# Patient Record
Sex: MALE | Race: BLACK OR AFRICAN AMERICAN | NOT HISPANIC OR LATINO | Employment: OTHER | URBAN - METROPOLITAN AREA
[De-identification: names, ages, dates, MRNs, and addresses within clinical notes are randomized per-mention and may not be internally consistent; named-entity substitution may affect disease eponyms.]

---

## 2023-05-22 LAB
ALBUMIN (G/DL) IN SER/PLAS: 4 G/DL (ref 3.4–5)
ALBUMIN (MG/L) IN URINE: 60 MG/L
ALBUMIN/CREATININE (UG/MG) IN URINE: 66.5 UG/MG CRT (ref 0–30)
ANION GAP IN SER/PLAS: 13 MMOL/L (ref 10–20)
APPEARANCE, URINE: CLEAR
BILIRUBIN, URINE: NEGATIVE
BLOOD, URINE: NEGATIVE
CALCIDIOL (25 OH VITAMIN D3) (NG/ML) IN SER/PLAS: 35 NG/ML
CALCIUM (MG/DL) IN SER/PLAS: 9.2 MG/DL (ref 8.6–10.6)
CARBON DIOXIDE, TOTAL (MMOL/L) IN SER/PLAS: 25 MMOL/L (ref 21–32)
CHLORIDE (MMOL/L) IN SER/PLAS: 106 MMOL/L (ref 98–107)
COLOR, URINE: YELLOW
CREATININE (MG/DL) IN SER/PLAS: 1.54 MG/DL (ref 0.5–1.3)
CREATININE (MG/DL) IN URINE: 90.2 MG/DL (ref 20–370)
ERYTHROCYTE DISTRIBUTION WIDTH (RATIO) BY AUTOMATED COUNT: 14.8 % (ref 11.5–14.5)
ERYTHROCYTE MEAN CORPUSCULAR HEMOGLOBIN CONCENTRATION (G/DL) BY AUTOMATED: 33.1 G/DL (ref 32–36)
ERYTHROCYTE MEAN CORPUSCULAR VOLUME (FL) BY AUTOMATED COUNT: 89 FL (ref 80–100)
ERYTHROCYTES (10*6/UL) IN BLOOD BY AUTOMATED COUNT: 3.58 X10E12/L (ref 4.5–5.9)
GFR MALE: 47 ML/MIN/1.73M2
GLUCOSE (MG/DL) IN SER/PLAS: 138 MG/DL (ref 74–99)
GLUCOSE, URINE: NEGATIVE MG/DL
HEMATOCRIT (%) IN BLOOD BY AUTOMATED COUNT: 31.7 % (ref 41–52)
HEMOGLOBIN (G/DL) IN BLOOD: 10.5 G/DL (ref 13.5–17.5)
KETONES, URINE: NEGATIVE MG/DL
LEUKOCYTE ESTERASE, URINE: NEGATIVE
LEUKOCYTES (10*3/UL) IN BLOOD BY AUTOMATED COUNT: 3.3 X10E9/L (ref 4.4–11.3)
NITRITE, URINE: NEGATIVE
NRBC (PER 100 WBCS) BY AUTOMATED COUNT: 0 /100 WBC (ref 0–0)
PH, URINE: 6 (ref 5–8)
PHOSPHATE (MG/DL) IN SER/PLAS: 3.1 MG/DL (ref 2.5–4.9)
PLATELETS (10*3/UL) IN BLOOD AUTOMATED COUNT: 157 X10E9/L (ref 150–450)
POTASSIUM (MMOL/L) IN SER/PLAS: 3.7 MMOL/L (ref 3.5–5.3)
PROTEIN, URINE: NEGATIVE MG/DL
SODIUM (MMOL/L) IN SER/PLAS: 140 MMOL/L (ref 136–145)
SPECIFIC GRAVITY, URINE: 1.01 (ref 1–1.03)
URATE (MG/DL) IN SER/PLAS: 6 MG/DL (ref 4–7.5)
UREA NITROGEN (MG/DL) IN SER/PLAS: 12 MG/DL (ref 6–23)
UROBILINOGEN, URINE: <2 MG/DL (ref 0–1.9)

## 2023-07-25 LAB — SARS-COV-2 RESULT: NOT DETECTED

## 2023-09-06 PROBLEM — M1A.00X0 CHRONIC GOUTY ARTHROPATHY: Status: ACTIVE | Noted: 2023-09-06

## 2023-09-06 PROBLEM — E87.1 LOW SODIUM LEVELS: Status: ACTIVE | Noted: 2023-09-06

## 2023-09-06 PROBLEM — H90.3 BILATERAL SENSORINEURAL HEARING LOSS: Status: ACTIVE | Noted: 2023-09-06

## 2023-09-06 PROBLEM — D47.2 SMOLDERING MULTIPLE MYELOMA (SMM): Status: ACTIVE | Noted: 2023-09-06

## 2023-09-06 PROBLEM — T50.905A ADVERSE EFFECT OF DRUG: Status: ACTIVE | Noted: 2023-09-06

## 2023-09-06 PROBLEM — I49.3 PVC (PREMATURE VENTRICULAR CONTRACTION): Status: ACTIVE | Noted: 2023-09-06

## 2023-09-06 PROBLEM — M17.11 RIGHT KNEE DJD: Status: ACTIVE | Noted: 2023-09-06

## 2023-09-06 PROBLEM — E78.00 HYPERCHOLESTEROLEMIA: Status: ACTIVE | Noted: 2023-09-06

## 2023-09-06 PROBLEM — H61.23 HEARING LOSS SECONDARY TO CERUMEN IMPACTION, BILATERAL: Status: ACTIVE | Noted: 2023-09-06

## 2023-09-06 PROBLEM — I26.99 PULMONARY EMBOLUS AND INFARCTION (MULTI): Status: ACTIVE | Noted: 2023-09-06

## 2023-09-06 PROBLEM — R07.9 CHEST PAIN: Status: ACTIVE | Noted: 2023-09-06

## 2023-09-06 PROBLEM — R68.82 LOW LIBIDO: Status: ACTIVE | Noted: 2023-09-06

## 2023-09-06 PROBLEM — M25.559 PAINFUL HIP: Status: ACTIVE | Noted: 2023-09-06

## 2023-09-06 PROBLEM — C90.00: Status: ACTIVE | Noted: 2023-09-06

## 2023-09-06 PROBLEM — M62.81 MUSCLE WEAKNESS: Status: ACTIVE | Noted: 2023-09-06

## 2023-09-06 PROBLEM — R55 SYNCOPE AND COLLAPSE: Status: ACTIVE | Noted: 2023-09-06

## 2023-09-06 PROBLEM — H93.13 SUBJECTIVE TINNITUS OF BOTH EARS: Status: ACTIVE | Noted: 2023-09-06

## 2023-09-06 PROBLEM — N18.9 CHRONIC RENAL INSUFFICIENCY: Status: ACTIVE | Noted: 2023-09-06

## 2023-09-06 PROBLEM — M10.9 GOUT OF RIGHT KNEE: Status: ACTIVE | Noted: 2023-09-06

## 2023-09-06 PROBLEM — D53.1 MEGALOBLASTIC ANEMIA: Status: ACTIVE | Noted: 2023-09-06

## 2023-09-06 PROBLEM — E86.0 DEHYDRATION: Status: ACTIVE | Noted: 2023-09-06

## 2023-09-06 PROBLEM — B35.9 DERMATOPHYTOSIS: Status: ACTIVE | Noted: 2023-09-06
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
## 2023-09-06 PROBLEM — L02.92 FURUNCULOSIS: Status: ACTIVE | Noted: 2023-09-06

## 2023-09-06 PROBLEM — M16.10 ARTHROPATHY OF HIP: Status: ACTIVE | Noted: 2023-09-06

## 2023-09-06 PROBLEM — I49.9 ARRHYTHMIA: Status: ACTIVE | Noted: 2023-09-06

## 2023-09-06 PROBLEM — M1A.9XX0 CHRONIC GOUTY ARTHROPATHY: Status: ACTIVE | Noted: 2023-09-06

## 2023-09-06 PROBLEM — D63.8 ANEMIA IN OTHER CHRONIC DISEASES CLASSIFIED ELSEWHERE: Status: ACTIVE | Noted: 2023-09-06

## 2023-09-06 PROBLEM — M16.10 ARTHRITIS OF HIP: Status: ACTIVE | Noted: 2023-09-06

## 2023-09-06 PROBLEM — M54.50 LOWER BACK PAIN: Status: ACTIVE | Noted: 2023-09-06

## 2023-09-06 PROBLEM — I48.91 NEW ONSET ATRIAL FIBRILLATION (MULTI): Status: ACTIVE | Noted: 2023-09-06

## 2023-09-06 PROBLEM — H93.13 BILATERAL TINNITUS: Status: ACTIVE | Noted: 2023-09-06

## 2023-09-06 PROBLEM — R91.8 LUNG MASS: Status: ACTIVE | Noted: 2023-09-06

## 2023-09-06 PROBLEM — R05.8 RECURRENT DRY COUGH: Status: ACTIVE | Noted: 2023-09-06

## 2023-09-06 PROBLEM — K61.0 PERIANAL ABSCESS: Status: ACTIVE | Noted: 2023-09-06

## 2023-09-06 PROBLEM — M27.2 OSTEOMYELITIS OF MANDIBLE: Status: ACTIVE | Noted: 2023-09-06

## 2023-09-06 PROBLEM — R19.7 INTERMITTENT DIARRHEA: Status: ACTIVE | Noted: 2023-09-06

## 2023-09-06 PROBLEM — M51.36 LUMBAR DEGENERATIVE DISC DISEASE: Status: ACTIVE | Noted: 2023-09-06

## 2023-09-06 PROBLEM — R35.0 FREQUENT URINATION: Status: ACTIVE | Noted: 2023-09-06

## 2023-09-06 PROBLEM — E87.1 HYPONATREMIA: Status: ACTIVE | Noted: 2023-09-06

## 2023-09-06 PROBLEM — B35.4 RINGWORM OF BODY: Status: ACTIVE | Noted: 2023-09-06

## 2023-09-06 PROBLEM — L08.9 STAPH SKIN INFECTION: Status: ACTIVE | Noted: 2023-09-06

## 2023-09-06 PROBLEM — E29.1 HYPOGONADISM MALE: Status: ACTIVE | Noted: 2023-09-06

## 2023-09-06 PROBLEM — R29.898 WEAKNESS OF LEFT FOOT: Status: ACTIVE | Noted: 2023-09-06

## 2023-09-06 PROBLEM — I48.0 PAROXYSMAL ATRIAL FIBRILLATION (MULTI): Status: ACTIVE | Noted: 2023-09-06

## 2023-09-06 PROBLEM — N40.0 ENLARGED PROSTATE WITHOUT LOWER URINARY TRACT SYMPTOMS (LUTS): Status: ACTIVE | Noted: 2023-09-06

## 2023-09-06 PROBLEM — R53.83 CHEMOTHERAPY-INDUCED FATIGUE: Status: ACTIVE | Noted: 2023-09-06

## 2023-09-06 PROBLEM — R21 RASH: Status: ACTIVE | Noted: 2023-09-06

## 2023-09-06 PROBLEM — N13.8 BPH WITH OBSTRUCTION/LOWER URINARY TRACT SYMPTOMS: Status: ACTIVE | Noted: 2023-09-06

## 2023-09-06 PROBLEM — R53.81 MALAISE AND FATIGUE: Status: ACTIVE | Noted: 2023-09-06

## 2023-09-06 PROBLEM — H93.8X3 FULLNESS IN EAR, BILATERAL: Status: ACTIVE | Noted: 2023-09-06

## 2023-09-06 PROBLEM — R53.83 MALAISE AND FATIGUE: Status: ACTIVE | Noted: 2023-09-06

## 2023-09-06 PROBLEM — K21.9 GERD WITHOUT ESOPHAGITIS: Status: ACTIVE | Noted: 2023-09-06

## 2023-09-06 PROBLEM — R06.02 SOB (SHORTNESS OF BREATH): Status: ACTIVE | Noted: 2023-09-06

## 2023-09-06 PROBLEM — A09 GASTROENTERITIS, INFECTIOUS: Status: ACTIVE | Noted: 2023-09-06

## 2023-09-06 PROBLEM — N40.1 BPH WITH OBSTRUCTION/LOWER URINARY TRACT SYMPTOMS: Status: ACTIVE | Noted: 2023-09-06

## 2023-09-06 PROBLEM — T45.1X5A CHEMOTHERAPY-INDUCED FATIGUE: Status: ACTIVE | Noted: 2023-09-06

## 2023-09-06 PROBLEM — I47.19 ATRIAL TACHYCARDIA (CMS-HCC): Status: ACTIVE | Noted: 2023-09-06

## 2023-09-06 PROBLEM — I49.9 ARRHYTHMIA, VENTRICULAR: Status: ACTIVE | Noted: 2018-12-14

## 2023-09-06 PROBLEM — N31.9 NEUROGENIC BLADDER: Status: ACTIVE | Noted: 2023-09-06

## 2023-09-06 PROBLEM — R42 DIZZINESS: Status: ACTIVE | Noted: 2022-04-11

## 2023-09-06 PROBLEM — R97.20 ELEVATED PSA: Status: ACTIVE | Noted: 2023-09-06

## 2023-09-06 PROBLEM — J45.991 COUGH VARIANT ASTHMA (HHS-HCC): Status: ACTIVE | Noted: 2023-09-06

## 2023-09-06 PROBLEM — E11.9 DIABETES MELLITUS (MULTI): Status: ACTIVE | Noted: 2023-09-06

## 2023-09-06 PROBLEM — D61.818 PANCYTOPENIA (MULTI): Status: ACTIVE | Noted: 2023-09-06

## 2023-09-06 PROBLEM — I26.99 OTHER PULMONARY EMBOLISM WITHOUT ACUTE COR PULMONALE (MULTI): Status: ACTIVE | Noted: 2023-09-06

## 2023-09-06 PROBLEM — M1A.9XX1: Status: ACTIVE | Noted: 2023-09-06

## 2023-09-06 PROBLEM — T50.905A ADVERSE DRUG REACTION: Status: ACTIVE | Noted: 2023-09-06

## 2023-09-06 PROBLEM — M51.369 LUMBAR DEGENERATIVE DISC DISEASE: Status: ACTIVE | Noted: 2023-09-06

## 2023-09-06 PROBLEM — I48.91 ATRIAL FIBRILLATION WITH RVR (MULTI): Status: ACTIVE | Noted: 2023-09-06

## 2023-09-06 PROBLEM — E87.6 HYPOKALEMIA: Status: ACTIVE | Noted: 2023-09-06

## 2023-09-06 PROBLEM — R53.83 FATIGUE: Status: ACTIVE | Noted: 2023-09-06

## 2023-09-06 PROBLEM — I10 BENIGN HYPERTENSION: Status: ACTIVE | Noted: 2023-09-06

## 2023-09-06 PROBLEM — A09 DIARRHEA OF INFECTIOUS ORIGIN: Status: ACTIVE | Noted: 2023-09-06

## 2023-09-06 PROBLEM — M87.188: Status: ACTIVE | Noted: 2023-09-06

## 2023-09-06 PROBLEM — R29.898 TRANSIENT WEAKNESS OF LEFT LOWER EXTREMITY: Status: ACTIVE | Noted: 2023-09-06

## 2023-09-06 PROBLEM — R11.2 NAUSEA WITH VOMITING: Status: ACTIVE | Noted: 2023-09-06

## 2023-09-06 PROBLEM — I95.89 HYPOTENSION DUE TO HYPOVOLEMIA: Status: ACTIVE | Noted: 2023-09-06

## 2023-09-06 PROBLEM — M21.372 LEFT FOOT DROP: Status: ACTIVE | Noted: 2023-09-06

## 2023-09-06 PROBLEM — C90.00 IGG MULTIPLE MYELOMA (MULTI): Status: ACTIVE | Noted: 2023-09-06

## 2023-09-06 PROBLEM — B95.8 STAPH SKIN INFECTION: Status: ACTIVE | Noted: 2023-09-06

## 2023-09-06 PROBLEM — I95.1 ORTHOSTATIC HYPOTENSION: Status: ACTIVE | Noted: 2023-09-06

## 2023-09-06 PROBLEM — E86.1 HYPOTENSION DUE TO HYPOVOLEMIA: Status: ACTIVE | Noted: 2023-09-06

## 2023-09-06 RX ORDER — FINASTERIDE 5 MG/1
5 TABLET, FILM COATED ORAL DAILY PRN
COMMUNITY
Start: 2015-10-28

## 2023-09-06 RX ORDER — METOPROLOL TARTRATE 37.5 MG/1
TABLET, FILM COATED ORAL
COMMUNITY
Start: 2022-05-31 | End: 2023-10-11 | Stop reason: SDUPTHER

## 2023-09-06 RX ORDER — ATORVASTATIN CALCIUM 10 MG/1
1 TABLET, FILM COATED ORAL DAILY
COMMUNITY
Start: 2014-01-31 | End: 2023-10-12

## 2023-09-06 RX ORDER — ACETAMINOPHEN 500 MG
TABLET ORAL
COMMUNITY

## 2023-09-06 RX ORDER — FLUTICASONE PROPIONATE 50 MCG
1 SPRAY, SUSPENSION (ML) NASAL DAILY PRN
COMMUNITY
Start: 2021-09-03

## 2023-09-06 RX ORDER — ALLOPURINOL 300 MG/1
300 TABLET ORAL AS NEEDED
COMMUNITY
Start: 2013-11-20

## 2023-09-06 RX ORDER — SILDENAFIL 100 MG/1
100 TABLET, FILM COATED ORAL AS NEEDED
COMMUNITY
Start: 2022-02-28

## 2023-09-06 RX ORDER — FLUDROCORTISONE ACETATE 0.1 MG/1
1 TABLET ORAL DAILY
COMMUNITY
Start: 2022-05-31

## 2023-09-06 RX ORDER — PANTOPRAZOLE SODIUM 40 MG/1
1 TABLET, DELAYED RELEASE ORAL DAILY
COMMUNITY
Start: 2021-05-24

## 2023-09-06 RX ORDER — POTASSIUM CHLORIDE 750 MG/1
TABLET, FILM COATED, EXTENDED RELEASE ORAL
COMMUNITY
Start: 2022-06-15 | End: 2024-04-10 | Stop reason: ALTCHOICE

## 2023-09-06 RX ORDER — TRAMADOL HYDROCHLORIDE 50 MG/1
50 TABLET ORAL 3 TIMES DAILY PRN
COMMUNITY
Start: 2014-01-17 | End: 2023-10-13 | Stop reason: SDUPTHER

## 2023-09-06 RX ORDER — LENALIDOMIDE 5 MG/1
CAPSULE ORAL
COMMUNITY

## 2023-09-06 RX ORDER — METHYLPREDNISOLONE 4 MG/1
TABLET ORAL
COMMUNITY
Start: 2022-11-15 | End: 2023-11-14 | Stop reason: ALTCHOICE

## 2023-09-06 RX ORDER — METOPROLOL TARTRATE 25 MG/1
25 TABLET, FILM COATED ORAL 2 TIMES DAILY
COMMUNITY
Start: 2022-05-04 | End: 2023-10-11 | Stop reason: SDUPTHER

## 2023-09-06 RX ORDER — TAMSULOSIN HYDROCHLORIDE 0.4 MG/1
1 CAPSULE ORAL DAILY
COMMUNITY
Start: 2015-10-28

## 2023-09-06 RX ORDER — COLCHICINE 0.6 MG/1
0.6 TABLET ORAL DAILY PRN
COMMUNITY
Start: 2013-11-20

## 2023-09-06 RX ORDER — METOPROLOL TARTRATE 50 MG/1
1 TABLET ORAL EVERY 12 HOURS
COMMUNITY
Start: 2022-04-30 | End: 2023-12-08 | Stop reason: SDUPTHER

## 2023-09-06 RX ORDER — METFORMIN HYDROCHLORIDE 500 MG/1
500 TABLET ORAL AS NEEDED
COMMUNITY
Start: 2017-10-27

## 2023-09-06 RX ORDER — AMLODIPINE BESYLATE 5 MG/1
5 TABLET ORAL DAILY
COMMUNITY
End: 2024-04-17

## 2023-09-06 RX ORDER — ECONAZOLE NITRATE 10 MG/G
1 CREAM TOPICAL 2 TIMES DAILY
COMMUNITY
Start: 2021-05-24 | End: 2023-10-11 | Stop reason: ALTCHOICE

## 2023-09-18 DIAGNOSIS — C90.00 IGG MULTIPLE MYELOMA (MULTI): Primary | ICD-10-CM

## 2023-09-18 RX ORDER — HEPARIN 100 UNIT/ML
500 SYRINGE INTRAVENOUS AS NEEDED
Status: CANCELLED | OUTPATIENT
Start: 2023-10-02

## 2023-09-18 RX ORDER — HEPARIN SODIUM,PORCINE/PF 10 UNIT/ML
50 SYRINGE (ML) INTRAVENOUS AS NEEDED
Status: CANCELLED | OUTPATIENT
Start: 2023-10-02

## 2023-09-27 LAB
ALBUMIN (G/DL) IN SER/PLAS: 4.1 G/DL (ref 3.4–5)
ALBUMIN (MG/L) IN URINE: 19 MG/L
ALBUMIN/CREATININE (UG/MG) IN URINE: 17 UG/MG CRT (ref 0–30)
ANION GAP IN SER/PLAS: 14 MMOL/L (ref 10–20)
APPEARANCE, URINE: CLEAR
BILIRUBIN, URINE: NEGATIVE
BLOOD, URINE: NEGATIVE
CALCIUM (MG/DL) IN SER/PLAS: 9.1 MG/DL (ref 8.6–10.6)
CARBON DIOXIDE, TOTAL (MMOL/L) IN SER/PLAS: 24 MMOL/L (ref 21–32)
CHLORIDE (MMOL/L) IN SER/PLAS: 103 MMOL/L (ref 98–107)
CHOLESTEROL (MG/DL) IN SER/PLAS: 153 MG/DL (ref 0–199)
CHOLESTEROL IN HDL (MG/DL) IN SER/PLAS: 70.6 MG/DL
CHOLESTEROL/HDL RATIO: 2.2
COLOR, URINE: YELLOW
CREATININE (MG/DL) IN SER/PLAS: 1.34 MG/DL (ref 0.5–1.3)
CREATININE (MG/DL) IN URINE: 112 MG/DL (ref 20–370)
ERYTHROCYTE DISTRIBUTION WIDTH (RATIO) BY AUTOMATED COUNT: 16.2 % (ref 11.5–14.5)
ERYTHROCYTE MEAN CORPUSCULAR HEMOGLOBIN CONCENTRATION (G/DL) BY AUTOMATED: 33.1 G/DL (ref 32–36)
ERYTHROCYTE MEAN CORPUSCULAR VOLUME (FL) BY AUTOMATED COUNT: 93 FL (ref 80–100)
ERYTHROCYTES (10*6/UL) IN BLOOD BY AUTOMATED COUNT: 3.6 X10E12/L (ref 4.5–5.9)
FERRITIN (UG/LL) IN SER/PLAS: 421 UG/L (ref 20–300)
GFR MALE: 55 ML/MIN/1.73M2
GLUCOSE (MG/DL) IN SER/PLAS: 167 MG/DL (ref 74–99)
GLUCOSE, URINE: NEGATIVE MG/DL
HEMATOCRIT (%) IN BLOOD BY AUTOMATED COUNT: 33.5 % (ref 41–52)
HEMOGLOBIN (G/DL) IN BLOOD: 11.1 G/DL (ref 13.5–17.5)
IRON (UG/DL) IN SER/PLAS: 115 UG/DL (ref 35–150)
IRON BINDING CAPACITY (UG/DL) IN SER/PLAS: 319 UG/DL (ref 240–445)
IRON SATURATION (%) IN SER/PLAS: 36 % (ref 25–45)
KETONES, URINE: NEGATIVE MG/DL
LDL: 61 MG/DL (ref 0–99)
LEUKOCYTE ESTERASE, URINE: NEGATIVE
LEUKOCYTES (10*3/UL) IN BLOOD BY AUTOMATED COUNT: 3.4 X10E9/L (ref 4.4–11.3)
NITRITE, URINE: NEGATIVE
NRBC (PER 100 WBCS) BY AUTOMATED COUNT: 0 /100 WBC (ref 0–0)
PH, URINE: 6 (ref 5–8)
PHOSPHATE (MG/DL) IN SER/PLAS: 2.8 MG/DL (ref 2.5–4.9)
PLATELETS (10*3/UL) IN BLOOD AUTOMATED COUNT: 153 X10E9/L (ref 150–450)
POTASSIUM (MMOL/L) IN SER/PLAS: 3.9 MMOL/L (ref 3.5–5.3)
PROTEIN, URINE: NEGATIVE MG/DL
SODIUM (MMOL/L) IN SER/PLAS: 137 MMOL/L (ref 136–145)
SPECIFIC GRAVITY, URINE: 1.01 (ref 1–1.03)
TRIGLYCERIDE (MG/DL) IN SER/PLAS: 109 MG/DL (ref 0–149)
UREA NITROGEN (MG/DL) IN SER/PLAS: 15 MG/DL (ref 6–23)
UROBILINOGEN, URINE: <2 MG/DL (ref 0–1.9)
VLDL: 22 MG/DL (ref 0–40)

## 2023-09-28 LAB — PARATHYRIN INTACT (PG/ML) IN SER/PLAS: 85.6 PG/ML (ref 18.5–88)

## 2023-10-11 ENCOUNTER — OFFICE VISIT (OUTPATIENT)
Dept: CARDIOLOGY | Facility: HOSPITAL | Age: 73
End: 2023-10-11
Payer: MEDICARE

## 2023-10-11 VITALS
OXYGEN SATURATION: 98 % | HEIGHT: 71 IN | SYSTOLIC BLOOD PRESSURE: 130 MMHG | DIASTOLIC BLOOD PRESSURE: 82 MMHG | WEIGHT: 186.5 LBS | BODY MASS INDEX: 26.11 KG/M2 | HEART RATE: 70 BPM

## 2023-10-11 DIAGNOSIS — I48.91 ATRIAL FIBRILLATION WITH RVR (MULTI): Primary | ICD-10-CM

## 2023-10-11 DIAGNOSIS — I10 BENIGN HYPERTENSION: ICD-10-CM

## 2023-10-11 PROCEDURE — 99214 OFFICE O/P EST MOD 30 MIN: CPT | Performed by: NURSE PRACTITIONER

## 2023-10-11 PROCEDURE — 3075F SYST BP GE 130 - 139MM HG: CPT | Performed by: NURSE PRACTITIONER

## 2023-10-11 PROCEDURE — 3079F DIAST BP 80-89 MM HG: CPT | Performed by: NURSE PRACTITIONER

## 2023-10-11 PROCEDURE — 1036F TOBACCO NON-USER: CPT | Performed by: NURSE PRACTITIONER

## 2023-10-11 PROCEDURE — 1159F MED LIST DOCD IN RCRD: CPT | Performed by: NURSE PRACTITIONER

## 2023-10-11 ASSESSMENT — ENCOUNTER SYMPTOMS
DEPRESSION: 0
OCCASIONAL FEELINGS OF UNSTEADINESS: 0
LOSS OF SENSATION IN FEET: 1

## 2023-10-11 ASSESSMENT — PATIENT HEALTH QUESTIONNAIRE - PHQ9
2. FEELING DOWN, DEPRESSED OR HOPELESS: NOT AT ALL
SUM OF ALL RESPONSES TO PHQ9 QUESTIONS 1 & 2: 0
1. LITTLE INTEREST OR PLEASURE IN DOING THINGS: NOT AT ALL

## 2023-10-11 NOTE — PATIENT INSTRUCTIONS
Continue current cardiovascular medications  Verify your dose of Metoprolol you are taking. We have recorded Metoprolol tartrate 50 mg twice a day. Call if medication dose is different  Follow up as scheduled with Dr. Moon

## 2023-10-11 NOTE — PROGRESS NOTES
Primary Care Physician: Antoine Cano MD  Date of Visit: 10/11/2023  9:00 AM EDT  Location of visit: [facility]     Chief Complaint:   Chief Complaint   Patient presents with    Atrial Fibrillation    Hyperlipidemia    Hypertension    Follow-up     6 month         HPI / Summary:   Toby Ugalde is a 73 y.o. male presents for followup. Seen in collaboration with Dr. Moon. He continues to have chemotherapy for treatment of multiple myeloma. He has chronic dyspnea on exertion when ambulating up 1 flight of stairs which has remained unchanged. This is not always consistent. No Chest pain.  He ambulates with assistance of a cane at times. The patient denies chest pain,palpitations, lightheadedness, syncope, orthopnea, paroxysmal nocturnal dyspnea,lower extremity edema, or bleeding problems.              Past Medical History:  Past Medical History:   Diagnosis Date    Essential (primary) hypertension 11/20/2013    Benign essential hypertension    Other intervertebral disc degeneration, lumbar region     Lumbar degenerative disc disease    Personal history of malignant neoplasm of prostate     History of malignant neoplasm of prostate    Personal history of other diseases of the circulatory system     History of hypertension    Personal history of other diseases of the circulatory system 06/29/2022    History of atrial tachycardia    Personal history of other diseases of the circulatory system     History of essential hypertension    Personal history of other diseases of the musculoskeletal system and connective tissue     History of arthritis    Personal history of other diseases of the musculoskeletal system and connective tissue     History of acute gouty arthritis    Personal history of other diseases of the musculoskeletal system and connective tissue 01/30/2019    History of left foot drop    Personal history of other diseases of the respiratory system     History of bronchitis    Personal history of  other endocrine, nutritional and metabolic disease     History of hypercholesterolemia    Personal history of other endocrine, nutritional and metabolic disease     History of diabetes mellitus    Personal history of other endocrine, nutritional and metabolic disease 01/17/2014    History of diabetes mellitus    Pure hypercholesterolemia, unspecified 11/20/2013    Low-density-lipoid-type (LDL) hyperlipoproteinemia    Snoring     Snoring        Past Surgical History:  Past Surgical History:   Procedure Laterality Date    BACK SURGERY  11/20/2013    Back Surgery    TONSILLECTOMY  11/20/2013    Tonsillectomy    TOTAL KNEE ARTHROPLASTY  11/20/2013    Knee Replacement          Social History:   reports that he has never smoked. He has never used smokeless tobacco. He reports current alcohol use. He reports that he does not use drugs.     Family History:  family history includes Diabetes in his brother; Heart attack in his mother; Stroke in his maternal grandfather.      Allergies:  No Known Allergies    Outpatient Medications:  Current Outpatient Medications   Medication Instructions    acetaminophen (Tylenol) 500 mg tablet oral    allopurinol (ZYLOPRIM) 300 mg, oral, As needed    amLODIPine (NORVASC) 5 mg, oral, Daily    apixaban (Eliquis) 5 mg tablet TAKE ONE (1) TABLET BY MOUTH 2 TIMES A DAY    atorvastatin (Lipitor) 10 mg tablet 1 tablet, oral, Daily    colchicine (gout) 0.6 mg, oral, Daily PRN    dextromethorphan-guaifenesin (Mucinex DM)  mg 12 hr tablet 1 tablet, oral, Every 12 hours    finasteride (PROSCAR) 5 mg, oral, Daily PRN    fludrocortisone (Florinef) 0.1 mg tablet 1 tablet, oral, Daily    fluticasone (Flonase) 50 mcg/actuation nasal spray 1 spray, nasal, Daily PRN    lenalidomide (Revlimid) 5 mg capsule oral    metFORMIN (GLUCOPHAGE) 500 mg, oral, As needed    methylPREDNISolone (Medrol, Phillip,) 4 mg tablets oral,  1 kit orally take as directed on package    metoprolol tartrate (Lopressor) 50 mg tablet  "1 tablet, oral, Every 12 hours    pantoprazole (ProtoNix) 40 mg EC tablet 1 tablet, oral, Daily    potassium chloride CR 10 mEq ER tablet oral    sildenafil (VIAGRA) 100 mg, oral, As needed,  1 HOUR BEFORE NEEDED.    tamsulosin (Flomax) 0.4 mg 24 hr capsule 1 capsule, oral, Daily    traMADol (ULTRAM) 50 mg, oral, 3 times daily PRN       Physical Exam:  Vitals:    10/11/23 0922   BP: 130/82   BP Location: Left arm   Patient Position: Sitting   Pulse: 70   SpO2: 98%   Weight: 84.6 kg (186 lb 8 oz)   Height: 1.803 m (5' 11\")     Wt Readings from Last 5 Encounters:   10/11/23 84.6 kg (186 lb 8 oz)   06/27/23 86.9 kg (191 lb 9.3 oz)   05/24/23 86.2 kg (190 lb)   04/21/23 86.2 kg (190 lb)   04/12/23 84.8 kg (187 lb 0.4 oz)     Body mass index is 26.01 kg/m².     GENERAL: alert, cooperative, pleasant, in no acute distress  SKIN: warm and dry  NECK: Normal JVD, negative HJR  CARDIAC: Regular rate and rhythm with no rubs, murmurs, or gallops  CHEST: Normal respiratory efforts, lungs clear to auscultation bilaterally.  ABDOMEN: soft, nontender, nondistended  EXTREMITIES: no edema, +2 palpable RP and PT pulses bilaterally       Last Labs:  Recent Labs     09/11/23  0851 09/19/23  1003 09/27/23  1153   WBC 2.6* 2.4* 3.4*   HGB 9.5* 9.6* 11.1*   HCT 29.3* 29.4* 33.5*   PLT 95* 111* 153   MCV 93 92 93     Recent Labs     08/24/23  0906 09/11/23  0850 09/27/23  1153    142 137   K 3.5 3.8 3.9    107 103   BUN 14 12 15   CREATININE 1.27 1.18 1.34*     CMP -  Lab Results   Component Value Date    CALCIUM 9.1 09/27/2023    PHOS 2.8 09/27/2023    PROT 5.6 (L) 09/11/2023    PROT 5.6 (L) 09/11/2023    ALBUMIN 4.1 09/27/2023    AST 10 09/11/2023    ALT 9 (L) 09/11/2023    ALKPHOS 55 09/11/2023    BILITOT 0.7 09/11/2023       LIPID PANEL -   Lab Results   Component Value Date    CHOL 153 09/27/2023    HDL 70.6 09/27/2023    LDLF 61 09/27/2023    TRIG 109 09/27/2023       Lab Results   Component Value Date    BNP 20 04/09/2022 "    HGBA1C 7.7 (A) 02/28/2022    HGBA1C 7.0 (H) 08/03/2020       Last Cardiology Tests:  ECG:  Obtained and reviewed EKG- normal sinus rhythm HR 70    Echo:  CONCLUSIONS:   1. The left ventricular systolic function is normal with a 60% estimated ejection fraction.   2. Compared with study from 9/6/2018, Right ventricle appears mildly enlarged.       Stress Test:  9/13/2018   IMPRESSION:  1. Normal stress myocardial perfusion imaging. No evidence of  stress-induced ischemia or scar.  2. Well-maintained left ventricular function.  I personally reviewed the images/study and I agree with the findings  as stated. This study was interpreted at [facility], [place], [place].              Assessment/Plan   Diagnoses and all orders for this visit:  Atrial fibrillation with RVR (CMS/[facility])  -     ECG 12 lead (Clinic Performed)  Benign hypertension  In Summary Mr. Toby Ugalde is a pleasant 73 year old  male with significant past medical history of Paroxysmal atrial fibrillation, Hypertension, Pulmonary embolism 4/9/22, GERD, Anemia, Multiple myeloma. Patient is asymptomatic from a cardiac perspective. His blood pressure is acceptable. His lipid panel is acceptable. His EKG today showed normal sinus rhythm. He is tolerating Eliquis. Blood work is up to date. He will continue current cardiovascular medications. He will follow up as scheduled with Dr. Moon.       Orders:  Orders Placed This Encounter   Procedures    ECG 12 lead (Clinic Performed)      Followup Appts:  Future Appointments   Date Time Provider Department Center   10/13/2023 10:00 AM Antoine Cano MD [id number] [facility]   10/17/2023  9:30 AM INF 04 MINOFF [id number] [facility]   11/7/2023  8:30 AM [facility] [place]  [id number] [facility]   11/14/2023  8:40 AM Yumiko Waller MD [id number] [facility]   11/14/2023  9:30 AM INF 04 MINOFF [id number] [facility]   4/10/2024  9:00 AM Rishi Moon MD [id number] [facility]   8/7/2024  8:40 AM Geoffrey  FOSTER Garcia MD [id number] [place]           ____________________________________________________________  EWELINA Almanza-CNP  [facility]

## 2023-10-12 DIAGNOSIS — E78.00 HYPERCHOLESTEROLEMIA: Primary | ICD-10-CM

## 2023-10-12 RX ORDER — ATORVASTATIN CALCIUM 10 MG/1
10 TABLET, FILM COATED ORAL DAILY
Qty: 90 TABLET | Refills: 3 | Status: SHIPPED | OUTPATIENT
Start: 2023-10-12 | End: 2023-10-27 | Stop reason: ALTCHOICE

## 2023-10-13 ENCOUNTER — OFFICE VISIT (OUTPATIENT)
Dept: PRIMARY CARE | Facility: CLINIC | Age: 73
End: 2023-10-13
Payer: MEDICARE

## 2023-10-13 VITALS
SYSTOLIC BLOOD PRESSURE: 130 MMHG | HEART RATE: 76 BPM | BODY MASS INDEX: 25.8 KG/M2 | TEMPERATURE: 97.6 F | RESPIRATION RATE: 18 BRPM | OXYGEN SATURATION: 98 % | WEIGHT: 185 LBS | DIASTOLIC BLOOD PRESSURE: 70 MMHG

## 2023-10-13 DIAGNOSIS — R97.20 ELEVATED PSA: ICD-10-CM

## 2023-10-13 DIAGNOSIS — M51.36 LUMBAR DEGENERATIVE DISC DISEASE: Primary | ICD-10-CM

## 2023-10-13 DIAGNOSIS — E11.9 TYPE 2 DIABETES MELLITUS WITHOUT COMPLICATION, WITHOUT LONG-TERM CURRENT USE OF INSULIN (MULTI): ICD-10-CM

## 2023-10-13 DIAGNOSIS — I10 BENIGN HYPERTENSION: ICD-10-CM

## 2023-10-13 DIAGNOSIS — I48.91 ATRIAL FIBRILLATION WITH RVR (MULTI): ICD-10-CM

## 2023-10-13 DIAGNOSIS — K21.9 GERD WITHOUT ESOPHAGITIS: ICD-10-CM

## 2023-10-13 DIAGNOSIS — C90.00 IGG MULTIPLE MYELOMA (MULTI): ICD-10-CM

## 2023-10-13 DIAGNOSIS — E78.00 HYPERCHOLESTEROLEMIA: ICD-10-CM

## 2023-10-13 DIAGNOSIS — N18.2 CHRONIC RENAL IMPAIRMENT, STAGE 2 (MILD): ICD-10-CM

## 2023-10-13 PROBLEM — Z86.711 HISTORY OF PULMONARY EMBOLISM: Status: ACTIVE | Noted: 2023-10-13

## 2023-10-13 PROBLEM — G47.00 INSOMNIA: Status: ACTIVE | Noted: 2023-10-13

## 2023-10-13 PROBLEM — F32.0 MILD MAJOR DEPRESSION (CMS-HCC): Status: ACTIVE | Noted: 2023-10-13

## 2023-10-13 PROBLEM — M16.0 BILATERAL PRIMARY OSTEOARTHRITIS OF HIP: Status: ACTIVE | Noted: 2023-10-13

## 2023-10-13 PROBLEM — Z98.890 HISTORY OF LUMBAR SURGERY: Status: ACTIVE | Noted: 2017-11-14

## 2023-10-13 PROBLEM — Z96.641 HISTORY OF TOTAL RIGHT HIP REPLACEMENT: Status: ACTIVE | Noted: 2021-11-10

## 2023-10-13 PROBLEM — N28.9 NEPHROPATHY: Status: ACTIVE | Noted: 2023-10-13

## 2023-10-13 PROCEDURE — 1159F MED LIST DOCD IN RCRD: CPT | Performed by: INTERNAL MEDICINE

## 2023-10-13 PROCEDURE — 3078F DIAST BP <80 MM HG: CPT | Performed by: INTERNAL MEDICINE

## 2023-10-13 PROCEDURE — 99214 OFFICE O/P EST MOD 30 MIN: CPT | Performed by: INTERNAL MEDICINE

## 2023-10-13 PROCEDURE — 1036F TOBACCO NON-USER: CPT | Performed by: INTERNAL MEDICINE

## 2023-10-13 PROCEDURE — 3075F SYST BP GE 130 - 139MM HG: CPT | Performed by: INTERNAL MEDICINE

## 2023-10-13 RX ORDER — TRAMADOL HYDROCHLORIDE 50 MG/1
50 TABLET ORAL EVERY 8 HOURS PRN
Qty: 90 TABLET | Refills: 2 | Status: SHIPPED | OUTPATIENT
Start: 2023-10-13 | End: 2024-01-12 | Stop reason: SDUPTHER

## 2023-10-13 ASSESSMENT — ENCOUNTER SYMPTOMS
HEMATOLOGIC/LYMPHATIC NEGATIVE: 1
ALLERGIC/IMMUNOLOGIC NEGATIVE: 1
NEUROLOGICAL NEGATIVE: 1
OCCASIONAL FEELINGS OF UNSTEADINESS: 0
LOSS OF SENSATION IN FEET: 0
EYES NEGATIVE: 1
DEPRESSION: 0
PSYCHIATRIC NEGATIVE: 1
ENDOCRINE NEGATIVE: 1
CARDIOVASCULAR NEGATIVE: 1
RESPIRATORY NEGATIVE: 1
CONSTITUTIONAL NEGATIVE: 1
MUSCULOSKELETAL NEGATIVE: 1
GASTROINTESTINAL NEGATIVE: 1

## 2023-10-13 NOTE — ASSESSMENT & PLAN NOTE
CRI - Chronic Renal Insuficiency. Secondary to DM/HTN/Atherosclerosis. Follow BUN/Cr. and lytes.   RENOPROTECTION with ACEI/ARB (). Monitor microalbuminuria. Avoid hypotension and renal hypoperfusion. The patient was instructed to avoid NSAIDS and educate compliance with medications to control HTN(hypertension) and cholesterol and diabetes.

## 2023-10-13 NOTE — ASSESSMENT & PLAN NOTE
DM - NIDDM  . Reviewed with patient / Will check  HbA1c and fasting blood sugars. Educate home self monitoring and diary keeping(reviewed with patient home blood sugar levels /diary). Educate extensively low calorie diet and weight loss with exercise. Reviewed BS- diary and Rx. Regimen. [place] renal protection with ARB/ACEI.               Educate compliance with diet and Rx. And educate risks and autcomes.     Refill Metformin 500 mg BID with meals

## 2023-10-13 NOTE — ASSESSMENT & PLAN NOTE
Hypercholesterolemia - Monitor lipid profile and educate patient upon risks of high cholesterol and targets. Educate diet and change in lifestyle and increase in exercises - Refill:   Atorvastatin and educate compliance with medication and diet.

## 2023-10-13 NOTE — PROGRESS NOTES
Subjective   Patient ID: Toby Ugalde is a 73 y.o. male who presents for No chief complaint on file..    HPI     Review of Systems    Objective   Pulse 76   Temp 36.4 °C (97.6 °F)   Resp 18   Wt 83.9 kg (185 lb)   SpO2 98%   BMI 25.80 kg/m²     Physical Exam    Assessment/Plan

## 2023-10-13 NOTE — ASSESSMENT & PLAN NOTE
HTN - hypertension well/controlled .Target BP < 130/80  achieved. Educate low salt diet and exercise with weight loss. Educate home self monitoring and diary keeping. Educate risks of elevate blood pressure and benefits of prompt treatment.  Refill Amlodipine and Metoprolol

## 2023-10-13 NOTE — ASSESSMENT & PLAN NOTE
Atrial fibrillation - persistent with  Ventricular rate is well controlled (in sinus rhythm). ON eLIQUIS  No Shortness of breath. Continues   Metoprolol to 50 mg BID

## 2023-10-13 NOTE — ASSESSMENT & PLAN NOTE
GERD - Acid reflux disease. Rx. PPI (Prilosec/Prevacid/Protonix/Nexium) and educate diet and life style changes. Referred patient to an endoscopy (EGD) and check H. Pylori titers.

## 2023-10-13 NOTE — PROGRESS NOTES
Subjective   Patient ID: Toby Ugalde is a 73 y.o. male who presents for No chief complaint on file..    HPI     Review of Systems   Constitutional: Negative.    HENT: Negative.     Eyes: Negative.    Respiratory: Negative.     Cardiovascular: Negative.    Gastrointestinal: Negative.    Endocrine: Negative.    Musculoskeletal: Negative.    Skin: Negative.    Allergic/Immunologic: Negative.    Neurological: Negative.    Hematological: Negative.    Psychiatric/Behavioral: Negative.     All other systems reviewed and are negative.      Objective   Pulse 76   Temp 36.4 °C (97.6 °F)   Resp 18   Wt 83.9 kg (185 lb)   SpO2 98%   BMI 25.80 kg/m²   Blood pressure 130/70, pulse 76, temperature 36.4 °C (97.6 °F), resp. rate 18, weight 83.9 kg (185 lb), SpO2 98 %.   Physical Exam  Vitals and nursing note reviewed.   Constitutional:       Appearance: Normal appearance.   HENT:      Head: Normocephalic and atraumatic.      Right Ear: Tympanic membrane, ear canal and external ear normal.      Left Ear: Tympanic membrane, ear canal and external ear normal. There is no impacted cerumen.      Nose: Nose normal.      Mouth/Throat:      Mouth: Mucous membranes are moist.      Pharynx: Oropharynx is clear.   Eyes:      Extraocular Movements: Extraocular movements intact.      Conjunctiva/sclera: Conjunctivae normal.      Pupils: Pupils are equal, round, and reactive to light.   Cardiovascular:      Rate and Rhythm: Normal rate and regular rhythm.      Pulses: Normal pulses.      Heart sounds: Normal heart sounds. No murmur heard.  Pulmonary:      Effort: Pulmonary effort is normal. No respiratory distress.      Breath sounds: Normal breath sounds. No stridor. No wheezing, rhonchi or rales.   Chest:      Chest wall: No tenderness.   Abdominal:      General: Abdomen is flat. Bowel sounds are normal. There is no distension.      Palpations: Abdomen is soft. There is no mass.      Tenderness: There is no abdominal tenderness. There is  no right CVA tenderness, left CVA tenderness, guarding or rebound.      Hernia: No hernia is present.   Musculoskeletal:         General: Normal range of motion.      Cervical back: Normal range of motion and neck supple.   Skin:     General: Skin is warm.      Capillary Refill: Capillary refill takes less than 2 seconds.   Neurological:      General: No focal deficit present.      Mental Status: He is alert.      Cranial Nerves: No cranial nerve deficit.      Sensory: No sensory deficit.      Motor: No weakness.      Coordination: Coordination normal.      Gait: Gait normal.      Deep Tendon Reflexes: Reflexes normal.   Psychiatric:         Mood and Affect: Mood normal.         Behavior: Behavior normal. Behavior is cooperative.         Thought Content: Thought content normal.         Judgment: Judgment normal.         Assessment/Plan   Problem List Items Addressed This Visit             ICD-10-CM    Atrial fibrillation with RVR (CMS/[facility]) I48.91     Atrial fibrillation - persistent with  Ventricular rate is well controlled (in sinus rhythm). ON eLIQUIS  No Shortness of breath. Continues   Metoprolol to 50 mg BID            Relevant Medications    ATENOLOL ORAL    Benign hypertension I10     HTN - hypertension well/controlled .Target BP < 130/80  achieved. Educate low salt diet and exercise with weight loss. Educate home self monitoring and diary keeping. Educate risks of elevate blood pressure and benefits of prompt treatment.  Refill Amlodipine and Metoprolol         Chronic renal insufficiency N18.9     CRI - Chronic Renal Insuficiency. Secondary to DM/HTN/Atherosclerosis. Follow BUN/Cr. and lytes.   RENOPROTECTION with ACEI/ARB (). Monitor microalbuminuria. Avoid hypotension and renal hypoperfusion. The patient was instructed to avoid NSAIDS and educate compliance with medications to control HTN(hypertension) and cholesterol and diabetes.                                      Diabetes mellitus (CMS/[facility]) E11.9      DM - NIDDM  . Reviewed with patient / Will check  HbA1c and fasting blood sugars. Educate home self monitoring and diary keeping(reviewed with patient home blood sugar levels /diary). Educate extensively low calorie diet and weight loss with exercise. Reviewed BS- diary and Rx. Regimen. [place] renal protection with ARB/ACEI.               Educate compliance with diet and Rx. And educate risks and autcomes.     Refill Metformin 500 mg BID with meals                                                Elevated PSA R97.20     Follows with urology under observation and monitor PSA         GERD without esophagitis K21.9     GERD - Acid reflux disease. Rx. PPI (Prilosec/Prevacid/Protonix/Nexium) and educate diet and life style changes. Referred patient to an endoscopy (EGD) and check H. Pylori titers.           Hypercholesterolemia E78.00     Hypercholesterolemia - Monitor lipid profile and educate patient upon risks of high cholesterol and targets. Educate diet and change in lifestyle and increase in exercises - Refill:   Atorvastatin and educate compliance with medication and diet.           IgG multiple myeloma (CMS/HCC) C90.00     Monitor CBC and blood count and follow with hematology and stable now on Chemotherapy          Lumbar degenerative disc disease - Primary M51.36    Relevant Medications    traMADol (Ultram) 50 mg tablet

## 2023-10-15 DIAGNOSIS — C90.00 IGG MULTIPLE MYELOMA (MULTI): Primary | ICD-10-CM

## 2023-10-15 RX ORDER — MONTELUKAST SODIUM 10 MG/1
10 TABLET ORAL ONCE
Status: CANCELLED | OUTPATIENT
Start: 2023-10-17

## 2023-10-15 RX ORDER — ALBUTEROL SULFATE 0.83 MG/ML
3 SOLUTION RESPIRATORY (INHALATION) AS NEEDED
Status: CANCELLED | OUTPATIENT
Start: 2023-10-17

## 2023-10-15 RX ORDER — EPINEPHRINE 0.3 MG/.3ML
0.3 INJECTION SUBCUTANEOUS EVERY 5 MIN PRN
Status: CANCELLED | OUTPATIENT
Start: 2023-10-17

## 2023-10-15 RX ORDER — ACETAMINOPHEN 325 MG/1
650 TABLET ORAL ONCE
Status: CANCELLED | OUTPATIENT
Start: 2023-10-17

## 2023-10-15 RX ORDER — DIPHENHYDRAMINE HYDROCHLORIDE 50 MG/ML
50 INJECTION INTRAMUSCULAR; INTRAVENOUS AS NEEDED
Status: CANCELLED | OUTPATIENT
Start: 2023-10-17

## 2023-10-15 RX ORDER — FAMOTIDINE 10 MG/ML
20 INJECTION INTRAVENOUS ONCE AS NEEDED
Status: CANCELLED | OUTPATIENT
Start: 2023-10-17

## 2023-10-15 RX ORDER — DIPHENHYDRAMINE HCL 50 MG
50 CAPSULE ORAL ONCE
Status: CANCELLED | OUTPATIENT
Start: 2023-10-17

## 2023-10-17 ENCOUNTER — LAB (OUTPATIENT)
Dept: LAB | Facility: LAB | Age: 73
End: 2023-10-17
Payer: MEDICARE

## 2023-10-17 ENCOUNTER — INFUSION (OUTPATIENT)
Dept: HEMATOLOGY/ONCOLOGY | Facility: CLINIC | Age: 73
End: 2023-10-17
Payer: MEDICARE

## 2023-10-17 VITALS
TEMPERATURE: 96.3 F | HEART RATE: 75 BPM | SYSTOLIC BLOOD PRESSURE: 155 MMHG | WEIGHT: 185.85 LBS | OXYGEN SATURATION: 99 % | DIASTOLIC BLOOD PRESSURE: 86 MMHG | BODY MASS INDEX: 27.53 KG/M2 | HEIGHT: 69 IN | RESPIRATION RATE: 18 BRPM

## 2023-10-17 DIAGNOSIS — C90.00 IGG MULTIPLE MYELOMA (MULTI): ICD-10-CM

## 2023-10-17 LAB
ALBUMIN SERPL BCP-MCNC: 4.2 G/DL (ref 3.4–5)
ALP SERPL-CCNC: 65 U/L (ref 33–136)
ALT SERPL W P-5'-P-CCNC: 14 U/L (ref 10–52)
ANION GAP SERPL CALC-SCNC: 15 MMOL/L (ref 10–20)
AST SERPL W P-5'-P-CCNC: 14 U/L (ref 9–39)
BASOPHILS # BLD AUTO: 0.02 X10*3/UL (ref 0–0.1)
BASOPHILS NFR BLD AUTO: 0.5 %
BILIRUB SERPL-MCNC: 0.6 MG/DL (ref 0–1.2)
BUN SERPL-MCNC: 17 MG/DL (ref 6–23)
CALCIUM SERPL-MCNC: 9.2 MG/DL (ref 8.6–10.6)
CHLORIDE SERPL-SCNC: 107 MMOL/L (ref 98–107)
CO2 SERPL-SCNC: 23 MMOL/L (ref 21–32)
CREAT SERPL-MCNC: 1.58 MG/DL (ref 0.5–1.3)
EOSINOPHIL # BLD AUTO: 0.08 X10*3/UL (ref 0–0.4)
EOSINOPHIL NFR BLD AUTO: 2.1 %
ERYTHROCYTE [DISTWIDTH] IN BLOOD BY AUTOMATED COUNT: 15.3 % (ref 11.5–14.5)
GFR SERPL CREATININE-BSD FRML MDRD: 45 ML/MIN/1.73M*2
GLUCOSE SERPL-MCNC: 159 MG/DL (ref 74–99)
HCT VFR BLD AUTO: 34.3 % (ref 41–52)
HGB BLD-MCNC: 11.4 G/DL (ref 13.5–17.5)
IMM GRANULOCYTES # BLD AUTO: 0 X10*3/UL (ref 0–0.5)
IMM GRANULOCYTES NFR BLD AUTO: 0 % (ref 0–0.9)
LYMPHOCYTES # BLD AUTO: 1.73 X10*3/UL (ref 0.8–3)
LYMPHOCYTES NFR BLD AUTO: 45.9 %
MCH RBC QN AUTO: 30.6 PG (ref 26–34)
MCHC RBC AUTO-ENTMCNC: 33.2 G/DL (ref 32–36)
MCV RBC AUTO: 92 FL (ref 80–100)
MONOCYTES # BLD AUTO: 0.44 X10*3/UL (ref 0.05–0.8)
MONOCYTES NFR BLD AUTO: 11.7 %
NEUTROPHILS # BLD AUTO: 1.5 X10*3/UL (ref 1.6–5.5)
NEUTROPHILS NFR BLD AUTO: 39.8 %
NRBC BLD-RTO: ABNORMAL /100{WBCS}
PLATELET # BLD AUTO: 138 X10*3/UL (ref 150–450)
PMV BLD AUTO: 9 FL (ref 7.5–11.5)
POTASSIUM SERPL-SCNC: 3.8 MMOL/L (ref 3.5–5.3)
PROT SERPL-MCNC: 6.2 G/DL (ref 6.4–8.2)
RBC # BLD AUTO: 3.72 X10*6/UL (ref 4.5–5.9)
SODIUM SERPL-SCNC: 141 MMOL/L (ref 136–145)
WBC # BLD AUTO: 3.8 X10*3/UL (ref 4.4–11.3)

## 2023-10-17 PROCEDURE — 36415 COLL VENOUS BLD VENIPUNCTURE: CPT

## 2023-10-17 PROCEDURE — 2500000004 HC RX 250 GENERAL PHARMACY W/ HCPCS (ALT 636 FOR OP/ED): Performed by: STUDENT IN AN ORGANIZED HEALTH CARE EDUCATION/TRAINING PROGRAM

## 2023-10-17 PROCEDURE — 80053 COMPREHEN METABOLIC PANEL: CPT

## 2023-10-17 PROCEDURE — 96401 CHEMO ANTI-NEOPL SQ/IM: CPT

## 2023-10-17 PROCEDURE — 85025 COMPLETE CBC W/AUTO DIFF WBC: CPT

## 2023-10-17 PROCEDURE — 2500000001 HC RX 250 WO HCPCS SELF ADMINISTERED DRUGS (ALT 637 FOR MEDICARE OP): Performed by: STUDENT IN AN ORGANIZED HEALTH CARE EDUCATION/TRAINING PROGRAM

## 2023-10-17 PROCEDURE — 83521 IG LIGHT CHAINS FREE EACH: CPT

## 2023-10-17 RX ORDER — EPINEPHRINE 0.3 MG/.3ML
0.3 INJECTION SUBCUTANEOUS EVERY 5 MIN PRN
Status: DISCONTINUED | OUTPATIENT
Start: 2023-10-17 | End: 2023-10-17 | Stop reason: HOSPADM

## 2023-10-17 RX ORDER — ALBUTEROL SULFATE 0.83 MG/ML
3 SOLUTION RESPIRATORY (INHALATION) AS NEEDED
Status: DISCONTINUED | OUTPATIENT
Start: 2023-10-17 | End: 2023-10-17 | Stop reason: HOSPADM

## 2023-10-17 RX ORDER — DIPHENHYDRAMINE HYDROCHLORIDE 50 MG/ML
50 INJECTION INTRAMUSCULAR; INTRAVENOUS AS NEEDED
Status: DISCONTINUED | OUTPATIENT
Start: 2023-10-17 | End: 2023-10-17 | Stop reason: HOSPADM

## 2023-10-17 RX ORDER — DIPHENHYDRAMINE HCL 50 MG
50 CAPSULE ORAL ONCE
Status: COMPLETED | OUTPATIENT
Start: 2023-10-17 | End: 2023-10-17

## 2023-10-17 RX ORDER — MONTELUKAST SODIUM 10 MG/1
10 TABLET ORAL ONCE
Status: COMPLETED | OUTPATIENT
Start: 2023-10-17 | End: 2023-10-17

## 2023-10-17 RX ORDER — ACETAMINOPHEN 325 MG/1
650 TABLET ORAL ONCE
Status: COMPLETED | OUTPATIENT
Start: 2023-10-17 | End: 2023-10-17

## 2023-10-17 RX ORDER — FAMOTIDINE 10 MG/ML
20 INJECTION INTRAVENOUS ONCE AS NEEDED
Status: DISCONTINUED | OUTPATIENT
Start: 2023-10-17 | End: 2023-10-17 | Stop reason: HOSPADM

## 2023-10-17 RX ADMIN — DEXAMETHASONE 20 MG: 6 TABLET ORAL at 10:32

## 2023-10-17 RX ADMIN — DARATUMUMAB AND HYALURONIDASE-FIHJ (HUMAN RECOMBINANT) 1800 MG: 1800; 30000 INJECTION SUBCUTANEOUS at 11:09

## 2023-10-17 RX ADMIN — ACETAMINOPHEN 650 MG: 325 TABLET ORAL at 10:32

## 2023-10-17 RX ADMIN — DIPHENHYDRAMINE HYDROCHLORIDE 50 MG: 50 CAPSULE ORAL at 10:32

## 2023-10-17 RX ADMIN — MONTELUKAST 10 MG: 10 TABLET, FILM COATED ORAL at 10:32

## 2023-10-17 ASSESSMENT — PAIN SCALES - GENERAL: PAINLEVEL: 0-NO PAIN

## 2023-10-18 LAB
KAPPA LC SERPL-MCNC: 1.26 MG/DL (ref 0.33–1.94)
KAPPA LC/LAMBDA SER: 3.23 {RATIO} (ref 0.26–1.65)
LAMBDA LC SERPL-MCNC: 0.39 MG/DL (ref 0.57–2.63)

## 2023-10-24 DIAGNOSIS — C90.00 IGG MULTIPLE MYELOMA (MULTI): Primary | ICD-10-CM

## 2023-10-27 ENCOUNTER — TELEPHONE (OUTPATIENT)
Dept: CARDIOLOGY | Facility: HOSPITAL | Age: 73
End: 2023-10-27
Payer: MEDICARE

## 2023-10-27 DIAGNOSIS — E78.00 HYPERCHOLESTEROLEMIA: Primary | ICD-10-CM

## 2023-10-27 RX ORDER — ATORVASTATIN CALCIUM 80 MG/1
80 TABLET, FILM COATED ORAL DAILY
Qty: 90 TABLET | Refills: 3
Start: 2023-10-27 | End: 2024-10-26

## 2023-10-27 NOTE — TELEPHONE ENCOUNTER
09/27/23 lipid panel results per Dr. Moon,     Lipids are marginal. Prefer LDL <55. Increase atorvastatin to 80mg and recheck lipids in 3 months.     Message left informing of results.  Orders to be placed upon requested return call.       09/27/23  Patient returned call. He is agreeable to plan of care. He just filled refill of 40mg. He will take 2 tablets daily until supply runs low and then call us.

## 2023-10-31 ENCOUNTER — SPECIALTY PHARMACY (OUTPATIENT)
Dept: PHARMACY | Facility: CLINIC | Age: 73
End: 2023-10-31

## 2023-10-31 ENCOUNTER — PHARMACY VISIT (OUTPATIENT)
Dept: PHARMACY | Facility: CLINIC | Age: 73
End: 2023-10-31
Payer: COMMERCIAL

## 2023-10-31 PROCEDURE — RXMED WILLOW AMBULATORY MEDICATION CHARGE

## 2023-11-07 PROCEDURE — 84550 ASSAY OF BLOOD/URIC ACID: CPT | Performed by: PHYSICIAN ASSISTANT

## 2023-11-07 PROCEDURE — 80053 COMPREHEN METABOLIC PANEL: CPT | Performed by: PHYSICIAN ASSISTANT

## 2023-11-07 PROCEDURE — 83521 IG LIGHT CHAINS FREE EACH: CPT | Performed by: PHYSICIAN ASSISTANT

## 2023-11-10 ENCOUNTER — OFFICE VISIT (OUTPATIENT)
Dept: PRIMARY CARE | Facility: CLINIC | Age: 73
End: 2023-11-10
Payer: MEDICARE

## 2023-11-10 VITALS
HEART RATE: 66 BPM | HEIGHT: 70 IN | RESPIRATION RATE: 16 BRPM | BODY MASS INDEX: 26.63 KG/M2 | WEIGHT: 186 LBS | DIASTOLIC BLOOD PRESSURE: 70 MMHG | SYSTOLIC BLOOD PRESSURE: 128 MMHG

## 2023-11-10 DIAGNOSIS — N13.8 BPH WITH OBSTRUCTION/LOWER URINARY TRACT SYMPTOMS: ICD-10-CM

## 2023-11-10 DIAGNOSIS — D63.8 ANEMIA IN OTHER CHRONIC DISEASES CLASSIFIED ELSEWHERE: ICD-10-CM

## 2023-11-10 DIAGNOSIS — I10 BENIGN HYPERTENSION: ICD-10-CM

## 2023-11-10 DIAGNOSIS — N40.1 BPH WITH OBSTRUCTION/LOWER URINARY TRACT SYMPTOMS: ICD-10-CM

## 2023-11-10 DIAGNOSIS — R53.83 MALAISE AND FATIGUE: ICD-10-CM

## 2023-11-10 DIAGNOSIS — E78.00 HYPERCHOLESTEROLEMIA: ICD-10-CM

## 2023-11-10 DIAGNOSIS — I48.91 ATRIAL FIBRILLATION WITH RVR (MULTI): Primary | ICD-10-CM

## 2023-11-10 DIAGNOSIS — R53.81 MALAISE AND FATIGUE: ICD-10-CM

## 2023-11-10 DIAGNOSIS — N28.9 NEPHROPATHY: ICD-10-CM

## 2023-11-10 PROBLEM — F43.12 POST-TRAUMATIC STRESS DISORDER, CHRONIC: Status: RESOLVED | Noted: 2023-11-10 | Resolved: 2023-11-10

## 2023-11-10 PROBLEM — M16.10 PRIMARY LOCALIZED OSTEOARTHRITIS OF PELVIC REGION AND THIGH: Status: RESOLVED | Noted: 2018-12-17 | Resolved: 2023-11-10

## 2023-11-10 PROBLEM — E66.3 OVERWEIGHT WITH BODY MASS INDEX (BMI) 25.0-29.9: Status: RESOLVED | Noted: 2022-02-02 | Resolved: 2023-11-10

## 2023-11-10 PROCEDURE — 1036F TOBACCO NON-USER: CPT | Performed by: INTERNAL MEDICINE

## 2023-11-10 PROCEDURE — 3078F DIAST BP <80 MM HG: CPT | Performed by: INTERNAL MEDICINE

## 2023-11-10 PROCEDURE — 1159F MED LIST DOCD IN RCRD: CPT | Performed by: INTERNAL MEDICINE

## 2023-11-10 PROCEDURE — 1126F AMNT PAIN NOTED NONE PRSNT: CPT | Performed by: INTERNAL MEDICINE

## 2023-11-10 PROCEDURE — 99214 OFFICE O/P EST MOD 30 MIN: CPT | Performed by: INTERNAL MEDICINE

## 2023-11-10 PROCEDURE — 3074F SYST BP LT 130 MM HG: CPT | Performed by: INTERNAL MEDICINE

## 2023-11-10 RX ORDER — LOSARTAN POTASSIUM AND HYDROCHLOROTHIAZIDE 12.5; 1 MG/1; MG/1
1 TABLET ORAL DAILY
COMMUNITY
Start: 2014-12-12 | End: 2024-04-30 | Stop reason: ALTCHOICE

## 2023-11-10 ASSESSMENT — ENCOUNTER SYMPTOMS
PSYCHIATRIC NEGATIVE: 1
MUSCULOSKELETAL NEGATIVE: 1
ENDOCRINE NEGATIVE: 1
HEMATOLOGIC/LYMPHATIC NEGATIVE: 1
CARDIOVASCULAR NEGATIVE: 1
CONSTITUTIONAL NEGATIVE: 1
GASTROINTESTINAL NEGATIVE: 1
NEUROLOGICAL NEGATIVE: 1
RESPIRATORY NEGATIVE: 1
EYES NEGATIVE: 1
ALLERGIC/IMMUNOLOGIC NEGATIVE: 1

## 2023-11-10 NOTE — ASSESSMENT & PLAN NOTE
BPH - Benign PROSTATIC Hypertrophy, + Dysuria/Nocturia, check PSA, prescribe Flomax 0.4mg qD and Avodart 0.5 mg qD vs. Finasteride 5 mg qD.  Educate exercises and Change in life style, prescribe vitamin E 400 IU qD. Consider referral to urology.

## 2023-11-10 NOTE — ASSESSMENT & PLAN NOTE
Anemia - of bleed/chronic disease. Check CBC , B12 and Folic Acid levels, Iron/Ferritn/TIBC, refer patient to endoscopy (upper/ lower). Target HCT > 30 %. Check guaic stool

## 2023-11-10 NOTE — ASSESSMENT & PLAN NOTE
HTN - hypertension well/controlled .Target BP < 130/80  achieved. Educate low salt diet and exercise with weight loss. Educate home self monitoring and diary keeping. Educate risks of elevate blood pressure and benefits of prompt treatment.  Refill Amlodipine and Metoprolol            Hypercholesterolemia  Medium

## 2023-11-10 NOTE — PROGRESS NOTES
"Subjective   Patient ID: Toby Ugalde is a 73 y.o. male who presents for Follow-up (Follow up).    HPI     Review of Systems   Constitutional: Negative.    HENT: Negative.     Eyes: Negative.    Respiratory: Negative.     Cardiovascular: Negative.    Gastrointestinal: Negative.    Endocrine: Negative.    Musculoskeletal: Negative.    Skin: Negative.    Allergic/Immunologic: Negative.    Neurological: Negative.    Hematological: Negative.    Psychiatric/Behavioral: Negative.     All other systems reviewed and are negative.      Objective   Ht 1.778 m (5' 10\")   Wt 84.4 kg (186 lb)   BMI 26.69 kg/m²   Blood pressure 128/70, pulse 66, resp. rate 16, height 1.778 m (5' 10\"), weight 84.4 kg (186 lb).   Physical Exam  Vitals and nursing note reviewed.   Constitutional:       Appearance: Normal appearance.   HENT:      Head: Normocephalic and atraumatic.      Right Ear: Tympanic membrane, ear canal and external ear normal.      Left Ear: Tympanic membrane, ear canal and external ear normal. There is no impacted cerumen.      Nose: Nose normal.      Mouth/Throat:      Mouth: Mucous membranes are moist.      Pharynx: Oropharynx is clear.   Eyes:      Extraocular Movements: Extraocular movements intact.      Conjunctiva/sclera: Conjunctivae normal.      Pupils: Pupils are equal, round, and reactive to light.   Cardiovascular:      Rate and Rhythm: Normal rate and regular rhythm.      Pulses: Normal pulses.      Heart sounds: Normal heart sounds. No murmur heard.  Pulmonary:      Effort: Pulmonary effort is normal. No respiratory distress.      Breath sounds: Normal breath sounds. No stridor. No wheezing, rhonchi or rales.   Chest:      Chest wall: No tenderness.   Abdominal:      General: Abdomen is flat. Bowel sounds are normal. There is no distension.      Palpations: Abdomen is soft. There is no mass.      Tenderness: There is no abdominal tenderness. There is no right CVA tenderness, left CVA tenderness, guarding or " rebound.      Hernia: No hernia is present.   Musculoskeletal:         General: Normal range of motion.      Cervical back: Normal range of motion and neck supple.   Skin:     General: Skin is warm.      Capillary Refill: Capillary refill takes less than 2 seconds.   Neurological:      General: No focal deficit present.      Mental Status: He is alert.      Cranial Nerves: No cranial nerve deficit.      Sensory: No sensory deficit.      Motor: No weakness.      Coordination: Coordination normal.      Gait: Gait normal.      Deep Tendon Reflexes: Reflexes normal.   Psychiatric:         Mood and Affect: Mood normal.         Behavior: Behavior normal. Behavior is cooperative.         Thought Content: Thought content normal.         Judgment: Judgment normal.         Assessment/Plan   Problem List Items Addressed This Visit             ICD-10-CM    Anemia in other chronic diseases classified elsewhere D63.8     Anemia - of bleed/chronic disease. Check CBC , B12 and Folic Acid levels, Iron/Ferritn/TIBC, refer patient to endoscopy (upper/ lower). Target HCT > 30 %. Check guaic stool          Atrial fibrillation with RVR (CMS/HCC) - Primary I48.91     Atrial fibrillation - persistent with  Ventricular rate is well controlled (in sinus rhythm). ON eLIQUIS  No Shortness of breath. Continues   Metoprolol to 50 mg BID             Benign hypertension I10     HTN - hypertension well/controlled .Target BP < 130/80  achieved. Educate low salt diet and exercise with weight loss. Educate home self monitoring and diary keeping. Educate risks of elevate blood pressure and benefits of prompt treatment.  Refill Amlodipine and Metoprolol            Hypercholesterolemia  Medium                  BPH with obstruction/lower urinary tract symptoms N40.1, N13.8     BPH - Benign PROSTATIC Hypertrophy, + Dysuria/Nocturia, check PSA, prescribe Flomax 0.4mg qD and Avodart 0.5 mg qD vs. Finasteride 5 mg qD.  Educate exercises and Change in life  style, prescribe vitamin E 400 IU qD. Consider referral to urology.          Hypercholesterolemia E78.00     Hypercholesterolemia - Monitor lipid profile and educate patient upon risks of high cholesterol and targets. Educate diet and change in lifestyle and increase in exercises - Refill:  Atorvastatin    and educate compliance with medication and diet.           Malaise and fatigue R53.81, R53.83     Fatigue - check CMP(metabolic panel and elctrolytes) , CBC(complete blood cell count), TSH(thyroid function). Insomnia may play a role and sleep studies(rule out sleep apnea) are recommended . Educate sleep hygiene. Consider anxiety disorder vs. depression. Consider Stress test, and 2DECHO.           Nephropathy N28.9     CRI - Chronic Renal Insuficiency. Secondary to DM/HTN/Atherosclerosis. Follow BUN/Cr. and lytes.   RENOPROTECTION with ACEI/ARB (). Monitor microalbuminuria. Avoid hypotension and renal hypoperfusion. The patient was instructed to avoid NSAIDS and educate compliance with medications to control HTN(hypertension) and cholesterol and diabetes.                                        Atrial fibrillation with RVR (CMS/HCC) I48.91        Atrial fibrillation - persistent with  Ventricular rate is well controlled (in sinus rhythm). ON eLIQUIS  No Shortness of breath. Continues   Metoprolol to 50 mg BID               Relevant Medications     ATENOLOL ORAL     Benign hypertension I10       HTN - hypertension well/controlled .Target BP < 130/80  achieved. Educate low salt diet and exercise with weight loss. Educate home self monitoring and diary keeping. Educate risks of elevate blood pressure and benefits of prompt treatment.  Refill Amlodipine and Metoprolol           Chronic renal insufficiency N18.9       CRI - Chronic Renal Insuficiency. Secondary to DM/HTN/Atherosclerosis. Follow BUN/Cr. and lytes.   RENOPROTECTION with ACEI/ARB (). Monitor microalbuminuria. Avoid hypotension and renal hypoperfusion. The  patient was instructed to avoid NSAIDS and educate compliance with medications to control HTN(hypertension) and cholesterol and diabetes.                                         Diabetes mellitus (CMS/[facility]) E11.9       DM - NIDDM  . Reviewed with patient / Will check  HbA1c and fasting blood sugars. Educate home self monitoring and diary keeping(reviewed with patient home blood sugar levels /diary). Educate extensively low calorie diet and weight loss with exercise. Reviewed BS- diary and Rx. Regimen. [place] renal protection with ARB/ACEI.               Educate compliance with diet and Rx. And educate risks and autcomes.     Refill Metformin 500 mg BID with meals                                                   Elevated PSA R97.20       Follows with urology under observation and monitor PSA           GERD without esophagitis K21.9       GERD - Acid reflux disease. Rx. PPI (Prilosec/Prevacid/Protonix/Nexium) and educate diet and life style changes. Referred patient to an endoscopy (EGD) and check H. Pylori titers.              Hypercholesterolemia E78.00       Hypercholesterolemia - Monitor lipid profile and educate patient upon risks of high cholesterol and targets. Educate diet and change in lifestyle and increase in exercises - Refill:   Atorvastatin and educate compliance with medication and diet.             IgG multiple myeloma (CMS/[facility]) C90.00       Monitor CBC and blood count and follow with hematology and stable now on Chemotherapy            Lumbar degenerative disc disease - Primary M51.36     Relevant Medications     traMADol (Ultram) 50 mg tablet

## 2023-11-14 ENCOUNTER — OFFICE VISIT (OUTPATIENT)
Dept: HEMATOLOGY/ONCOLOGY | Facility: CLINIC | Age: 73
End: 2023-11-14
Payer: MEDICARE

## 2023-11-14 ENCOUNTER — INFUSION (OUTPATIENT)
Dept: HEMATOLOGY/ONCOLOGY | Facility: CLINIC | Age: 73
End: 2023-11-14
Payer: MEDICARE

## 2023-11-14 VITALS
TEMPERATURE: 97.7 F | WEIGHT: 179.9 LBS | OXYGEN SATURATION: 100 % | BODY MASS INDEX: 25.81 KG/M2 | HEART RATE: 65 BPM | DIASTOLIC BLOOD PRESSURE: 80 MMHG | RESPIRATION RATE: 18 BRPM | SYSTOLIC BLOOD PRESSURE: 126 MMHG

## 2023-11-14 DIAGNOSIS — C90.00 IGG MULTIPLE MYELOMA (MULTI): ICD-10-CM

## 2023-11-14 DIAGNOSIS — C90.00 IGG MULTIPLE MYELOMA (MULTI): Primary | ICD-10-CM

## 2023-11-14 LAB
ALBUMIN SERPL BCP-MCNC: 3.8 G/DL (ref 3.4–5)
ALP SERPL-CCNC: 57 U/L (ref 33–136)
ALT SERPL W P-5'-P-CCNC: 17 U/L (ref 10–52)
ANION GAP SERPL CALC-SCNC: 11 MMOL/L (ref 10–20)
AST SERPL W P-5'-P-CCNC: 12 U/L (ref 9–39)
BASOPHILS # BLD AUTO: 0.02 X10*3/UL (ref 0–0.1)
BASOPHILS NFR BLD AUTO: 0.6 %
BILIRUB SERPL-MCNC: 0.5 MG/DL (ref 0–1.2)
BUN SERPL-MCNC: 22 MG/DL (ref 6–23)
CALCIUM SERPL-MCNC: 8.5 MG/DL (ref 8.6–10.6)
CHLORIDE SERPL-SCNC: 105 MMOL/L (ref 98–107)
CO2 SERPL-SCNC: 26 MMOL/L (ref 21–32)
CREAT SERPL-MCNC: 1.41 MG/DL (ref 0.5–1.3)
EOSINOPHIL # BLD AUTO: 0.07 X10*3/UL (ref 0–0.4)
EOSINOPHIL NFR BLD AUTO: 2.3 %
ERYTHROCYTE [DISTWIDTH] IN BLOOD BY AUTOMATED COUNT: 14 % (ref 11.5–14.5)
GFR SERPL CREATININE-BSD FRML MDRD: 52 ML/MIN/1.73M*2
GLUCOSE SERPL-MCNC: 148 MG/DL (ref 74–99)
HCT VFR BLD AUTO: 31.9 % (ref 41–52)
HGB BLD-MCNC: 10.7 G/DL (ref 13.5–17.5)
IMM GRANULOCYTES # BLD AUTO: 0 X10*3/UL (ref 0–0.5)
IMM GRANULOCYTES NFR BLD AUTO: 0 % (ref 0–0.9)
LYMPHOCYTES # BLD AUTO: 1.52 X10*3/UL (ref 0.8–3)
LYMPHOCYTES NFR BLD AUTO: 48.9 %
MCH RBC QN AUTO: 30.1 PG (ref 26–34)
MCHC RBC AUTO-ENTMCNC: 33.5 G/DL (ref 32–36)
MCV RBC AUTO: 90 FL (ref 80–100)
MONOCYTES # BLD AUTO: 0.47 X10*3/UL (ref 0.05–0.8)
MONOCYTES NFR BLD AUTO: 15.1 %
NEUTROPHILS # BLD AUTO: 1.03 X10*3/UL (ref 1.6–5.5)
NEUTROPHILS NFR BLD AUTO: 33.1 %
NRBC BLD-RTO: ABNORMAL /100{WBCS}
PLATELET # BLD AUTO: 125 X10*3/UL (ref 150–450)
POTASSIUM SERPL-SCNC: 3.8 MMOL/L (ref 3.5–5.3)
PROT SERPL-MCNC: 5.6 G/DL (ref 6.4–8.2)
RBC # BLD AUTO: 3.55 X10*6/UL (ref 4.5–5.9)
SODIUM SERPL-SCNC: 138 MMOL/L (ref 136–145)
WBC # BLD AUTO: 3.1 X10*3/UL (ref 4.4–11.3)

## 2023-11-14 PROCEDURE — 36415 COLL VENOUS BLD VENIPUNCTURE: CPT

## 2023-11-14 PROCEDURE — 3074F SYST BP LT 130 MM HG: CPT | Performed by: INTERNAL MEDICINE

## 2023-11-14 PROCEDURE — 80053 COMPREHEN METABOLIC PANEL: CPT

## 2023-11-14 PROCEDURE — 1159F MED LIST DOCD IN RCRD: CPT | Performed by: INTERNAL MEDICINE

## 2023-11-14 PROCEDURE — 96401 CHEMO ANTI-NEOPL SQ/IM: CPT

## 2023-11-14 PROCEDURE — 84075 ASSAY ALKALINE PHOSPHATASE: CPT

## 2023-11-14 PROCEDURE — 99214 OFFICE O/P EST MOD 30 MIN: CPT | Performed by: INTERNAL MEDICINE

## 2023-11-14 PROCEDURE — 83521 IG LIGHT CHAINS FREE EACH: CPT

## 2023-11-14 PROCEDURE — 3079F DIAST BP 80-89 MM HG: CPT | Performed by: INTERNAL MEDICINE

## 2023-11-14 PROCEDURE — 85025 COMPLETE CBC W/AUTO DIFF WBC: CPT

## 2023-11-14 PROCEDURE — 2500000001 HC RX 250 WO HCPCS SELF ADMINISTERED DRUGS (ALT 637 FOR MEDICARE OP): Performed by: INTERNAL MEDICINE

## 2023-11-14 PROCEDURE — 2500000004 HC RX 250 GENERAL PHARMACY W/ HCPCS (ALT 636 FOR OP/ED): Performed by: INTERNAL MEDICINE

## 2023-11-14 PROCEDURE — 1036F TOBACCO NON-USER: CPT | Performed by: INTERNAL MEDICINE

## 2023-11-14 PROCEDURE — 1125F AMNT PAIN NOTED PAIN PRSNT: CPT | Performed by: INTERNAL MEDICINE

## 2023-11-14 RX ORDER — MONTELUKAST SODIUM 10 MG/1
10 TABLET ORAL ONCE
Status: CANCELLED | OUTPATIENT
Start: 2023-11-14

## 2023-11-14 RX ORDER — FAMOTIDINE 10 MG/ML
20 INJECTION INTRAVENOUS ONCE AS NEEDED
Status: CANCELLED | OUTPATIENT
Start: 2023-12-12

## 2023-11-14 RX ORDER — DIPHENHYDRAMINE HYDROCHLORIDE 50 MG/ML
50 INJECTION INTRAMUSCULAR; INTRAVENOUS AS NEEDED
Status: DISCONTINUED | OUTPATIENT
Start: 2023-11-14 | End: 2023-11-14 | Stop reason: HOSPADM

## 2023-11-14 RX ORDER — ACETAMINOPHEN 325 MG/1
650 TABLET ORAL ONCE
Status: CANCELLED | OUTPATIENT
Start: 2024-01-09

## 2023-11-14 RX ORDER — DIPHENHYDRAMINE HYDROCHLORIDE 50 MG/ML
50 INJECTION INTRAMUSCULAR; INTRAVENOUS AS NEEDED
Status: CANCELLED | OUTPATIENT
Start: 2023-11-14

## 2023-11-14 RX ORDER — DIPHENHYDRAMINE HCL 50 MG
50 CAPSULE ORAL ONCE
Status: COMPLETED | OUTPATIENT
Start: 2023-11-14 | End: 2023-11-14

## 2023-11-14 RX ORDER — DIPHENHYDRAMINE HYDROCHLORIDE 50 MG/ML
50 INJECTION INTRAMUSCULAR; INTRAVENOUS AS NEEDED
Status: CANCELLED | OUTPATIENT
Start: 2024-01-09

## 2023-11-14 RX ORDER — ACETAMINOPHEN 325 MG/1
650 TABLET ORAL ONCE
Status: COMPLETED | OUTPATIENT
Start: 2023-11-14 | End: 2023-11-14

## 2023-11-14 RX ORDER — MONTELUKAST SODIUM 10 MG/1
10 TABLET ORAL ONCE
Status: CANCELLED | OUTPATIENT
Start: 2024-01-09

## 2023-11-14 RX ORDER — ALBUTEROL SULFATE 0.83 MG/ML
3 SOLUTION RESPIRATORY (INHALATION) AS NEEDED
Status: CANCELLED | OUTPATIENT
Start: 2024-01-09

## 2023-11-14 RX ORDER — MONTELUKAST SODIUM 10 MG/1
10 TABLET ORAL ONCE
Status: COMPLETED | OUTPATIENT
Start: 2023-11-14 | End: 2023-11-14

## 2023-11-14 RX ORDER — ACETAMINOPHEN 325 MG/1
650 TABLET ORAL ONCE
Status: CANCELLED | OUTPATIENT
Start: 2023-11-14

## 2023-11-14 RX ORDER — ALBUTEROL SULFATE 0.83 MG/ML
3 SOLUTION RESPIRATORY (INHALATION) AS NEEDED
Status: CANCELLED | OUTPATIENT
Start: 2023-12-12

## 2023-11-14 RX ORDER — FAMOTIDINE 10 MG/ML
20 INJECTION INTRAVENOUS ONCE AS NEEDED
Status: DISCONTINUED | OUTPATIENT
Start: 2023-11-14 | End: 2023-11-14 | Stop reason: HOSPADM

## 2023-11-14 RX ORDER — ALBUTEROL SULFATE 0.83 MG/ML
3 SOLUTION RESPIRATORY (INHALATION) AS NEEDED
Status: CANCELLED | OUTPATIENT
Start: 2023-11-14

## 2023-11-14 RX ORDER — ALBUTEROL SULFATE 0.83 MG/ML
3 SOLUTION RESPIRATORY (INHALATION) AS NEEDED
Status: DISCONTINUED | OUTPATIENT
Start: 2023-11-14 | End: 2023-11-14 | Stop reason: HOSPADM

## 2023-11-14 RX ORDER — FAMOTIDINE 10 MG/ML
20 INJECTION INTRAVENOUS ONCE AS NEEDED
Status: CANCELLED | OUTPATIENT
Start: 2023-11-14

## 2023-11-14 RX ORDER — DIPHENHYDRAMINE HCL 50 MG
50 CAPSULE ORAL ONCE
Status: CANCELLED | OUTPATIENT
Start: 2024-01-09

## 2023-11-14 RX ORDER — EPINEPHRINE 0.3 MG/.3ML
0.3 INJECTION SUBCUTANEOUS EVERY 5 MIN PRN
Status: CANCELLED | OUTPATIENT
Start: 2024-01-09

## 2023-11-14 RX ORDER — FAMOTIDINE 10 MG/ML
20 INJECTION INTRAVENOUS ONCE AS NEEDED
Status: CANCELLED | OUTPATIENT
Start: 2024-01-09

## 2023-11-14 RX ORDER — EPINEPHRINE 0.3 MG/.3ML
0.3 INJECTION SUBCUTANEOUS EVERY 5 MIN PRN
Status: CANCELLED | OUTPATIENT
Start: 2023-12-12

## 2023-11-14 RX ORDER — EPINEPHRINE 0.3 MG/.3ML
0.3 INJECTION SUBCUTANEOUS EVERY 5 MIN PRN
Status: DISCONTINUED | OUTPATIENT
Start: 2023-11-14 | End: 2023-11-14 | Stop reason: HOSPADM

## 2023-11-14 RX ORDER — EPINEPHRINE 0.3 MG/.3ML
0.3 INJECTION SUBCUTANEOUS EVERY 5 MIN PRN
Status: CANCELLED | OUTPATIENT
Start: 2023-11-14

## 2023-11-14 RX ORDER — ACYCLOVIR 400 MG/1
400 TABLET ORAL 2 TIMES DAILY
Qty: 180 TABLET | Refills: 3 | Status: SHIPPED | OUTPATIENT
Start: 2023-11-14 | End: 2024-04-10 | Stop reason: ALTCHOICE

## 2023-11-14 RX ORDER — MONTELUKAST SODIUM 10 MG/1
10 TABLET ORAL ONCE
Status: CANCELLED | OUTPATIENT
Start: 2023-12-12

## 2023-11-14 RX ORDER — DIPHENHYDRAMINE HYDROCHLORIDE 50 MG/ML
50 INJECTION INTRAMUSCULAR; INTRAVENOUS AS NEEDED
Status: CANCELLED | OUTPATIENT
Start: 2023-12-12

## 2023-11-14 RX ORDER — DIPHENHYDRAMINE HCL 50 MG
50 CAPSULE ORAL ONCE
Status: CANCELLED | OUTPATIENT
Start: 2023-11-14

## 2023-11-14 RX ORDER — DIPHENHYDRAMINE HCL 50 MG
50 CAPSULE ORAL ONCE
Status: CANCELLED | OUTPATIENT
Start: 2023-12-12

## 2023-11-14 RX ORDER — ACETAMINOPHEN 325 MG/1
650 TABLET ORAL ONCE
Status: CANCELLED | OUTPATIENT
Start: 2023-12-12

## 2023-11-14 RX ADMIN — DEXAMETHASONE 20 MG: 6 TABLET ORAL at 11:07

## 2023-11-14 RX ADMIN — DIPHENHYDRAMINE HYDROCHLORIDE 50 MG: 50 CAPSULE ORAL at 10:46

## 2023-11-14 RX ADMIN — ACETAMINOPHEN 650 MG: 325 TABLET ORAL at 10:45

## 2023-11-14 RX ADMIN — DARATUMUMAB AND HYALURONIDASE-FIHJ (HUMAN RECOMBINANT) 1800 MG: 1800; 30000 INJECTION SUBCUTANEOUS at 11:25

## 2023-11-14 RX ADMIN — MONTELUKAST 10 MG: 10 TABLET, FILM COATED ORAL at 10:45

## 2023-11-14 ASSESSMENT — PAIN SCALES - GENERAL
PAINLEVEL: 2
PAINLEVEL_OUTOF10: 2

## 2023-11-14 NOTE — PROGRESS NOTES
Patient ID: Toby Ugalde is a 73 y.o. male.    Diagnosis:   Problem List Items Addressed This Visit    None       Treatment:   Oncology History   IgG multiple myeloma (CMS/HCC)   9/6/2023 Initial Diagnosis    IgG multiple myeloma (CMS/HCC)     10/17/2023 -  Chemotherapy    Daratumumab, 28 Day Cycles - Maintenance         Response:     Past Medical History:     Past Medical History:   Diagnosis Date    Essential (primary) hypertension 11/20/2013    Benign essential hypertension    Other intervertebral disc degeneration, lumbar region     Lumbar degenerative disc disease    Overweight with body mass index (BMI) 25.0-29.9 02/02/2022    Personal history of malignant neoplasm of prostate     History of malignant neoplasm of prostate    Personal history of other diseases of the circulatory system     History of hypertension    Personal history of other diseases of the circulatory system 06/29/2022    History of atrial tachycardia    Personal history of other diseases of the circulatory system     History of essential hypertension    Personal history of other diseases of the musculoskeletal system and connective tissue     History of arthritis    Personal history of other diseases of the musculoskeletal system and connective tissue     History of acute gouty arthritis    Personal history of other diseases of the musculoskeletal system and connective tissue 01/30/2019    History of left foot drop    Personal history of other diseases of the respiratory system     History of bronchitis    Personal history of other endocrine, nutritional and metabolic disease     History of hypercholesterolemia    Personal history of other endocrine, nutritional and metabolic disease     History of diabetes mellitus    Personal history of other endocrine, nutritional and metabolic disease 01/17/2014    History of diabetes mellitus    Post-traumatic stress disorder, chronic 11/10/2023    Primary localized osteoarthritis of pelvic region and  thigh 12/17/2018    Pure hypercholesterolemia, unspecified 11/20/2013    Low-density-lipoid-type (LDL) hyperlipoproteinemia    Snoring     Snoring       Surgical History:     Past Surgical History:   Procedure Laterality Date    BACK SURGERY  11/20/2013    Back Surgery    TONSILLECTOMY  11/20/2013    Tonsillectomy    TOTAL KNEE ARTHROPLASTY  11/20/2013    Knee Replacement        Family History:     Family History   Problem Relation Name Age of Onset    Heart attack Mother      Diabetes Brother      Stroke Maternal Grandfather         Social History:  Reported history of agent orange exposure while in [employer] at [employer]. Retired  and post .  from his wife who is health care  power of .  Two sons and 2 grandchildren (1)    Social History     Tobacco Use    Smoking status: Never     Passive exposure: Never    Smokeless tobacco: Never   Substance Use Topics    Alcohol use: Yes     Comment: occasional    Drug use: Never      -------------------------------------------------------------------------------------------------------  Subjective       HPI    KANDI ROSE is a 73 year old Male h/o pAfib,Pes  well controlled T2DM, HTN, gout, IgG kappa MM with history as above     He presents to the clinic today (11/14/23) for follow-up and to ahead of receiving /dose #14  monthly subcutaneous daratumumab, still on revlimid 5 mg 21/28 days.  Free lyte from last week 2.1  with ratio of 2.5 which is steady, cbc is better now on lower dose revlimid.   He had his COVID booster and flu shot.  No other health issues.  Still waiting to see the dentist.       Review of Systems   All other systems reviewed and are negative.     -------------------------------------------------------------------------------------------------------  Objective   BSA: There is no height or weight on file to calculate BSA.  There were no vitals taken for this visit.    Physical Exam  Vitals reviewed.    Constitutional:       Comments: Pale, elderly uses a cane   HENT:      Head: Normocephalic and atraumatic.      Mouth/Throat:      Mouth: Mucous membranes are moist.   Eyes:      Extraocular Movements: Extraocular movements intact.      Conjunctiva/sclera: Conjunctivae normal.      Pupils: Pupils are equal, round, and reactive to light.   Cardiovascular:      Rate and Rhythm: Normal rate and regular rhythm.      Pulses: Normal pulses.   Pulmonary:      Effort: Pulmonary effort is normal.      Breath sounds: Normal breath sounds.   Abdominal:      General: Abdomen is flat. Bowel sounds are normal.      Palpations: Abdomen is soft.   Musculoskeletal:         General: Normal range of motion.   Skin:     General: Skin is warm.   Neurological:      General: No focal deficit present.      Mental Status: He is alert.   Psychiatric:         Mood and Affect: Mood normal.         Behavior: Behavior normal.         Thought Content: Thought content normal.         Judgment: Judgment normal.         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      Restaging 3/2022 shows high burden disease with 90% tumorous involvement of the bone marrow , PET to be done on 4/4/22     Initiated (3/29/22) treatment for his relapsed Myeloma with Revlimid 10 mg by mouth days 1-14/21 days, Daratuzumab (3/29, & 4/5/22), SC Velcade (3/29 & 4/5/22), & weekly Dexamethasone. complicated by several hospilizations for syncope, PE ,afib due to  velcade and revlimid.       Recv'd weekly single agent Daraumumab from 5/31 - 7/19/22. Initiated (8/25/22) Revlimid 10 mg on days 1-21/28 days.      myeloma labs (11/14/23) with IgG kappa M-protein  down to 0.1 g/dL, free kappa light chains normal at 2.1 with ratio of 2.50 mg/dL, last SPEP pm 9/27 0.1consistent with ongoing VGPR.    Current tx monthly casey and revlimid 5mg 21/28 days  Dose 14 of monthly casey today     Will continue monthly casey, rev, & dex until evidence of disease progression or unacceptable toxicity.     #Atrial fib on chronic anticoagulation  on Eliquis 5 mg by mouth twice a day.   Patient confirmed on 50 mg by mouth twice a day of metoprolol,  Now on 25 mg by mouth twice a day due to orthostatic hypotention.    Has cardiology followup with EP.      PE (4/9/22) - Small occlusive embolic thrombus within the right lower lobe pulmonary artery branch, medially and posteriorly.  Cont Eliquis 5  mg twice a day.     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  Pt had his tooth pulled in 2019, however with prolonged bone exposure/infection, eventually underwent OMSF surgery in 10/2021. Unclear if he has denosumab assoc jaw necrosis. Patient finally had his bad tooth (L upper molar) removed on 5/25/23.     RTC:   - 12/12/23 dose #15 of casey  - 1/9/23 MD visit and casey # 16    -------------------------------------------------------------------------------------------------------  Yumiko Waller MD

## 2023-11-15 LAB
KAPPA LC SERPL-MCNC: 2.29 MG/DL (ref 0.33–1.94)
KAPPA LC/LAMBDA SER: 2.86 {RATIO} (ref 0.26–1.65)
LAMBDA LC SERPL-MCNC: 0.8 MG/DL (ref 0.57–2.63)

## 2023-12-08 ENCOUNTER — OFFICE VISIT (OUTPATIENT)
Dept: PRIMARY CARE | Facility: CLINIC | Age: 73
End: 2023-12-08
Payer: MEDICARE

## 2023-12-08 VITALS
BODY MASS INDEX: 26.63 KG/M2 | WEIGHT: 186 LBS | HEART RATE: 72 BPM | RESPIRATION RATE: 16 BRPM | SYSTOLIC BLOOD PRESSURE: 120 MMHG | DIASTOLIC BLOOD PRESSURE: 70 MMHG | HEIGHT: 70 IN

## 2023-12-08 DIAGNOSIS — I10 BENIGN HYPERTENSION: ICD-10-CM

## 2023-12-08 DIAGNOSIS — E11.9 TYPE 2 DIABETES MELLITUS WITHOUT COMPLICATION, WITHOUT LONG-TERM CURRENT USE OF INSULIN (MULTI): ICD-10-CM

## 2023-12-08 DIAGNOSIS — K21.9 GERD WITHOUT ESOPHAGITIS: ICD-10-CM

## 2023-12-08 DIAGNOSIS — N40.1 BPH WITH OBSTRUCTION/LOWER URINARY TRACT SYMPTOMS: ICD-10-CM

## 2023-12-08 DIAGNOSIS — I48.91 ATRIAL FIBRILLATION WITH RVR (MULTI): Primary | ICD-10-CM

## 2023-12-08 DIAGNOSIS — N18.2 CHRONIC RENAL IMPAIRMENT, STAGE 2 (MILD): ICD-10-CM

## 2023-12-08 DIAGNOSIS — R53.83 MALAISE AND FATIGUE: ICD-10-CM

## 2023-12-08 DIAGNOSIS — E78.00 HYPERCHOLESTEROLEMIA: ICD-10-CM

## 2023-12-08 DIAGNOSIS — C90.00 IGG MULTIPLE MYELOMA (MULTI): ICD-10-CM

## 2023-12-08 DIAGNOSIS — R53.81 MALAISE AND FATIGUE: ICD-10-CM

## 2023-12-08 DIAGNOSIS — N13.8 BPH WITH OBSTRUCTION/LOWER URINARY TRACT SYMPTOMS: ICD-10-CM

## 2023-12-08 PROCEDURE — 3078F DIAST BP <80 MM HG: CPT | Performed by: INTERNAL MEDICINE

## 2023-12-08 PROCEDURE — 99214 OFFICE O/P EST MOD 30 MIN: CPT | Performed by: INTERNAL MEDICINE

## 2023-12-08 PROCEDURE — 3074F SYST BP LT 130 MM HG: CPT | Performed by: INTERNAL MEDICINE

## 2023-12-08 PROCEDURE — 1159F MED LIST DOCD IN RCRD: CPT | Performed by: INTERNAL MEDICINE

## 2023-12-08 PROCEDURE — 1125F AMNT PAIN NOTED PAIN PRSNT: CPT | Performed by: INTERNAL MEDICINE

## 2023-12-08 PROCEDURE — 1036F TOBACCO NON-USER: CPT | Performed by: INTERNAL MEDICINE

## 2023-12-08 RX ORDER — METOPROLOL TARTRATE 50 MG/1
50 TABLET ORAL EVERY 12 HOURS
Qty: 90 TABLET | Refills: 0 | Status: SHIPPED | OUTPATIENT
Start: 2023-12-08 | End: 2023-12-08

## 2023-12-08 RX ORDER — METOPROLOL TARTRATE 50 MG/1
50 TABLET ORAL EVERY 12 HOURS
Qty: 180 TABLET | Refills: 0 | Status: SHIPPED | OUTPATIENT
Start: 2023-12-08 | End: 2024-03-11

## 2023-12-08 ASSESSMENT — ENCOUNTER SYMPTOMS
EYES NEGATIVE: 1
GASTROINTESTINAL NEGATIVE: 1
CARDIOVASCULAR NEGATIVE: 1
MUSCULOSKELETAL NEGATIVE: 1
CONSTITUTIONAL NEGATIVE: 1
HEMATOLOGIC/LYMPHATIC NEGATIVE: 1
ALLERGIC/IMMUNOLOGIC NEGATIVE: 1
PSYCHIATRIC NEGATIVE: 1
RESPIRATORY NEGATIVE: 1
NEUROLOGICAL NEGATIVE: 1
ENDOCRINE NEGATIVE: 1

## 2023-12-08 NOTE — ASSESSMENT & PLAN NOTE
DM - NIDDM  . Reviewed with patient / Will check  HbA1c and fasting blood sugars. Educate home self monitoring and diary keeping(reviewed with patient home blood sugar levels /diary). Educate extensively low calorie diet and weight loss with exercise. Reviewed BS- diary and Rx. Regimen. [place] renal protection with ARB/ACEI.               Educate compliance with diet and Rx. And educate risks and autcomes.     Refill Metformin 500 mg BID with meals

## 2023-12-08 NOTE — PROGRESS NOTES
"Subjective   Patient ID: Toby Ugalde is a 73 y.o. male who presents for Follow-up (Follow up).    HPI     Review of Systems   Constitutional: Negative.    HENT: Negative.     Eyes: Negative.    Respiratory: Negative.     Cardiovascular: Negative.    Gastrointestinal: Negative.    Endocrine: Negative.    Musculoskeletal: Negative.    Skin: Negative.    Allergic/Immunologic: Negative.    Neurological: Negative.    Hematological: Negative.    Psychiatric/Behavioral: Negative.     All other systems reviewed and are negative.      Objective   Ht 1.778 m (5' 10\")   Wt 84.4 kg (186 lb)   BMI 26.69 kg/m²   Blood pressure 120/70, pulse 72, resp. rate 16, height 1.778 m (5' 10\"), weight 84.4 kg (186 lb). Physical Exam  Vitals and nursing note reviewed.   Constitutional:       Appearance: Normal appearance.   HENT:      Head: Normocephalic and atraumatic.      Right Ear: Tympanic membrane, ear canal and external ear normal.      Left Ear: Tympanic membrane, ear canal and external ear normal. There is no impacted cerumen.      Nose: Nose normal.      Mouth/Throat:      Mouth: Mucous membranes are moist.      Pharynx: Oropharynx is clear.   Eyes:      Extraocular Movements: Extraocular movements intact.      Conjunctiva/sclera: Conjunctivae normal.      Pupils: Pupils are equal, round, and reactive to light.   Cardiovascular:      Rate and Rhythm: Normal rate and regular rhythm.      Pulses: Normal pulses.      Heart sounds: Normal heart sounds. No murmur heard.  Pulmonary:      Effort: Pulmonary effort is normal. No respiratory distress.      Breath sounds: Normal breath sounds. No stridor. No wheezing, rhonchi or rales.   Chest:      Chest wall: No tenderness.   Abdominal:      General: Abdomen is flat. Bowel sounds are normal. There is no distension.      Palpations: Abdomen is soft. There is no mass.      Tenderness: There is no abdominal tenderness. There is no right CVA tenderness, left CVA tenderness, guarding or " rebound.      Hernia: No hernia is present.   Musculoskeletal:         General: Normal range of motion.      Cervical back: Normal range of motion and neck supple.   Skin:     General: Skin is warm.      Capillary Refill: Capillary refill takes less than 2 seconds.   Neurological:      General: No focal deficit present.      Mental Status: He is alert.      Cranial Nerves: No cranial nerve deficit.      Sensory: No sensory deficit.      Motor: No weakness.      Coordination: Coordination normal.      Gait: Gait normal.      Deep Tendon Reflexes: Reflexes normal.   Psychiatric:         Mood and Affect: Mood normal.         Behavior: Behavior normal. Behavior is cooperative.         Thought Content: Thought content normal.         Judgment: Judgment normal.         Assessment/Plan   Problem List Items Addressed This Visit             ICD-10-CM    Atrial fibrillation with RVR (CMS/[facility]) - Primary I48.91     Atrial fibrillation - persistent with  Ventricular rate is well controlled (in sinus rhythm). ON ELIQUIS  No Shortness of breath. Continues   Metoprolol to 50 mg BID           Relevant Medications    metoprolol tartrate (Lopressor) 50 mg tablet    Benign hypertension I10              HTN - hypertension well/controlled .Target BP < 130/80  achieved. Educate low salt diet and exercise with weight loss. Educate home self monitoring and diary keeping. Educate risks of elevate blood pressure and benefits of prompt treatment.  Refill Amlodipine and Metoprolol              Relevant Medications    metoprolol tartrate (Lopressor) 50 mg tablet    BPH with obstruction/lower urinary tract symptoms N40.1, N13.8     BPH - Benign PROSTATIC Hypertrophy, + Dysuria/Nocturia, check PSA, prescribe Flomax 0.4mg qD and Avodart 0.5 mg qD vs. Finasteride 5 mg qD.  Educate exercises and Change in life style, prescribe vitamin E 400 IU qD. Consider referral to urology.          Chronic renal insufficiency N18.9     CRI - Chronic Renal  Insuficiency. Secondary to DM/HTN/Atherosclerosis. Follow BUN/Cr. and lytes.   RENOPROTECTION with ACEI/ARB (). Monitor microalbuminuria. Avoid hypotension and renal hypoperfusion. The patient was instructed to avoid NSAIDS and educate compliance with medications to control HTN(hypertension) and cholesterol and diabetes.                                     Diabetes mellitus (CMS/[facility]) E11.9     DM - NIDDM  . Reviewed with patient / Will check  HbA1c and fasting blood sugars. Educate home self monitoring and diary keeping(reviewed with patient home blood sugar levels /diary). Educate extensively low calorie diet and weight loss with exercise. Reviewed BS- diary and Rx. Regimen. [place] renal protection with ARB/ACEI.               Educate compliance with diet and Rx. And educate risks and autcomes.     Refill Metformin 500 mg BID with meals                                              GERD without esophagitis K21.9     GERD - Acid reflux disease. Rx. PPI (Prilosec/Prevacid/Protonix/Nexium) and educate diet and life style changes. Referred patient to an endoscopy (EGD) and check H. Pylori titers.          Hypercholesterolemia E78.00     Hypercholesterolemia - Monitor lipid profile and educate patient upon risks of high cholesterol and targets. Educate diet and change in lifestyle and increase in exercises - Refill:  Atorvastatin    and educate compliance with medication and diet.            IgG multiple myeloma (CMS/[facility]) C90.00     Monitor CBC and blood count and follow with hematology and stable now on Chemotherapy           Malaise and fatigue R53.81, R53.83     Fatigue - check CMP(metabolic panel and elctrolytes) , CBC(complete blood cell count), TSH(thyroid function). Insomnia may play a role and sleep studies(rule out sleep apnea) are recommended . Educate sleep hygiene. Consider anxiety disorder vs. depression. Consider Stress test, and 2DECHO.               Anemia in other chronic diseases classified elsewhere  D63.8        Anemia - of bleed/chronic disease. Check CBC , B12 and Folic Acid levels, Iron/Ferritn/TIBC, refer patient to endoscopy (upper/ lower). Target HCT > 30 %. Check guaic stool            Atrial fibrillation with RVR (CMS/HCC) - Primary I48.91       Atrial fibrillation - persistent with  Ventricular rate is well controlled (in sinus rhythm). ON eLIQUIS  No Shortness of breath. Continues   Metoprolol to 50 mg BID               Benign hypertension I10                HTN - hypertension well/controlled .Target BP < 130/80  achieved. Educate low salt diet and exercise with weight loss. Educate home self monitoring and diary keeping. Educate risks of elevate blood pressure and benefits of prompt treatment.  Refill Amlodipine and Metoprolol                Hypercholesterolemia   Medium                         BPH with obstruction/lower urinary tract symptoms N40.1, N13.8       BPH - Benign PROSTATIC Hypertrophy, + Dysuria/Nocturia, check PSA, prescribe Flomax 0.4mg qD and Avodart 0.5 mg qD vs. Finasteride 5 mg qD.  Educate exercises and Change in life style, prescribe vitamin E 400 IU qD. Consider referral to urology.            Hypercholesterolemia E78.00       Hypercholesterolemia - Monitor lipid profile and educate patient upon risks of high cholesterol and targets. Educate diet and change in lifestyle and increase in exercises - Refill:  Atorvastatin    and educate compliance with medication and diet.             Malaise and fatigue R53.81, R53.83       Fatigue - check CMP(metabolic panel and elctrolytes) , CBC(complete blood cell count), TSH(thyroid function). Insomnia may play a role and sleep studies(rule out sleep apnea) are recommended . Educate sleep hygiene. Consider anxiety disorder vs. depression. Consider Stress test, and 2DECHO.             Nephropathy N28.9       CRI - Chronic Renal Insuficiency. Secondary to DM/HTN/Atherosclerosis. Follow BUN/Cr. and lytes.   RENOPROTECTION with ACEI/ARB ().  Monitor microalbuminuria. Avoid hypotension and renal hypoperfusion. The patient was instructed to avoid NSAIDS and educate compliance with medications to control HTN(hypertension) and cholesterol and diabetes.                                                  Atrial fibrillation with RVR (CMS/[facility]) I48.91          Atrial fibrillation - persistent with  Ventricular rate is well controlled (in sinus rhythm). ON eLIQUIS  No Shortness of breath. Continues   Metoprolol to 50 mg BID               Relevant Medications      ATENOLOL ORAL      Benign hypertension I10        HTN - hypertension well/controlled .Target BP < 130/80  achieved. Educate low salt diet and exercise with weight loss. Educate home self monitoring and diary keeping. Educate risks of elevate blood pressure and benefits of prompt treatment.  Refill Amlodipine and Metoprolol           Chronic renal insufficiency N18.9        CRI - Chronic Renal Insuficiency. Secondary to DM/HTN/Atherosclerosis. Follow BUN/Cr. and lytes.   RENOPROTECTION with ACEI/ARB (). Monitor microalbuminuria. Avoid hypotension and renal hypoperfusion. The patient was instructed to avoid NSAIDS and educate compliance with medications to control HTN(hypertension) and cholesterol and diabetes.                                         Diabetes mellitus (CMS/[facility]) E11.9        DM - NIDDM  . Reviewed with patient / Will check  HbA1c and fasting blood sugars. Educate home self monitoring and diary keeping(reviewed with patient home blood sugar levels /diary). Educate extensively low calorie diet and weight loss with exercise. Reviewed BS- diary and Rx. Regimen. [place] renal protection with ARB/ACEI.               Educate compliance with diet and Rx. And educate risks and autcomes.     Refill Metformin 500 mg BID with meals                                                   Elevated PSA R97.20        Follows with urology under observation and monitor PSA           GERD without esophagitis K21.9         GERD - Acid reflux disease. Rx. PPI (Prilosec/Prevacid/Protonix/Nexium) and educate diet and life style changes. Referred patient to an endoscopy (EGD) and check H. Pylori titers.              Hypercholesterolemia E78.00        Hypercholesterolemia - Monitor lipid profile and educate patient upon risks of high cholesterol and targets. Educate diet and change in lifestyle and increase in exercises - Refill:   Atorvastatin and educate compliance with medication and diet.             IgG multiple myeloma (CMS/HCC) C90.00        Monitor CBC and blood count and follow with hematology and stable now on Chemotherapy            Lumbar degenerative disc disease - Primary M51.36      Relevant Medications      traMADol (Ultram) 50 mg tablet                               Immunizations/Injections      very important  Immunizations from outside sources need reconciliation.      Influenza, High Dose Seasonal, Preservative Free12/11/2020, 10/1/2018  Influenza, Unspecified9/22/2010  Influenza, seasonal, [id number]/6/2021, 9/12/2016, 9/9/2015,  ... (3 more)

## 2023-12-12 ENCOUNTER — LAB (OUTPATIENT)
Dept: LAB | Facility: CLINIC | Age: 73
End: 2023-12-12
Payer: MEDICARE

## 2023-12-12 ENCOUNTER — INFUSION (OUTPATIENT)
Dept: HEMATOLOGY/ONCOLOGY | Facility: CLINIC | Age: 73
End: 2023-12-12
Payer: MEDICARE

## 2023-12-12 VITALS
HEIGHT: 69 IN | WEIGHT: 195.33 LBS | HEART RATE: 81 BPM | RESPIRATION RATE: 18 BRPM | DIASTOLIC BLOOD PRESSURE: 63 MMHG | TEMPERATURE: 97.5 F | SYSTOLIC BLOOD PRESSURE: 115 MMHG | BODY MASS INDEX: 28.93 KG/M2

## 2023-12-12 DIAGNOSIS — C90.00 IGG MULTIPLE MYELOMA (MULTI): ICD-10-CM

## 2023-12-12 DIAGNOSIS — C90.00: Primary | ICD-10-CM

## 2023-12-12 DIAGNOSIS — C90.00: ICD-10-CM

## 2023-12-12 LAB
ALBUMIN SERPL BCP-MCNC: 3.9 G/DL (ref 3.4–5)
ALP SERPL-CCNC: 53 U/L (ref 33–136)
ALT SERPL W P-5'-P-CCNC: 11 U/L (ref 10–52)
ANION GAP SERPL CALC-SCNC: 14 MMOL/L (ref 10–20)
AST SERPL W P-5'-P-CCNC: 9 U/L (ref 9–39)
BASOPHILS # BLD AUTO: 0.02 X10*3/UL (ref 0–0.1)
BASOPHILS NFR BLD AUTO: 0.6 %
BILIRUB SERPL-MCNC: 0.5 MG/DL (ref 0–1.2)
BUN SERPL-MCNC: 24 MG/DL (ref 6–23)
CALCIUM SERPL-MCNC: 8.8 MG/DL (ref 8.6–10.6)
CHLORIDE SERPL-SCNC: 106 MMOL/L (ref 98–107)
CO2 SERPL-SCNC: 24 MMOL/L (ref 21–32)
CREAT SERPL-MCNC: 1.63 MG/DL (ref 0.5–1.3)
EOSINOPHIL # BLD AUTO: 0.16 X10*3/UL (ref 0–0.4)
EOSINOPHIL NFR BLD AUTO: 4.5 %
ERYTHROCYTE [DISTWIDTH] IN BLOOD BY AUTOMATED COUNT: 13.6 % (ref 11.5–14.5)
GFR SERPL CREATININE-BSD FRML MDRD: 44 ML/MIN/1.73M*2
GLUCOSE SERPL-MCNC: 143 MG/DL (ref 74–99)
HCT VFR BLD AUTO: 32.3 % (ref 41–52)
HGB BLD-MCNC: 10.9 G/DL (ref 13.5–17.5)
IMM GRANULOCYTES # BLD AUTO: 0.01 X10*3/UL (ref 0–0.5)
IMM GRANULOCYTES NFR BLD AUTO: 0.3 % (ref 0–0.9)
LYMPHOCYTES # BLD AUTO: 1.61 X10*3/UL (ref 0.8–3)
LYMPHOCYTES NFR BLD AUTO: 45.2 %
MCH RBC QN AUTO: 30.4 PG (ref 26–34)
MCHC RBC AUTO-ENTMCNC: 33.7 G/DL (ref 32–36)
MCV RBC AUTO: 90 FL (ref 80–100)
MONOCYTES # BLD AUTO: 0.44 X10*3/UL (ref 0.05–0.8)
MONOCYTES NFR BLD AUTO: 12.4 %
NEUTROPHILS # BLD AUTO: 1.32 X10*3/UL (ref 1.6–5.5)
NEUTROPHILS NFR BLD AUTO: 37 %
NRBC BLD-RTO: ABNORMAL /100{WBCS}
PLATELET # BLD AUTO: 131 X10*3/UL (ref 150–450)
POTASSIUM SERPL-SCNC: 3.9 MMOL/L (ref 3.5–5.3)
PROT SERPL-MCNC: 5.8 G/DL (ref 6.4–8.2)
PROT SERPL-MCNC: 5.9 G/DL (ref 6.4–8.2)
RBC # BLD AUTO: 3.59 X10*6/UL (ref 4.5–5.9)
SODIUM SERPL-SCNC: 140 MMOL/L (ref 136–145)
WBC # BLD AUTO: 3.6 X10*3/UL (ref 4.4–11.3)

## 2023-12-12 PROCEDURE — 80053 COMPREHEN METABOLIC PANEL: CPT

## 2023-12-12 PROCEDURE — 2500000001 HC RX 250 WO HCPCS SELF ADMINISTERED DRUGS (ALT 637 FOR MEDICARE OP): Performed by: INTERNAL MEDICINE

## 2023-12-12 PROCEDURE — 2500000004 HC RX 250 GENERAL PHARMACY W/ HCPCS (ALT 636 FOR OP/ED): Performed by: INTERNAL MEDICINE

## 2023-12-12 PROCEDURE — 84165 PROTEIN E-PHORESIS SERUM: CPT

## 2023-12-12 PROCEDURE — 85025 COMPLETE CBC W/AUTO DIFF WBC: CPT

## 2023-12-12 PROCEDURE — 36415 COLL VENOUS BLD VENIPUNCTURE: CPT

## 2023-12-12 PROCEDURE — 96401 CHEMO ANTI-NEOPL SQ/IM: CPT

## 2023-12-12 PROCEDURE — 84165 PROTEIN E-PHORESIS SERUM: CPT | Performed by: PATHOLOGY

## 2023-12-12 PROCEDURE — 83521 IG LIGHT CHAINS FREE EACH: CPT

## 2023-12-12 RX ORDER — DIPHENHYDRAMINE HCL 50 MG
50 CAPSULE ORAL ONCE
Status: COMPLETED | OUTPATIENT
Start: 2023-12-12 | End: 2023-12-12

## 2023-12-12 RX ORDER — HEPARIN 100 UNIT/ML
500 SYRINGE INTRAVENOUS AS NEEDED
Status: DISCONTINUED | OUTPATIENT
Start: 2023-12-12 | End: 2023-12-12 | Stop reason: HOSPADM

## 2023-12-12 RX ORDER — ACETAMINOPHEN 325 MG/1
650 TABLET ORAL ONCE
Status: COMPLETED | OUTPATIENT
Start: 2023-12-12 | End: 2023-12-12

## 2023-12-12 RX ORDER — MONTELUKAST SODIUM 10 MG/1
10 TABLET ORAL ONCE
Status: COMPLETED | OUTPATIENT
Start: 2023-12-12 | End: 2023-12-12

## 2023-12-12 RX ORDER — DIPHENHYDRAMINE HYDROCHLORIDE 50 MG/ML
50 INJECTION INTRAMUSCULAR; INTRAVENOUS AS NEEDED
Status: DISCONTINUED | OUTPATIENT
Start: 2023-12-12 | End: 2023-12-12 | Stop reason: HOSPADM

## 2023-12-12 RX ORDER — HEPARIN 100 UNIT/ML
500 SYRINGE INTRAVENOUS AS NEEDED
OUTPATIENT
Start: 2023-12-12

## 2023-12-12 RX ORDER — ALBUTEROL SULFATE 0.83 MG/ML
3 SOLUTION RESPIRATORY (INHALATION) AS NEEDED
Status: DISCONTINUED | OUTPATIENT
Start: 2023-12-12 | End: 2023-12-12 | Stop reason: HOSPADM

## 2023-12-12 RX ORDER — EPINEPHRINE 0.3 MG/.3ML
0.3 INJECTION SUBCUTANEOUS EVERY 5 MIN PRN
Status: DISCONTINUED | OUTPATIENT
Start: 2023-12-12 | End: 2023-12-12 | Stop reason: HOSPADM

## 2023-12-12 RX ORDER — FAMOTIDINE 10 MG/ML
20 INJECTION INTRAVENOUS ONCE AS NEEDED
Status: DISCONTINUED | OUTPATIENT
Start: 2023-12-12 | End: 2023-12-12 | Stop reason: HOSPADM

## 2023-12-12 RX ORDER — HEPARIN SODIUM,PORCINE/PF 10 UNIT/ML
50 SYRINGE (ML) INTRAVENOUS AS NEEDED
Status: DISCONTINUED | OUTPATIENT
Start: 2023-12-12 | End: 2023-12-12 | Stop reason: HOSPADM

## 2023-12-12 RX ORDER — HEPARIN SODIUM,PORCINE/PF 10 UNIT/ML
50 SYRINGE (ML) INTRAVENOUS AS NEEDED
OUTPATIENT
Start: 2023-12-12

## 2023-12-12 RX ADMIN — MONTELUKAST 10 MG: 10 TABLET, FILM COATED ORAL at 10:06

## 2023-12-12 RX ADMIN — DARATUMUMAB AND HYALURONIDASE-FIHJ (HUMAN RECOMBINANT) 1800 MG: 1800; 30000 INJECTION SUBCUTANEOUS at 10:28

## 2023-12-12 RX ADMIN — DIPHENHYDRAMINE HYDROCHLORIDE 50 MG: 50 CAPSULE ORAL at 10:07

## 2023-12-12 RX ADMIN — DEXAMETHASONE 20 MG: 6 TABLET ORAL at 10:06

## 2023-12-12 RX ADMIN — ACETAMINOPHEN 650 MG: 325 TABLET ORAL at 10:08

## 2023-12-12 ASSESSMENT — PAIN SCALES - GENERAL: PAINLEVEL: 0-NO PAIN

## 2023-12-13 LAB
KAPPA LC SERPL-MCNC: 2.38 MG/DL (ref 0.33–1.94)
KAPPA LC/LAMBDA SER: 2.43 {RATIO} (ref 0.26–1.65)
LAMBDA LC SERPL-MCNC: 0.98 MG/DL (ref 0.57–2.63)

## 2023-12-14 LAB
ALBUMIN: 3.7 G/DL (ref 3.4–5)
ALPHA 1 GLOBULIN: 0.3 G/DL (ref 0.2–0.6)
ALPHA 2 GLOBULIN: 0.8 G/DL (ref 0.4–1.1)
BETA GLOBULIN: 0.6 G/DL (ref 0.5–1.2)
GAMMA GLOBULIN: 0.5 G/DL (ref 0.5–1.4)
M-PROTEIN 1: 0.1 G/DL
PATH REVIEW-SERUM PROTEIN ELECTROPHORESIS: ABNORMAL
PROTEIN ELECTROPHORESIS COMMENT: ABNORMAL

## 2024-01-02 ENCOUNTER — SPECIALTY PHARMACY (OUTPATIENT)
Dept: PHARMACY | Facility: CLINIC | Age: 74
End: 2024-01-02

## 2024-01-08 RX ORDER — FAMOTIDINE 10 MG/ML
20 INJECTION INTRAVENOUS ONCE AS NEEDED
Status: CANCELLED | OUTPATIENT
Start: 2024-02-06

## 2024-01-08 RX ORDER — ACETAMINOPHEN 325 MG/1
650 TABLET ORAL ONCE
Status: CANCELLED | OUTPATIENT
Start: 2024-02-06

## 2024-01-08 RX ORDER — EPINEPHRINE 0.3 MG/.3ML
0.3 INJECTION SUBCUTANEOUS EVERY 5 MIN PRN
Status: CANCELLED | OUTPATIENT
Start: 2024-02-06

## 2024-01-08 RX ORDER — DIPHENHYDRAMINE HCL 50 MG
50 CAPSULE ORAL ONCE
Status: CANCELLED | OUTPATIENT
Start: 2024-02-06

## 2024-01-08 RX ORDER — ALBUTEROL SULFATE 0.83 MG/ML
3 SOLUTION RESPIRATORY (INHALATION) AS NEEDED
Status: CANCELLED | OUTPATIENT
Start: 2024-02-06

## 2024-01-08 RX ORDER — DIPHENHYDRAMINE HYDROCHLORIDE 50 MG/ML
50 INJECTION INTRAMUSCULAR; INTRAVENOUS AS NEEDED
Status: CANCELLED | OUTPATIENT
Start: 2024-02-06

## 2024-01-08 RX ORDER — MONTELUKAST SODIUM 10 MG/1
10 TABLET ORAL ONCE
Status: CANCELLED | OUTPATIENT
Start: 2024-02-06

## 2024-01-09 ENCOUNTER — OFFICE VISIT (OUTPATIENT)
Dept: HEMATOLOGY/ONCOLOGY | Facility: CLINIC | Age: 74
End: 2024-01-09
Payer: MEDICARE

## 2024-01-09 ENCOUNTER — LAB (OUTPATIENT)
Dept: LAB | Facility: CLINIC | Age: 74
End: 2024-01-09
Payer: MEDICARE

## 2024-01-09 ENCOUNTER — INFUSION (OUTPATIENT)
Dept: HEMATOLOGY/ONCOLOGY | Facility: CLINIC | Age: 74
End: 2024-01-09
Payer: MEDICARE

## 2024-01-09 VITALS
RESPIRATION RATE: 16 BRPM | HEIGHT: 69 IN | WEIGHT: 191.8 LBS | TEMPERATURE: 98.2 F | OXYGEN SATURATION: 95 % | DIASTOLIC BLOOD PRESSURE: 84 MMHG | BODY MASS INDEX: 28.41 KG/M2 | SYSTOLIC BLOOD PRESSURE: 155 MMHG | HEART RATE: 66 BPM

## 2024-01-09 DIAGNOSIS — C90.00 IGG MULTIPLE MYELOMA (MULTI): ICD-10-CM

## 2024-01-09 DIAGNOSIS — C90.00: ICD-10-CM

## 2024-01-09 DIAGNOSIS — C90.00 IGG MULTIPLE MYELOMA (MULTI): Primary | ICD-10-CM

## 2024-01-09 LAB
ALBUMIN SERPL BCP-MCNC: 3.9 G/DL (ref 3.4–5)
ALBUMIN SERPL BCP-MCNC: 4.2 G/DL (ref 3.4–5)
ALP SERPL-CCNC: 64 U/L (ref 33–136)
ALP SERPL-CCNC: 64 U/L (ref 33–136)
ALT SERPL W P-5'-P-CCNC: 15 U/L (ref 10–52)
ALT SERPL W P-5'-P-CCNC: 16 U/L (ref 10–52)
ANION GAP SERPL CALC-SCNC: 12 MMOL/L (ref 10–20)
ANION GAP SERPL CALC-SCNC: 12 MMOL/L (ref 10–20)
AST SERPL W P-5'-P-CCNC: 12 U/L (ref 9–39)
AST SERPL W P-5'-P-CCNC: 13 U/L (ref 9–39)
BASOPHILS # BLD AUTO: 0.01 X10*3/UL (ref 0–0.1)
BASOPHILS NFR BLD AUTO: 0.3 %
BILIRUB SERPL-MCNC: 0.6 MG/DL (ref 0–1.2)
BILIRUB SERPL-MCNC: 0.6 MG/DL (ref 0–1.2)
BUN SERPL-MCNC: 13 MG/DL (ref 6–23)
BUN SERPL-MCNC: 14 MG/DL (ref 6–23)
CALCIUM SERPL-MCNC: 9.2 MG/DL (ref 8.6–10.6)
CALCIUM SERPL-MCNC: 9.4 MG/DL (ref 8.6–10.6)
CHLORIDE SERPL-SCNC: 104 MMOL/L (ref 98–107)
CHLORIDE SERPL-SCNC: 105 MMOL/L (ref 98–107)
CO2 SERPL-SCNC: 28 MMOL/L (ref 21–32)
CO2 SERPL-SCNC: 28 MMOL/L (ref 21–32)
CREAT SERPL-MCNC: 1.38 MG/DL (ref 0.5–1.3)
CREAT SERPL-MCNC: 1.38 MG/DL (ref 0.5–1.3)
EGFRCR SERPLBLD CKD-EPI 2021: 53 ML/MIN/1.73M*2
EGFRCR SERPLBLD CKD-EPI 2021: 53 ML/MIN/1.73M*2
EOSINOPHIL # BLD AUTO: 0.16 X10*3/UL (ref 0–0.4)
EOSINOPHIL NFR BLD AUTO: 4.8 %
ERYTHROCYTE [DISTWIDTH] IN BLOOD BY AUTOMATED COUNT: 13.5 % (ref 11.5–14.5)
GLUCOSE SERPL-MCNC: 152 MG/DL (ref 74–99)
GLUCOSE SERPL-MCNC: 159 MG/DL (ref 74–99)
HCT VFR BLD AUTO: 35.3 % (ref 41–52)
HGB BLD-MCNC: 11.6 G/DL (ref 13.5–17.5)
IGA SERPL-MCNC: 43 MG/DL (ref 70–400)
IGG SERPL-MCNC: 531 MG/DL (ref 700–1600)
IGM SERPL-MCNC: 9 MG/DL (ref 40–230)
IMM GRANULOCYTES # BLD AUTO: 0 X10*3/UL (ref 0–0.5)
IMM GRANULOCYTES NFR BLD AUTO: 0 % (ref 0–0.9)
LYMPHOCYTES # BLD AUTO: 1.48 X10*3/UL (ref 0.8–3)
LYMPHOCYTES NFR BLD AUTO: 44 %
MCH RBC QN AUTO: 29.7 PG (ref 26–34)
MCHC RBC AUTO-ENTMCNC: 32.9 G/DL (ref 32–36)
MCV RBC AUTO: 91 FL (ref 80–100)
MONOCYTES # BLD AUTO: 0.4 X10*3/UL (ref 0.05–0.8)
MONOCYTES NFR BLD AUTO: 11.9 %
NEUTROPHILS # BLD AUTO: 1.31 X10*3/UL (ref 1.6–5.5)
NEUTROPHILS NFR BLD AUTO: 39 %
NRBC BLD-RTO: ABNORMAL /100{WBCS}
PLATELET # BLD AUTO: 147 X10*3/UL (ref 150–450)
POTASSIUM SERPL-SCNC: 3.7 MMOL/L (ref 3.5–5.3)
POTASSIUM SERPL-SCNC: 3.7 MMOL/L (ref 3.5–5.3)
PROT SERPL-MCNC: 5.9 G/DL (ref 6.4–8.2)
RBC # BLD AUTO: 3.9 X10*6/UL (ref 4.5–5.9)
SODIUM SERPL-SCNC: 140 MMOL/L (ref 136–145)
SODIUM SERPL-SCNC: 141 MMOL/L (ref 136–145)
WBC # BLD AUTO: 3.4 X10*3/UL (ref 4.4–11.3)

## 2024-01-09 PROCEDURE — 84155 ASSAY OF PROTEIN SERUM: CPT | Mod: 59

## 2024-01-09 PROCEDURE — 96401 CHEMO ANTI-NEOPL SQ/IM: CPT

## 2024-01-09 PROCEDURE — 96372 THER/PROPH/DIAG INJ SC/IM: CPT | Performed by: INTERNAL MEDICINE

## 2024-01-09 PROCEDURE — 84165 PROTEIN E-PHORESIS SERUM: CPT | Performed by: PATHOLOGY

## 2024-01-09 PROCEDURE — 99214 OFFICE O/P EST MOD 30 MIN: CPT | Performed by: INTERNAL MEDICINE

## 2024-01-09 PROCEDURE — 86334 IMMUNOFIX E-PHORESIS SERUM: CPT

## 2024-01-09 PROCEDURE — 1159F MED LIST DOCD IN RCRD: CPT | Performed by: INTERNAL MEDICINE

## 2024-01-09 PROCEDURE — 2500000004 HC RX 250 GENERAL PHARMACY W/ HCPCS (ALT 636 FOR OP/ED): Performed by: INTERNAL MEDICINE

## 2024-01-09 PROCEDURE — 85025 COMPLETE CBC W/AUTO DIFF WBC: CPT

## 2024-01-09 PROCEDURE — 83521 IG LIGHT CHAINS FREE EACH: CPT

## 2024-01-09 PROCEDURE — 84165 PROTEIN E-PHORESIS SERUM: CPT

## 2024-01-09 PROCEDURE — 82784 ASSAY IGA/IGD/IGG/IGM EACH: CPT

## 2024-01-09 PROCEDURE — 1126F AMNT PAIN NOTED NONE PRSNT: CPT | Performed by: INTERNAL MEDICINE

## 2024-01-09 PROCEDURE — 2500000001 HC RX 250 WO HCPCS SELF ADMINISTERED DRUGS (ALT 637 FOR MEDICARE OP): Performed by: INTERNAL MEDICINE

## 2024-01-09 PROCEDURE — 80053 COMPREHEN METABOLIC PANEL: CPT | Mod: MUE

## 2024-01-09 PROCEDURE — 3077F SYST BP >= 140 MM HG: CPT | Performed by: INTERNAL MEDICINE

## 2024-01-09 PROCEDURE — 1036F TOBACCO NON-USER: CPT | Performed by: INTERNAL MEDICINE

## 2024-01-09 PROCEDURE — 86320 SERUM IMMUNOELECTROPHORESIS: CPT | Performed by: PATHOLOGY

## 2024-01-09 PROCEDURE — 36415 COLL VENOUS BLD VENIPUNCTURE: CPT

## 2024-01-09 PROCEDURE — 3079F DIAST BP 80-89 MM HG: CPT | Performed by: INTERNAL MEDICINE

## 2024-01-09 RX ORDER — FAMOTIDINE 10 MG/ML
20 INJECTION INTRAVENOUS ONCE AS NEEDED
Status: DISCONTINUED | OUTPATIENT
Start: 2024-01-09 | End: 2024-01-09 | Stop reason: HOSPADM

## 2024-01-09 RX ORDER — DIPHENHYDRAMINE HYDROCHLORIDE 50 MG/ML
50 INJECTION INTRAMUSCULAR; INTRAVENOUS AS NEEDED
Status: DISCONTINUED | OUTPATIENT
Start: 2024-01-09 | End: 2024-01-09 | Stop reason: HOSPADM

## 2024-01-09 RX ORDER — DIPHENHYDRAMINE HCL 50 MG
50 CAPSULE ORAL ONCE
Status: COMPLETED | OUTPATIENT
Start: 2024-01-09 | End: 2024-01-09

## 2024-01-09 RX ORDER — ALBUTEROL SULFATE 0.83 MG/ML
3 SOLUTION RESPIRATORY (INHALATION) AS NEEDED
Status: DISCONTINUED | OUTPATIENT
Start: 2024-01-09 | End: 2024-01-09 | Stop reason: HOSPADM

## 2024-01-09 RX ORDER — ACETAMINOPHEN 325 MG/1
650 TABLET ORAL ONCE
Status: DISCONTINUED | OUTPATIENT
Start: 2024-01-09 | End: 2024-01-09 | Stop reason: HOSPADM

## 2024-01-09 RX ORDER — MONTELUKAST SODIUM 10 MG/1
10 TABLET ORAL ONCE
Status: COMPLETED | OUTPATIENT
Start: 2024-01-09 | End: 2024-01-09

## 2024-01-09 RX ORDER — EPINEPHRINE 0.3 MG/.3ML
0.3 INJECTION SUBCUTANEOUS EVERY 5 MIN PRN
Status: DISCONTINUED | OUTPATIENT
Start: 2024-01-09 | End: 2024-01-09 | Stop reason: HOSPADM

## 2024-01-09 RX ADMIN — MONTELUKAST 10 MG: 10 TABLET, FILM COATED ORAL at 09:55

## 2024-01-09 RX ADMIN — DIPHENHYDRAMINE HYDROCHLORIDE 50 MG: 50 CAPSULE ORAL at 09:55

## 2024-01-09 RX ADMIN — DEXAMETHASONE 20 MG: 6 TABLET ORAL at 09:54

## 2024-01-09 RX ADMIN — DARATUMUMAB AND HYALURONIDASE-FIHJ (HUMAN RECOMBINANT) 1800 MG: 1800; 30000 INJECTION SUBCUTANEOUS at 10:26

## 2024-01-09 ASSESSMENT — ENCOUNTER SYMPTOMS
DEPRESSION: 0
OCCASIONAL FEELINGS OF UNSTEADINESS: 0
LOSS OF SENSATION IN FEET: 0

## 2024-01-09 ASSESSMENT — PAIN SCALES - GENERAL: PAINLEVEL: 0-NO PAIN

## 2024-01-09 NOTE — PROGRESS NOTES
Patient ID: Toby Ugalde is a 73 y.o. male.    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 3/6/17- bone survey was negative for lytic lesions.  3/15/17-  bone marrow revealed 30% plasma cells with kappa predominance. Started on Rd  - 2017 - 12/2019: Rd->R 10mg (d/t hyperGlc a/w dex so d/c'ed), best response CO; Rd held d/t dental procedure and hip replacement, but never restarted  - 3/2022: worsening anemia since 3/22, worsening Cr since 3/21; SPE-IgGK 3.5g, K/L 147; bone survey: LT 9th rib possible plasmacytoma     Late February 2022 patient was seen by his PCP Dr. Cano for complaints of fatigue.  Labs notable for hyponatremia to 127, anemia with a HGB of 9.  Cr 1.99. Protein elevated to  10.7.  Uric acid up to 8.9.      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  Skeletal survey (3/2/22) - lesion in the left rib.   PET   Treatment:   Initiated (3/29/22) treatment for his relapsed Myeloma with Revlimid 10 mg by mouth days 1-14/21 days, Daratuzumab (3/29, & 4/5/22), SC Velcade (3/29 & 4/5/22), & weekly Dexamethasone complicated by several hospitaliizations for syncope, PE ,afib due to  velcade and revlimid.  Velcade discontinued     Recv'd weekly single agent Daraumumab from 5/31 - 7/19/22. Initiated (8/25/22) Revlimid 10 mg on days 1-21/28 days.     Has had marked reduction in paraprotein and free lyte chains with M protein : 1.4 g/dl on 4/19/22 ,    [place] free lyte/ratio of 4.49 mg/dl/4.45.    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         IgG multiple myeloma (CMS/HCC)   9/6/2023 Initial Diagnosis    IgG multiple myeloma (CMS/HCC)     10/17/2023 -  Chemotherapy    Daratumumab, 28 Day Cycles - Maintenance         Response:     Past Medical History:     Past Medical History:   Diagnosis Date    Essential (primary) hypertension 11/20/2013    Benign essential hypertension    Other intervertebral disc degeneration, lumbar region     Lumbar degenerative disc disease    Overweight with body mass index (BMI) 25.0-29.9 02/02/2022    Personal history of malignant neoplasm of prostate     History of malignant neoplasm of prostate    Personal history of other diseases of the circulatory system     History of hypertension    Personal history of other diseases of the circulatory system 06/29/2022    History of atrial tachycardia    Personal history of other diseases of the circulatory system     History of essential hypertension    Personal history of other diseases of the musculoskeletal system and connective tissue     History of arthritis    Personal history of other diseases of the musculoskeletal system and connective tissue     History of acute gouty arthritis    Personal history of other diseases of the musculoskeletal system and connective tissue 01/30/2019    History of left foot drop    Personal history of other diseases of the respiratory system     History of bronchitis    Personal history of other endocrine, nutritional and metabolic disease     History of hypercholesterolemia    Personal history of other endocrine, nutritional and metabolic disease     History of diabetes mellitus    Personal history of other endocrine, nutritional and metabolic disease 01/17/2014    History of diabetes mellitus    Post-traumatic stress disorder, chronic 11/10/2023    Primary localized osteoarthritis of pelvic region and thigh 12/17/2018    Pure hypercholesterolemia, unspecified 11/20/2013    Low-density-lipoid-type  (LDL) hyperlipoproteinemia    Snoring     Snoring       Surgical History:     Past Surgical History:   Procedure Laterality Date    BACK SURGERY  11/20/2013    Back Surgery    TONSILLECTOMY  11/20/2013    Tonsillectomy    TOTAL KNEE ARTHROPLASTY  11/20/2013    Knee Replacement        Family History:     Family History   Problem Relation Name Age of Onset    Heart attack Mother      Diabetes Brother      Stroke Maternal Grandfather         Social History:  Reported history of agent orange exposure while in [employer] at [employer]. Retired  and post .  from his wife who is health care  power of .  Two sons and 2 grandchildren (1)    Social History     Tobacco Use    Smoking status: Never     Passive exposure: Never    Smokeless tobacco: Never   Substance Use Topics    Alcohol use: Yes     Comment: occasional    Drug use: Never      -------------------------------------------------------------------------------------------------------  Subjective       HPI    KANDI ROSE is a 73 year old Male h/o pAfib,Pes  well controlled T2DM, HTN, gout, IgG kappa MM with history as above     He presents to the clinic today (1/8/24) for follow-up and to ahead of receiving /dose #16  monthly subcutaneous daratumumab, still on revlimid 5 mg 21/28 days.  Free lyte from 12/12 2.38 ratio of 2.43 , SPEP with paraprotien of 0.1 consistent with VGPR. He had his COVID booster and flu shot.  No other health issues.  Finally is working with dentist at the [facility]. Blood counts good for treatment today. No complaints today.  Still not clear whether patient has ONJ . Patient states he had a bone density recently with Dr. Turner [phone].      Review of Systems   All other systems reviewed and are negative.     -------------------------------------------------------------------------------------------------------  Objective   BSA: There is no height or weight on file to calculate BSA.  There were no  vitals taken for this visit.    Physical Exam  Vitals reviewed.   Constitutional:       Comments: Pale, elderly uses a cane   HENT:      Head: Normocephalic and atraumatic.      Mouth/Throat:      Mouth: Mucous membranes are moist.   Eyes:      Extraocular Movements: Extraocular movements intact.      Conjunctiva/sclera: Conjunctivae normal.      Pupils: Pupils are equal, round, and reactive to light.   Cardiovascular:      Rate and Rhythm: Normal rate and regular rhythm.      Pulses: Normal pulses.   Pulmonary:      Effort: Pulmonary effort is normal.      Breath sounds: Normal breath sounds.   Abdominal:      General: Abdomen is flat. Bowel sounds are normal.      Palpations: Abdomen is soft.   Musculoskeletal:         General: Normal range of motion.   Skin:     General: Skin is warm.   Neurological:      General: No focal deficit present.      Mental Status: He is alert.   Psychiatric:         Mood and Affect: Mood normal.         Behavior: Behavior normal.         Thought Content: Thought content normal.         Judgment: Judgment normal.         Performance Status:  Symptomatic; fully ambulatory  -------------------------------------------------------------------------------------------------------  Assessment/Plan      74 yo M h/o IgG kappa MM s/p Rd [id number] best response RI stopped d/t dental procedure, presented on 3/2022 for worsening anemia/Cr d/t relapsed MM, and likely LT  9th rib plasmacytoma.      Restaging 3/2022 shows high burden disease with 90% tumorous involvement of the bone marrow , PET to be done on 4/4/22     Initiated (3/29/22) treatment for his relapsed Myeloma with Revlimid 10 mg by mouth days 1-14/21 days, Daratuzumab (3/29, & 4/5/22), SC Velcade (3/29 & 4/5/22), & weekly Dexamethasone. complicated by several hospilizations for syncope, PE ,afib due to  velcade and revlimid.       Recv'd weekly single agent Daraumumab from 5/31 - 7/19/22. Initiated (8/25/22) Revlimid 10 mg on days  1-21/28 days.     myeloma labs (11/14/23) with IgG kappa M-protein down to 0.1 g/dL, free kappa light chains normal at 2.1 with ratio of 2.50 mg/dL, last SPEP pm 9/27 0.1consistent with ongoing VGPR.    1/9/24 Current tx monthly casey and revlimid 5mg 21/28 days  Dose 16 of monthly casey today     Will continue monthly casey, rev, & dex until evidence of disease progression or unacceptable toxicity.    Bone health, xgeva held due to concerns of ONJ and prolonged treatment in the past.  Will try to obtain info regarding recent bone density from Dr. Toussaint as [facility] and [facility].     #Atrial fib on chronic anticoagulation  on Eliquis 5 mg by mouth twice a day.   Patient confirmed on 50 mg by mouth twice a day of metoprolol,  Now on 25 mg by mouth twice a day due to orthostatic hypotention.    Has cardiology followup with EP.      PE (4/9/22) - Small occlusive embolic thrombus within the right lower lobe pulmonary artery branch, medially and posteriorly.  Cont Eliquis 5  mg twice a day.     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  Pt had his tooth pulled in 2019, however with prolonged bone exposure/infection, eventually underwent OMSF surgery in 10/2021. Unclear if he has denosumab assoc jaw necrosis. Patient finally had his bad tooth (L upper molar) removed on 5/25/23.[facility]     RTC: - 2/6/24 casey # 17, next casey and provider visit is 3/5/24    -------------------------------------------------------------------------------------------------------  Yumiko Waller MD

## 2024-01-09 NOTE — SIGNIFICANT EVENT
01/09/24 0919   Prechemo Checklist   Has the patient been in the hospital, ED, or urgent care since last date of service No   Chemo/Immuno Consent Signed Yes   Protocol/Indications Verified Yes   Confirmed to previous date/time of medication Yes   Compared to previous dose Yes   All medications are dated accurately Yes   Pregnancy Test Negative Not applicable   Parameters Met Yes   BSA/Weight-Height Verified Yes   Dose Calculations Verified Yes

## 2024-01-10 LAB
KAPPA LC SERPL-MCNC: 1.57 MG/DL (ref 0.33–1.94)
KAPPA LC/LAMBDA SER: 2.8 {RATIO} (ref 0.26–1.65)
LAMBDA LC SERPL-MCNC: 0.56 MG/DL (ref 0.57–2.63)

## 2024-01-11 LAB
ALBUMIN: 3.8 G/DL (ref 3.4–5)
ALPHA 1 GLOBULIN: 0.2 G/DL (ref 0.2–0.6)
ALPHA 2 GLOBULIN: 0.8 G/DL (ref 0.4–1.1)
BETA GLOBULIN: 0.6 G/DL (ref 0.5–1.2)
GAMMA GLOBULIN: 0.5 G/DL (ref 0.5–1.4)
IMMUNOFIXATION COMMENT: NORMAL
M-PROTEIN 1: 0.1 G/DL
PATH REVIEW - SERUM IMMUNOFIXATION: NORMAL
PATH REVIEW-SERUM PROTEIN ELECTROPHORESIS: ABNORMAL
PROTEIN ELECTROPHORESIS COMMENT: ABNORMAL

## 2024-01-12 ENCOUNTER — LAB (OUTPATIENT)
Dept: LAB | Facility: LAB | Age: 74
End: 2024-01-12
Payer: MEDICARE

## 2024-01-12 ENCOUNTER — OFFICE VISIT (OUTPATIENT)
Dept: PRIMARY CARE | Facility: CLINIC | Age: 74
End: 2024-01-12
Payer: MEDICARE

## 2024-01-12 VITALS
RESPIRATION RATE: 16 BRPM | HEIGHT: 68 IN | WEIGHT: 191 LBS | SYSTOLIC BLOOD PRESSURE: 120 MMHG | DIASTOLIC BLOOD PRESSURE: 70 MMHG | BODY MASS INDEX: 28.95 KG/M2 | HEART RATE: 52 BPM

## 2024-01-12 DIAGNOSIS — H93.12 ANTIBIOTIC-INDUCED TINNITUS OF LEFT EAR: ICD-10-CM

## 2024-01-12 DIAGNOSIS — I48.0 PAROXYSMAL ATRIAL FIBRILLATION (MULTI): ICD-10-CM

## 2024-01-12 DIAGNOSIS — E11.9 TYPE 2 DIABETES MELLITUS WITHOUT COMPLICATION, WITHOUT LONG-TERM CURRENT USE OF INSULIN (MULTI): ICD-10-CM

## 2024-01-12 DIAGNOSIS — T36.95XA ANTIBIOTIC-INDUCED TINNITUS OF LEFT EAR: ICD-10-CM

## 2024-01-12 DIAGNOSIS — K21.9 GERD WITHOUT ESOPHAGITIS: ICD-10-CM

## 2024-01-12 DIAGNOSIS — M51.36 LUMBAR DEGENERATIVE DISC DISEASE: ICD-10-CM

## 2024-01-12 DIAGNOSIS — N18.31 STAGE 3A CHRONIC KIDNEY DISEASE (MULTI): ICD-10-CM

## 2024-01-12 DIAGNOSIS — R30.0 DYSURIA: ICD-10-CM

## 2024-01-12 DIAGNOSIS — N18.2 CHRONIC RENAL IMPAIRMENT, STAGE 2 (MILD): ICD-10-CM

## 2024-01-12 DIAGNOSIS — Z23 NEED FOR VACCINATION WITH 20-POLYVALENT PNEUMOCOCCAL CONJUGATE VACCINE: ICD-10-CM

## 2024-01-12 DIAGNOSIS — N13.8 BPH WITH OBSTRUCTION/LOWER URINARY TRACT SYMPTOMS: ICD-10-CM

## 2024-01-12 DIAGNOSIS — Z00.00 MEDICARE ANNUAL WELLNESS VISIT, SUBSEQUENT: Primary | ICD-10-CM

## 2024-01-12 DIAGNOSIS — N40.1 BPH WITH OBSTRUCTION/LOWER URINARY TRACT SYMPTOMS: ICD-10-CM

## 2024-01-12 DIAGNOSIS — D61.818 PANCYTOPENIA (MULTI): ICD-10-CM

## 2024-01-12 DIAGNOSIS — I10 BENIGN HYPERTENSION: ICD-10-CM

## 2024-01-12 PROBLEM — K02.52 DENTAL CARIES ON PIT AND FISSURE SURFACE PENETRATING INTO DENTIN: Status: RESOLVED | Noted: 2024-01-12 | Resolved: 2024-01-12

## 2024-01-12 PROBLEM — K03.6 DEPOSITS (ACCRETIONS) ON TEETH: Status: RESOLVED | Noted: 2024-01-12 | Resolved: 2024-01-12

## 2024-01-12 PROBLEM — J31.0 RHINITIS: Status: ACTIVE | Noted: 2024-01-12

## 2024-01-12 PROBLEM — H66.90 RECURRENT ACUTE OTITIS MEDIA: Status: ACTIVE | Noted: 2024-01-12

## 2024-01-12 PROBLEM — Z86.79 HISTORY OF HYPERTENSION: Status: RESOLVED | Noted: 2024-01-12 | Resolved: 2024-01-12

## 2024-01-12 PROBLEM — F32.0 MILD MAJOR DEPRESSION (CMS-HCC): Status: RESOLVED | Noted: 2023-10-13 | Resolved: 2024-01-12

## 2024-01-12 PROBLEM — J02.9 INFECTIVE PHARYNGITIS: Status: RESOLVED | Noted: 2024-01-12 | Resolved: 2024-01-12

## 2024-01-12 PROBLEM — H61.20 IMPACTED CERUMEN: Status: RESOLVED | Noted: 2024-01-12 | Resolved: 2024-01-12

## 2024-01-12 PROBLEM — K08.431 PARTIAL LOSS OF TEETH DUE TO CARIES, CLASS I: Status: RESOLVED | Noted: 2024-01-12 | Resolved: 2024-01-12

## 2024-01-12 PROBLEM — I26.99 OTHER PULMONARY EMBOLISM WITHOUT ACUTE COR PULMONALE (MULTI): Status: RESOLVED | Noted: 2023-09-06 | Resolved: 2024-01-12

## 2024-01-12 PROBLEM — Z86.39 HISTORY OF HYPERCHOLESTEROLEMIA: Status: RESOLVED | Noted: 2024-01-12 | Resolved: 2024-01-12

## 2024-01-12 PROBLEM — I26.99 PULMONARY EMBOLUS AND INFARCTION (MULTI): Status: RESOLVED | Noted: 2023-09-06 | Resolved: 2024-01-12

## 2024-01-12 PROBLEM — Z85.46 HISTORY OF MALIGNANT NEOPLASM OF PROSTATE: Status: RESOLVED | Noted: 2024-01-12 | Resolved: 2024-01-12

## 2024-01-12 PROBLEM — R06.83 SNORING: Status: ACTIVE | Noted: 2024-01-12

## 2024-01-12 PROBLEM — M87.188: Status: RESOLVED | Noted: 2023-09-06 | Resolved: 2024-01-12

## 2024-01-12 LAB
AMPHETAMINES UR QL SCN: NORMAL
BARBITURATES UR QL SCN: NORMAL
BZE UR QL SCN: NORMAL
CANNABINOIDS UR QL SCN: NORMAL
CREAT UR-MCNC: 141.3 MG/DL (ref 20–370)
PCP UR QL SCN: NORMAL

## 2024-01-12 PROCEDURE — 3078F DIAST BP <80 MM HG: CPT | Performed by: INTERNAL MEDICINE

## 2024-01-12 PROCEDURE — 1036F TOBACCO NON-USER: CPT | Performed by: INTERNAL MEDICINE

## 2024-01-12 PROCEDURE — 80368 SEDATIVE HYPNOTICS: CPT

## 2024-01-12 PROCEDURE — 1159F MED LIST DOCD IN RCRD: CPT | Performed by: INTERNAL MEDICINE

## 2024-01-12 PROCEDURE — 80354 DRUG SCREENING FENTANYL: CPT

## 2024-01-12 PROCEDURE — G0439 PPPS, SUBSEQ VISIT: HCPCS | Performed by: INTERNAL MEDICINE

## 2024-01-12 PROCEDURE — 80307 DRUG TEST PRSMV CHEM ANLYZR: CPT

## 2024-01-12 PROCEDURE — 80365 DRUG SCREENING OXYCODONE: CPT

## 2024-01-12 PROCEDURE — G0009 ADMIN PNEUMOCOCCAL VACCINE: HCPCS | Performed by: INTERNAL MEDICINE

## 2024-01-12 PROCEDURE — 80373 DRUG SCREENING TRAMADOL: CPT

## 2024-01-12 PROCEDURE — 99214 OFFICE O/P EST MOD 30 MIN: CPT | Performed by: INTERNAL MEDICINE

## 2024-01-12 PROCEDURE — 90677 PCV20 VACCINE IM: CPT | Performed by: INTERNAL MEDICINE

## 2024-01-12 PROCEDURE — 80361 OPIATES 1 OR MORE: CPT

## 2024-01-12 PROCEDURE — 82570 ASSAY OF URINE CREATININE: CPT

## 2024-01-12 PROCEDURE — 3074F SYST BP LT 130 MM HG: CPT | Performed by: INTERNAL MEDICINE

## 2024-01-12 PROCEDURE — 80346 BENZODIAZEPINES1-12: CPT

## 2024-01-12 PROCEDURE — 80358 DRUG SCREENING METHADONE: CPT

## 2024-01-12 PROCEDURE — 1126F AMNT PAIN NOTED NONE PRSNT: CPT | Performed by: INTERNAL MEDICINE

## 2024-01-12 RX ORDER — TRAMADOL HYDROCHLORIDE 50 MG/1
50 TABLET ORAL EVERY 8 HOURS PRN
Qty: 90 TABLET | Refills: 2 | Status: SHIPPED | OUTPATIENT
Start: 2024-01-12 | End: 2024-04-11

## 2024-01-12 ASSESSMENT — ENCOUNTER SYMPTOMS
ENDOCRINE NEGATIVE: 1
HEMATOLOGIC/LYMPHATIC NEGATIVE: 1
CARDIOVASCULAR NEGATIVE: 1
GASTROINTESTINAL NEGATIVE: 1
CONSTITUTIONAL NEGATIVE: 1
PSYCHIATRIC NEGATIVE: 1
MUSCULOSKELETAL NEGATIVE: 1
ALLERGIC/IMMUNOLOGIC NEGATIVE: 1
EYES NEGATIVE: 1
NEUROLOGICAL NEGATIVE: 1
RESPIRATORY NEGATIVE: 1

## 2024-01-12 NOTE — PROGRESS NOTES
"Subjective   Patient ID: Toby Ugalde is a 73 y.o. male who presents for Medicare Annual Wellness Visit Initial (Medicare wellness).    HPI     Review of Systems   Constitutional: Negative.    HENT: Negative.     Eyes: Negative.    Respiratory: Negative.     Cardiovascular: Negative.    Gastrointestinal: Negative.    Endocrine: Negative.    Musculoskeletal: Negative.    Skin: Negative.    Allergic/Immunologic: Negative.    Neurological: Negative.    Hematological: Negative.    Psychiatric/Behavioral: Negative.     All other systems reviewed and are negative.      Objective   Ht 1.727 m (5' 8\")   Wt 86.6 kg (191 lb)   BMI 29.04 kg/m²   Blood pressure 120/70, pulse 52, resp. rate 16, height 1.727 m (5' 8\"), weight 86.6 kg (191 lb).   Physical Exam  Vitals and nursing note reviewed.   Constitutional:       Appearance: Normal appearance.   HENT:      Head: Normocephalic and atraumatic.      Right Ear: Tympanic membrane, ear canal and external ear normal.      Left Ear: Tympanic membrane, ear canal and external ear normal. There is no impacted cerumen.      Nose: Nose normal.      Mouth/Throat:      Mouth: Mucous membranes are moist.      Pharynx: Oropharynx is clear.   Eyes:      Extraocular Movements: Extraocular movements intact.      Conjunctiva/sclera: Conjunctivae normal.      Pupils: Pupils are equal, round, and reactive to light.   Cardiovascular:      Rate and Rhythm: Normal rate and regular rhythm.      Pulses: Normal pulses.      Heart sounds: Normal heart sounds. No murmur heard.  Pulmonary:      Effort: Pulmonary effort is normal. No respiratory distress.      Breath sounds: Normal breath sounds. No stridor. No wheezing, rhonchi or rales.   Chest:      Chest wall: No tenderness.   Abdominal:      General: Abdomen is flat. Bowel sounds are normal. There is no distension.      Palpations: Abdomen is soft. There is no mass.      Tenderness: There is no abdominal tenderness. There is no right CVA tenderness, " left CVA tenderness, guarding or rebound.      Hernia: No hernia is present.   Musculoskeletal:         General: Normal range of motion.      Cervical back: Normal range of motion and neck supple.   Skin:     General: Skin is warm.      Capillary Refill: Capillary refill takes less than 2 seconds.   Neurological:      General: No focal deficit present.      Mental Status: He is alert.      Cranial Nerves: No cranial nerve deficit.      Sensory: No sensory deficit.      Motor: No weakness.      Coordination: Coordination normal.      Gait: Gait normal.      Deep Tendon Reflexes: Reflexes normal.   Psychiatric:         Mood and Affect: Mood normal.         Behavior: Behavior normal. Behavior is cooperative.         Thought Content: Thought content normal.         Judgment: Judgment normal.         Assessment/Plan   Problem List Items Addressed This Visit             ICD-10-CM    Paroxysmal atrial fibrillation (CMS/HCC) I48.0     Atrial fibrillation - persistent with  Ventricular rate is well controlled (in sinus rhythm). On Apixaban  No Shortness of breath. Continues   Metoprolol to 25 mg BID           Benign hypertension I10     HTN - hypertension well/controlled .Target BP < 130/80  achieved. Educate low salt diet and exercise with weight loss. Educate home self monitoring and diary keeping. Educate risks of elevate blood pressure and benefits of prompt treatment.  Refill Amlodipine and Metoprolol          BPH with obstruction/lower urinary tract symptoms N40.1, N13.8     BPH - Benign PROSTATIC Hypertrophy, + Dysuria/Nocturia, check PSA, prescribe Flomax 0.4mg qD and Avodart 0.5 mg qD vs. Finasteride 5 mg qD.  Educate exercises and Change in life style, prescribe vitamin E 400 IU qD. Consider referral to urology.          Chronic renal insufficiency N18.9     CRI - Chronic Renal Insuficiency. Secondary to DM/HTN/Atherosclerosis. Follow BUN/Cr. and lytes.   RENOPROTECTION with ACEI/ARB (). Monitor microalbuminuria.  Avoid hypotension and renal hypoperfusion. The patient was instructed to avoid NSAIDS and educate compliance with medications to control HTN(hypertension) and cholesterol and diabetes.                                     Diabetes mellitus (CMS/[facility]) E11.9     DM - NIDDM  . Reviewed with patient / Will check  HbA1c and fasting blood sugars. Educate home self monitoring and diary keeping(reviewed with patient home blood sugar levels /diary). Educate extensively low calorie diet and weight loss with exercise. Reviewed BS- diary and Rx. Regimen. [place] renal protection with ARB/ACEI.               Educate compliance with diet and Rx. And educate risks and autcomes.     Refill Metformin 500 mg BID with meals           GERD without esophagitis K21.9     GERD - Acid reflux disease. Rx. PPI (Prilosec/Prevacid/Protonix/Nexium) and educate diet and life style changes. Referred patient to an endoscopy (EGD) and check H. Pylori titers.          Lumbar degenerative disc disease M51.36     DDD - Degenerative disc disease of the Lumbo-Sacral (LS)/Cervical (C)  spine. Educate exercises and referred patient to Physical Therapy (PT). Ordered X-Ray's of the LS/C spine. Consider MRI. Radiculopathy in the distribution of L4-L5-S1/C3-C4-C5 nerve roots, needs NSAIDS (Naprosin 500mg BID vs. Arthrotec 75mg BID or Prednisone taper (Medrol dose pack), Flexeril 10 mg qHS, heat application, and pain control with Tylenol vs. Vicodin 5/325 mg TID.           Relevant Medications    traMADol (Ultram) 50 mg tablet    Other Relevant Orders    Opiate/Opioid/Benzo Prescription Compliance    Medicare annual wellness visit, subsequent - Primary Z00.00     No recent hospitalizations.    All medications reviewed and reconciled by me the provider..  No use of controlled substances or opiates.    Family history, social history reviewed, no changes.    Patient does not smoke.    Patient does not drink.    Patient hydrates adequately daily.  Eats a  well-balanced healthy diet.     Exercises adequately including walking and doing weightbearing exercises.    Patient denies any difficulty with memory or cognition.     Denies any difficulty with hearing.  Patient does not wear hearing aids.    No fall risk.  No recent falls.  Denies any difficulty walking.    Patient with no history of depression anxiety, denies any loss of interest, no feeling of sadness, no lack of motivation.    Patient is independent in all ADLs and IADLs.  Independent bathing, dressing, walking.  Takes care of own finances, shopping and cooking.     End-of-life decision-making power of  reviewed with patient.     Risk Factors Identified During Visit: None.   Influenza: influenza vaccine was previously given.   Pneumovax 23: Pneumovax 23 vaccine was previously given.   Prevnar 13: Prevnar 13 vaccine was previously given.   Shingles Vaccine: Shingles vaccine was previously given.   Prostate cancer screening: Screening is current.   Colorectal Cancer Screening: screening is current.   Abdominal Aortic Aneurysm screening: screening is current.   HIV screening: screening not indicated.            Need for vaccination with 20-polyvalent pneumococcal conjugate vaccine Z23    Relevant Orders    Pneumococcal conjugate vaccine, 20-valent (PREVNAR 20)     Other Visit Diagnoses         Codes    Antibiotic-induced tinnitus of left ear     H93.12, T36.95XA            Atrial fibrillation with RVR (CMS/[facility]) - Primary I48.91        Atrial fibrillation - persistent with  Ventricular rate is well controlled (in sinus rhythm). ON ELIQUIS  No Shortness of breath. Continues   Metoprolol to 50 mg BID             Relevant Medications     metoprolol tartrate (Lopressor) 50 mg tablet     Benign hypertension I10                       HTN - hypertension well/controlled .Target BP < 130/80  achieved. Educate low salt diet and exercise with weight loss. Educate home self monitoring and diary keeping. Educate risks  of elevate blood pressure and benefits of prompt treatment.  Refill Amlodipine and Metoprolol               Relevant Medications     metoprolol tartrate (Lopressor) 50 mg tablet     BPH with obstruction/lower urinary tract symptoms N40.1, N13.8       BPH - Benign PROSTATIC Hypertrophy, + Dysuria/Nocturia, check PSA, prescribe Flomax 0.4mg qD and Avodart 0.5 mg qD vs. Finasteride 5 mg qD.  Educate exercises and Change in life style, prescribe vitamin E 400 IU qD. Consider referral to urology.            Chronic renal insufficiency N18.9       CRI - Chronic Renal Insuficiency. Secondary to DM/HTN/Atherosclerosis. Follow BUN/Cr. and lytes.   RENOPROTECTION with ACEI/ARB (). Monitor microalbuminuria. Avoid hypotension and renal hypoperfusion. The patient was instructed to avoid NSAIDS and educate compliance with medications to control HTN(hypertension) and cholesterol and diabetes.                                       Diabetes mellitus (CMS/[facility]) E11.9       DM - NIDDM  . Reviewed with patient / Will check  HbA1c and fasting blood sugars. Educate home self monitoring and diary keeping(reviewed with patient home blood sugar levels /diary). Educate extensively low calorie diet and weight loss with exercise. Reviewed BS- diary and Rx. Regimen. [place] renal protection with ARB/ACEI.               Educate compliance with diet and Rx. And educate risks and autcomes.     Refill Metformin 500 mg BID with meals                                                GERD without esophagitis K21.9       GERD - Acid reflux disease. Rx. PPI (Prilosec/Prevacid/Protonix/Nexium) and educate diet and life style changes. Referred patient to an endoscopy (EGD) and check H. Pylori titers.            Hypercholesterolemia E78.00       Hypercholesterolemia - Monitor lipid profile and educate patient upon risks of high cholesterol and targets. Educate diet and change in lifestyle and increase in exercises - Refill:  Atorvastatin    and educate  compliance with medication and diet.              IgG multiple myeloma (CMS/HCC) C90.00       Monitor CBC and blood count and follow with hematology and stable now on Chemotherapy             Malaise and fatigue R53.81, R53.83       Fatigue - check CMP(metabolic panel and elctrolytes) , CBC(complete blood cell count), TSH(thyroid function). Insomnia may play a role and sleep studies(rule out sleep apnea) are recommended . Educate sleep hygiene. Consider anxiety disorder vs. depression. Consider Stress test, and 2DECHO.                          Anemia in other chronic diseases classified elsewhere D63.8           Anemia - of bleed/chronic disease. Check CBC , B12 and Folic Acid levels, Iron/Ferritn/TIBC, refer patient to endoscopy (upper/ lower). Target HCT > 30 %. Check guaic stool             Atrial fibrillation with RVR (CMS/HCC) - Primary I48.91         Atrial fibrillation - persistent with  Ventricular rate is well controlled (in sinus rhythm). ON eLIQUIS  No Shortness of breath. Continues   Metoprolol to 50 mg BID

## 2024-01-12 NOTE — ASSESSMENT & PLAN NOTE
No recent hospitalizations.    All medications reviewed and reconciled by me the provider..  No use of controlled substances or opiates.    Family history, social history reviewed, no changes.    Patient does not smoke.    Patient does not drink.    Patient hydrates adequately daily.  Eats a well-balanced healthy diet.     Exercises adequately including walking and doing weightbearing exercises.    Patient denies any difficulty with memory or cognition.     Denies any difficulty with hearing.  Patient does not wear hearing aids.    No fall risk.  No recent falls.  Denies any difficulty walking.    Patient with no history of depression anxiety, denies any loss of interest, no feeling of sadness, no lack of motivation.    Patient is independent in all ADLs and IADLs.  Independent bathing, dressing, walking.  Takes care of own finances, shopping and cooking.     End-of-life decision-making power of  reviewed with patient.     Risk Factors Identified During Visit: None.   Influenza: influenza vaccine was previously given.   Pneumovax 23: Pneumovax 23 vaccine was previously given.   Prevnar 13: Prevnar 13 vaccine was previously given.   Shingles Vaccine: Shingles vaccine was previously given.   Prostate cancer screening: Screening is current.   Colorectal Cancer Screening: screening is current.   Abdominal Aortic Aneurysm screening: screening is current.   HIV screening: screening not indicated.

## 2024-01-12 NOTE — ASSESSMENT & PLAN NOTE
Atrial fibrillation - persistent with  Ventricular rate is well controlled (in sinus rhythm). On Apixaban  No Shortness of breath. Continues   Metoprolol to 25 mg BID

## 2024-01-12 NOTE — ASSESSMENT & PLAN NOTE
DM - NIDDM  . Reviewed with patient / Will check  HbA1c and fasting blood sugars. Educate home self monitoring and diary keeping(reviewed with patient home blood sugar levels /diary). Educate extensively low calorie diet and weight loss with exercise. Reviewed BS- diary and Rx. Regimen. [place] renal protection with ARB/ACEI.               Educate compliance with diet and Rx. And educate risks and autcomes.     Refill Metformin 500 mg BID with meals

## 2024-01-16 LAB
1OH-MIDAZOLAM UR CFM-MCNC: <25 NG/ML
6MAM UR CFM-MCNC: <25 NG/ML
7AMINOCLONAZEPAM UR CFM-MCNC: <25 NG/ML
A-OH ALPRAZ UR CFM-MCNC: <25 NG/ML
ALPRAZ UR CFM-MCNC: <25 NG/ML
CHLORDIAZEP UR CFM-MCNC: <25 NG/ML
CLONAZEPAM UR CFM-MCNC: <25 NG/ML
CODEINE UR CFM-MCNC: <50 NG/ML
DIAZEPAM UR CFM-MCNC: <25 NG/ML
EDDP UR CFM-MCNC: <25 NG/ML
FENTANYL UR CFM-MCNC: <2.5 NG/ML
HYDROCODONE CTO UR CFM-MCNC: <25 NG/ML
HYDROMORPHONE UR CFM-MCNC: <25 NG/ML
LORAZEPAM UR CFM-MCNC: <25 NG/ML
METHADONE UR CFM-MCNC: <25 NG/ML
MIDAZOLAM UR CFM-MCNC: <25 NG/ML
MORPHINE UR CFM-MCNC: <50 NG/ML
NORDIAZEPAM UR CFM-MCNC: <25 NG/ML
NORFENTANYL UR CFM-MCNC: <2.5 NG/ML
NORHYDROCODONE UR CFM-MCNC: <25 NG/ML
NOROXYCODONE UR CFM-MCNC: <25 NG/ML
NORTRAMADOL UR-MCNC: >1000 NG/ML
OXAZEPAM UR CFM-MCNC: <25 NG/ML
OXYCODONE UR CFM-MCNC: <25 NG/ML
OXYMORPHONE UR CFM-MCNC: <25 NG/ML
TEMAZEPAM UR CFM-MCNC: <25 NG/ML
TRAMADOL UR CFM-MCNC: >1000 NG/ML
ZOLPIDEM UR CFM-MCNC: <25 NG/ML
ZOLPIDEM UR-MCNC: <25 NG/ML

## 2024-02-03 RX ORDER — APIXABAN 5 MG/1
5 TABLET, FILM COATED ORAL 2 TIMES DAILY
Qty: 180 TABLET | Refills: 2 | Status: CANCELLED | OUTPATIENT
Start: 2024-02-03 | End: 2025-02-01

## 2024-02-06 ENCOUNTER — INFUSION (OUTPATIENT)
Dept: HEMATOLOGY/ONCOLOGY | Facility: CLINIC | Age: 74
End: 2024-02-06
Payer: MEDICARE

## 2024-02-06 ENCOUNTER — LAB (OUTPATIENT)
Dept: LAB | Facility: CLINIC | Age: 74
End: 2024-02-06
Payer: MEDICARE

## 2024-02-06 VITALS
SYSTOLIC BLOOD PRESSURE: 154 MMHG | TEMPERATURE: 97.3 F | DIASTOLIC BLOOD PRESSURE: 78 MMHG | RESPIRATION RATE: 16 BRPM | WEIGHT: 193.12 LBS | HEART RATE: 75 BPM | BODY MASS INDEX: 29.36 KG/M2

## 2024-02-06 DIAGNOSIS — C90.00 IGG MULTIPLE MYELOMA (MULTI): ICD-10-CM

## 2024-02-06 DIAGNOSIS — C90.00: ICD-10-CM

## 2024-02-06 LAB
ALBUMIN SERPL BCP-MCNC: 4 G/DL (ref 3.4–5)
ALP SERPL-CCNC: 64 U/L (ref 33–136)
ALT SERPL W P-5'-P-CCNC: 16 U/L (ref 10–52)
ANION GAP SERPL CALC-SCNC: 12 MMOL/L (ref 10–20)
AST SERPL W P-5'-P-CCNC: 13 U/L (ref 9–39)
BASOPHILS # BLD AUTO: 0.03 X10*3/UL (ref 0–0.1)
BASOPHILS NFR BLD AUTO: 0.6 %
BILIRUB SERPL-MCNC: 0.6 MG/DL (ref 0–1.2)
BUN SERPL-MCNC: 24 MG/DL (ref 6–23)
CALCIUM SERPL-MCNC: 9.2 MG/DL (ref 8.6–10.6)
CHLORIDE SERPL-SCNC: 107 MMOL/L (ref 98–107)
CO2 SERPL-SCNC: 24 MMOL/L (ref 21–32)
CREAT SERPL-MCNC: 1.38 MG/DL (ref 0.5–1.3)
EGFRCR SERPLBLD CKD-EPI 2021: 53 ML/MIN/1.73M*2
EOSINOPHIL # BLD AUTO: 0.04 X10*3/UL (ref 0–0.4)
EOSINOPHIL NFR BLD AUTO: 0.8 %
ERYTHROCYTE [DISTWIDTH] IN BLOOD BY AUTOMATED COUNT: 14.2 % (ref 11.5–14.5)
GLUCOSE SERPL-MCNC: 142 MG/DL (ref 74–99)
HCT VFR BLD AUTO: 34.4 % (ref 41–52)
HGB BLD-MCNC: 11.5 G/DL (ref 13.5–17.5)
IGA SERPL-MCNC: 45 MG/DL (ref 70–400)
IGG SERPL-MCNC: 543 MG/DL (ref 700–1600)
IGM SERPL-MCNC: 9 MG/DL (ref 40–230)
IMM GRANULOCYTES # BLD AUTO: 0.01 X10*3/UL (ref 0–0.5)
IMM GRANULOCYTES NFR BLD AUTO: 0.2 % (ref 0–0.9)
LYMPHOCYTES # BLD AUTO: 1.15 X10*3/UL (ref 0.8–3)
LYMPHOCYTES NFR BLD AUTO: 21.6 %
MCH RBC QN AUTO: 30.6 PG (ref 26–34)
MCHC RBC AUTO-ENTMCNC: 33.4 G/DL (ref 32–36)
MCV RBC AUTO: 92 FL (ref 80–100)
MONOCYTES # BLD AUTO: 0.77 X10*3/UL (ref 0.05–0.8)
MONOCYTES NFR BLD AUTO: 14.4 %
NEUTROPHILS # BLD AUTO: 3.33 X10*3/UL (ref 1.6–5.5)
NEUTROPHILS NFR BLD AUTO: 62.4 %
NRBC BLD-RTO: ABNORMAL /100{WBCS}
PLATELET # BLD AUTO: 162 X10*3/UL (ref 150–450)
POTASSIUM SERPL-SCNC: 3.9 MMOL/L (ref 3.5–5.3)
PROT SERPL-MCNC: 6 G/DL (ref 6.4–8.2)
PROT SERPL-MCNC: 6 G/DL (ref 6.4–8.2)
RBC # BLD AUTO: 3.76 X10*6/UL (ref 4.5–5.9)
SODIUM SERPL-SCNC: 139 MMOL/L (ref 136–145)
WBC # BLD AUTO: 5.3 X10*3/UL (ref 4.4–11.3)

## 2024-02-06 PROCEDURE — 96401 CHEMO ANTI-NEOPL SQ/IM: CPT

## 2024-02-06 PROCEDURE — 84155 ASSAY OF PROTEIN SERUM: CPT | Mod: 59

## 2024-02-06 PROCEDURE — 36415 COLL VENOUS BLD VENIPUNCTURE: CPT

## 2024-02-06 PROCEDURE — 85025 COMPLETE CBC W/AUTO DIFF WBC: CPT

## 2024-02-06 PROCEDURE — 96372 THER/PROPH/DIAG INJ SC/IM: CPT | Performed by: INTERNAL MEDICINE

## 2024-02-06 PROCEDURE — 83521 IG LIGHT CHAINS FREE EACH: CPT

## 2024-02-06 PROCEDURE — 84165 PROTEIN E-PHORESIS SERUM: CPT

## 2024-02-06 PROCEDURE — 84165 PROTEIN E-PHORESIS SERUM: CPT | Performed by: PATHOLOGY

## 2024-02-06 PROCEDURE — 80053 COMPREHEN METABOLIC PANEL: CPT

## 2024-02-06 PROCEDURE — 2500000001 HC RX 250 WO HCPCS SELF ADMINISTERED DRUGS (ALT 637 FOR MEDICARE OP): Performed by: INTERNAL MEDICINE

## 2024-02-06 PROCEDURE — 2500000004 HC RX 250 GENERAL PHARMACY W/ HCPCS (ALT 636 FOR OP/ED): Performed by: INTERNAL MEDICINE

## 2024-02-06 PROCEDURE — 82784 ASSAY IGA/IGD/IGG/IGM EACH: CPT

## 2024-02-06 RX ORDER — ALBUTEROL SULFATE 0.83 MG/ML
3 SOLUTION RESPIRATORY (INHALATION) AS NEEDED
Status: DISCONTINUED | OUTPATIENT
Start: 2024-02-06 | End: 2024-02-06 | Stop reason: HOSPADM

## 2024-02-06 RX ORDER — MONTELUKAST SODIUM 10 MG/1
10 TABLET ORAL ONCE
Status: COMPLETED | OUTPATIENT
Start: 2024-02-06 | End: 2024-02-06

## 2024-02-06 RX ORDER — DIPHENHYDRAMINE HCL 50 MG
50 CAPSULE ORAL ONCE
Status: COMPLETED | OUTPATIENT
Start: 2024-02-06 | End: 2024-02-06

## 2024-02-06 RX ORDER — ACETAMINOPHEN 325 MG/1
650 TABLET ORAL ONCE
Status: COMPLETED | OUTPATIENT
Start: 2024-02-06 | End: 2024-02-06

## 2024-02-06 RX ORDER — FAMOTIDINE 10 MG/ML
20 INJECTION INTRAVENOUS ONCE AS NEEDED
Status: DISCONTINUED | OUTPATIENT
Start: 2024-02-06 | End: 2024-02-06 | Stop reason: HOSPADM

## 2024-02-06 RX ORDER — EPINEPHRINE 0.3 MG/.3ML
0.3 INJECTION SUBCUTANEOUS EVERY 5 MIN PRN
Status: DISCONTINUED | OUTPATIENT
Start: 2024-02-06 | End: 2024-02-06 | Stop reason: HOSPADM

## 2024-02-06 RX ORDER — DIPHENHYDRAMINE HYDROCHLORIDE 50 MG/ML
50 INJECTION INTRAMUSCULAR; INTRAVENOUS AS NEEDED
Status: DISCONTINUED | OUTPATIENT
Start: 2024-02-06 | End: 2024-02-06 | Stop reason: HOSPADM

## 2024-02-06 RX ADMIN — DEXAMETHASONE 20 MG: 6 TABLET ORAL at 09:35

## 2024-02-06 RX ADMIN — DIPHENHYDRAMINE HYDROCHLORIDE 50 MG: 50 CAPSULE ORAL at 09:36

## 2024-02-06 RX ADMIN — ACETAMINOPHEN 650 MG: 325 TABLET ORAL at 09:34

## 2024-02-06 RX ADMIN — MONTELUKAST 10 MG: 10 TABLET, FILM COATED ORAL at 09:35

## 2024-02-06 RX ADMIN — DARATUMUMAB AND HYALURONIDASE-FIHJ (HUMAN RECOMBINANT) 1800 MG: 1800; 30000 INJECTION SUBCUTANEOUS at 10:16

## 2024-02-06 ASSESSMENT — PAIN SCALES - WONG BAKER: WONGBAKER_NUMERICALRESPONSE: HURTS LITTLE BIT

## 2024-02-06 ASSESSMENT — PAIN SCALES - PAIN ASSESSMENT IN ADVANCED DEMENTIA (PAINAD)
BREATHING: NORMAL
CONSOLABILITY: NO NEED TO CONSOLE
FACIALEXPRESSION: SMILING OR INEXPRESSIVE
BODYLANGUAGE: RELAXED
TOTALSCORE: 0

## 2024-02-06 ASSESSMENT — PAIN SCALES - GENERAL
PAINLEVEL_OUTOF10: 3
PAINLEVEL: 3

## 2024-02-07 LAB
KAPPA LC SERPL-MCNC: 1.47 MG/DL (ref 0.33–1.94)
KAPPA LC/LAMBDA SER: 2.33 {RATIO} (ref 0.26–1.65)
LAMBDA LC SERPL-MCNC: 0.63 MG/DL (ref 0.57–2.63)

## 2024-02-08 LAB
ALBUMIN: 4 G/DL (ref 3.4–5)
ALPHA 1 GLOBULIN: 0.2 G/DL (ref 0.2–0.6)
ALPHA 2 GLOBULIN: 0.7 G/DL (ref 0.4–1.1)
BETA GLOBULIN: 0.6 G/DL (ref 0.5–1.2)
GAMMA GLOBULIN: 0.5 G/DL (ref 0.5–1.4)
M-PROTEIN 1: 0.1 G/DL
PATH REVIEW-SERUM PROTEIN ELECTROPHORESIS: ABNORMAL
PROTEIN ELECTROPHORESIS COMMENT: ABNORMAL

## 2024-02-13 ENCOUNTER — SPECIALTY PHARMACY (OUTPATIENT)
Dept: PHARMACY | Facility: CLINIC | Age: 74
End: 2024-02-13

## 2024-02-16 ENCOUNTER — LAB (OUTPATIENT)
Dept: LAB | Facility: LAB | Age: 74
End: 2024-02-16
Payer: MEDICARE

## 2024-02-16 ENCOUNTER — OFFICE VISIT (OUTPATIENT)
Dept: PRIMARY CARE | Facility: CLINIC | Age: 74
End: 2024-02-16
Payer: MEDICARE

## 2024-02-16 VITALS
DIASTOLIC BLOOD PRESSURE: 75 MMHG | HEART RATE: 72 BPM | BODY MASS INDEX: 29.25 KG/M2 | SYSTOLIC BLOOD PRESSURE: 125 MMHG | WEIGHT: 193 LBS | HEIGHT: 68 IN | RESPIRATION RATE: 16 BRPM

## 2024-02-16 DIAGNOSIS — G89.29 CHRONIC BILATERAL LOW BACK PAIN WITHOUT SCIATICA: ICD-10-CM

## 2024-02-16 DIAGNOSIS — N40.0 BENIGN PROSTATIC HYPERPLASIA WITHOUT LOWER URINARY TRACT SYMPTOMS: ICD-10-CM

## 2024-02-16 DIAGNOSIS — R53.83 MALAISE AND FATIGUE: ICD-10-CM

## 2024-02-16 DIAGNOSIS — C90.00 IGG MULTIPLE MYELOMA (MULTI): ICD-10-CM

## 2024-02-16 DIAGNOSIS — N40.1 BPH WITH OBSTRUCTION/LOWER URINARY TRACT SYMPTOMS: ICD-10-CM

## 2024-02-16 DIAGNOSIS — E78.00 HYPERCHOLESTEROLEMIA: ICD-10-CM

## 2024-02-16 DIAGNOSIS — K21.9 GERD WITHOUT ESOPHAGITIS: ICD-10-CM

## 2024-02-16 DIAGNOSIS — D61.818 PANCYTOPENIA (MULTI): ICD-10-CM

## 2024-02-16 DIAGNOSIS — R97.20 ELEVATED PSA: ICD-10-CM

## 2024-02-16 DIAGNOSIS — I10 BENIGN HYPERTENSION: ICD-10-CM

## 2024-02-16 DIAGNOSIS — E11.9 TYPE 2 DIABETES MELLITUS WITHOUT COMPLICATION, WITHOUT LONG-TERM CURRENT USE OF INSULIN (MULTI): Primary | ICD-10-CM

## 2024-02-16 DIAGNOSIS — N18.2 CHRONIC RENAL IMPAIRMENT, STAGE 2 (MILD): ICD-10-CM

## 2024-02-16 DIAGNOSIS — R53.81 MALAISE AND FATIGUE: ICD-10-CM

## 2024-02-16 DIAGNOSIS — G47.00 INSOMNIA, UNSPECIFIED TYPE: ICD-10-CM

## 2024-02-16 DIAGNOSIS — N13.8 BPH WITH OBSTRUCTION/LOWER URINARY TRACT SYMPTOMS: ICD-10-CM

## 2024-02-16 DIAGNOSIS — I48.0 PAROXYSMAL ATRIAL FIBRILLATION (MULTI): ICD-10-CM

## 2024-02-16 DIAGNOSIS — M54.50 CHRONIC BILATERAL LOW BACK PAIN WITHOUT SCIATICA: ICD-10-CM

## 2024-02-16 DIAGNOSIS — I48.91 ATRIAL FIBRILLATION WITH RVR (MULTI): ICD-10-CM

## 2024-02-16 PROBLEM — H61.23 IMPACTED CERUMEN, BILATERAL: Status: RESOLVED | Noted: 2024-02-16 | Resolved: 2024-02-16

## 2024-02-16 LAB — PSA SERPL-MCNC: 7.85 NG/ML

## 2024-02-16 PROCEDURE — 1036F TOBACCO NON-USER: CPT | Performed by: INTERNAL MEDICINE

## 2024-02-16 PROCEDURE — 3074F SYST BP LT 130 MM HG: CPT | Performed by: INTERNAL MEDICINE

## 2024-02-16 PROCEDURE — 1125F AMNT PAIN NOTED PAIN PRSNT: CPT | Performed by: INTERNAL MEDICINE

## 2024-02-16 PROCEDURE — 3078F DIAST BP <80 MM HG: CPT | Performed by: INTERNAL MEDICINE

## 2024-02-16 PROCEDURE — 36415 COLL VENOUS BLD VENIPUNCTURE: CPT

## 2024-02-16 PROCEDURE — 3060F POS MICROALBUMINURIA REV: CPT | Performed by: INTERNAL MEDICINE

## 2024-02-16 PROCEDURE — 84153 ASSAY OF PSA TOTAL: CPT

## 2024-02-16 PROCEDURE — 1159F MED LIST DOCD IN RCRD: CPT | Performed by: INTERNAL MEDICINE

## 2024-02-16 PROCEDURE — 99214 OFFICE O/P EST MOD 30 MIN: CPT | Performed by: INTERNAL MEDICINE

## 2024-02-16 ASSESSMENT — ENCOUNTER SYMPTOMS
PSYCHIATRIC NEGATIVE: 1
EYES NEGATIVE: 1
RESPIRATORY NEGATIVE: 1
GASTROINTESTINAL NEGATIVE: 1
ALLERGIC/IMMUNOLOGIC NEGATIVE: 1
CARDIOVASCULAR NEGATIVE: 1
NEUROLOGICAL NEGATIVE: 1
HEMATOLOGIC/LYMPHATIC NEGATIVE: 1
CONSTITUTIONAL NEGATIVE: 1
ENDOCRINE NEGATIVE: 1
MUSCULOSKELETAL NEGATIVE: 1

## 2024-02-16 NOTE — PROGRESS NOTES
"Subjective   Patient ID: Toby Ugalde is a 73 y.o. male who presents for Follow-up (Follow up).    HPI     Review of Systems   Constitutional: Negative.    HENT: Negative.     Eyes: Negative.    Respiratory: Negative.     Cardiovascular: Negative.    Gastrointestinal: Negative.    Endocrine: Negative.    Musculoskeletal: Negative.    Skin: Negative.    Allergic/Immunologic: Negative.    Neurological: Negative.    Hematological: Negative.    Psychiatric/Behavioral: Negative.     All other systems reviewed and are negative.      Objective   Ht 1.727 m (5' 8\")   Wt 87.5 kg (193 lb)   BMI 29.35 kg/m²   Blood pressure 125/75, pulse 72, resp. rate 16, height 1.727 m (5' 8\"), weight 87.5 kg (193 lb).   Physical Exam  Vitals and nursing note reviewed.   Constitutional:       Appearance: Normal appearance.   HENT:      Head: Normocephalic and atraumatic.      Right Ear: Tympanic membrane, ear canal and external ear normal.      Left Ear: Tympanic membrane, ear canal and external ear normal. There is no impacted cerumen.      Nose: Nose normal.      Mouth/Throat:      Mouth: Mucous membranes are moist.      Pharynx: Oropharynx is clear.   Eyes:      Extraocular Movements: Extraocular movements intact.      Conjunctiva/sclera: Conjunctivae normal.      Pupils: Pupils are equal, round, and reactive to light.   Cardiovascular:      Rate and Rhythm: Normal rate and regular rhythm.      Pulses: Normal pulses.      Heart sounds: Normal heart sounds. No murmur heard.  Pulmonary:      Effort: Pulmonary effort is normal. No respiratory distress.      Breath sounds: Normal breath sounds. No stridor. No wheezing, rhonchi or rales.   Chest:      Chest wall: No tenderness.   Abdominal:      General: Abdomen is flat. Bowel sounds are normal. There is no distension.      Palpations: Abdomen is soft. There is no mass.      Tenderness: There is no abdominal tenderness. There is no right CVA tenderness, left CVA tenderness, guarding or " rebound.      Hernia: No hernia is present.   Musculoskeletal:         General: Normal range of motion.      Cervical back: Normal range of motion and neck supple.   Skin:     General: Skin is warm.      Capillary Refill: Capillary refill takes less than 2 seconds.   Neurological:      General: No focal deficit present.      Mental Status: He is alert.      Cranial Nerves: No cranial nerve deficit.      Sensory: No sensory deficit.      Motor: No weakness.      Coordination: Coordination normal.      Gait: Gait normal.      Deep Tendon Reflexes: Reflexes normal.   Psychiatric:         Mood and Affect: Mood normal.         Behavior: Behavior normal. Behavior is cooperative.         Thought Content: Thought content normal.         Judgment: Judgment normal.         Assessment/Plan   Problem List Items Addressed This Visit             ICD-10-CM    Pancytopenia (CMS/HCC) D61.818    Atrial fibrillation with RVR (CMS/[facility]) I48.91    Paroxysmal atrial fibrillation (CMS/[facility]) I48.0    Benign hypertension I10    BPH with obstruction/lower urinary tract symptoms N40.1, N13.8    Chronic renal insufficiency N18.9    Diabetes mellitus (CMS/[facility]) - Primary E11.9    Elevated PSA R97.20    GERD without esophagitis K21.9    Hypercholesterolemia E78.00    IgG multiple myeloma (CMS/HCC) C90.00    Lower back pain M54.50    Malaise and fatigue R53.81, R53.83    Insomnia G47.00     Other Visit Diagnoses         Codes    Benign prostatic hyperplasia without lower urinary tract symptoms     N40.0    Relevant Orders    Prostate Specific Antigen               Paroxysmal atrial fibrillation (CMS/HCC) I48.0        Atrial fibrillation - persistent with  Ventricular rate is well controlled (in sinus rhythm). On Apixaban  No Shortness of breath. Continues   Metoprolol to 25 mg BID             Benign hypertension I10       HTN - hypertension well/controlled .Target BP < 130/80  achieved. Educate low salt diet and exercise with weight loss. Educate  home self monitoring and diary keeping. Educate risks of elevate blood pressure and benefits of prompt treatment.  Refill Amlodipine and Metoprolol            BPH with obstruction/lower urinary tract symptoms N40.1, N13.8       BPH - Benign PROSTATIC Hypertrophy, + Dysuria/Nocturia, check PSA, prescribe Flomax 0.4mg qD and Avodart 0.5 mg qD vs. Finasteride 5 mg qD.  Educate exercises and Change in life style, prescribe vitamin E 400 IU qD. Consider referral to urology.            Chronic renal insufficiency N18.9       CRI - Chronic Renal Insuficiency. Secondary to DM/HTN/Atherosclerosis. Follow BUN/Cr. and lytes.   RENOPROTECTION with ACEI/ARB (). Monitor microalbuminuria. Avoid hypotension and renal hypoperfusion. The patient was instructed to avoid NSAIDS and educate compliance with medications to control HTN(hypertension) and cholesterol and diabetes.                                       Diabetes mellitus (CMS/[facility]) E11.9       DM - NIDDM  . Reviewed with patient / Will check  HbA1c and fasting blood sugars. Educate home self monitoring and diary keeping(reviewed with patient home blood sugar levels /diary). Educate extensively low calorie diet and weight loss with exercise. Reviewed BS- diary and Rx. Regimen. [place] renal protection with ARB/ACEI.               Educate compliance with diet and Rx. And educate risks and autcomes.     Refill Metformin 500 mg BID with meals             GERD without esophagitis K21.9       GERD - Acid reflux disease. Rx. PPI (Prilosec/Prevacid/Protonix/Nexium) and educate diet and life style changes. Referred patient to an endoscopy (EGD) and check H. Pylori titers.            Lumbar degenerative disc disease M51.36       DDD - Degenerative disc disease of the Lumbo-Sacral (LS)/Cervical (C)  spine. Educate exercises and referred patient to Physical Therapy (PT). Ordered X-Ray's of the LS/C spine. Consider MRI. Radiculopathy in the distribution of L4-L5-S1/C3-C4-C5 nerve roots,  needs NSAIDS (Naprosin 500mg BID vs. Arthrotec 75mg BID or Prednisone taper (Medrol dose pack), Flexeril 10 mg qHS, heat application, and pain control with Tylenol vs. Vicodin 5/325 mg TID.             Relevant Medications     traMADol (Ultram) 50 mg tablet     Other Relevant Orders     Opiate/Opioid/Benzo Prescription Compliance     Medicare annual wellness visit, subsequent - Primary Z00.00       No recent hospitalizations.     All medications reviewed and reconciled by me the provider..  No use of controlled substances or opiates.     Family history, social history reviewed, no changes.     Patient does not smoke.     Patient does not drink.     Patient hydrates adequately daily.  Eats a well-balanced healthy diet.      Exercises adequately including walking and doing weightbearing exercises.     Patient denies any difficulty with memory or cognition.      Denies any difficulty with hearing.  Patient does not wear hearing aids.     No fall risk.  No recent falls.  Denies any difficulty walking.     Patient with no history of depression anxiety, denies any loss of interest, no feeling of sadness, no lack of motivation.     Patient is independent in all ADLs and IADLs.  Independent bathing, dressing, walking.  Takes care of own finances, shopping and cooking.      End-of-life decision-making power of  reviewed with patient.      Risk Factors Identified During Visit: None.   Influenza: influenza vaccine was previously given.   Pneumovax 23: Pneumovax 23 vaccine was previously given.   Prevnar 13: Prevnar 13 vaccine was previously given.   Shingles Vaccine: Shingles vaccine was previously given.   Prostate cancer screening: Screening is current.   Colorectal Cancer Screening: screening is current.   Abdominal Aortic Aneurysm screening: screening is current.   HIV screening: screening not indicated.              Need for vaccination with 20-polyvalent pneumococcal conjugate vaccine Z23     Relevant Orders      Pneumococcal conjugate vaccine, 20-valent (PREVNAR 20)      Other Visit Diagnoses           Codes     Antibiotic-induced tinnitus of left ear     H93.12, T36.95XA                       Atrial fibrillation with RVR (CMS/[facility]) - Primary I48.91          Atrial fibrillation - persistent with  Ventricular rate is well controlled (in sinus rhythm). ON ELIQUIS  No Shortness of breath. Continues   Metoprolol to 50 mg BID             Relevant Medications      metoprolol tartrate (Lopressor) 50 mg tablet      Benign hypertension I10                        HTN - hypertension well/controlled .Target BP < 130/80  achieved. Educate low salt diet and exercise with weight loss. Educate home self monitoring and diary keeping. Educate risks of elevate blood pressure and benefits of prompt treatment.  Refill Amlodipine and Metoprolol               Relevant Medications      metoprolol tartrate (Lopressor) 50 mg tablet      BPH with obstruction/lower urinary tract symptoms N40.1, N13.8        BPH - Benign PROSTATIC Hypertrophy, + Dysuria/Nocturia, check PSA, prescribe Flomax 0.4mg qD and Avodart 0.5 mg qD vs. Finasteride 5 mg qD.  Educate exercises and Change in life style, prescribe vitamin E 400 IU qD. Consider referral to urology.            Chronic renal insufficiency N18.9        CRI - Chronic Renal Insuficiency. Secondary to DM/HTN/Atherosclerosis. Follow BUN/Cr. and lytes.   RENOPROTECTION with ACEI/ARB (). Monitor microalbuminuria. Avoid hypotension and renal hypoperfusion. The patient was instructed to avoid NSAIDS and educate compliance with medications to control HTN(hypertension) and cholesterol and diabetes.                                       Diabetes mellitus (CMS/[facility]) E11.9        DM - NIDDM  . Reviewed with patient / Will check  HbA1c and fasting blood sugars. Educate home self monitoring and diary keeping(reviewed with patient home blood sugar levels /diary). Educate extensively low calorie diet and weight loss with  exercise. Reviewed BS- diary and Rx. Regimen. [place] renal protection with ARB/ACEI.               Educate compliance with diet and Rx. And educate risks and autcomes.     Refill Metformin 500 mg BID with meals                                                GERD without esophagitis K21.9        GERD - Acid reflux disease. Rx. PPI (Prilosec/Prevacid/Protonix/Nexium) and educate diet and life style changes. Referred patient to an endoscopy (EGD) and check H. Pylori titers.            Hypercholesterolemia E78.00        Hypercholesterolemia - Monitor lipid profile and educate patient upon risks of high cholesterol and targets. Educate diet and change in lifestyle and increase in exercises - Refill:  Atorvastatin    and educate compliance with medication and diet.              IgG multiple myeloma (CMS/[facility]) C90.00        Monitor CBC and blood count and follow with hematology and stable now on Chemotherapy             Malaise and fatigue R53.81, R53.83        Fatigue - check CMP(metabolic panel and elctrolytes) , CBC(complete blood cell count), TSH(thyroid function). Insomnia may play a role and sleep studies(rule out sleep apnea) are recommended . Educate sleep hygiene. Consider anxiety disorder vs. depression. Consider Stress test, and 2DECHO.                                         Anemia in other chronic diseases classified elsewhere D63.8             Anemia - of bleed/chronic disease. Check CBC , B12 and Folic Acid levels, Iron/Ferritn/TIBC, refer patient to endoscopy (upper/ lower). Target HCT > 30 %. Check guaic stool              Atrial fibrillation with RVR (CMS/[facility]) - Primary I48.91           Atrial fibrillation - persistent with  Ventricular rate is well controlled (in sinus rhythm). ON eLIQUIS  No Shortness of breath. Continues   Metoprolol to 50 mg BID                                 Immunizations/Injections      very important  Immunizations from outside sources need reconciliation.      Influenza, High  Dose Seasonal, Preservative Free12/11/2020, 10/1/2018  Influenza, Unspecified9/22/2010  Influenza, seasonal, [id number]/6/2021, 9/12/2016, 9/9/2015,  ... (3 more)  Pfizer COVID-19 vaccine, Fall 2023, 12 years and older, (30mcg/0.3mL)10/16/2023  Pfizer COVID-19 vaccine, bivalent, age 12 years and older (30 mcg/0.3 mL)3/17/2023  Pneumococcal conjugate vaccine, 13-valent (PREVNAR 13)6/23/2015  Pneumococcal conjugate vaccine, 20-valent (PREVNAR 20)1/12/2024  Pneumococcal polysaccharide vaccine, 23-valent, age 2 years and older (PNEUMOVAX 23)4/11/2018  Tdap vaccine, age 7 year and older (BOOSTRIX, ADACEL)6/23/2015  Zoster vaccine, recombinant, adult (SHINGRIX)8/16/2021, 6/16/2021

## 2024-03-04 RX ORDER — ACETAMINOPHEN 325 MG/1
650 TABLET ORAL ONCE
Status: CANCELLED | OUTPATIENT
Start: 2024-04-30

## 2024-03-04 RX ORDER — FAMOTIDINE 10 MG/ML
20 INJECTION INTRAVENOUS ONCE AS NEEDED
Status: CANCELLED | OUTPATIENT
Start: 2024-04-30

## 2024-03-04 RX ORDER — DIPHENHYDRAMINE HYDROCHLORIDE 50 MG/ML
50 INJECTION INTRAMUSCULAR; INTRAVENOUS AS NEEDED
Status: CANCELLED | OUTPATIENT
Start: 2024-04-30

## 2024-03-04 RX ORDER — ALBUTEROL SULFATE 0.83 MG/ML
3 SOLUTION RESPIRATORY (INHALATION) AS NEEDED
Status: CANCELLED | OUTPATIENT
Start: 2024-04-02

## 2024-03-04 RX ORDER — MONTELUKAST SODIUM 10 MG/1
10 TABLET ORAL ONCE
Status: CANCELLED | OUTPATIENT
Start: 2024-03-05

## 2024-03-04 RX ORDER — MONTELUKAST SODIUM 10 MG/1
10 TABLET ORAL ONCE
Status: CANCELLED | OUTPATIENT
Start: 2024-04-30

## 2024-03-04 RX ORDER — ALBUTEROL SULFATE 0.83 MG/ML
3 SOLUTION RESPIRATORY (INHALATION) AS NEEDED
Status: CANCELLED | OUTPATIENT
Start: 2024-03-05

## 2024-03-04 RX ORDER — ACETAMINOPHEN 325 MG/1
650 TABLET ORAL ONCE
Status: CANCELLED | OUTPATIENT
Start: 2024-04-02

## 2024-03-04 RX ORDER — DIPHENHYDRAMINE HCL 50 MG
50 CAPSULE ORAL ONCE
Status: CANCELLED | OUTPATIENT
Start: 2024-03-05

## 2024-03-04 RX ORDER — EPINEPHRINE 0.3 MG/.3ML
0.3 INJECTION SUBCUTANEOUS EVERY 5 MIN PRN
Status: CANCELLED | OUTPATIENT
Start: 2024-04-30

## 2024-03-04 RX ORDER — DIPHENHYDRAMINE HYDROCHLORIDE 50 MG/ML
50 INJECTION INTRAMUSCULAR; INTRAVENOUS AS NEEDED
Status: CANCELLED | OUTPATIENT
Start: 2024-03-05

## 2024-03-04 RX ORDER — ACETAMINOPHEN 325 MG/1
650 TABLET ORAL ONCE
Status: CANCELLED | OUTPATIENT
Start: 2024-03-05

## 2024-03-04 RX ORDER — EPINEPHRINE 0.3 MG/.3ML
0.3 INJECTION SUBCUTANEOUS EVERY 5 MIN PRN
Status: CANCELLED | OUTPATIENT
Start: 2024-04-02

## 2024-03-04 RX ORDER — DIPHENHYDRAMINE HYDROCHLORIDE 50 MG/ML
50 INJECTION INTRAMUSCULAR; INTRAVENOUS AS NEEDED
Status: CANCELLED | OUTPATIENT
Start: 2024-04-02

## 2024-03-04 RX ORDER — EPINEPHRINE 0.3 MG/.3ML
0.3 INJECTION SUBCUTANEOUS EVERY 5 MIN PRN
Status: CANCELLED | OUTPATIENT
Start: 2024-03-05

## 2024-03-04 RX ORDER — ALBUTEROL SULFATE 0.83 MG/ML
3 SOLUTION RESPIRATORY (INHALATION) AS NEEDED
Status: CANCELLED | OUTPATIENT
Start: 2024-04-30

## 2024-03-04 RX ORDER — DIPHENHYDRAMINE HCL 50 MG
50 CAPSULE ORAL ONCE
Status: CANCELLED | OUTPATIENT
Start: 2024-04-30

## 2024-03-04 RX ORDER — DIPHENHYDRAMINE HCL 50 MG
50 CAPSULE ORAL ONCE
Status: CANCELLED | OUTPATIENT
Start: 2024-04-02

## 2024-03-04 RX ORDER — MONTELUKAST SODIUM 10 MG/1
10 TABLET ORAL ONCE
Status: CANCELLED | OUTPATIENT
Start: 2024-04-02

## 2024-03-04 RX ORDER — FAMOTIDINE 10 MG/ML
20 INJECTION INTRAVENOUS ONCE AS NEEDED
Status: CANCELLED | OUTPATIENT
Start: 2024-03-05

## 2024-03-04 RX ORDER — FAMOTIDINE 10 MG/ML
20 INJECTION INTRAVENOUS ONCE AS NEEDED
Status: CANCELLED | OUTPATIENT
Start: 2024-04-02

## 2024-03-05 ENCOUNTER — OFFICE VISIT (OUTPATIENT)
Dept: HEMATOLOGY/ONCOLOGY | Facility: CLINIC | Age: 74
End: 2024-03-05
Payer: MEDICARE

## 2024-03-05 ENCOUNTER — INFUSION (OUTPATIENT)
Dept: HEMATOLOGY/ONCOLOGY | Facility: CLINIC | Age: 74
End: 2024-03-05
Payer: MEDICARE

## 2024-03-05 ENCOUNTER — LAB (OUTPATIENT)
Dept: LAB | Facility: CLINIC | Age: 74
End: 2024-03-05
Payer: MEDICARE

## 2024-03-05 VITALS
RESPIRATION RATE: 18 BRPM | OXYGEN SATURATION: 97 % | SYSTOLIC BLOOD PRESSURE: 144 MMHG | DIASTOLIC BLOOD PRESSURE: 80 MMHG | BODY MASS INDEX: 29.2 KG/M2 | TEMPERATURE: 97.5 F | HEART RATE: 63 BPM | WEIGHT: 192.02 LBS

## 2024-03-05 DIAGNOSIS — C90.00 IGG MULTIPLE MYELOMA (MULTI): Primary | ICD-10-CM

## 2024-03-05 DIAGNOSIS — C90.00: ICD-10-CM

## 2024-03-05 DIAGNOSIS — C90.00 IGG MULTIPLE MYELOMA (MULTI): ICD-10-CM

## 2024-03-05 LAB
ALBUMIN SERPL BCP-MCNC: 4.1 G/DL (ref 3.4–5)
ALP SERPL-CCNC: 57 U/L (ref 33–136)
ALT SERPL W P-5'-P-CCNC: 16 U/L (ref 10–52)
ANION GAP SERPL CALC-SCNC: 14 MMOL/L (ref 10–20)
AST SERPL W P-5'-P-CCNC: 12 U/L (ref 9–39)
BASOPHILS # BLD AUTO: 0.02 X10*3/UL (ref 0–0.1)
BASOPHILS NFR BLD AUTO: 0.5 %
BILIRUB SERPL-MCNC: 0.7 MG/DL (ref 0–1.2)
BUN SERPL-MCNC: 24 MG/DL (ref 6–23)
CALCIUM SERPL-MCNC: 9.1 MG/DL (ref 8.6–10.6)
CHLORIDE SERPL-SCNC: 107 MMOL/L (ref 98–107)
CO2 SERPL-SCNC: 23 MMOL/L (ref 21–32)
CREAT SERPL-MCNC: 1.41 MG/DL (ref 0.5–1.3)
EGFRCR SERPLBLD CKD-EPI 2021: 52 ML/MIN/1.73M*2
EOSINOPHIL # BLD AUTO: 0.04 X10*3/UL (ref 0–0.4)
EOSINOPHIL NFR BLD AUTO: 1 %
ERYTHROCYTE [DISTWIDTH] IN BLOOD BY AUTOMATED COUNT: 14 % (ref 11.5–14.5)
GLUCOSE SERPL-MCNC: 138 MG/DL (ref 74–99)
HCT VFR BLD AUTO: 35.6 % (ref 41–52)
HGB BLD-MCNC: 11.7 G/DL (ref 13.5–17.5)
IMM GRANULOCYTES # BLD AUTO: 0 X10*3/UL (ref 0–0.5)
IMM GRANULOCYTES NFR BLD AUTO: 0 % (ref 0–0.9)
LYMPHOCYTES # BLD AUTO: 1.78 X10*3/UL (ref 0.8–3)
LYMPHOCYTES NFR BLD AUTO: 44.1 %
MCH RBC QN AUTO: 30.2 PG (ref 26–34)
MCHC RBC AUTO-ENTMCNC: 32.9 G/DL (ref 32–36)
MCV RBC AUTO: 92 FL (ref 80–100)
MONOCYTES # BLD AUTO: 0.57 X10*3/UL (ref 0.05–0.8)
MONOCYTES NFR BLD AUTO: 14.1 %
NEUTROPHILS # BLD AUTO: 1.63 X10*3/UL (ref 1.6–5.5)
NEUTROPHILS NFR BLD AUTO: 40.3 %
NRBC BLD-RTO: ABNORMAL /100{WBCS}
PLATELET # BLD AUTO: 160 X10*3/UL (ref 150–450)
POTASSIUM SERPL-SCNC: 4 MMOL/L (ref 3.5–5.3)
PROT SERPL-MCNC: 6 G/DL (ref 6.4–8.2)
PROT SERPL-MCNC: 6 G/DL (ref 6.4–8.2)
RBC # BLD AUTO: 3.87 X10*6/UL (ref 4.5–5.9)
SODIUM SERPL-SCNC: 140 MMOL/L (ref 136–145)
WBC # BLD AUTO: 4 X10*3/UL (ref 4.4–11.3)

## 2024-03-05 PROCEDURE — 99214 OFFICE O/P EST MOD 30 MIN: CPT | Performed by: INTERNAL MEDICINE

## 2024-03-05 PROCEDURE — 83521 IG LIGHT CHAINS FREE EACH: CPT

## 2024-03-05 PROCEDURE — 84165 PROTEIN E-PHORESIS SERUM: CPT | Performed by: STUDENT IN AN ORGANIZED HEALTH CARE EDUCATION/TRAINING PROGRAM

## 2024-03-05 PROCEDURE — 96372 THER/PROPH/DIAG INJ SC/IM: CPT | Performed by: INTERNAL MEDICINE

## 2024-03-05 PROCEDURE — 1036F TOBACCO NON-USER: CPT | Performed by: INTERNAL MEDICINE

## 2024-03-05 PROCEDURE — 1126F AMNT PAIN NOTED NONE PRSNT: CPT | Performed by: INTERNAL MEDICINE

## 2024-03-05 PROCEDURE — 36415 COLL VENOUS BLD VENIPUNCTURE: CPT

## 2024-03-05 PROCEDURE — 84155 ASSAY OF PROTEIN SERUM: CPT | Mod: 59

## 2024-03-05 PROCEDURE — 99214 OFFICE O/P EST MOD 30 MIN: CPT | Mod: 25 | Performed by: INTERNAL MEDICINE

## 2024-03-05 PROCEDURE — 3060F POS MICROALBUMINURIA REV: CPT | Performed by: INTERNAL MEDICINE

## 2024-03-05 PROCEDURE — 3079F DIAST BP 80-89 MM HG: CPT | Performed by: INTERNAL MEDICINE

## 2024-03-05 PROCEDURE — 2500000001 HC RX 250 WO HCPCS SELF ADMINISTERED DRUGS (ALT 637 FOR MEDICARE OP): Performed by: INTERNAL MEDICINE

## 2024-03-05 PROCEDURE — 84165 PROTEIN E-PHORESIS SERUM: CPT

## 2024-03-05 PROCEDURE — 2500000004 HC RX 250 GENERAL PHARMACY W/ HCPCS (ALT 636 FOR OP/ED): Mod: JZ,JG | Performed by: INTERNAL MEDICINE

## 2024-03-05 PROCEDURE — 3077F SYST BP >= 140 MM HG: CPT | Performed by: INTERNAL MEDICINE

## 2024-03-05 PROCEDURE — 2500000004 HC RX 250 GENERAL PHARMACY W/ HCPCS (ALT 636 FOR OP/ED): Performed by: INTERNAL MEDICINE

## 2024-03-05 PROCEDURE — 80053 COMPREHEN METABOLIC PANEL: CPT

## 2024-03-05 PROCEDURE — 1159F MED LIST DOCD IN RCRD: CPT | Performed by: INTERNAL MEDICINE

## 2024-03-05 PROCEDURE — 85025 COMPLETE CBC W/AUTO DIFF WBC: CPT

## 2024-03-05 PROCEDURE — 96401 CHEMO ANTI-NEOPL SQ/IM: CPT

## 2024-03-05 RX ORDER — ACETAMINOPHEN 325 MG/1
650 TABLET ORAL ONCE
Status: COMPLETED | OUTPATIENT
Start: 2024-03-05 | End: 2024-03-05

## 2024-03-05 RX ORDER — EPINEPHRINE 0.3 MG/.3ML
0.3 INJECTION SUBCUTANEOUS EVERY 5 MIN PRN
Status: DISCONTINUED | OUTPATIENT
Start: 2024-03-05 | End: 2024-03-05 | Stop reason: HOSPADM

## 2024-03-05 RX ORDER — DIPHENHYDRAMINE HYDROCHLORIDE 50 MG/ML
50 INJECTION INTRAMUSCULAR; INTRAVENOUS AS NEEDED
Status: DISCONTINUED | OUTPATIENT
Start: 2024-03-05 | End: 2024-03-05 | Stop reason: HOSPADM

## 2024-03-05 RX ORDER — ALBUTEROL SULFATE 0.83 MG/ML
3 SOLUTION RESPIRATORY (INHALATION) AS NEEDED
Status: DISCONTINUED | OUTPATIENT
Start: 2024-03-05 | End: 2024-03-05 | Stop reason: HOSPADM

## 2024-03-05 RX ORDER — DIPHENHYDRAMINE HCL 50 MG
50 CAPSULE ORAL ONCE
Status: COMPLETED | OUTPATIENT
Start: 2024-03-05 | End: 2024-03-05

## 2024-03-05 RX ORDER — FAMOTIDINE 10 MG/ML
20 INJECTION INTRAVENOUS ONCE AS NEEDED
Status: DISCONTINUED | OUTPATIENT
Start: 2024-03-05 | End: 2024-03-05 | Stop reason: HOSPADM

## 2024-03-05 RX ORDER — MONTELUKAST SODIUM 10 MG/1
10 TABLET ORAL ONCE
Status: COMPLETED | OUTPATIENT
Start: 2024-03-05 | End: 2024-03-05

## 2024-03-05 RX ADMIN — MONTELUKAST 10 MG: 10 TABLET, FILM COATED ORAL at 09:27

## 2024-03-05 RX ADMIN — DEXAMETHASONE 20 MG: 6 TABLET ORAL at 09:32

## 2024-03-05 RX ADMIN — DIPHENHYDRAMINE HYDROCHLORIDE 50 MG: 50 CAPSULE ORAL at 09:27

## 2024-03-05 RX ADMIN — DARATUMUMAB AND HYALURONIDASE-FIHJ (HUMAN RECOMBINANT) 1800 MG: 1800; 30000 INJECTION SUBCUTANEOUS at 09:59

## 2024-03-05 RX ADMIN — ACETAMINOPHEN 650 MG: 325 TABLET ORAL at 09:27

## 2024-03-05 ASSESSMENT — PAIN SCALES - GENERAL: PAINLEVEL: 0-NO PAIN

## 2024-03-05 NOTE — SIGNIFICANT EVENT
03/05/24 0918   Prechemo Checklist   Has the patient been in the hospital, ED, or urgent care since last date of service No   Chemo/Immuno Consent Signed Yes   Protocol/Indications Verified Yes   Confirmed to previous date/time of medication Yes   Compared to previous dose Yes   All medications are dated accurately Yes   Pregnancy Test Negative Not applicable   Parameters Met Yes   BSA/Weight-Height Verified Yes   Dose Calculations Verified Yes

## 2024-03-05 NOTE — PROGRESS NOTES
Patient ID: Toby Ugalde is a 73 y.o. male.    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 3/6/17- bone survey was negative for lytic lesions.  3/15/17-  bone marrow revealed 30% plasma cells with kappa predominance. 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 Cano for complaints of fatigue.  Labs notable for hyponatremia to 127, anemia with a HGB of 9.  Cr 1.99. Protein elevated to  10.7.  Uric acid up to 8.9.      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  Skeletal survey (3/2/22) - lesion in the left rib.   PET:    Expansile hypermetabolic lytic mass involving the left lateral 9th  rib, correlating to suspected plasmacytoma described on osseous  survey performed 03/02/2022.  2. Diffusely increased FDG uptake throughout the bone marrow which  may relate to anemia/marrow activation, with marrow involvement by  myeloma not excluded.  Treatment:   Initiated (3/29/22) treatment for his relapsed Myeloma with Revlimid 10 mg by mouth days 1-14/21 days, Daratuzumab (3/29, & 4/5/22), SC Velcade (3/29 & 4/5/22), & weekly Dexamethasone complicated by several hospitaliizations for syncope, PE ,afib due to  velcade and revlimid.  Velcade discontinued     Recv'd weekly single agent Daraumumab from 5/31 - 7/19/22. Initiated (8/25/22) Revlimid 10 mg on days 1-21/28  days.     Has had marked reduction in paraprotein and free lyte chains with M protein : 1.4 g/dl on 4/19/22 ,    Ider free lyte/ratio of 4.49 mg/dl/4.45.    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         IgG multiple myeloma (CMS/HCC)   9/6/2023 Initial Diagnosis    IgG multiple myeloma (CMS/HCC)     10/17/2023 -  Chemotherapy    Daratumumab, 28 Day Cycles - Maintenance         Response:     Past Medical History:     Past Medical History:   Diagnosis Date    Dental caries on pit and fissure surface penetrating into dentin 01/12/2024    Deposits (accretions) on teeth 01/12/2024    Essential (primary) hypertension 11/20/2013    Benign essential hypertension    History of hypercholesterolemia 01/12/2024    History of hypertension 01/12/2024    History of malignant neoplasm of prostate 01/12/2024    Impacted cerumen 01/12/2024    Impacted cerumen, bilateral 02/16/2024    Infective pharyngitis 01/12/2024    Other intervertebral disc degeneration, lumbar region     Lumbar degenerative disc disease    Overweight with body mass index (BMI) 25.0-29.9 02/02/2022    Partial loss of teeth due to caries, class I 01/12/2024    Personal history of malignant neoplasm of prostate     History of malignant neoplasm of prostate    Personal history of other diseases of the circulatory system     History of hypertension    Personal history of other diseases of the circulatory system 06/29/2022    History of atrial tachycardia    Personal history of other diseases of the circulatory system     History of essential hypertension    Personal history of other diseases of the musculoskeletal system and connective tissue     History of arthritis    Personal history of other diseases of the musculoskeletal system and connective tissue     History of acute gouty arthritis     Personal history of other diseases of the musculoskeletal system and connective tissue 01/30/2019    History of left foot drop    Personal history of other diseases of the respiratory system     History of bronchitis    Personal history of other endocrine, nutritional and metabolic disease     History of hypercholesterolemia    Personal history of other endocrine, nutritional and metabolic disease     History of diabetes mellitus    Personal history of other endocrine, nutritional and metabolic disease 01/17/2014    History of diabetes mellitus    Post-traumatic stress disorder, chronic 11/10/2023    Primary localized osteoarthritis of pelvic region and thigh 12/17/2018    Pure hypercholesterolemia, unspecified 11/20/2013    Low-density-lipoid-type (LDL) hyperlipoproteinemia    Snoring     Snoring       Surgical History:     Past Surgical History:   Procedure Laterality Date    BACK SURGERY  11/20/2013    Back Surgery    TONSILLECTOMY  11/20/2013    Tonsillectomy    TOTAL KNEE ARTHROPLASTY  11/20/2013    Knee Replacement        Family History:     Family History   Problem Relation Name Age of Onset    Heart attack Mother      Diabetes Brother      Stroke Maternal Grandfather         Social History:  Reported history of agent orange exposure while in [employer] at [employer]. Retired  and post .  from his wife who is health care  power of .  Two sons and 2 grandchildren (1)    Social History     Tobacco Use    Smoking status: Never     Passive exposure: Never    Smokeless tobacco: Never   Substance Use Topics    Alcohol use: Yes     Comment: occasional    Drug use: Never      -------------------------------------------------------------------------------------------------------  Subjective       HPI    KANDI ROSE is a 73 year old Male h/o pAfib,Pes  well controlled T2DM, HTN, gout, IgG kappa MM with history as above     He presents to the clinic today (3/5/24) for follow-up   with his wife and to ahead of receiving /dose #17 monthly subcutaneous daratumumab, still on revlimid 5 mg 21/28 days.  Free lyte from 2/6/24 shows normal lyte chain ratio and  SPEP with paraprotien of 0.1 consistent with VGPR. He had his COVID booster and flu shot.  No other health issues.  Finally is working with dentist at the [facility]. Blood counts good for treatment today. No complaints today.  Still not clear whether patient has ONJ . Patient states he had a bone density 11/23 which does not show osteopenia. He is struggling with arthritis of his hip and needs a hip replacement.      Review of Systems   All other systems reviewed and are negative.     -------------------------------------------------------------------------------------------------------  Objective   BSA: There is no height or weight on file to calculate BSA.  There were no vitals taken for this visit.    Physical Exam  Vitals reviewed.   Constitutional:       Comments: Pale, elderly uses a cane   HENT:      Head: Normocephalic and atraumatic.      Mouth/Throat:      Mouth: Mucous membranes are moist.   Eyes:      Extraocular Movements: Extraocular movements intact.      Conjunctiva/sclera: Conjunctivae normal.      Pupils: Pupils are equal, round, and reactive to light.   Cardiovascular:      Rate and Rhythm: Normal rate and regular rhythm.      Pulses: Normal pulses.   Pulmonary:      Effort: Pulmonary effort is normal.      Breath sounds: Normal breath sounds.   Abdominal:      General: Abdomen is flat. Bowel sounds are normal.      Palpations: Abdomen is soft.   Musculoskeletal:         General: Normal range of motion.   Skin:     General: Skin is warm.   Neurological:      General: No focal deficit present.      Mental Status: He is alert.   Psychiatric:         Mood and Affect: Mood normal.         Behavior: Behavior normal.         Thought Content: Thought content normal.         Judgment: Judgment normal.         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      Restaging 3/2022 shows high burden disease with 90% tumorous involvement of the bone marrow ,      Initiated (3/29/22) treatment for his relapsed Myeloma with Revlimid 10 mg by mouth days 1-14/21 days, Daratuzumab (3/29, & 4/5/22), SC Velcade (3/29 & 4/5/22), & weekly Dexamethasone. complicated by several hospilizations for syncope, PE ,afib due to  velcade and revlimid.       Recv'd weekly single agent Daraumumab from 5/31 - 7/19/22. Initiated (8/25/22) Revlimid 10 mg on days 1-21/28 days.     myeloma labs (11/14/23) with IgG kappa M-protein down to 0.1 g/dL, free kappa light chains normal at 2.1 with ratio of 2.50 mg/dL, last SPEP pm 9/27 0.1consistent with ongoing VGPR.    3/5/24 Current tx monthly casey and revlimid 5mg 21/28 days, myeloma labs consistent with VGPR  Dose 18 of monthly casey today     Will continue monthly casey, rev, & dex until evidence of disease progression or unacceptable toxicity.    Bone health, xgeva held due to concerns of ONJ and prolonged treatment in the past.  Bone density December 2023 was normal.  Hold for now     #Atrial fib on chronic anticoagulation  on Eliquis 5 mg by mouth twice a day.   Patient confirmed on 50 mg by mouth twice a day of metoprolol,  Now on 25 mg by mouth twice a day due to orthostatic hypotention.    Has cardiology followup with EP next month.      PE (4/9/22) - Small occlusive embolic thrombus within the right lower lobe pulmonary artery branch, medially and posteriorly.  Cont Eliquis 5  mg twice a day.     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  Pt had his tooth pulled in 2019, however with prolonged bone exposure/infection, eventually underwent OMSF surgery in 10/2021. Unclear if he has denosumab assoc jaw necrosis. Patient finally had his bad tooth (L upper molar) removed on 5/25/23.[facility]     RTC: - 4/2/24 next casey, next provider visit and casey will be April 30     -------------------------------------------------------------------------------------------------------  Yumiko Waller MD

## 2024-03-06 LAB
ALBUMIN: 3.8 G/DL (ref 3.4–5)
ALPHA 1 GLOBULIN: 0.2 G/DL (ref 0.2–0.6)
ALPHA 2 GLOBULIN: 0.8 G/DL (ref 0.4–1.1)
BETA GLOBULIN: 0.7 G/DL (ref 0.5–1.2)
GAMMA GLOBULIN: 0.5 G/DL (ref 0.5–1.4)
KAPPA LC SERPL-MCNC: 1.52 MG/DL (ref 0.33–1.94)
KAPPA LC/LAMBDA SER: 2.11 {RATIO} (ref 0.26–1.65)
LAMBDA LC SERPL-MCNC: 0.72 MG/DL (ref 0.57–2.63)
M-PROTEIN 1: 0.1 G/DL
PATH REVIEW-SERUM PROTEIN ELECTROPHORESIS: ABNORMAL
PROTEIN ELECTROPHORESIS COMMENT: ABNORMAL

## 2024-03-07 PROCEDURE — RXMED WILLOW AMBULATORY MEDICATION CHARGE

## 2024-03-10 DIAGNOSIS — I10 BENIGN HYPERTENSION: ICD-10-CM

## 2024-03-11 RX ORDER — METOPROLOL TARTRATE 50 MG/1
50 TABLET ORAL EVERY 12 HOURS
Qty: 180 TABLET | Refills: 0 | Status: SHIPPED | OUTPATIENT
Start: 2024-03-11 | End: 2024-06-07 | Stop reason: SDUPTHER

## 2024-03-18 ENCOUNTER — APPOINTMENT (OUTPATIENT)
Dept: PRIMARY CARE | Facility: CLINIC | Age: 74
End: 2024-03-18
Payer: MEDICARE

## 2024-03-18 ENCOUNTER — OFFICE VISIT (OUTPATIENT)
Dept: PRIMARY CARE | Facility: CLINIC | Age: 74
End: 2024-03-18
Payer: MEDICARE

## 2024-03-18 VITALS
BODY MASS INDEX: 29.1 KG/M2 | SYSTOLIC BLOOD PRESSURE: 140 MMHG | WEIGHT: 192 LBS | HEIGHT: 68 IN | HEART RATE: 72 BPM | RESPIRATION RATE: 16 BRPM | DIASTOLIC BLOOD PRESSURE: 82 MMHG

## 2024-03-18 DIAGNOSIS — E78.00 HYPERCHOLESTEROLEMIA: ICD-10-CM

## 2024-03-18 DIAGNOSIS — N40.1 BPH WITH OBSTRUCTION/LOWER URINARY TRACT SYMPTOMS: ICD-10-CM

## 2024-03-18 DIAGNOSIS — M51.36 LUMBAR DEGENERATIVE DISC DISEASE: ICD-10-CM

## 2024-03-18 DIAGNOSIS — I48.0 PAROXYSMAL ATRIAL FIBRILLATION (MULTI): ICD-10-CM

## 2024-03-18 DIAGNOSIS — E11.9 TYPE 2 DIABETES MELLITUS WITHOUT COMPLICATION, WITHOUT LONG-TERM CURRENT USE OF INSULIN (MULTI): ICD-10-CM

## 2024-03-18 DIAGNOSIS — C90.00: ICD-10-CM

## 2024-03-18 DIAGNOSIS — K21.9 GERD WITHOUT ESOPHAGITIS: ICD-10-CM

## 2024-03-18 DIAGNOSIS — N18.2 CHRONIC RENAL IMPAIRMENT, STAGE 2 (MILD): ICD-10-CM

## 2024-03-18 DIAGNOSIS — I10 BENIGN HYPERTENSION: ICD-10-CM

## 2024-03-18 DIAGNOSIS — D53.1 MEGALOBLASTIC ANEMIA: ICD-10-CM

## 2024-03-18 DIAGNOSIS — E29.1 HYPOGONADISM MALE: ICD-10-CM

## 2024-03-18 DIAGNOSIS — I48.91 ATRIAL FIBRILLATION WITH RVR (MULTI): Primary | ICD-10-CM

## 2024-03-18 DIAGNOSIS — N13.8 BPH WITH OBSTRUCTION/LOWER URINARY TRACT SYMPTOMS: ICD-10-CM

## 2024-03-18 PROCEDURE — 99214 OFFICE O/P EST MOD 30 MIN: CPT | Performed by: INTERNAL MEDICINE

## 2024-03-18 PROCEDURE — 3077F SYST BP >= 140 MM HG: CPT | Performed by: INTERNAL MEDICINE

## 2024-03-18 PROCEDURE — 3060F POS MICROALBUMINURIA REV: CPT | Performed by: INTERNAL MEDICINE

## 2024-03-18 PROCEDURE — 3079F DIAST BP 80-89 MM HG: CPT | Performed by: INTERNAL MEDICINE

## 2024-03-18 PROCEDURE — 1036F TOBACCO NON-USER: CPT | Performed by: INTERNAL MEDICINE

## 2024-03-18 PROCEDURE — 1159F MED LIST DOCD IN RCRD: CPT | Performed by: INTERNAL MEDICINE

## 2024-03-18 ASSESSMENT — ENCOUNTER SYMPTOMS
PSYCHIATRIC NEGATIVE: 1
ALLERGIC/IMMUNOLOGIC NEGATIVE: 1
CONSTITUTIONAL NEGATIVE: 1
NEUROLOGICAL NEGATIVE: 1
RESPIRATORY NEGATIVE: 1
GASTROINTESTINAL NEGATIVE: 1
ENDOCRINE NEGATIVE: 1
CARDIOVASCULAR NEGATIVE: 1
HEMATOLOGIC/LYMPHATIC NEGATIVE: 1
MUSCULOSKELETAL NEGATIVE: 1
EYES NEGATIVE: 1

## 2024-03-18 NOTE — PROGRESS NOTES
"Subjective   Patient ID: Toby Ugalde is a 73 y.o. male who presents for Follow-up (Follow up).    HPI     Review of Systems   Constitutional: Negative.    HENT: Negative.     Eyes: Negative.    Respiratory: Negative.     Cardiovascular: Negative.    Gastrointestinal: Negative.    Endocrine: Negative.    Musculoskeletal: Negative.    Skin: Negative.    Allergic/Immunologic: Negative.    Neurological: Negative.    Hematological: Negative.    Psychiatric/Behavioral: Negative.     All other systems reviewed and are negative.      Objective   Ht 1.727 m (5' 8\")   Wt 87.1 kg (192 lb)   BMI 29.19 kg/m²     Blood pressure 140/82, pulse 72, resp. rate 16, height 1.727 m (5' 8\"), weight 87.1 kg (192 lb).   Pancytopenia (CMS/HCC) D61.818      Atrial fibrillation with RVR (CMS/HCC) I48.91     Paroxysmal atrial fibrillation (CMS/HCC) I48.0     Benign hypertension I10     BPH with obstruction/lower urinary tract symptoms N40.1, N13.8     Chronic renal insufficiency N18.9     Diabetes mellitus (CMS/[facility]) - Primary E11.9     Elevated PSA R97.20     GERD without esophagitis K21.9     Hypercholesterolemia E78.00     IgG multiple myeloma (CMS/HCC) C90.00     Lower back pain M54.50     Malaise and fatigue R53.81, R53.83     Insomnia G47.00      Other Visit Diagnoses           Codes     Benign prostatic hyperplasia without lower urinary tract symptoms     N40.0     Relevant Orders     Prostate Specific Antigen                       Paroxysmal atrial fibrillation (CMS/HCC) I48.0          Atrial fibrillation - persistent with  Ventricular rate is well controlled (in sinus rhythm). On Apixaban  No Shortness of breath. Continues   Metoprolol to 25 mg BID             Benign hypertension I10        HTN - hypertension well/controlled .Target BP < 130/80  achieved. Educate low salt diet and exercise with weight loss. Educate home self monitoring and diary keeping. Educate risks of elevate blood pressure and benefits of prompt treatment.  " Refill Amlodipine and Metoprolol            BPH with obstruction/lower urinary tract symptoms N40.1, N13.8        BPH - Benign PROSTATIC Hypertrophy, + Dysuria/Nocturia, check PSA, prescribe Flomax 0.4mg qD and Avodart 0.5 mg qD vs. Finasteride 5 mg qD.  Educate exercises and Change in life style, prescribe vitamin E 400 IU qD. Consider referral to urology.            Chronic renal insufficiency N18.9        CRI - Chronic Renal Insuficiency. Secondary to DM/HTN/Atherosclerosis. Follow BUN/Cr. and lytes.   RENOPROTECTION with ACEI/ARB (). Monitor microalbuminuria. Avoid hypotension and renal hypoperfusion. The patient was instructed to avoid NSAIDS and educate compliance with medications to control HTN(hypertension) and cholesterol and diabetes.                                       Diabetes mellitus (CMS/[facility]) E11.9        DM - NIDDM  . Reviewed with patient / Will check  HbA1c and fasting blood sugars. Educate home self monitoring and diary keeping(reviewed with patient home blood sugar levels /diary). Educate extensively low calorie diet and weight loss with exercise. Reviewed BS- diary and Rx. Regimen. [place] renal protection with ARB/ACEI.               Educate compliance with diet and Rx. And educate risks and autcomes.     Refill Metformin 500 mg BID with meals             GERD without esophagitis K21.9        GERD - Acid reflux disease. Rx. PPI (Prilosec/Prevacid/Protonix/Nexium) and educate diet and life style changes. Referred patient to an endoscopy (EGD) and check H. Pylori titers.            Lumbar degenerative disc disease M51.36        DDD - Degenerative disc disease of the Lumbo-Sacral (LS)/Cervical (C)  spine. Educate exercises and referred patient to Physical Therapy (PT). Ordered X-Ray's of the LS/C spine. Consider MRI. Radiculopathy in the distribution of L4-L5-S1/C3-C4-C5 nerve roots, needs NSAIDS (Naprosin 500mg BID vs. Arthrotec 75mg BID or Prednisone taper (Medrol dose pack), Flexeril 10 mg  qHS, heat application, and pain control with Tylenol vs. Vicodin 5/325 mg TID.             Relevant Medications      traMADol (Ultram) 50 mg tablet      Other Relevant Orders      Opiate/Opioid/Benzo Prescription Compliance      Medicare annual wellness visit, subsequent - Primary Z00.00        No recent hospitalizations.     All medications reviewed and reconciled by me the provider..  No use of controlled substances or opiates.     Family history, social history reviewed, no changes.     Patient does not smoke.     Patient does not drink.     Patient hydrates adequately daily.  Eats a well-balanced healthy diet.      Exercises adequately including walking and doing weightbearing exercises.     Patient denies any difficulty with memory or cognition.      Denies any difficulty with hearing.  Patient does not wear hearing aids.     No fall risk.  No recent falls.  Denies any difficulty walking.     Patient with no history of depression anxiety, denies any loss of interest, no feeling of sadness, no lack of motivation.     Patient is independent in all ADLs and IADLs.  Independent bathing, dressing, walking.  Takes care of own finances, shopping and cooking.      End-of-life decision-making power of  reviewed with patient.      Risk Factors Identified During Visit: None.   Influenza: influenza vaccine was previously given.   Pneumovax 23: Pneumovax 23 vaccine was previously given.   Prevnar 13: Prevnar 13 vaccine was previously given.   Shingles Vaccine: Shingles vaccine was previously given.   Prostate cancer screening: Screening is current.   Colorectal Cancer Screening: screening is current.   Abdominal Aortic Aneurysm screening: screening is current.   HIV screening: screening not indicated.              Need for vaccination with 20-polyvalent pneumococcal conjugate vaccine Z23      Relevant Orders      Pneumococcal conjugate vaccine, 20-valent (PREVNAR 20)       Other Visit Diagnoses           Codes      Antibiotic-induced tinnitus of left ear     H93.12, T36.95XA                            Atrial fibrillation with RVR (CMS/[facility]) - Primary I48.91           Atrial fibrillation - persistent with  Ventricular rate is well controlled (in sinus rhythm). ON ELIQUIS  No Shortness of breath. Continues   Metoprolol to 50 mg BID             Relevant Medications       metoprolol tartrate (Lopressor) 50 mg tablet       Benign hypertension I10                         HTN - hypertension well/controlled .Target BP < 130/80  achieved. Educate low salt diet and exercise with weight loss. Educate home self monitoring and diary keeping. Educate risks of elevate blood pressure and benefits of prompt treatment.  Refill Amlodipine and Metoprolol               Relevant Medications       metoprolol tartrate (Lopressor) 50 mg tablet       BPH with obstruction/lower urinary tract symptoms N40.1, N13.8         BPH - Benign PROSTATIC Hypertrophy, + Dysuria/Nocturia, check PSA, prescribe Flomax 0.4mg qD and Avodart 0.5 mg qD vs. Finasteride 5 mg qD.  Educate exercises and Change in life style, prescribe vitamin E 400 IU qD. Consider referral to urology.            Chronic renal insufficiency N18.9         CRI - Chronic Renal Insuficiency. Secondary to DM/HTN/Atherosclerosis. Follow BUN/Cr. and lytes.   RENOPROTECTION with ACEI/ARB (). Monitor microalbuminuria. Avoid hypotension and renal hypoperfusion. The patient was instructed to avoid NSAIDS and educate compliance with medications to control HTN(hypertension) and cholesterol and diabetes.                                       Diabetes mellitus (CMS/[facility]) E11.9         DM - NIDDM  . Reviewed with patient / Will check  HbA1c and fasting blood sugars. Educate home self monitoring and diary keeping(reviewed with patient home blood sugar levels /diary). Educate extensively low calorie diet and weight loss with exercise. Reviewed BS- diary and Rx. Regimen. [place] renal protection with ARB/ACEI.                Educate compliance with diet and Rx. And educate risks and autcomes.     Refill Metformin 500 mg BID with meals                                                GERD without esophagitis K21.9         GERD - Acid reflux disease. Rx. PPI (Prilosec/Prevacid/Protonix/Nexium) and educate diet and life style changes. Referred patient to an endoscopy (EGD) and check H. Pylori titers.            Hypercholesterolemia E78.00         Hypercholesterolemia - Monitor lipid profile and educate patient upon risks of high cholesterol and targets. Educate diet and change in lifestyle and increase in exercises - Refill:  Atorvastatin    and educate compliance with medication and diet.              IgG multiple myeloma (CMS/[facility]) C90.00         Monitor CBC and blood count and follow with hematology and stable now on Chemotherapy             Malaise and fatigue R53.81, R53.83         Fatigue - check CMP(metabolic panel and elctrolytes) , CBC(complete blood cell count), TSH(thyroid function). Insomnia may play a role and sleep studies(rule out sleep apnea) are recommended . Educate sleep hygiene. Consider anxiety disorder vs. depression. Consider Stress test, and 2DECHO.                                                          Anemia in other chronic diseases classified elsewhere D63.8             Anemia - of bleed/chronic disease. Check CBC , B12 and Folic Acid levels, Iron/Ferritn/TIBC, refer patient to endoscopy (upper/ lower). Target HCT > 30 %. Check guaic stool              Atrial fibrillation with RVR (CMS/[facility]) - Primary I48.91           Atrial fibrillation - persistent with  Ventricular rate is well controlled (in sinus rhythm). ON eLIQUIS  No Shortness of breath. Continues   Metoprolol to 50 mg BID                                  Immunizations/Injections       very important  Immunizations from outside sources need reconciliation.       Influenza, High Dose Seasonal, Preservative Free12/11/2020, 10/1/2018  Influenza,  Unspecified9/22/2010  Influenza, seasonal, igjnssynda40/6/2021, 9/12/2016, 9/9/2015,  ... (3 more)  Pfizer COVID-19 vaccine, Fall 2023, 12 years and older, (30mcg/0.3mL)10/16/2023  Pfizer COVID-19 vaccine, bivalent, age 12 years and older (30 mcg/0.3 mL)3/17/2023  Pneumococcal conjugate vaccine, 13-valent (PREVNAR 13)6/23/2015  Pneumococcal conjugate vaccine, 20-valent (PREVNAR 20)1/12/2024  Pneumococcal polysaccharide vaccine, 23-valent, age 2 years and older (PNEUMOVAX 23)4/11/2018  Tdap vaccine, age 7 year and older (BOOSTRIX, ADACEL)6/23/2015  Zoster vaccine, recombinant, adult (SHINGRIX)8/16/2021, 6/16/2021                Immunizations/Injections      very important  Immunizations from outside sources need reconciliation.      Influenza, High Dose Seasonal, Preservative Free12/11/2020, 10/1/2018  Influenza, Unspecified9/22/2010  Influenza, seasonal, ikkqgljnwg72/6/2021, 9/12/2016, 9/9/2015,  ... (3 more)  Pfizer COVID-19 vaccine, Fall 2023, 12 years and older, (30mcg/0.3mL)10/16/2023  Pfizer COVID-19 vaccine, bivalent, age 12 years and older (30 mcg/0.3 mL)3/17/2023  Pneumococcal conjugate vaccine, 13-valent (PREVNAR 13)6/23/2015  Pneumococcal conjugate vaccine, 20-valent (PREVNAR 20)1/12/2024  Pneumococcal polysaccharide vaccine, 23-valent, age 2 years and older (PNEUMOVAX 23)4/11/2018  Tdap vaccine, age 7 year and older (BOOSTRIX, ADACEL)6/23/2015  Zoster vaccine, recombinant, adult (SHINGRIX)8/16/2021, 6/16/2021  Physical Exam  Vitals and nursing note reviewed.   Constitutional:       Appearance: Normal appearance.   HENT:      Head: Normocephalic and atraumatic.      Right Ear: Tympanic membrane, ear canal and external ear normal.      Left Ear: Tympanic membrane, ear canal and external ear normal. There is no impacted cerumen.      Nose: Nose normal.      Mouth/Throat:      Mouth: Mucous membranes are moist.      Pharynx: Oropharynx is clear.   Eyes:      Extraocular Movements: Extraocular movements  intact.      Conjunctiva/sclera: Conjunctivae normal.      Pupils: Pupils are equal, round, and reactive to light.   Cardiovascular:      Rate and Rhythm: Normal rate and regular rhythm.      Pulses: Normal pulses.      Heart sounds: Normal heart sounds. No murmur heard.  Pulmonary:      Effort: Pulmonary effort is normal. No respiratory distress.      Breath sounds: Normal breath sounds. No stridor. No wheezing, rhonchi or rales.   Chest:      Chest wall: No tenderness.   Abdominal:      General: Abdomen is flat. Bowel sounds are normal. There is no distension.      Palpations: Abdomen is soft. There is no mass.      Tenderness: There is no abdominal tenderness. There is no right CVA tenderness, left CVA tenderness, guarding or rebound.      Hernia: No hernia is present.   Musculoskeletal:         General: Normal range of motion.      Cervical back: Normal range of motion and neck supple.   Skin:     General: Skin is warm.      Capillary Refill: Capillary refill takes less than 2 seconds.   Neurological:      General: No focal deficit present.      Mental Status: He is alert.      Cranial Nerves: No cranial nerve deficit.      Sensory: No sensory deficit.      Motor: No weakness.      Coordination: Coordination normal.      Gait: Gait normal.      Deep Tendon Reflexes: Reflexes normal.   Psychiatric:         Mood and Affect: Mood normal.         Behavior: Behavior normal. Behavior is cooperative.         Thought Content: Thought content normal.         Judgment: Judgment normal.         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.1    Multiple myeloma, stage 1 (CMS/HCC) C90.00       Pancytopenia (CMS/HCC) D61.818      Atrial fibrillation with RVR (CMS/[facility]) I48.91     Paroxysmal atrial fibrillation (CMS/[facility]) I48.0     Benign hypertension I10     BPH with obstruction/lower urinary tract symptoms N40.1, N13.8     Chronic renal insufficiency N18.9     Diabetes mellitus (CMS/[facility]) - Primary E11.9     Elevated PSA R97.20     GERD without esophagitis K21.9     Hypercholesterolemia E78.00     IgG multiple myeloma (CMS/[facility]) C90.00     Lower back pain M54.50     Malaise and fatigue R53.81, R53.83     Insomnia G47.00      Other Visit Diagnoses           Codes     Benign prostatic hyperplasia without lower urinary tract symptoms     N40.0     Relevant Orders     Prostate Specific Antigen                       Paroxysmal atrial fibrillation (CMS/[facility]) I48.0          Atrial fibrillation - persistent with  Ventricular rate is well controlled (in sinus rhythm). On Apixaban  No Shortness of breath. Continues   Metoprolol to 25 mg BID             Benign hypertension I10        HTN - hypertension well/controlled .Target BP < 130/80  achieved. Educate low salt diet and exercise with weight loss. Educate home self monitoring and diary keeping. Educate risks of elevate blood pressure and benefits of prompt treatment.  Refill Amlodipine and Metoprolol            BPH with obstruction/lower urinary tract symptoms N40.1, N13.8        BPH - Benign PROSTATIC Hypertrophy, + Dysuria/Nocturia, check PSA, prescribe Flomax 0.4mg qD and Avodart 0.5 mg qD vs. Finasteride 5 mg qD.  Educate exercises and Change in life style, prescribe vitamin E 400 IU qD. Consider referral to urology.            Chronic renal insufficiency N18.9        CRI - Chronic Renal Insuficiency. Secondary to DM/HTN/Atherosclerosis. Follow BUN/Cr. and lytes.   RENOPROTECTION with ACEI/ARB (). Monitor microalbuminuria. Avoid  hypotension and renal hypoperfusion. The patient was instructed to avoid NSAIDS and educate compliance with medications to control HTN(hypertension) and cholesterol and diabetes.                                       Diabetes mellitus (CMS/[facility]) E11.9        DM - NIDDM  . Reviewed with patient / Will check  HbA1c and fasting blood sugars. Educate home self monitoring and diary keeping(reviewed with patient home blood sugar levels /diary). Educate extensively low calorie diet and weight loss with exercise. Reviewed BS- diary and Rx. Regimen. [place] renal protection with ARB/ACEI.               Educate compliance with diet and Rx. And educate risks and autcomes.     Refill Metformin 500 mg BID with meals             GERD without esophagitis K21.9        GERD - Acid reflux disease. Rx. PPI (Prilosec/Prevacid/Protonix/Nexium) and educate diet and life style changes. Referred patient to an endoscopy (EGD) and check H. Pylori titers.            Lumbar degenerative disc disease M51.36        DDD - Degenerative disc disease of the Lumbo-Sacral (LS)/Cervical (C)  spine. Educate exercises and referred patient to Physical Therapy (PT). Ordered X-Ray's of the LS/C spine. Consider MRI. Radiculopathy in the distribution of L4-L5-S1/C3-C4-C5 nerve roots, needs NSAIDS (Naprosin 500mg BID vs. Arthrotec 75mg BID or Prednisone taper (Medrol dose pack), Flexeril 10 mg qHS, heat application, and pain control with Tylenol vs. Vicodin 5/325 mg TID.             Relevant Medications      traMADol (Ultram) 50 mg tablet      Other Relevant Orders      Opiate/Opioid/Benzo Prescription Compliance      Medicare annual wellness visit, subsequent - Primary Z00.00        No recent hospitalizations.     All medications reviewed and reconciled by me the provider..  No use of controlled substances or opiates.     Family history, social history reviewed, no changes.     Patient does not smoke.     Patient does not drink.     Patient hydrates  adequately daily.  Eats a well-balanced healthy diet.      Exercises adequately including walking and doing weightbearing exercises.     Patient denies any difficulty with memory or cognition.      Denies any difficulty with hearing.  Patient does not wear hearing aids.     No fall risk.  No recent falls.  Denies any difficulty walking.     Patient with no history of depression anxiety, denies any loss of interest, no feeling of sadness, no lack of motivation.     Patient is independent in all ADLs and IADLs.  Independent bathing, dressing, walking.  Takes care of own finances, shopping and cooking.      End-of-life decision-making power of  reviewed with patient.      Risk Factors Identified During Visit: None.   Influenza: influenza vaccine was previously given.   Pneumovax 23: Pneumovax 23 vaccine was previously given.   Prevnar 13: Prevnar 13 vaccine was previously given.   Shingles Vaccine: Shingles vaccine was previously given.   Prostate cancer screening: Screening is current.   Colorectal Cancer Screening: screening is current.   Abdominal Aortic Aneurysm screening: screening is current.   HIV screening: screening not indicated.              Need for vaccination with 20-polyvalent pneumococcal conjugate vaccine Z23      Relevant Orders      Pneumococcal conjugate vaccine, 20-valent (PREVNAR 20)       Other Visit Diagnoses           Codes     Antibiotic-induced tinnitus of left ear     H93.12, T36.95XA                            Atrial fibrillation with RVR (CMS/HCC) - Primary I48.91           Atrial fibrillation - persistent with  Ventricular rate is well controlled (in sinus rhythm). ON ELIQUIS  No Shortness of breath. Continues   Metoprolol to 50 mg BID             Relevant Medications       metoprolol tartrate (Lopressor) 50 mg tablet       Benign hypertension I10                         HTN - hypertension well/controlled .Target BP < 130/80  achieved. Educate low salt diet and exercise with  weight loss. Educate home self monitoring and diary keeping. Educate risks of elevate blood pressure and benefits of prompt treatment.  Refill Amlodipine and Metoprolol               Relevant Medications       metoprolol tartrate (Lopressor) 50 mg tablet       BPH with obstruction/lower urinary tract symptoms N40.1, N13.8         BPH - Benign PROSTATIC Hypertrophy, + Dysuria/Nocturia, check PSA, prescribe Flomax 0.4mg qD and Avodart 0.5 mg qD vs. Finasteride 5 mg qD.  Educate exercises and Change in life style, prescribe vitamin E 400 IU qD. Consider referral to urology.            Chronic renal insufficiency N18.9         CRI - Chronic Renal Insuficiency. Secondary to DM/HTN/Atherosclerosis. Follow BUN/Cr. and lytes.   RENOPROTECTION with ACEI/ARB (). Monitor microalbuminuria. Avoid hypotension and renal hypoperfusion. The patient was instructed to avoid NSAIDS and educate compliance with medications to control HTN(hypertension) and cholesterol and diabetes.                                       Diabetes mellitus (CMS/[facility]) E11.9         DM - NIDDM  . Reviewed with patient / Will check  HbA1c and fasting blood sugars. Educate home self monitoring and diary keeping(reviewed with patient home blood sugar levels /diary). Educate extensively low calorie diet and weight loss with exercise. Reviewed BS- diary and Rx. Regimen. [place] renal protection with ARB/ACEI.               Educate compliance with diet and Rx. And educate risks and autcomes.     Refill Metformin 500 mg BID with meals                                                GERD without esophagitis K21.9         GERD - Acid reflux disease. Rx. PPI (Prilosec/Prevacid/Protonix/Nexium) and educate diet and life style changes. Referred patient to an endoscopy (EGD) and check H. Pylori titers.            Hypercholesterolemia E78.00         Hypercholesterolemia - Monitor lipid profile and educate patient upon risks of high cholesterol and targets. Educate diet and  change in lifestyle and increase in exercises - Refill:  Atorvastatin    and educate compliance with medication and diet.              IgG multiple myeloma (CMS/[facility]) C90.00         Monitor CBC and blood count and follow with hematology and stable now on Chemotherapy             Malaise and fatigue R53.81, R53.83         Fatigue - check CMP(metabolic panel and elctrolytes) , CBC(complete blood cell count), TSH(thyroid function). Insomnia may play a role and sleep studies(rule out sleep apnea) are recommended . Educate sleep hygiene. Consider anxiety disorder vs. depression. Consider Stress test, and 2DECHO.                                                          Anemia in other chronic diseases classified elsewhere D63.8             Anemia - of bleed/chronic disease. Check CBC , B12 and Folic Acid levels, Iron/Ferritn/TIBC, refer patient to endoscopy (upper/ lower). Target HCT > 30 %. Check guaic stool              Atrial fibrillation with RVR (CMS/[facility]) - Primary I48.91           Atrial fibrillation - persistent with  Ventricular rate is well controlled (in sinus rhythm). ON eLIQUIS  No Shortness of breath. Continues   Metoprolol to 50 mg BID                                  Immunizations/Injections       very important  Immunizations from outside sources need reconciliation.       Influenza, High Dose Seasonal, Preservative Free12/11/2020, 10/1/2018  Influenza, Unspecified9/22/2010  Influenza, seasonal, [id number]/6/2021, 9/12/2016, 9/9/2015,  ... (3 more)  Pfizer COVID-19 vaccine, Fall 2023, 12 years and older, (30mcg/0.3mL)10/16/2023  Pfizer COVID-19 vaccine, bivalent, age 12 years and older (30 mcg/0.3 mL)3/17/2023  Pneumococcal conjugate vaccine, 13-valent (PREVNAR 13)6/23/2015  Pneumococcal conjugate vaccine, 20-valent (PREVNAR 20)1/12/2024  Pneumococcal polysaccharide vaccine, 23-valent, age 2 years and older (PNEUMOVAX 23)4/11/2018  Tdap vaccine, age 7 year and older (BOOSTRIX, ADACEL)6/23/2015  Zoster  vaccine, recombinant, adult (SHINGRIX)8/16/2021, 6/16/2021                Immunizations/Injections      very important  Immunizations from outside sources need reconciliation.      Influenza, High Dose Seasonal, Preservative Free12/11/2020, 10/1/2018  Influenza, Unspecified9/22/2010  Influenza, seasonal, [id number]/6/2021, 9/12/2016, 9/9/2015,  ... (3 more)  Pfizer COVID-19 vaccine, Fall 2023, 12 years and older, (30mcg/0.3mL)10/16/2023  Pfizer COVID-19 vaccine, bivalent, age 12 years and older (30 mcg/0.3 mL)3/17/2023  Pneumococcal conjugate vaccine, 13-valent (PREVNAR 13)6/23/2015  Pneumococcal conjugate vaccine, 20-valent (PREVNAR 20)1/12/2024  Pneumococcal polysaccharide vaccine, 23-valent, age 2 years and older (PNEUMOVAX 23)4/11/2018  Tdap vaccine, age 7 year and older (BOOSTRIX, ADACEL)6/23/2015  Zoster vaccine, recombinant, adult (SHINGRIX)8/16/2021, 6/16/2021

## 2024-03-20 ENCOUNTER — PHARMACY VISIT (OUTPATIENT)
Dept: PHARMACY | Facility: CLINIC | Age: 74
End: 2024-03-20
Payer: COMMERCIAL

## 2024-04-02 ENCOUNTER — LAB (OUTPATIENT)
Dept: LAB | Facility: CLINIC | Age: 74
End: 2024-04-02
Payer: MEDICARE

## 2024-04-02 ENCOUNTER — INFUSION (OUTPATIENT)
Dept: HEMATOLOGY/ONCOLOGY | Facility: CLINIC | Age: 74
End: 2024-04-02
Payer: MEDICARE

## 2024-04-02 VITALS
HEART RATE: 65 BPM | BODY MASS INDEX: 30.26 KG/M2 | OXYGEN SATURATION: 96 % | TEMPERATURE: 97.2 F | SYSTOLIC BLOOD PRESSURE: 165 MMHG | WEIGHT: 199 LBS | DIASTOLIC BLOOD PRESSURE: 80 MMHG | RESPIRATION RATE: 18 BRPM

## 2024-04-02 DIAGNOSIS — C90.00 IGG MULTIPLE MYELOMA (MULTI): ICD-10-CM

## 2024-04-02 DIAGNOSIS — C90.00: ICD-10-CM

## 2024-04-02 LAB
ALBUMIN SERPL BCP-MCNC: 4 G/DL (ref 3.4–5)
ALP SERPL-CCNC: 60 U/L (ref 33–136)
ALT SERPL W P-5'-P-CCNC: 18 U/L (ref 10–52)
ANION GAP SERPL CALC-SCNC: 14 MMOL/L (ref 10–20)
AST SERPL W P-5'-P-CCNC: 15 U/L (ref 9–39)
BASOPHILS # BLD AUTO: 0.02 X10*3/UL (ref 0–0.1)
BASOPHILS NFR BLD AUTO: 0.6 %
BILIRUB SERPL-MCNC: 0.6 MG/DL (ref 0–1.2)
BUN SERPL-MCNC: 21 MG/DL (ref 6–23)
CALCIUM SERPL-MCNC: 9 MG/DL (ref 8.6–10.6)
CHLORIDE SERPL-SCNC: 107 MMOL/L (ref 98–107)
CO2 SERPL-SCNC: 24 MMOL/L (ref 21–32)
CREAT SERPL-MCNC: 1.43 MG/DL (ref 0.5–1.3)
EGFRCR SERPLBLD CKD-EPI 2021: 51 ML/MIN/1.73M*2
EOSINOPHIL # BLD AUTO: 0.07 X10*3/UL (ref 0–0.4)
EOSINOPHIL NFR BLD AUTO: 2.3 %
ERYTHROCYTE [DISTWIDTH] IN BLOOD BY AUTOMATED COUNT: 13.5 % (ref 11.5–14.5)
GLUCOSE SERPL-MCNC: 157 MG/DL (ref 74–99)
HCT VFR BLD AUTO: 32.9 % (ref 41–52)
HGB BLD-MCNC: 11.3 G/DL (ref 13.5–17.5)
IGA SERPL-MCNC: 52 MG/DL (ref 70–400)
IGG SERPL-MCNC: 554 MG/DL (ref 700–1600)
IGM SERPL-MCNC: 8 MG/DL (ref 40–230)
IMM GRANULOCYTES # BLD AUTO: 0.01 X10*3/UL (ref 0–0.5)
IMM GRANULOCYTES NFR BLD AUTO: 0.3 % (ref 0–0.9)
LYMPHOCYTES # BLD AUTO: 1.48 X10*3/UL (ref 0.8–3)
LYMPHOCYTES NFR BLD AUTO: 47.6 %
MCH RBC QN AUTO: 30.4 PG (ref 26–34)
MCHC RBC AUTO-ENTMCNC: 34.3 G/DL (ref 32–36)
MCV RBC AUTO: 88 FL (ref 80–100)
MONOCYTES # BLD AUTO: 0.48 X10*3/UL (ref 0.05–0.8)
MONOCYTES NFR BLD AUTO: 15.4 %
NEUTROPHILS # BLD AUTO: 1.05 X10*3/UL (ref 1.6–5.5)
NEUTROPHILS NFR BLD AUTO: 33.8 %
NRBC BLD-RTO: ABNORMAL /100{WBCS}
PLATELET # BLD AUTO: 150 X10*3/UL (ref 150–450)
POTASSIUM SERPL-SCNC: 3.5 MMOL/L (ref 3.5–5.3)
PROT SERPL-MCNC: 5.8 G/DL (ref 6.4–8.2)
PROT SERPL-MCNC: 5.8 G/DL (ref 6.4–8.2)
RBC # BLD AUTO: 3.72 X10*6/UL (ref 4.5–5.9)
SODIUM SERPL-SCNC: 141 MMOL/L (ref 136–145)
WBC # BLD AUTO: 3.1 X10*3/UL (ref 4.4–11.3)

## 2024-04-02 PROCEDURE — 83521 IG LIGHT CHAINS FREE EACH: CPT

## 2024-04-02 PROCEDURE — 85025 COMPLETE CBC W/AUTO DIFF WBC: CPT

## 2024-04-02 PROCEDURE — 36415 COLL VENOUS BLD VENIPUNCTURE: CPT

## 2024-04-02 PROCEDURE — 2500000004 HC RX 250 GENERAL PHARMACY W/ HCPCS (ALT 636 FOR OP/ED): Mod: JZ,JG | Performed by: INTERNAL MEDICINE

## 2024-04-02 PROCEDURE — 2500000001 HC RX 250 WO HCPCS SELF ADMINISTERED DRUGS (ALT 637 FOR MEDICARE OP): Performed by: INTERNAL MEDICINE

## 2024-04-02 PROCEDURE — 84165 PROTEIN E-PHORESIS SERUM: CPT | Performed by: PATHOLOGY

## 2024-04-02 PROCEDURE — 80053 COMPREHEN METABOLIC PANEL: CPT

## 2024-04-02 PROCEDURE — 96372 THER/PROPH/DIAG INJ SC/IM: CPT | Performed by: INTERNAL MEDICINE

## 2024-04-02 PROCEDURE — 2500000004 HC RX 250 GENERAL PHARMACY W/ HCPCS (ALT 636 FOR OP/ED): Performed by: INTERNAL MEDICINE

## 2024-04-02 PROCEDURE — 84155 ASSAY OF PROTEIN SERUM: CPT

## 2024-04-02 PROCEDURE — 84165 PROTEIN E-PHORESIS SERUM: CPT

## 2024-04-02 PROCEDURE — 96401 CHEMO ANTI-NEOPL SQ/IM: CPT

## 2024-04-02 PROCEDURE — 82784 ASSAY IGA/IGD/IGG/IGM EACH: CPT

## 2024-04-02 RX ORDER — DIPHENHYDRAMINE HYDROCHLORIDE 50 MG/ML
50 INJECTION INTRAMUSCULAR; INTRAVENOUS AS NEEDED
Status: DISCONTINUED | OUTPATIENT
Start: 2024-04-02 | End: 2024-04-02 | Stop reason: HOSPADM

## 2024-04-02 RX ORDER — ALBUTEROL SULFATE 0.83 MG/ML
3 SOLUTION RESPIRATORY (INHALATION) AS NEEDED
Status: DISCONTINUED | OUTPATIENT
Start: 2024-04-02 | End: 2024-04-02 | Stop reason: HOSPADM

## 2024-04-02 RX ORDER — EPINEPHRINE 0.3 MG/.3ML
0.3 INJECTION SUBCUTANEOUS EVERY 5 MIN PRN
Status: DISCONTINUED | OUTPATIENT
Start: 2024-04-02 | End: 2024-04-02 | Stop reason: HOSPADM

## 2024-04-02 RX ORDER — DIPHENHYDRAMINE HCL 50 MG
50 CAPSULE ORAL ONCE
Status: COMPLETED | OUTPATIENT
Start: 2024-04-02 | End: 2024-04-02

## 2024-04-02 RX ORDER — ACETAMINOPHEN 325 MG/1
650 TABLET ORAL ONCE
Status: COMPLETED | OUTPATIENT
Start: 2024-04-02 | End: 2024-04-02

## 2024-04-02 RX ORDER — FAMOTIDINE 10 MG/ML
20 INJECTION INTRAVENOUS ONCE AS NEEDED
Status: DISCONTINUED | OUTPATIENT
Start: 2024-04-02 | End: 2024-04-02 | Stop reason: HOSPADM

## 2024-04-02 RX ORDER — MONTELUKAST SODIUM 10 MG/1
10 TABLET ORAL ONCE
Status: COMPLETED | OUTPATIENT
Start: 2024-04-02 | End: 2024-04-02

## 2024-04-02 RX ADMIN — ACETAMINOPHEN 650 MG: 325 TABLET ORAL at 11:27

## 2024-04-02 RX ADMIN — DIPHENHYDRAMINE HYDROCHLORIDE 50 MG: 50 CAPSULE ORAL at 11:28

## 2024-04-02 RX ADMIN — DEXAMETHASONE 20 MG: 6 TABLET ORAL at 11:28

## 2024-04-02 RX ADMIN — MONTELUKAST 10 MG: 10 TABLET, FILM COATED ORAL at 11:28

## 2024-04-02 RX ADMIN — DARATUMUMAB AND HYALURONIDASE-FIHJ (HUMAN RECOMBINANT) 1800 MG: 1800; 30000 INJECTION SUBCUTANEOUS at 11:44

## 2024-04-02 ASSESSMENT — PAIN SCALES - GENERAL: PAINLEVEL: 2

## 2024-04-03 LAB
KAPPA LC SERPL-MCNC: 1.62 MG/DL (ref 0.33–1.94)
KAPPA LC/LAMBDA SER: 1.88 {RATIO} (ref 0.26–1.65)
LAMBDA LC SERPL-MCNC: 0.86 MG/DL (ref 0.57–2.63)

## 2024-04-04 LAB
ALBUMIN: 3.7 G/DL (ref 3.4–5)
ALPHA 1 GLOBULIN: 0.2 G/DL (ref 0.2–0.6)
ALPHA 2 GLOBULIN: 0.7 G/DL (ref 0.4–1.1)
BETA GLOBULIN: 0.7 G/DL (ref 0.5–1.2)
GAMMA GLOBULIN: 0.5 G/DL (ref 0.5–1.4)
M-PROTEIN 1: 0.1 G/DL
PATH REVIEW-SERUM PROTEIN ELECTROPHORESIS: ABNORMAL
PROTEIN ELECTROPHORESIS COMMENT: ABNORMAL

## 2024-04-10 ENCOUNTER — OFFICE VISIT (OUTPATIENT)
Dept: CARDIOLOGY | Facility: HOSPITAL | Age: 74
End: 2024-04-10
Payer: MEDICARE

## 2024-04-10 VITALS
BODY MASS INDEX: 27.19 KG/M2 | WEIGHT: 194.2 LBS | DIASTOLIC BLOOD PRESSURE: 60 MMHG | HEIGHT: 71 IN | SYSTOLIC BLOOD PRESSURE: 120 MMHG | HEART RATE: 68 BPM

## 2024-04-10 DIAGNOSIS — E78.00 HYPERCHOLESTEROLEMIA: ICD-10-CM

## 2024-04-10 DIAGNOSIS — I10 BENIGN HYPERTENSION: ICD-10-CM

## 2024-04-10 DIAGNOSIS — I48.91 ATRIAL FIBRILLATION WITH RVR (MULTI): Primary | ICD-10-CM

## 2024-04-10 LAB
ATRIAL RATE: 68 BPM
P AXIS: 12 DEGREES
P OFFSET: 200 MS
P ONSET: 156 MS
PR INTERVAL: 138 MS
Q ONSET: 225 MS
QRS COUNT: 11 BEATS
QRS DURATION: 94 MS
QT INTERVAL: 404 MS
QTC CALCULATION(BAZETT): 429 MS
QTC FREDERICIA: 421 MS
R AXIS: 11 DEGREES
T AXIS: 0 DEGREES
T OFFSET: 427 MS
VENTRICULAR RATE: 68 BPM

## 2024-04-10 PROCEDURE — 3078F DIAST BP <80 MM HG: CPT | Performed by: NURSE PRACTITIONER

## 2024-04-10 PROCEDURE — 99214 OFFICE O/P EST MOD 30 MIN: CPT | Performed by: NURSE PRACTITIONER

## 2024-04-10 PROCEDURE — 93005 ELECTROCARDIOGRAM TRACING: CPT | Performed by: NURSE PRACTITIONER

## 2024-04-10 PROCEDURE — 3074F SYST BP LT 130 MM HG: CPT | Performed by: NURSE PRACTITIONER

## 2024-04-10 PROCEDURE — 1159F MED LIST DOCD IN RCRD: CPT | Performed by: NURSE PRACTITIONER

## 2024-04-10 ASSESSMENT — ENCOUNTER SYMPTOMS
LOSS OF SENSATION IN FEET: 0
DEPRESSION: 0
OCCASIONAL FEELINGS OF UNSTEADINESS: 0

## 2024-04-10 NOTE — PATIENT INSTRUCTIONS
Continue current cardiovascular medications  Check fasting lipid panel  Follow up in November with Dr. Moon  Increase physical activity

## 2024-04-10 NOTE — PROGRESS NOTES
Primary Care Physician: No primary care provider on file.  Date of Visit: 04/10/2024  9:00 AM EDT  Location of visit: [facility]     Chief Complaint:   Chief Complaint   Patient presents with    Follow-up        HPI / Summary:   Toby Ugalde is a 74 y.o. male presents for followup. Seen in collaboration with Dr. Moon. He continues to have chemotherapy for treatment of multiple myeloma. He has chronic dyspnea on exertion when ambulating up 1 flight of stairs and walking prolonged distances which has remained unchanged. No Chest pain.  He ambulates with assistance of a cane at times. The patient denies chest pain,palpitations, lightheadedness, syncope, orthopnea, paroxysmal nocturnal dyspnea,lower extremity edema, or bleeding problems.              Past Medical History:  Past Medical History:   Diagnosis Date    Dental caries on pit and fissure surface penetrating into dentin 01/12/2024    Deposits (accretions) on teeth 01/12/2024    Essential (primary) hypertension 11/20/2013    Benign essential hypertension    History of hypercholesterolemia 01/12/2024    History of hypertension 01/12/2024    History of malignant neoplasm of prostate 01/12/2024    Impacted cerumen 01/12/2024    Impacted cerumen, bilateral 02/16/2024    Infective pharyngitis 01/12/2024    Other intervertebral disc degeneration, lumbar region     Lumbar degenerative disc disease    Overweight with body mass index (BMI) 25.0-29.9 02/02/2022    Partial loss of teeth due to caries, class I 01/12/2024    Personal history of malignant neoplasm of prostate     History of malignant neoplasm of prostate    Personal history of other diseases of the circulatory system     History of hypertension    Personal history of other diseases of the circulatory system 06/29/2022    History of atrial tachycardia    Personal history of other diseases of the circulatory system     History of essential hypertension    Personal history of other diseases of the  musculoskeletal system and connective tissue     History of arthritis    Personal history of other diseases of the musculoskeletal system and connective tissue     History of acute gouty arthritis    Personal history of other diseases of the musculoskeletal system and connective tissue 01/30/2019    History of left foot drop    Personal history of other diseases of the respiratory system     History of bronchitis    Personal history of other endocrine, nutritional and metabolic disease     History of hypercholesterolemia    Personal history of other endocrine, nutritional and metabolic disease     History of diabetes mellitus    Personal history of other endocrine, nutritional and metabolic disease 01/17/2014    History of diabetes mellitus    Post-traumatic stress disorder, chronic 11/10/2023    Primary localized osteoarthritis of pelvic region and thigh 12/17/2018    Pure hypercholesterolemia, unspecified 11/20/2013    Low-density-lipoid-type (LDL) hyperlipoproteinemia    Snoring     Snoring        Past Surgical History:  Past Surgical History:   Procedure Laterality Date    BACK SURGERY  11/20/2013    Back Surgery    TONSILLECTOMY  11/20/2013    Tonsillectomy    TOTAL KNEE ARTHROPLASTY  11/20/2013    Knee Replacement          Social History:   reports that he has never smoked. He has never been exposed to tobacco smoke. He has never used smokeless tobacco. He reports current alcohol use. He reports that he does not use drugs.     Family History:  family history includes Diabetes in his brother; Heart attack in his mother; Stroke in his maternal grandfather.      Allergies:  Allergies   Allergen Reactions    Mold Other       Outpatient Medications:  Current Outpatient Medications   Medication Instructions    acetaminophen (Tylenol) 500 mg tablet oral    acyclovir (ZOVIRAX) 400 mg, oral, 2 times daily    allopurinol (ZYLOPRIM) 300 mg, oral, As needed    amLODIPine (NORVASC) 5 mg, oral, Daily    apixaban (Eliquis) 5  "mg tablet TAKE ONE (1) TABLET BY MOUTH 2 TIMES A DAY    atorvastatin (LIPITOR) 80 mg, oral, Daily    colchicine 0.6 mg, oral, Daily PRN    dextromethorphan-guaifenesin (Mucinex DM)  mg 12 hr tablet 1 tablet, oral, Every 12 hours PRN    finasteride (PROSCAR) 5 mg, oral, Daily PRN    fludrocortisone (Florinef) 0.1 mg tablet 1 tablet, oral, Daily    fluticasone (Flonase) 50 mcg/actuation nasal spray 1 spray, nasal, Daily PRN    lenalidomide (Revlimid) 5 mg capsule oral    losartan-hydrochlorothiazide (Hyzaar) 100-12.5 mg tablet 1 tablet, oral, Daily    metFORMIN (GLUCOPHAGE) 500 mg, oral, As needed    metoprolol tartrate (LOPRESSOR) 50 mg, oral, Every 12 hours    pantoprazole (ProtoNix) 40 mg EC tablet 1 tablet, oral, Daily    potassium chloride CR 10 mEq ER tablet oral    sildenafil (VIAGRA) 100 mg, oral, As needed,  1 HOUR BEFORE NEEDED.    tamsulosin (Flomax) 0.4 mg 24 hr capsule 1 capsule, oral, Daily    traMADol (ULTRAM) 50 mg, oral, Every 8 hours PRN       Physical Exam:  Vitals:    04/10/24 0909   BP: 165/90   BP Location: Left arm   Patient Position: Sitting   BP Cuff Size: Adult   Pulse: 68   Weight: 88.1 kg (194 lb 3.2 oz)   Height: 1.803 m (5' 11\")     Wt Readings from Last 5 Encounters:   04/10/24 88.1 kg (194 lb 3.2 oz)   04/02/24 90.3 kg (199 lb)   03/18/24 87.1 kg (192 lb)   03/05/24 87.1 kg (192 lb 0.3 oz)   02/16/24 87.5 kg (193 lb)     Body mass index is 27.09 kg/m².     GENERAL: alert, cooperative, pleasant, in no acute distress  SKIN: warm and dry  NECK: Normal JVD, negative HJR  CARDIAC: Regular rate and rhythm with no rubs, murmurs, or gallops  CHEST: Normal respiratory efforts, lungs clear to auscultation bilaterally.  ABDOMEN: soft, nontender, nondistended  EXTREMITIES: no edema, +2 palpable RP and PT pulses bilaterally       Last Labs:  Recent Labs     04/02/24  1024 03/05/24  0834 02/06/24  0833   WBC 3.1* 4.0* 5.3   HGB 11.3* 11.7* 11.5*   HCT 32.9* 35.6* 34.4*    160 162   MCV " 88 92 92     Recent Labs     04/02/24  1024 03/05/24  0834 02/06/24  0833    140 139   K 3.5 4.0 3.9    107 107   BUN 21 24* 24*   CREATININE 1.43* 1.41* 1.38*     CMP -  Lab Results   Component Value Date    CALCIUM 9.0 04/02/2024    PHOS 2.8 09/27/2023    PROT 5.8 (L) 04/02/2024    PROT 5.8 (L) 04/02/2024    ALBUMIN 4.0 04/02/2024    AST 15 04/02/2024    ALT 18 04/02/2024    ALKPHOS 60 04/02/2024    BILITOT 0.6 04/02/2024       LIPID PANEL -   Lab Results   Component Value Date    CHOL 153 09/27/2023    HDL 70.6 09/27/2023    LDLF 61 09/27/2023    TRIG 109 09/27/2023       Lab Results   Component Value Date    BNP 20 04/09/2022    HGBA1C 7.7 (A) 02/28/2022    HGBA1C 7.0 (H) 08/03/2020       Last Cardiology Tests:  ECG:  Obtained and reviewed EKG- normal sinus rhythm HR 68    Echo:  CONCLUSIONS:   1. The left ventricular systolic function is normal with a 60% estimated ejection fraction.   2. Compared with study from 9/6/2018, Right ventricle appears mildly enlarged.       Stress Test:  9/13/2018   IMPRESSION:  1. Normal stress myocardial perfusion imaging. No evidence of  stress-induced ischemia or scar.  2. Well-maintained left ventricular function.  I personally reviewed the images/study and I agree with the findings  as stated. This study was interpreted at [facility], [place], [place].              Assessment/Plan     Toby was seen today for follow-up.  Diagnoses and all orders for this visit:  Atrial fibrillation with RVR (CMS/HCC) (Primary)  -     ECG 12 lead (Clinic Performed)  Hypercholesterolemia  -     Lipid Panel; Future  Benign hypertension      In Summary Mr. Toby Ugalde is a pleasant 74 year old  male with significant past medical history of Paroxysmal atrial fibrillation, Hypertension, Pulmonary embolism 4/9/22, GERD, Anemia, Multiple myeloma. Patient is asymptomatic from a cardiac perspective. His blood pressure is controlled. His EKG  today showed normal sinus rhythm. He is tolerating Eliquis. I have ordered fasting lipid panel as Atorvastatin was increased in September. He will continue current cardiovascular medications. He will follow up as scheduled with Dr. Moon.       Orders:  No orders of the defined types were placed in this encounter.     Followup Appts:  Future Appointments   Date Time Provider Department Center   4/15/2024 10:20 AM Kendra Segovia MD [id number] [facility]   4/29/2024  9:30 AM Antoine Cano MD [id number] [facility]   4/30/2024 10:00 AM Rosi Parham, APRN-CNP [id number] [facility]   4/30/2024 10:30 AM INF 05 MINOFF [id number] [facility]   8/7/2024  8:40 AM Geoffrey Garcia MD [id number] [facility]           ____________________________________________________________  Mora Lin, APRN-CNP  [facility]  [facility]

## 2024-04-15 ENCOUNTER — OFFICE VISIT (OUTPATIENT)
Dept: UROLOGY | Facility: CLINIC | Age: 74
End: 2024-04-15
Payer: MEDICARE

## 2024-04-15 VITALS — HEIGHT: 71 IN | BODY MASS INDEX: 27.16 KG/M2 | WEIGHT: 194 LBS | TEMPERATURE: 96.6 F

## 2024-04-15 DIAGNOSIS — R68.82 LOW LIBIDO: ICD-10-CM

## 2024-04-15 DIAGNOSIS — N40.1 BPH WITH OBSTRUCTION/LOWER URINARY TRACT SYMPTOMS: Primary | ICD-10-CM

## 2024-04-15 DIAGNOSIS — N13.8 BPH WITH OBSTRUCTION/LOWER URINARY TRACT SYMPTOMS: Primary | ICD-10-CM

## 2024-04-15 DIAGNOSIS — R97.20 ELEVATED PSA: ICD-10-CM

## 2024-04-15 LAB
POC APPEARANCE, URINE: CLEAR
POC BILIRUBIN, URINE: NEGATIVE
POC BLOOD, URINE: NEGATIVE
POC COLOR, URINE: YELLOW
POC GLUCOSE, URINE: NEGATIVE MG/DL
POC KETONES, URINE: NEGATIVE MG/DL
POC LEUKOCYTES, URINE: NEGATIVE
POC NITRITE,URINE: NEGATIVE
POC PH, URINE: 6 PH
POC PROTEIN, URINE: NEGATIVE MG/DL
POC SPECIFIC GRAVITY, URINE: 1.01
POC UROBILINOGEN, URINE: 0.2 EU/DL

## 2024-04-15 PROCEDURE — 81002 URINALYSIS NONAUTO W/O SCOPE: CPT | Performed by: UROLOGY

## 2024-04-15 PROCEDURE — 1159F MED LIST DOCD IN RCRD: CPT | Performed by: UROLOGY

## 2024-04-15 PROCEDURE — 51798 US URINE CAPACITY MEASURE: CPT | Performed by: UROLOGY

## 2024-04-15 PROCEDURE — 99213 OFFICE O/P EST LOW 20 MIN: CPT | Performed by: UROLOGY

## 2024-04-15 PROCEDURE — 1036F TOBACCO NON-USER: CPT | Performed by: UROLOGY

## 2024-04-15 PROCEDURE — G2211 COMPLEX E/M VISIT ADD ON: HCPCS | Performed by: UROLOGY

## 2024-04-15 NOTE — PROGRESS NOTES
Mely Ugalde is a 74 y.o. male with history of BPH, and elevated PSA with negative prostate biopsy 06/29/2018, PI-RADS 3 on MRI 01/31/2019, and negative MRI 12/13/2020, and low libido with a history of low testosterone. Patient was last seen by me in 2021 and lost to follow up. He has been diagnosed with multiple myeloma since his last visit. He has no new complaints and is doing well.         Past Medical History:   Diagnosis Date    Dental caries on pit and fissure surface penetrating into dentin 01/12/2024    Deposits (accretions) on teeth 01/12/2024    Essential (primary) hypertension 11/20/2013    Benign essential hypertension    History of hypercholesterolemia 01/12/2024    History of hypertension 01/12/2024    History of malignant neoplasm of prostate 01/12/2024    Impacted cerumen 01/12/2024    Impacted cerumen, bilateral 02/16/2024    Infective pharyngitis 01/12/2024    Other intervertebral disc degeneration, lumbar region     Lumbar degenerative disc disease    Overweight with body mass index (BMI) 25.0-29.9 02/02/2022    Partial loss of teeth due to caries, class I 01/12/2024    Personal history of malignant neoplasm of prostate     History of malignant neoplasm of prostate    Personal history of other diseases of the circulatory system     History of hypertension    Personal history of other diseases of the circulatory system 06/29/2022    History of atrial tachycardia    Personal history of other diseases of the circulatory system     History of essential hypertension    Personal history of other diseases of the musculoskeletal system and connective tissue     History of arthritis    Personal history of other diseases of the musculoskeletal system and connective tissue     History of acute gouty arthritis    Personal history of other diseases of the musculoskeletal system and connective tissue 01/30/2019    History of left foot drop    Personal history of other diseases of the respiratory  system     History of bronchitis    Personal history of other endocrine, nutritional and metabolic disease     History of hypercholesterolemia    Personal history of other endocrine, nutritional and metabolic disease     History of diabetes mellitus    Personal history of other endocrine, nutritional and metabolic disease 01/17/2014    History of diabetes mellitus    Post-traumatic stress disorder, chronic 11/10/2023    Primary localized osteoarthritis of pelvic region and thigh 12/17/2018    Pure hypercholesterolemia, unspecified 11/20/2013    Low-density-lipoid-type (LDL) hyperlipoproteinemia    Snoring     Snoring     Past Surgical History:   Procedure Laterality Date    BACK SURGERY  11/20/2013    Back Surgery    TONSILLECTOMY  11/20/2013    Tonsillectomy    TOTAL KNEE ARTHROPLASTY  11/20/2013    Knee Replacement     Family History   Problem Relation Name Age of Onset    Heart attack Mother      Diabetes Brother      Stroke Maternal Grandfather       Current Outpatient Medications   Medication Sig Dispense Refill    acetaminophen (Tylenol) 500 mg tablet Take by mouth.      allopurinol (Zyloprim) 300 mg tablet Take 1 tablet (300 mg) by mouth if needed.      amLODIPine (Norvasc) 5 mg tablet Take 1 tablet (5 mg) by mouth once daily.      apixaban (Eliquis) 5 mg tablet TAKE ONE (1) TABLET BY MOUTH 2 TIMES A  tablet 3    atorvastatin (Lipitor) 80 mg tablet Take 1 tablet (80 mg) by mouth once daily. 90 tablet 3    colchicine, gout, 0.6 mg tablet Take 1 tablet (0.6 mg) by mouth once daily as needed.      dextromethorphan-guaifenesin (Mucinex DM)  mg 12 hr tablet Take 1 tablet by mouth every 12 hours if needed for cough.      finasteride (Proscar) 5 mg tablet Take 1 tablet (5 mg) by mouth once daily as needed.      fludrocortisone (Florinef) 0.1 mg tablet Take 1 tablet (0.1 mg) by mouth once daily.      fluticasone (Flonase) 50 mcg/actuation nasal spray Administer 1 spray into affected nostril(s) once daily  as needed.      lenalidomide (Revlimid) 5 mg capsule Take by mouth.      losartan-hydrochlorothiazide (Hyzaar) 100-12.5 mg tablet Take 1 tablet by mouth once daily.      metFORMIN (Glucophage) 500 mg tablet Take 1 tablet (500 mg) by mouth if needed.      metoprolol tartrate (Lopressor) 50 mg tablet TAKE 1 TABLET BY MOUTH EVERY 12 HOURS 180 tablet 0    pantoprazole (ProtoNix) 40 mg EC tablet Take 1 tablet (40 mg) by mouth once daily.      sildenafil (Viagra) 100 mg tablet Take 1 tablet (100 mg) by mouth if needed.  1 HOUR BEFORE NEEDED.      tamsulosin (Flomax) 0.4 mg 24 hr capsule Take 1 capsule (0.4 mg) by mouth once daily.       No current facility-administered medications for this visit.     Allergies   Allergen Reactions    Mold Other     Social History     Socioeconomic History    Marital status:      Spouse name: Not on file    Number of children: Not on file    Years of education: Not on file    Highest education level: Not on file   Occupational History    Not on file   Tobacco Use    Smoking status: Never     Passive exposure: Never    Smokeless tobacco: Never   Substance and Sexual Activity    Alcohol use: Yes     Comment: occasional    Drug use: Never    Sexual activity: Not on file   Other Topics Concern    Not on file   Social History Narrative    Not on file     Social Determinants of Health     Financial Resource Strain: Low Risk  (8/20/2020)    Received from [facility]    Overall Financial Resource Strain (CARDIA)     Difficulty of Paying Living Expenses: Not hard at all   Food Insecurity: No Food Insecurity (8/20/2020)    Received from [facility]    Hunger Vital Sign     Worried About Running Out of Food in the Last Year: Never true     Ran Out of Food in the Last Year: Never true   Transportation Needs: No Transportation Needs (8/20/2020)    Received from [facility]    PRAPARE - Transportation     Lack of Transportation (Medical): No     Lack of Transportation  (Non-Medical): No   Physical Activity: Not on file   Stress: Not on file   Social Connections: Not on file   Intimate Partner Violence: Not on file   Housing Stability: Not on file       Review of Systems  Pertinent items are noted in HPI.    Objective       Lab Review  Lab Results   Component Value Date    WBC 3.1 (L) 04/02/2024    RBC 3.72 (L) 04/02/2024    HGB 11.3 (L) 04/02/2024    HCT 32.9 (L) 04/02/2024     04/02/2024      Lab Results   Component Value Date    BUN 21 04/02/2024    CREATININE 1.43 (H) 04/02/2024      Lab Results   Component Value Date    PSA 7.85 (H) 02/16/2024     Urine analysis shows negative   PVR 0ml     Assessment/Plan   Diagnoses and all orders for this visit:  BPH with obstruction/lower urinary tract symptoms  -     Measure post void residual  -     POCT UA (nonautomated) manually resulted    BPH with LUTS - stable will continue to monitor   2. Elevated PSA     We will check PSA and if it remains stable we will stop screening due to his advanced age.     3. Low libido with history of low testosterone     We will recheck testosterone levels and follow up virtually to review.   All questions were answered to the patient's satisfaction. Patient agrees with the plan and wishes to proceed. Follow-up will be scheduled appropriately.     E&M visit today is associated with current or anticipated ongoing medical care services related to a patient's single, serious condition or a complex condition.    Scribed for Dr. Segovia by Janki Denise. I , Dr Segovia, have personally reviewed and agreed with the information entered by the Virtual Scribe.

## 2024-04-17 DIAGNOSIS — I10 ESSENTIAL (PRIMARY) HYPERTENSION: ICD-10-CM

## 2024-04-17 DIAGNOSIS — E78.00 PURE HYPERCHOLESTEROLEMIA, UNSPECIFIED: ICD-10-CM

## 2024-04-17 RX ORDER — FAMOTIDINE 10 MG/ML
20 INJECTION INTRAVENOUS ONCE AS NEEDED
Status: CANCELLED | OUTPATIENT
Start: 2024-05-28

## 2024-04-17 RX ORDER — DIPHENHYDRAMINE HCL 50 MG
50 CAPSULE ORAL ONCE
Status: CANCELLED | OUTPATIENT
Start: 2024-05-28

## 2024-04-17 RX ORDER — EPINEPHRINE 0.3 MG/.3ML
0.3 INJECTION SUBCUTANEOUS EVERY 5 MIN PRN
Status: CANCELLED | OUTPATIENT
Start: 2024-05-28

## 2024-04-17 RX ORDER — DIPHENHYDRAMINE HYDROCHLORIDE 50 MG/ML
50 INJECTION INTRAMUSCULAR; INTRAVENOUS AS NEEDED
Status: CANCELLED | OUTPATIENT
Start: 2024-05-28

## 2024-04-17 RX ORDER — ATORVASTATIN CALCIUM 40 MG/1
40 TABLET, FILM COATED ORAL DAILY
Qty: 90 TABLET | Refills: 3 | Status: SHIPPED | OUTPATIENT
Start: 2024-04-17

## 2024-04-17 RX ORDER — ALBUTEROL SULFATE 0.83 MG/ML
3 SOLUTION RESPIRATORY (INHALATION) AS NEEDED
Status: CANCELLED | OUTPATIENT
Start: 2024-05-28

## 2024-04-17 RX ORDER — MONTELUKAST SODIUM 10 MG/1
10 TABLET ORAL ONCE
Status: CANCELLED | OUTPATIENT
Start: 2024-05-28

## 2024-04-17 RX ORDER — ACETAMINOPHEN 325 MG/1
650 TABLET ORAL ONCE
Status: CANCELLED | OUTPATIENT
Start: 2024-05-28

## 2024-04-17 RX ORDER — AMLODIPINE BESYLATE 5 MG/1
5 TABLET ORAL DAILY
Qty: 90 TABLET | Refills: 3 | Status: SHIPPED | OUTPATIENT
Start: 2024-04-17

## 2024-04-29 ENCOUNTER — OFFICE VISIT (OUTPATIENT)
Dept: PRIMARY CARE | Facility: CLINIC | Age: 74
End: 2024-04-29
Payer: MEDICARE

## 2024-04-29 VITALS
RESPIRATION RATE: 16 BRPM | BODY MASS INDEX: 27.16 KG/M2 | WEIGHT: 194 LBS | SYSTOLIC BLOOD PRESSURE: 120 MMHG | HEART RATE: 76 BPM | OXYGEN SATURATION: 98 % | DIASTOLIC BLOOD PRESSURE: 70 MMHG | HEIGHT: 71 IN

## 2024-04-29 DIAGNOSIS — E78.00 HYPERCHOLESTEROLEMIA: ICD-10-CM

## 2024-04-29 DIAGNOSIS — K21.9 GERD WITHOUT ESOPHAGITIS: ICD-10-CM

## 2024-04-29 DIAGNOSIS — D63.8 ANEMIA IN OTHER CHRONIC DISEASES CLASSIFIED ELSEWHERE: ICD-10-CM

## 2024-04-29 DIAGNOSIS — E11.9 TYPE 2 DIABETES MELLITUS WITHOUT COMPLICATION, WITHOUT LONG-TERM CURRENT USE OF INSULIN (MULTI): ICD-10-CM

## 2024-04-29 DIAGNOSIS — C90.00 IGG MULTIPLE MYELOMA (MULTI): ICD-10-CM

## 2024-04-29 DIAGNOSIS — N40.1 BPH WITH OBSTRUCTION/LOWER URINARY TRACT SYMPTOMS: ICD-10-CM

## 2024-04-29 DIAGNOSIS — N13.8 BPH WITH OBSTRUCTION/LOWER URINARY TRACT SYMPTOMS: ICD-10-CM

## 2024-04-29 DIAGNOSIS — R53.83 OTHER FATIGUE: ICD-10-CM

## 2024-04-29 DIAGNOSIS — I48.91 ATRIAL FIBRILLATION WITH RVR (MULTI): Primary | ICD-10-CM

## 2024-04-29 DIAGNOSIS — N18.2 CHRONIC RENAL IMPAIRMENT, STAGE 2 (MILD): ICD-10-CM

## 2024-04-29 DIAGNOSIS — I10 BENIGN HYPERTENSION: ICD-10-CM

## 2024-04-29 PROBLEM — Z77.29 EXPOSURE TO POTENTIALLY HAZARDOUS SUBSTANCE: Status: ACTIVE | Noted: 2024-04-29

## 2024-04-29 PROBLEM — Z77.098 EXPOSURE TO AGENT ORANGE: Status: ACTIVE | Noted: 2024-04-29

## 2024-04-29 PROBLEM — Z77.128 CONTACT WITH AND (SUSPECTED) EXPOSURE TO OTHER HAZARDS IN THE PHYSICAL ENVIRONMENT: Status: ACTIVE | Noted: 2024-04-29

## 2024-04-29 PROCEDURE — 3074F SYST BP LT 130 MM HG: CPT | Performed by: INTERNAL MEDICINE

## 2024-04-29 PROCEDURE — 1036F TOBACCO NON-USER: CPT | Performed by: INTERNAL MEDICINE

## 2024-04-29 PROCEDURE — 1159F MED LIST DOCD IN RCRD: CPT | Performed by: INTERNAL MEDICINE

## 2024-04-29 PROCEDURE — 3078F DIAST BP <80 MM HG: CPT | Performed by: INTERNAL MEDICINE

## 2024-04-29 PROCEDURE — 99214 OFFICE O/P EST MOD 30 MIN: CPT | Performed by: INTERNAL MEDICINE

## 2024-04-29 ASSESSMENT — ENCOUNTER SYMPTOMS
CARDIOVASCULAR NEGATIVE: 1
ENDOCRINE NEGATIVE: 1
ALLERGIC/IMMUNOLOGIC NEGATIVE: 1
PSYCHIATRIC NEGATIVE: 1
EYES NEGATIVE: 1
RESPIRATORY NEGATIVE: 1
MUSCULOSKELETAL NEGATIVE: 1
HEMATOLOGIC/LYMPHATIC NEGATIVE: 1
CONSTITUTIONAL NEGATIVE: 1
GASTROINTESTINAL NEGATIVE: 1
NEUROLOGICAL NEGATIVE: 1

## 2024-04-29 NOTE — ASSESSMENT & PLAN NOTE
DM - NIDDM  . Reviewed with patient / Will check  HbA1c and fasting blood sugars. Educate home self monitoring and diary keeping(reviewed with patient home blood sugar levels /diary). Educate extensively low calorie diet and weight loss with exercise. Reviewed BS- diary and Rx. Regimen. [place] renal protection with ARB/ACEI.               Educate compliance with diet and Rx. And educate risks and autcomes.     Refill the Metformin 500 mg BID with meals

## 2024-04-29 NOTE — PROGRESS NOTES
"Subjective   Patient ID: Toby Ugalde is a 74 y.o. male who presents for Follow-up (Follow up).    HPI     Review of Systems   Constitutional: Negative.    HENT: Negative.     Eyes: Negative.    Respiratory: Negative.     Cardiovascular: Negative.    Gastrointestinal: Negative.    Endocrine: Negative.    Musculoskeletal: Negative.    Skin: Negative.    Allergic/Immunologic: Negative.    Neurological: Negative.    Hematological: Negative.    Psychiatric/Behavioral: Negative.     All other systems reviewed and are negative.      Objective   /86 (BP Location: Left arm, Patient Position: Sitting)   Pulse 76   Resp 16   Ht 1.803 m (5' 11\")   Wt 88 kg (194 lb)   SpO2 98%   BMI 27.06 kg/m²   Blood pressure 120/70, pulse 76, resp. rate 16, height 1.803 m (5' 11\"), weight 88 kg (194 lb), SpO2 98%.   Physical Exam  Vitals and nursing note reviewed.   Constitutional:       Appearance: Normal appearance.   HENT:      Head: Normocephalic and atraumatic.      Right Ear: Tympanic membrane, ear canal and external ear normal.      Left Ear: Tympanic membrane, ear canal and external ear normal. There is no impacted cerumen.      Nose: Nose normal.      Mouth/Throat:      Mouth: Mucous membranes are moist.      Pharynx: Oropharynx is clear.   Eyes:      Extraocular Movements: Extraocular movements intact.      Conjunctiva/sclera: Conjunctivae normal.      Pupils: Pupils are equal, round, and reactive to light.   Cardiovascular:      Rate and Rhythm: Normal rate and regular rhythm.      Pulses: Normal pulses.      Heart sounds: Normal heart sounds. No murmur heard.  Pulmonary:      Effort: Pulmonary effort is normal. No respiratory distress.      Breath sounds: Normal breath sounds. No stridor. No wheezing, rhonchi or rales.   Chest:      Chest wall: No tenderness.   Abdominal:      General: Abdomen is flat. Bowel sounds are normal. There is no distension.      Palpations: Abdomen is soft. There is no mass.      Tenderness: " There is no abdominal tenderness. There is no right CVA tenderness, left CVA tenderness, guarding or rebound.      Hernia: No hernia is present.   Musculoskeletal:         General: Normal range of motion.      Cervical back: Normal range of motion and neck supple.   Skin:     General: Skin is warm.      Capillary Refill: Capillary refill takes less than 2 seconds.   Neurological:      General: No focal deficit present.      Mental Status: He is alert.      Cranial Nerves: No cranial nerve deficit.      Sensory: No sensory deficit.      Motor: No weakness.      Coordination: Coordination normal.      Gait: Gait normal.      Deep Tendon Reflexes: Reflexes normal.   Psychiatric:         Mood and Affect: Mood normal.         Behavior: Behavior normal. Behavior is cooperative.         Thought Content: Thought content normal.         Judgment: Judgment normal.         Assessment/Plan   Problem List Items Addressed This Visit             ICD-10-CM    Anemia in other chronic diseases classified elsewhere D63.8     Anemia - of bleed/chronic disease. Check CBC , B12 and Folic Acid levels, Iron/Ferritn/TIBC, refer patient to endoscopy (upper/ lower). Target HCT > 30 %. Check guaic stool          Atrial fibrillation with RVR (Multi) - Primary I48.91     Atrial fibrillation - persistent with  Ventricular rate is well controlled (in sinus rhythm). ON ELIQUIS  No Shortness of breath. Continues   Metoprolol to 50 mg BID              Benign hypertension I10     HTN - hypertension well/controlled .Target BP < 130/80  achieved. Educate low salt diet and exercise with weight loss. Educate home self monitoring and diary keeping. Educate risks of elevate blood pressure and benefits of prompt treatment.  Refill Amlodipine and Metoprolol          BPH with obstruction/lower urinary tract symptoms N40.1, N13.8     BPH - Benign PROSTATIC Hypertrophy, + Dysuria/Nocturia, check PSA, prescribe Flomax 0.4mg qD and Avodart 0.5 mg qD vs.  Finasteride 5 mg qD.  Educate exercises and Change in life style, prescribe vitamin E 400 IU qD. Consider referral to urology.          Chronic renal insufficiency N18.9     CRI - Chronic Renal Insuficiency. Secondary to DM/HTN/Atherosclerosis. Follow BUN/Cr. and lytes.   RENOPROTECTION with ACEI/ARB (). Monitor microalbuminuria. Avoid hypotension and renal hypoperfusion. The patient was instructed to avoid NSAIDS and educate compliance with medications to control HTN(hypertension) and cholesterol and diabetes.                                     Fatigue R53.83     Fatigue - check CMP(metabolic panel and elctrolytes) , CBC(complete blood cell count), TSH(thyroid function). Insomnia may play a role and sleep studies(rule out sleep apnea) are recommended . Educate sleep hygiene. Consider anxiety disorder vs. depression. Consider Stress test, and 2DECHO.           GERD without esophagitis K21.9     GERD - Acid reflux disease. Rx. PPI (Prilosec/Prevacid/Protonix/Nexium) and educate diet and life style changes. Referred patient to an endoscopy (EGD) and check H. Pylori titers.          Hypercholesterolemia E78.00     Hypercholesterolemia - Monitor lipid profile and educate patient upon risks of high cholesterol and targets. Educate diet and change in lifestyle and increase in exercises - Refill:  Atorvastatin    and educate compliance with medication and diet.            IgG multiple myeloma (Multi) C90.00     Monitor CBC and blood count and follow with hematology and stable now on Chemotherapy           Type 2 diabetes mellitus without complications (Multi) E11.9     DM - NIDDM  . Reviewed with patient / Will check  HbA1c and fasting blood sugars. Educate home self monitoring and diary keeping(reviewed with patient home blood sugar levels /diary). Educate extensively low calorie diet and weight loss with exercise. Reviewed BS- diary and Rx. Regimen. [place] renal protection with ARB/ACEI.               Educate  compliance with diet and Rx. And educate risks and autcomes.     Refill the Metformin 500 mg BID with meals                                                    Paroxysmal atrial fibrillation (CMS/[facility]) I48.0           Atrial fibrillation - persistent with  Ventricular rate is well controlled (in sinus rhythm). On Apixaban  No Shortness of breath. Continues   Metoprolol to 25 mg BID             Benign hypertension I10         HTN - hypertension well/controlled .Target BP < 130/80  achieved. Educate low salt diet and exercise with weight loss. Educate home self monitoring and diary keeping. Educate risks of elevate blood pressure and benefits of prompt treatment.  Refill Amlodipine and Metoprolol            BPH with obstruction/lower urinary tract symptoms N40.1, N13.8         BPH - Benign PROSTATIC Hypertrophy, + Dysuria/Nocturia, check PSA, prescribe Flomax 0.4mg qD and Avodart 0.5 mg qD vs. Finasteride 5 mg qD.  Educate exercises and Change in life style, prescribe vitamin E 400 IU qD. Consider referral to urology.            Chronic renal insufficiency N18.9         CRI - Chronic Renal Insuficiency. Secondary to DM/HTN/Atherosclerosis. Follow BUN/Cr. and lytes.   RENOPROTECTION with ACEI/ARB (). Monitor microalbuminuria. Avoid hypotension and renal hypoperfusion. The patient was instructed to avoid NSAIDS and educate compliance with medications to control HTN(hypertension) and cholesterol and diabetes.                                       Diabetes mellitus (CMS/[facility]) E11.9         DM - NIDDM  . Reviewed with patient / Will check  HbA1c and fasting blood sugars. Educate home self monitoring and diary keeping(reviewed with patient home blood sugar levels /diary). Educate extensively low calorie diet and weight loss with exercise. Reviewed BS- diary and Rx. Regimen. [place] renal protection with ARB/ACEI.               Educate compliance with diet and Rx. And educate risks and autcomes.     Refill Metformin 500 mg  BID with meals             GERD without esophagitis K21.9         GERD - Acid reflux disease. Rx. PPI (Prilosec/Prevacid/Protonix/Nexium) and educate diet and life style changes. Referred patient to an endoscopy (EGD) and check H. Pylori titers.            Lumbar degenerative disc disease M51.36         DDD - Degenerative disc disease of the Lumbo-Sacral (LS)/Cervical (C)  spine. Educate exercises and referred patient to Physical Therapy (PT). Ordered X-Ray's of the LS/C spine. Consider MRI. Radiculopathy in the distribution of L4-L5-S1/C3-C4-C5 nerve roots, needs NSAIDS (Naprosin 500mg BID vs. Arthrotec 75mg BID or Prednisone taper (Medrol dose pack), Flexeril 10 mg qHS, heat application, and pain control with Tylenol vs. Vicodin 5/325 mg TID.             Relevant Medications       traMADol (Ultram) 50 mg tablet       Other Relevant Orders       Opiate/Opioid/Benzo Prescription Compliance       Medicare annual wellness visit, subsequent - Primary Z00.00            Need for vaccination with 20-polyvalent pneumococcal conjugate vaccine Z23       Relevant Orders       Pneumococcal conjugate vaccine, 20-valent (PREVNAR 20)        Other Visit Diagnoses           Codes     Antibiotic-induced tinnitus of left ear     H93.12, T36.95XA                            Atrial fibrillation with RVR (CMS/HCC) - Primary I48.91           Atrial fibrillation - persistent with  Ventricular rate is well controlled (in sinus rhythm). ON ELIQUIS  No Shortness of breath. Continues   Metoprolol to 50 mg BID             Relevant Medications       metoprolol tartrate (Lopressor) 50 mg tablet       Benign hypertension I10                         HTN - hypertension well/controlled .Target BP < 130/80  achieved. Educate low salt diet and exercise with weight loss. Educate home self monitoring and diary keeping. Educate risks of elevate blood pressure and benefits of prompt treatment.  Refill Amlodipine and Metoprolol               Relevant  Medications       metoprolol tartrate (Lopressor) 50 mg tablet       BPH with obstruction/lower urinary tract symptoms N40.1, N13.8         BPH - Benign PROSTATIC Hypertrophy, + Dysuria/Nocturia, check PSA, prescribe Flomax 0.4mg qD and Avodart 0.5 mg qD vs. Finasteride 5 mg qD.  Educate exercises and Change in life style, prescribe vitamin E 400 IU qD. Consider referral to urology.            Chronic renal insufficiency N18.9         CRI - Chronic Renal Insuficiency. Secondary to DM/HTN/Atherosclerosis. Follow BUN/Cr. and lytes.   RENOPROTECTION with ACEI/ARB (). Monitor microalbuminuria. Avoid hypotension and renal hypoperfusion. The patient was instructed to avoid NSAIDS and educate compliance with medications to control HTN(hypertension) and cholesterol and diabetes.                                       Diabetes mellitus (CMS/[facility]) E11.9         DM - NIDDM  . Reviewed with patient / Will check  HbA1c and fasting blood sugars. Educate home self monitoring and diary keeping(reviewed with patient home blood sugar levels /diary). Educate extensively low calorie diet and weight loss with exercise. Reviewed BS- diary and Rx. Regimen. [place] renal protection with ARB/ACEI.               Educate compliance with diet and Rx. And educate risks and autcomes.     Refill Metformin 500 mg BID with meals                                                GERD without esophagitis K21.9         GERD - Acid reflux disease. Rx. PPI (Prilosec/Prevacid/Protonix/Nexium) and educate diet and life style changes. Referred patient to an endoscopy (EGD) and check H. Pylori titers.            Hypercholesterolemia E78.00         Hypercholesterolemia - Monitor lipid profile and educate patient upon risks of high cholesterol and targets. Educate diet and change in lifestyle and increase in exercises - Refill:  Atorvastatin    and educate compliance with medication and diet.              IgG multiple myeloma (CMS/[facility]) C90.00         Monitor CBC  and blood count and follow with hematology and stable now on Chemotherapy             Malaise and fatigue R53.81, R53.83         Fatigue - check CMP(metabolic panel and elctrolytes) , CBC(complete blood cell count), TSH(thyroid function). Insomnia may play a role and sleep studies(rule out sleep apnea) are recommended . Educate sleep hygiene. Consider anxiety disorder vs. depression. Consider Stress test, and 2DECHO.                                                                             Anemia in other chronic diseases classified elsewhere D63.8             Anemia - of bleed/chronic disease. Check CBC , B12 and Folic Acid levels, Iron/Ferritn/TIBC, refer patient to endoscopy (upper/ lower). Target HCT > 30 %. Check guaic stool              Atrial fibrillation with RVR (CMS/HCC) - Primary I48.91           Atrial fibrillation - persistent with  Ventricular rate is well controlled (in sinus rhythm). ON eLIQUIS  No Shortness of breath. Continues   Metoprolol to 50 mg BID                                  Immunizations/Injections       very important  Immunizations from outside sources need reconciliation.       Influenza, High Dose Seasonal, Preservative Free12/11/2020, 10/1/2018  Influenza, Unspecified9/22/2010  Influenza, seasonal, vvoayqxlks16/6/2021, 9/12/2016, 9/9/2015,  ... (3 more)  Pfizer COVID-19 vaccine, Fall 2023, 12 years and older, (30mcg/0.3mL)10/16/2023  Pfizer COVID-19 vaccine, bivalent, age 12 years and older (30 mcg/0.3 mL)3/17/2023  Pneumococcal conjugate vaccine, 13-valent (PREVNAR 13)6/23/2015  Pneumococcal conjugate vaccine, 20-valent (PREVNAR 20)1/12/2024  Pneumococcal polysaccharide vaccine, 23-valent, age 2 years and older (PNEUMOVAX 23)4/11/2018  Tdap vaccine, age 7 year and older (BOOSTRIX, ADACEL)6/23/2015  Zoster vaccine, recombinant, adult (SHINGRIX)8/16/2021, 6/16/2021                  Immunizations/Injections       very important  Immunizations from outside sources need  reconciliation.       Influenza, High Dose Seasonal, Preservative Free12/11/2020, 10/1/2018  Influenza, Unspecified9/22/2010  Influenza, seasonal, [id number]/6/2021, 9/12/2016, 9/9/2015,  ... (3 more)  Pfizer COVID-19 vaccine, Fall 2023, 12 years and older, (30mcg/0.3mL)10/16/2023  Pfizer COVID-19 vaccine, bivalent, age 12 years and older (30 mcg/0.3 mL)3/17/2023  Pneumococcal conjugate vaccine, 13-valent (PREVNAR 13)6/23/2015  Pneumococcal conjugate vaccine, 20-valent (PREVNAR 20)1/12/2024  Pneumococcal polysaccharide vaccine, 23-valent, age 2 years and older (PNEUMOVAX 23)4/11/2018  Tdap vaccine, age 7 year and older (BOOSTRIX, ADACEL)6/23/2015  Zoster vaccine, recombinant, adult (SHINGRIX)8/16/2021, 6/16/2021                 Immunizations/Injections      very important  Immunizations from outside sources need reconciliation.      Influenza, High Dose Seasonal, Preservative Free12/11/2020, 10/1/2018  Influenza, Unspecified9/22/2010  Influenza, seasonal, [id number]/6/2021, 9/12/2016, 9/9/2015,  ... (3 more)  Pfizer COVID-19 vaccine, Fall 2023, 12 years and older, (30mcg/0.3mL)10/16/2023  Pfizer COVID-19 vaccine, bivalent, age 12 years and older (30 mcg/0.3 mL)3/17/2023  Pneumococcal conjugate vaccine, 13-valent (PREVNAR 13)6/23/2015  Pneumococcal conjugate vaccine, 20-valent (PREVNAR 20)1/12/2024  Pneumococcal polysaccharide vaccine, 23-valent, age 2 years and older (PNEUMOVAX 23)4/11/2018  Tdap vaccine, age 7 year and older (BOOSTRIX, ADACEL)6/23/2015  Zoster vaccine, recombinant, adult (SHINGRIX)8/16/2021, 6/16/2021

## 2024-04-30 ENCOUNTER — OFFICE VISIT (OUTPATIENT)
Dept: HEMATOLOGY/ONCOLOGY | Facility: CLINIC | Age: 74
End: 2024-04-30
Payer: MEDICARE

## 2024-04-30 ENCOUNTER — INFUSION (OUTPATIENT)
Dept: HEMATOLOGY/ONCOLOGY | Facility: CLINIC | Age: 74
End: 2024-04-30
Payer: MEDICARE

## 2024-04-30 ENCOUNTER — LAB (OUTPATIENT)
Dept: LAB | Facility: CLINIC | Age: 74
End: 2024-04-30
Payer: MEDICARE

## 2024-04-30 ENCOUNTER — SOCIAL WORK (OUTPATIENT)
Dept: CASE MANAGEMENT | Facility: HOSPITAL | Age: 74
End: 2024-04-30
Payer: MEDICARE

## 2024-04-30 VITALS
DIASTOLIC BLOOD PRESSURE: 77 MMHG | SYSTOLIC BLOOD PRESSURE: 143 MMHG | BODY MASS INDEX: 27.21 KG/M2 | HEART RATE: 80 BPM | RESPIRATION RATE: 18 BRPM | OXYGEN SATURATION: 93 % | WEIGHT: 195.11 LBS | TEMPERATURE: 96.6 F

## 2024-04-30 DIAGNOSIS — Z86.711 HISTORY OF PULMONARY EMBOLISM: ICD-10-CM

## 2024-04-30 DIAGNOSIS — R68.82 LOW LIBIDO: ICD-10-CM

## 2024-04-30 DIAGNOSIS — N13.8 BPH WITH OBSTRUCTION/LOWER URINARY TRACT SYMPTOMS: ICD-10-CM

## 2024-04-30 DIAGNOSIS — N40.1 BPH WITH OBSTRUCTION/LOWER URINARY TRACT SYMPTOMS: ICD-10-CM

## 2024-04-30 DIAGNOSIS — C90.00 IGG MULTIPLE MYELOMA (MULTI): ICD-10-CM

## 2024-04-30 DIAGNOSIS — C90.00 IGG MULTIPLE MYELOMA (MULTI): Primary | ICD-10-CM

## 2024-04-30 DIAGNOSIS — R97.20 ELEVATED PSA: ICD-10-CM

## 2024-04-30 LAB
ALBUMIN SERPL BCP-MCNC: 4.1 G/DL (ref 3.4–5)
ALP SERPL-CCNC: 67 U/L (ref 33–136)
ALT SERPL W P-5'-P-CCNC: 19 U/L (ref 10–52)
ANION GAP SERPL CALC-SCNC: 15 MMOL/L (ref 10–20)
AST SERPL W P-5'-P-CCNC: 14 U/L (ref 9–39)
BASOPHILS # BLD AUTO: 0.03 X10*3/UL (ref 0–0.1)
BASOPHILS NFR BLD AUTO: 0.6 %
BILIRUB SERPL-MCNC: 0.8 MG/DL (ref 0–1.2)
BUN SERPL-MCNC: 21 MG/DL (ref 6–23)
CALCIUM SERPL-MCNC: 9.2 MG/DL (ref 8.6–10.6)
CHLORIDE SERPL-SCNC: 104 MMOL/L (ref 98–107)
CO2 SERPL-SCNC: 24 MMOL/L (ref 21–32)
CREAT SERPL-MCNC: 1.57 MG/DL (ref 0.5–1.3)
EGFRCR SERPLBLD CKD-EPI 2021: 45 ML/MIN/1.73M*2
EOSINOPHIL # BLD AUTO: 0.1 X10*3/UL (ref 0–0.4)
EOSINOPHIL NFR BLD AUTO: 2.1 %
ERYTHROCYTE [DISTWIDTH] IN BLOOD BY AUTOMATED COUNT: 13.4 % (ref 11.5–14.5)
GLUCOSE SERPL-MCNC: 159 MG/DL (ref 74–99)
HCT VFR BLD AUTO: 36.8 % (ref 41–52)
HGB BLD-MCNC: 12.6 G/DL (ref 13.5–17.5)
IGA SERPL-MCNC: 45 MG/DL (ref 70–400)
IGG SERPL-MCNC: 548 MG/DL (ref 700–1600)
IGM SERPL-MCNC: 9 MG/DL (ref 40–230)
IMM GRANULOCYTES # BLD AUTO: 0.01 X10*3/UL (ref 0–0.5)
IMM GRANULOCYTES NFR BLD AUTO: 0.2 % (ref 0–0.9)
LYMPHOCYTES # BLD AUTO: 2.72 X10*3/UL (ref 0.8–3)
LYMPHOCYTES NFR BLD AUTO: 57.6 %
MCH RBC QN AUTO: 31 PG (ref 26–34)
MCHC RBC AUTO-ENTMCNC: 34.2 G/DL (ref 32–36)
MCV RBC AUTO: 90 FL (ref 80–100)
MONOCYTES # BLD AUTO: 0.46 X10*3/UL (ref 0.05–0.8)
MONOCYTES NFR BLD AUTO: 9.7 %
NEUTROPHILS # BLD AUTO: 1.4 X10*3/UL (ref 1.6–5.5)
NEUTROPHILS NFR BLD AUTO: 29.8 %
NRBC BLD-RTO: ABNORMAL /100{WBCS}
PLATELET # BLD AUTO: 165 X10*3/UL (ref 150–450)
POTASSIUM SERPL-SCNC: 3.9 MMOL/L (ref 3.5–5.3)
PROT SERPL-MCNC: 6.2 G/DL (ref 6.4–8.2)
PROT SERPL-MCNC: 6.2 G/DL (ref 6.4–8.2)
PSA SERPL-MCNC: 8.72 NG/ML
RBC # BLD AUTO: 4.07 X10*6/UL (ref 4.5–5.9)
SODIUM SERPL-SCNC: 139 MMOL/L (ref 136–145)
WBC # BLD AUTO: 4.7 X10*3/UL (ref 4.4–11.3)

## 2024-04-30 PROCEDURE — 2500000004 HC RX 250 GENERAL PHARMACY W/ HCPCS (ALT 636 FOR OP/ED): Mod: JZ,JG | Performed by: INTERNAL MEDICINE

## 2024-04-30 PROCEDURE — 2500000004 HC RX 250 GENERAL PHARMACY W/ HCPCS (ALT 636 FOR OP/ED): Performed by: INTERNAL MEDICINE

## 2024-04-30 PROCEDURE — 84153 ASSAY OF PSA TOTAL: CPT

## 2024-04-30 PROCEDURE — 96401 CHEMO ANTI-NEOPL SQ/IM: CPT

## 2024-04-30 PROCEDURE — 1125F AMNT PAIN NOTED PAIN PRSNT: CPT

## 2024-04-30 PROCEDURE — 36415 COLL VENOUS BLD VENIPUNCTURE: CPT

## 2024-04-30 PROCEDURE — 84165 PROTEIN E-PHORESIS SERUM: CPT

## 2024-04-30 PROCEDURE — 2500000001 HC RX 250 WO HCPCS SELF ADMINISTERED DRUGS (ALT 637 FOR MEDICARE OP): Performed by: INTERNAL MEDICINE

## 2024-04-30 PROCEDURE — 99214 OFFICE O/P EST MOD 30 MIN: CPT

## 2024-04-30 PROCEDURE — 1159F MED LIST DOCD IN RCRD: CPT

## 2024-04-30 PROCEDURE — 84402 ASSAY OF FREE TESTOSTERONE: CPT

## 2024-04-30 PROCEDURE — 85025 COMPLETE CBC W/AUTO DIFF WBC: CPT

## 2024-04-30 PROCEDURE — 1160F RVW MEDS BY RX/DR IN RCRD: CPT

## 2024-04-30 PROCEDURE — 84155 ASSAY OF PROTEIN SERUM: CPT

## 2024-04-30 PROCEDURE — 83521 IG LIGHT CHAINS FREE EACH: CPT

## 2024-04-30 PROCEDURE — 3077F SYST BP >= 140 MM HG: CPT

## 2024-04-30 PROCEDURE — 84165 PROTEIN E-PHORESIS SERUM: CPT | Performed by: STUDENT IN AN ORGANIZED HEALTH CARE EDUCATION/TRAINING PROGRAM

## 2024-04-30 PROCEDURE — 3078F DIAST BP <80 MM HG: CPT

## 2024-04-30 PROCEDURE — 80053 COMPREHEN METABOLIC PANEL: CPT

## 2024-04-30 PROCEDURE — 82784 ASSAY IGA/IGD/IGG/IGM EACH: CPT

## 2024-04-30 RX ORDER — ALBUTEROL SULFATE 0.83 MG/ML
3 SOLUTION RESPIRATORY (INHALATION) AS NEEDED
Status: DISCONTINUED | OUTPATIENT
Start: 2024-04-30 | End: 2024-04-30 | Stop reason: HOSPADM

## 2024-04-30 RX ORDER — FAMOTIDINE 10 MG/ML
20 INJECTION INTRAVENOUS ONCE AS NEEDED
Status: DISCONTINUED | OUTPATIENT
Start: 2024-04-30 | End: 2024-04-30 | Stop reason: HOSPADM

## 2024-04-30 RX ORDER — DIPHENHYDRAMINE HCL 50 MG
50 CAPSULE ORAL ONCE
Status: COMPLETED | OUTPATIENT
Start: 2024-04-30 | End: 2024-04-30

## 2024-04-30 RX ORDER — ACETAMINOPHEN 325 MG/1
650 TABLET ORAL ONCE
Status: COMPLETED | OUTPATIENT
Start: 2024-04-30 | End: 2024-04-30

## 2024-04-30 RX ORDER — DIPHENHYDRAMINE HYDROCHLORIDE 50 MG/ML
50 INJECTION INTRAMUSCULAR; INTRAVENOUS AS NEEDED
Status: DISCONTINUED | OUTPATIENT
Start: 2024-04-30 | End: 2024-04-30 | Stop reason: HOSPADM

## 2024-04-30 RX ORDER — EPINEPHRINE 0.3 MG/.3ML
0.3 INJECTION SUBCUTANEOUS EVERY 5 MIN PRN
Status: DISCONTINUED | OUTPATIENT
Start: 2024-04-30 | End: 2024-04-30 | Stop reason: HOSPADM

## 2024-04-30 RX ORDER — MONTELUKAST SODIUM 10 MG/1
10 TABLET ORAL ONCE
Status: COMPLETED | OUTPATIENT
Start: 2024-04-30 | End: 2024-04-30

## 2024-04-30 RX ADMIN — DARATUMUMAB AND HYALURONIDASE-FIHJ (HUMAN RECOMBINANT) 1800 MG: 1800; 30000 INJECTION SUBCUTANEOUS at 11:56

## 2024-04-30 RX ADMIN — DIPHENHYDRAMINE HYDROCHLORIDE 50 MG: 50 CAPSULE ORAL at 11:13

## 2024-04-30 RX ADMIN — DEXAMETHASONE 20 MG: 6 TABLET ORAL at 11:12

## 2024-04-30 RX ADMIN — ACETAMINOPHEN 650 MG: 325 TABLET ORAL at 11:13

## 2024-04-30 RX ADMIN — MONTELUKAST 10 MG: 10 TABLET, FILM COATED ORAL at 11:13

## 2024-04-30 ASSESSMENT — ENCOUNTER SYMPTOMS
NAUSEA: 0
UNEXPECTED WEIGHT CHANGE: 0
CHILLS: 0
LIGHT-HEADEDNESS: 1
ARTHRALGIAS: 1
BLOOD IN STOOL: 0
VOMITING: 0
NUMBNESS: 1
WHEEZING: 0
APPETITE CHANGE: 1
BRUISES/BLEEDS EASILY: 0
FATIGUE: 1
SHORTNESS OF BREATH: 0
WOUND: 0
CONSTIPATION: 1
DIZZINESS: 0
LEG SWELLING: 0
ADENOPATHY: 0
FEVER: 0
COUGH: 0
DIARRHEA: 1
HEMATURIA: 0
DIAPHORESIS: 1
PALPITATIONS: 0
DYSURIA: 0

## 2024-04-30 ASSESSMENT — PAIN SCALES - GENERAL: PAINLEVEL: 2

## 2024-04-30 NOTE — PROGRESS NOTES
Patient ID: Toby Ugalde is a 74 y.o. male.    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 3/6/17- bone survey was negative for lytic lesions.  3/15/17-  bone marrow revealed 30% plasma cells with kappa predominance. 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 Cano for complaints of fatigue.  Labs notable for hyponatremia to 127, anemia with a HGB of 9.  Cr 1.99. Protein elevated to  10.7.  Uric acid up to 8.9.      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  Skeletal survey (3/2/22) - lesion in the left rib.   PET:    Expansile hypermetabolic lytic mass involving the left lateral 9th  rib, correlating to suspected plasmacytoma described on osseous  survey performed 03/02/2022.  2. Diffusely increased FDG uptake throughout the bone marrow which  may relate to anemia/marrow activation, with marrow involvement by  myeloma not excluded.  Treatment:   Initiated (3/29/22) treatment for his relapsed Myeloma with Revlimid 10 mg by mouth days 1-14/21 days, Daratuzumab (3/29, & 4/5/22), SC Velcade (3/29 & 4/5/22), & weekly Dexamethasone complicated by several hospitaliizations for syncope, PE ,afib due to  velcade and revlimid.  Velcade discontinued     Recv'd weekly single agent Daraumumab from 5/31 - 7/19/22. Initiated (8/25/22) Revlimid 10 mg on days 1-21/28 days.     Has had marked reduction in paraprotein and free lyte chains  with M protein : 1.4 g/dl on 4/19/22 ,    [place] free lyte/ratio of 4.49 mg/dl/4.45.    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         IgG multiple myeloma (Multi)   9/6/2023 Initial Diagnosis    IgG multiple myeloma (CMS/HCC)     10/17/2023 -  Chemotherapy    Daratumumab, 28 Day Cycles - Maintenance       Response:     Past Medical History:     Past Medical History:   Diagnosis Date    Dental caries on pit and fissure surface penetrating into dentin 01/12/2024    Deposits (accretions) on teeth 01/12/2024    Essential (primary) hypertension 11/20/2013    Benign essential hypertension    History of hypercholesterolemia 01/12/2024    History of hypertension 01/12/2024    History of malignant neoplasm of prostate 01/12/2024    Impacted cerumen 01/12/2024    Impacted cerumen, bilateral 02/16/2024    Infective pharyngitis 01/12/2024    Other intervertebral disc degeneration, lumbar region     Lumbar degenerative disc disease    Overweight with body mass index (BMI) 25.0-29.9 02/02/2022    Partial loss of teeth due to caries, class I 01/12/2024    Personal history of malignant neoplasm of prostate     History of malignant neoplasm of prostate    Personal history of other diseases of the circulatory system     History of hypertension    Personal history of other diseases of the circulatory system 06/29/2022    History of atrial tachycardia    Personal history of other diseases of the circulatory system     History of essential hypertension    Personal history of other diseases of the musculoskeletal system and connective tissue     History of arthritis    Personal history of other diseases of the musculoskeletal system and connective tissue     History of acute gouty arthritis    Personal history of other diseases of the musculoskeletal system and connective  tissue 01/30/2019    History of left foot drop    Personal history of other diseases of the respiratory system     History of bronchitis    Personal history of other endocrine, nutritional and metabolic disease     History of hypercholesterolemia    Personal history of other endocrine, nutritional and metabolic disease     History of diabetes mellitus    Personal history of other endocrine, nutritional and metabolic disease 01/17/2014    History of diabetes mellitus    Post-traumatic stress disorder, chronic 11/10/2023    Primary localized osteoarthritis of pelvic region and thigh 12/17/2018    Pure hypercholesterolemia, unspecified 11/20/2013    Low-density-lipoid-type (LDL) hyperlipoproteinemia    Snoring     Snoring       Surgical History:     Past Surgical History:   Procedure Laterality Date    BACK SURGERY  11/20/2013    Back Surgery    TONSILLECTOMY  11/20/2013    Tonsillectomy    TOTAL KNEE ARTHROPLASTY  11/20/2013    Knee Replacement        Family History:     Family History   Problem Relation Name Age of Onset    Heart attack Mother      Diabetes Brother      Stroke Maternal Grandfather         Social History:  Reported history of agent orange exposure while in [employer] at [employer]. Retired  and post .  from his wife who is health care  power of .  Two sons and 2 grandchildren (1)    Social History     Tobacco Use    Smoking status: Never     Passive exposure: Never    Smokeless tobacco: Never   Substance Use Topics    Alcohol use: Yes     Comment: occasional    Drug use: Never      -------------------------------------------------------------------------------------------------------  Subjective       HPI    KANDI ROSE is a 74 year old Male h/o pAfib,Pes  well controlled T2DM, HTN, gout, IgG kappa MM with history as above.  Still not clear whether patient has ONJ.  Finally is [facility].  Patient states he had a bone density 11/23  which does not show osteopenia.    He presents to the clinic today (4/30/24) for follow-up  with his wife and to ahead of receiving /dose #19 monthly subcutaneous daratumumab, still on revlimid 5 mg 21/28 days.   Energy level can be lower at times.  Uses cane.  Has night sweats at least once per week, can be drenching at times.  Appetite remains low.  Had lost about 30 pounds 2 years ago, but slowly gained some weight back.  Bowels rotate between diarrhea and constipation.  Takes prn imodium and stool softener.  Chronic left hip, takes tramadol and tylenol.  Light headedness when standing quickly, happens about every few weeks.  Numbness and tingling to bilateral hands and feet.  Has gout to second toe on right foot.    Expressed concerns about doctor visits and blood work, had a recent bill of $3,100.  States his bills would be covered if was seen at the [facility].  Would like to speak to .       Review of Systems   Constitutional:  Positive for appetite change, diaphoresis (occassional night sweats about once per week, can be drenching) and fatigue. Negative for chills, fever and unexpected weight change.   Respiratory:  Negative for cough, shortness of breath and wheezing.    Cardiovascular:  Negative for chest pain, leg swelling and palpitations.   Gastrointestinal:  Positive for constipation and diarrhea. Negative for blood in stool, nausea and vomiting.   Genitourinary:  Negative for dysuria and hematuria.    Musculoskeletal:  Positive for arthralgias and gait problem.   Skin:  Negative for rash and wound.   Neurological:  Positive for gait problem, light-headedness and numbness. Negative for dizziness.   Hematological:  Negative for adenopathy. Does not bruise/bleed easily.   All other systems reviewed and are negative.  -------------------------------------------------------------------------------------------------------  Objective   BSA: 2.11 meters squared  /77   Pulse 80   Temp  35.9 °C (96.6 °F)   Resp 18   Wt 88.5 kg (195 lb 1.7 oz)   SpO2 93%   BMI 27.21 kg/m²     Physical Exam  Vitals reviewed.   Constitutional:       Appearance: Normal appearance.      Comments: Pale, elderly uses a cane   HENT:      Head: Normocephalic and atraumatic.      Mouth/Throat:      Mouth: Mucous membranes are moist.   Eyes:      Extraocular Movements: Extraocular movements intact.      Conjunctiva/sclera: Conjunctivae normal.      Pupils: Pupils are equal, round, and reactive to light.   Cardiovascular:      Rate and Rhythm: Normal rate and regular rhythm.      Pulses: Normal pulses.      Heart sounds: Normal heart sounds.   Pulmonary:      Effort: Pulmonary effort is normal.      Breath sounds: Normal breath sounds.   Abdominal:      General: Abdomen is flat. Bowel sounds are normal.      Palpations: Abdomen is soft. There is no mass.      Tenderness: There is no abdominal tenderness.   Musculoskeletal:         General: No swelling. Normal range of motion.   Lymphadenopathy:      Comments: No lymphadenopathy   Skin:     General: Skin is warm and dry.   Neurological:      General: No focal deficit present.      Mental Status: He is alert and oriented to person, place, and time.   Psychiatric:         Mood and Affect: Mood normal.         Behavior: Behavior normal.         Thought Content: Thought content normal.         Judgment: Judgment normal.       Performance Status:  Symptomatic; fully ambulatory  -------------------------------------------------------------------------------------------------------  Assessment/Plan      75 yo M h/o IgG kappa MM s/p Rd 0732-0622 best response AK stopped d/t dental procedure, presented on 3/2022 for worsening anemia/Cr d/t relapsed MM, and likely LT 9th rib plasmacytoma.      Restaging 3/2022 shows high burden disease with 90% tumorous involvement of the bone marrow ,      Initiated (3/29/22) treatment for his relapsed Myeloma with Revlimid 10 mg by mouth days  1-14/21 days, Daratuzumab (3/29, & 4/5/22), SC Velcade (3/29 & 4/5/22), & weekly Dexamethasone. complicated by several hospilizations for syncope, PE ,afib due to  velcade and revlimid.       Recv'd weekly single agent Daraumumab from 5/31 - 7/19/22. Initiated (8/25/22) Revlimid 10 mg on days 1-21/28 days.     myeloma labs (11/14/23) with IgG kappa M-protein down to 0.1 g/dL, free kappa light chains normal at 2.1 with ratio of 2.50 mg/dL, last SPEP pm 9/27 0.1consistent with ongoing VGPR.    3/5/24 Current tx monthly casey and revlimid 5mg 21/28 days, myeloma labs consistent with VGPR  Dose 18 of monthly casey today    Will continue monthly casey, rev, & dex until evidence of disease progression or unacceptable toxicity.  4/30/24:  Laboratory counts from today: hgb 12.6, plt 165, WBC 4.7, and ANC 1.40.  Kappa and lambda free light chains stable.  M-protein stable at 0.1.  Receiving dose 19 of monthly daratumumab today.  Continue revlimid 5 mg daily on days 1-21 out of 28 day cycle, [facility]  Ig [place] Free Light Chain   Date Value Ref Range Status   04/30/2024 1.34 0.33 - 1.94 mg/dL Final   04/02/2024 1.62 0.33 - 1.94 mg/dL Final   03/05/2024 1.52 0.33 - 1.94 mg/dL Final     Ig Lambda Free Light Chain   Date Value Ref Range Status   04/30/2024 0.64 0.57 - 2.63 mg/dL Final   04/02/2024 0.86 0.57 - 2.63 mg/dL Final   03/05/2024 0.72 0.57 - 2.63 mg/dL Final     Kappa/Lambda Ratio   Date Value Ref Range Status   04/30/2024 2.09 (H) 0.26 - 1.65 Final   04/02/2024 1.88 (H) 0.26 - 1.65 Final   03/05/2024 2.11 (H) 0.26 - 1.65 Final     M-PROTEIN 1   Date Value Ref Range Status   04/30/2024 0.1 (H)   g/dL Final   04/02/2024 0.1 (H)   g/dL Final   03/05/2024 0.1 (H)   g/dL Final      #Atrial fib on chronic anticoagulation  on Eliquis 5 mg by mouth twice a day.  Patient confirmed on 50 mg by mouth twice a day of metoprolol,  Now on 25 mg by mouth twice a day due to orthostatic hypotention. Has cardiology  followup with EP next month.   4/30/24:  Follows with Dr. Red EVANS (4/9/22) - Small occlusive embolic thrombus within the right lower lobe pulmonary artery branch, medially and posteriorly.  Cont Eliquis 5 mg twice a day.     Bone health:  Xgeva held due to concerns of ONJ and prolonged treatment in the past.  Bone density December 2023 was normal.  Hold for now.      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  Pt had his tooth pulled in 2019, however with prolonged bone exposure/infection, eventually underwent OMSF surgery in 10/2021. Unclear if he has denosumab assoc jaw necrosis. Patient finally had his bad tooth (L upper molar) removed on 5/25/23. [facility]  4/30/24:  Reports that he recently finished dental work and has dentures.      Social Work:  4/30/24: Requested to have DWIGHT Dominguez OSW-C contact patient regarding his medical bills.       RTC:   5/28/24:  Infusion visit for casey SALOMON  Follow up in 2 months for provider visit and casey TADEO  -------------------------------------------------------------------------------------------------------  EWELINA Novak-RONAN

## 2024-04-30 NOTE — PROGRESS NOTES
SW met with patient in the infusion center of [facility]: [facility]. Patient concerned about a bill he recently received for over $3000.00. SW explained how insurance works at the beginning of the year with yearly deductibles, co-pays and co-insurances. This is a new process as the [facility] not [facility] at [facility] thus he must utilize the Medicare coverage. Patient also shares that he believes he has a co-pay carolann for patients with MM. SW offered to make a referral to the financial navigator for co-pay assistance and the financial counselor to explore financial programs at [facility]. Patient agreeable. Referral sent. Support offered. SW to follow as needed.

## 2024-05-01 LAB
KAPPA LC SERPL-MCNC: 1.34 MG/DL (ref 0.33–1.94)
KAPPA LC/LAMBDA SER: 2.09 {RATIO} (ref 0.26–1.65)
LAMBDA LC SERPL-MCNC: 0.64 MG/DL (ref 0.57–2.63)

## 2024-05-03 LAB
ALBUMIN: 3.9 G/DL (ref 3.4–5)
ALPHA 1 GLOBULIN: 0.2 G/DL (ref 0.2–0.6)
ALPHA 2 GLOBULIN: 0.8 G/DL (ref 0.4–1.1)
BETA GLOBULIN: 0.7 G/DL (ref 0.5–1.2)
GAMMA GLOBULIN: 0.5 G/DL (ref 0.5–1.4)
M-PROTEIN 1: 0.1 G/DL
PATH REVIEW-SERUM PROTEIN ELECTROPHORESIS: ABNORMAL
PROTEIN ELECTROPHORESIS COMMENT: ABNORMAL

## 2024-05-05 LAB
TESTOSTERONE FREE (CHAN): 69.8 PG/ML (ref 30–135)
TESTOSTERONE,TOTAL,LC-MS/MS: 513 NG/DL (ref 250–1100)

## 2024-05-07 ENCOUNTER — TELEMEDICINE (OUTPATIENT)
Dept: UROLOGY | Facility: CLINIC | Age: 74
End: 2024-05-07
Payer: MEDICARE

## 2024-05-07 DIAGNOSIS — R97.20 ELEVATED PSA: Primary | ICD-10-CM

## 2024-05-07 PROCEDURE — 99213 OFFICE O/P EST LOW 20 MIN: CPT | Performed by: UROLOGY

## 2024-05-07 PROCEDURE — 1160F RVW MEDS BY RX/DR IN RCRD: CPT | Performed by: UROLOGY

## 2024-05-07 PROCEDURE — 1159F MED LIST DOCD IN RCRD: CPT | Performed by: UROLOGY

## 2024-05-07 PROCEDURE — G2211 COMPLEX E/M VISIT ADD ON: HCPCS | Performed by: UROLOGY

## 2024-05-07 NOTE — PROGRESS NOTES
Subjective     This visit was completed via telemedicine. All issues as below were discussed and addressed but no physical exam was performed unless allowed by visual confirmation. If it was felt that the patient should be evaluated in clinic, then they were directed there. Patient verbally consented to visit.    Toby Ugalde is a 74 y.o. male  with history of BPH, and elevated PSA with negative prostate biopsy 06/29/2018, PI-RADS 3 on MRI 01/31/2019, and negative MRI 12/13/2020, and low libido with a history of low testosterone.  History of multiple myeloma. He presents today to review PSA and Testerone levels.         Past Medical History:   Diagnosis Date    Dental caries on pit and fissure surface penetrating into dentin 01/12/2024    Deposits (accretions) on teeth 01/12/2024    Essential (primary) hypertension 11/20/2013    Benign essential hypertension    History of hypercholesterolemia 01/12/2024    History of hypertension 01/12/2024    History of malignant neoplasm of prostate 01/12/2024    Impacted cerumen 01/12/2024    Impacted cerumen, bilateral 02/16/2024    Infective pharyngitis 01/12/2024    Other intervertebral disc degeneration, lumbar region     Lumbar degenerative disc disease    Overweight with body mass index (BMI) 25.0-29.9 02/02/2022    Partial loss of teeth due to caries, class I 01/12/2024    Personal history of malignant neoplasm of prostate     History of malignant neoplasm of prostate    Personal history of other diseases of the circulatory system     History of hypertension    Personal history of other diseases of the circulatory system 06/29/2022    History of atrial tachycardia    Personal history of other diseases of the circulatory system     History of essential hypertension    Personal history of other diseases of the musculoskeletal system and connective tissue     History of arthritis    Personal history of other diseases of the musculoskeletal system and connective tissue      History of acute gouty arthritis    Personal history of other diseases of the musculoskeletal system and connective tissue 01/30/2019    History of left foot drop    Personal history of other diseases of the respiratory system     History of bronchitis    Personal history of other endocrine, nutritional and metabolic disease     History of hypercholesterolemia    Personal history of other endocrine, nutritional and metabolic disease     History of diabetes mellitus    Personal history of other endocrine, nutritional and metabolic disease 01/17/2014    History of diabetes mellitus    Post-traumatic stress disorder, chronic 11/10/2023    Primary localized osteoarthritis of pelvic region and thigh 12/17/2018    Pure hypercholesterolemia, unspecified 11/20/2013    Low-density-lipoid-type (LDL) hyperlipoproteinemia    Snoring     Snoring     Past Surgical History:   Procedure Laterality Date    BACK SURGERY  11/20/2013    Back Surgery    TONSILLECTOMY  11/20/2013    Tonsillectomy    TOTAL KNEE ARTHROPLASTY  11/20/2013    Knee Replacement     Family History   Problem Relation Name Age of Onset    Heart attack Mother      Diabetes Brother      Stroke Maternal Grandfather       Current Outpatient Medications   Medication Sig Dispense Refill    acetaminophen (Tylenol) 500 mg tablet Take by mouth.      allopurinol (Zyloprim) 300 mg tablet Take 1 tablet (300 mg) by mouth if needed.      amLODIPine (Norvasc) 5 mg tablet TAKE 1 TABLET BY MOUTH EVERY DAY 90 tablet 3    apixaban (Eliquis) 5 mg tablet TAKE ONE (1) TABLET BY MOUTH 2 TIMES A  tablet 3    atorvastatin (Lipitor) 40 mg tablet TAKE 1 TABLET BY MOUTH EVERY DAY (Patient not taking: Reported on 4/30/2024) 90 tablet 3    atorvastatin (Lipitor) 80 mg tablet Take 1 tablet (80 mg) by mouth once daily. 90 tablet 3    colchicine, gout, 0.6 mg tablet Take 1 tablet (0.6 mg) by mouth once daily as needed.      dextromethorphan-guaifenesin (Mucinex DM)  mg 12 hr tablet  Take 1 tablet by mouth every 12 hours if needed for cough.      finasteride (Proscar) 5 mg tablet Take 1 tablet (5 mg) by mouth once daily as needed.      fludrocortisone (Florinef) 0.1 mg tablet Take 1 tablet (0.1 mg) by mouth once daily.      fluticasone (Flonase) 50 mcg/actuation nasal spray Administer 1 spray into affected nostril(s) once daily as needed.      lenalidomide (Revlimid) 5 mg capsule Take by mouth.      metFORMIN (Glucophage) 500 mg tablet Take 1 tablet (500 mg) by mouth if needed.      metoprolol tartrate (Lopressor) 50 mg tablet TAKE 1 TABLET BY MOUTH EVERY 12 HOURS 180 tablet 0    pantoprazole (ProtoNix) 40 mg EC tablet Take 1 tablet (40 mg) by mouth once daily.      sildenafil (Viagra) 100 mg tablet Take 1 tablet (100 mg) by mouth if needed.  1 HOUR BEFORE NEEDED.      tamsulosin (Flomax) 0.4 mg 24 hr capsule Take 1 capsule (0.4 mg) by mouth once daily.       No current facility-administered medications for this visit.     Allergies   Allergen Reactions    Mold Other     Social History     Socioeconomic History    Marital status:      Spouse name: Not on file    Number of children: Not on file    Years of education: Not on file    Highest education level: Not on file   Occupational History    Not on file   Tobacco Use    Smoking status: Never     Passive exposure: Never    Smokeless tobacco: Never   Substance and Sexual Activity    Alcohol use: Yes     Comment: occasional    Drug use: Never    Sexual activity: Not on file   Other Topics Concern    Not on file   Social History Narrative    Not on file     Social Determinants of Health     Financial Resource Strain: Low Risk  (8/20/2020)    Received from [facility]    Overall Financial Resource Strain (CARDIA)     Difficulty of Paying Living Expenses: Not hard at all   Food Insecurity: No Food Insecurity (8/20/2020)    Received from [facility]    Hunger Vital Sign     Worried About Running Out of Food in the Last Year: Never  true     Ran Out of Food in the Last Year: Never true   Transportation Needs: No Transportation Needs (8/20/2020)    Received from [facility]    PRAPARE - Transportation     Lack of Transportation (Medical): No     Lack of Transportation (Non-Medical): No   Physical Activity: Not on file   Stress: Not on file   Social Connections: Not on file   Intimate Partner Violence: Not on file   Housing Stability: Not on file       Review of Systems  Pertinent items are noted in HPI.    Objective     Lab Review  Lab Results   Component Value Date    WBC 4.7 04/30/2024    RBC 4.07 (L) 04/30/2024    HGB 12.6 (L) 04/30/2024    HCT 36.8 (L) 04/30/2024     04/30/2024      Lab Results   Component Value Date    BUN 21 04/30/2024    CREATININE 1.57 (H) 04/30/2024        Lab Results   Component Value Date    PSA 8.72 (H) 04/30/2024    PSA 7.85 (H) 02/16/2024    PSA 3.72 03/02/2022       Assessment/Plan   There are no diagnoses linked to this encounter.    BPH with LUTS - stable will continue to monitor   Elevated PSA     PSA is 8.72 (4/30/2024), this has increased.     We will obtain a prostate MRI to assess prostate anatomy and r/o prostate cancer.     Follow up virtually to review.     Low libido with history of low testosterone      Testosterone is 69.8, wnl. Reassured patient.     All questions were answered to the patient's satisfaction. Patient agrees with the plan and wishes to proceed. Follow-up will be scheduled appropriately.     E&M visit today is associated with current or anticipated ongoing medical care services related to a patient's single, serious condition or a complex condition.    Scribed for Dr. Segovia by Janki Denise. I , Dr Segovia, have personally reviewed and agreed with the information entered by the Virtual Scribe.

## 2024-05-10 ENCOUNTER — HOSPITAL ENCOUNTER (OUTPATIENT)
Dept: RADIOLOGY | Facility: HOSPITAL | Age: 74
Discharge: HOME | End: 2024-05-10
Payer: MEDICARE

## 2024-05-10 DIAGNOSIS — R97.20 ELEVATED PSA: ICD-10-CM

## 2024-05-10 PROCEDURE — A9575 INJ GADOTERATE MEGLUMI 0.1ML: HCPCS | Performed by: UROLOGY

## 2024-05-10 PROCEDURE — 2550000001 HC RX 255 CONTRASTS: Performed by: UROLOGY

## 2024-05-10 PROCEDURE — 72197 MRI PELVIS W/O & W/DYE: CPT | Performed by: RADIOLOGY

## 2024-05-10 PROCEDURE — 72197 MRI PELVIS W/O & W/DYE: CPT

## 2024-05-10 RX ORDER — GADOTERATE MEGLUMINE 376.9 MG/ML
18 INJECTION INTRAVENOUS
Status: COMPLETED | OUTPATIENT
Start: 2024-05-10 | End: 2024-05-10

## 2024-05-10 RX ADMIN — GADOTERATE MEGLUMINE 18 ML: 376.9 INJECTION INTRAVENOUS at 08:21

## 2024-05-14 ENCOUNTER — SOCIAL WORK (OUTPATIENT)
Dept: CASE MANAGEMENT | Facility: HOSPITAL | Age: 74
End: 2024-05-14
Payer: MEDICARE

## 2024-05-16 NOTE — PROGRESS NOTES
Patient dropped off paperwork he received about a PAP for the co-pays for his infusion therapy. SW sent to financial navigator, Radha Barnes. Patient has a carolann from [employer]. Co-pays to be submitted. SW relayed this information to patient; message left. SW to follow.

## 2024-05-28 ENCOUNTER — INFUSION (OUTPATIENT)
Dept: HEMATOLOGY/ONCOLOGY | Facility: CLINIC | Age: 74
End: 2024-05-28
Payer: MEDICARE

## 2024-05-28 ENCOUNTER — LAB (OUTPATIENT)
Dept: LAB | Facility: CLINIC | Age: 74
End: 2024-05-28
Payer: MEDICARE

## 2024-05-28 VITALS
TEMPERATURE: 97.7 F | SYSTOLIC BLOOD PRESSURE: 169 MMHG | BODY MASS INDEX: 27.92 KG/M2 | WEIGHT: 200.18 LBS | HEART RATE: 67 BPM | DIASTOLIC BLOOD PRESSURE: 86 MMHG | RESPIRATION RATE: 18 BRPM

## 2024-05-28 DIAGNOSIS — C90.00 IGG MULTIPLE MYELOMA (MULTI): ICD-10-CM

## 2024-05-28 DIAGNOSIS — C90.00: ICD-10-CM

## 2024-05-28 LAB
ALBUMIN SERPL BCP-MCNC: 4.4 G/DL (ref 3.4–5)
ALP SERPL-CCNC: 64 U/L (ref 33–136)
ALT SERPL W P-5'-P-CCNC: 16 U/L (ref 10–52)
ANION GAP SERPL CALC-SCNC: 15 MMOL/L (ref 10–20)
AST SERPL W P-5'-P-CCNC: 15 U/L (ref 9–39)
BASOPHILS # BLD AUTO: 0.08 X10*3/UL (ref 0–0.1)
BASOPHILS NFR BLD AUTO: 1.8 %
BILIRUB SERPL-MCNC: 0.7 MG/DL (ref 0–1.2)
BUN SERPL-MCNC: 21 MG/DL (ref 6–23)
CALCIUM SERPL-MCNC: 9.2 MG/DL (ref 8.6–10.6)
CHLORIDE SERPL-SCNC: 103 MMOL/L (ref 98–107)
CO2 SERPL-SCNC: 23 MMOL/L (ref 21–32)
CREAT SERPL-MCNC: 1.38 MG/DL (ref 0.5–1.3)
EGFRCR SERPLBLD CKD-EPI 2021: 53 ML/MIN/1.73M*2
EOSINOPHIL # BLD AUTO: 0.06 X10*3/UL (ref 0–0.4)
EOSINOPHIL NFR BLD AUTO: 1.3 %
ERYTHROCYTE [DISTWIDTH] IN BLOOD BY AUTOMATED COUNT: 13.7 % (ref 11.5–14.5)
GLUCOSE SERPL-MCNC: 154 MG/DL (ref 74–99)
HCT VFR BLD AUTO: 34.8 % (ref 41–52)
HGB BLD-MCNC: 11.9 G/DL (ref 13.5–17.5)
IGA SERPL-MCNC: 51 MG/DL (ref 70–400)
IGG SERPL-MCNC: 590 MG/DL (ref 700–1600)
IGM SERPL-MCNC: 9 MG/DL (ref 40–230)
IMM GRANULOCYTES # BLD AUTO: 0.01 X10*3/UL (ref 0–0.5)
IMM GRANULOCYTES NFR BLD AUTO: 0.2 % (ref 0–0.9)
LYMPHOCYTES # BLD AUTO: 2.4 X10*3/UL (ref 0.8–3)
LYMPHOCYTES NFR BLD AUTO: 53.9 %
MCH RBC QN AUTO: 30.5 PG (ref 26–34)
MCHC RBC AUTO-ENTMCNC: 34.2 G/DL (ref 32–36)
MCV RBC AUTO: 89 FL (ref 80–100)
MONOCYTES # BLD AUTO: 0.49 X10*3/UL (ref 0.05–0.8)
MONOCYTES NFR BLD AUTO: 11 %
NEUTROPHILS # BLD AUTO: 1.41 X10*3/UL (ref 1.6–5.5)
NEUTROPHILS NFR BLD AUTO: 31.8 %
NRBC BLD-RTO: ABNORMAL /100{WBCS}
PLATELET # BLD AUTO: 204 X10*3/UL (ref 150–450)
POTASSIUM SERPL-SCNC: 3.5 MMOL/L (ref 3.5–5.3)
PROT SERPL-MCNC: 6.5 G/DL (ref 6.4–8.2)
PROT SERPL-MCNC: 6.5 G/DL (ref 6.4–8.2)
RBC # BLD AUTO: 3.9 X10*6/UL (ref 4.5–5.9)
SODIUM SERPL-SCNC: 137 MMOL/L (ref 136–145)
WBC # BLD AUTO: 4.5 X10*3/UL (ref 4.4–11.3)

## 2024-05-28 PROCEDURE — 83521 IG LIGHT CHAINS FREE EACH: CPT

## 2024-05-28 PROCEDURE — 82784 ASSAY IGA/IGD/IGG/IGM EACH: CPT

## 2024-05-28 PROCEDURE — 36415 COLL VENOUS BLD VENIPUNCTURE: CPT

## 2024-05-28 PROCEDURE — 2500000001 HC RX 250 WO HCPCS SELF ADMINISTERED DRUGS (ALT 637 FOR MEDICARE OP): Performed by: INTERNAL MEDICINE

## 2024-05-28 PROCEDURE — 84165 PROTEIN E-PHORESIS SERUM: CPT

## 2024-05-28 PROCEDURE — 2500000004 HC RX 250 GENERAL PHARMACY W/ HCPCS (ALT 636 FOR OP/ED): Performed by: INTERNAL MEDICINE

## 2024-05-28 PROCEDURE — 96401 CHEMO ANTI-NEOPL SQ/IM: CPT

## 2024-05-28 PROCEDURE — 80053 COMPREHEN METABOLIC PANEL: CPT

## 2024-05-28 PROCEDURE — 85025 COMPLETE CBC W/AUTO DIFF WBC: CPT

## 2024-05-28 PROCEDURE — 2500000004 HC RX 250 GENERAL PHARMACY W/ HCPCS (ALT 636 FOR OP/ED): Mod: JZ,JG | Performed by: INTERNAL MEDICINE

## 2024-05-28 PROCEDURE — 84155 ASSAY OF PROTEIN SERUM: CPT | Mod: 59

## 2024-05-28 RX ORDER — FAMOTIDINE 10 MG/ML
20 INJECTION INTRAVENOUS ONCE AS NEEDED
Status: DISCONTINUED | OUTPATIENT
Start: 2024-05-28 | End: 2024-05-28 | Stop reason: HOSPADM

## 2024-05-28 RX ORDER — DIPHENHYDRAMINE HYDROCHLORIDE 50 MG/ML
50 INJECTION INTRAMUSCULAR; INTRAVENOUS AS NEEDED
Status: DISCONTINUED | OUTPATIENT
Start: 2024-05-28 | End: 2024-05-28 | Stop reason: HOSPADM

## 2024-05-28 RX ORDER — MONTELUKAST SODIUM 10 MG/1
10 TABLET ORAL ONCE
Status: COMPLETED | OUTPATIENT
Start: 2024-05-28 | End: 2024-05-28

## 2024-05-28 RX ORDER — EPINEPHRINE 0.3 MG/.3ML
0.3 INJECTION SUBCUTANEOUS EVERY 5 MIN PRN
Status: DISCONTINUED | OUTPATIENT
Start: 2024-05-28 | End: 2024-05-28 | Stop reason: HOSPADM

## 2024-05-28 RX ORDER — DIPHENHYDRAMINE HCL 50 MG
50 CAPSULE ORAL ONCE
Status: COMPLETED | OUTPATIENT
Start: 2024-05-28 | End: 2024-05-28

## 2024-05-28 RX ORDER — ALBUTEROL SULFATE 0.83 MG/ML
3 SOLUTION RESPIRATORY (INHALATION) AS NEEDED
Status: DISCONTINUED | OUTPATIENT
Start: 2024-05-28 | End: 2024-05-28 | Stop reason: HOSPADM

## 2024-05-28 RX ORDER — ACETAMINOPHEN 325 MG/1
650 TABLET ORAL ONCE
Status: COMPLETED | OUTPATIENT
Start: 2024-05-28 | End: 2024-05-28

## 2024-05-28 RX ADMIN — DIPHENHYDRAMINE HYDROCHLORIDE 50 MG: 50 CAPSULE ORAL at 11:56

## 2024-05-28 RX ADMIN — DEXAMETHASONE 20 MG: 6 TABLET ORAL at 11:56

## 2024-05-28 RX ADMIN — MONTELUKAST 10 MG: 10 TABLET, FILM COATED ORAL at 11:55

## 2024-05-28 RX ADMIN — DARATUMUMAB AND HYALURONIDASE-FIHJ (HUMAN RECOMBINANT) 1800 MG: 1800; 30000 INJECTION SUBCUTANEOUS at 12:22

## 2024-05-28 RX ADMIN — ACETAMINOPHEN 650 MG: 325 TABLET ORAL at 11:55

## 2024-05-28 ASSESSMENT — PAIN SCALES - GENERAL: PAINLEVEL: 2

## 2024-05-29 ENCOUNTER — OFFICE VISIT (OUTPATIENT)
Dept: PRIMARY CARE | Facility: CLINIC | Age: 74
End: 2024-05-29
Payer: MEDICARE

## 2024-05-29 ENCOUNTER — LAB (OUTPATIENT)
Dept: LAB | Facility: LAB | Age: 74
End: 2024-05-29
Payer: MEDICARE

## 2024-05-29 VITALS
DIASTOLIC BLOOD PRESSURE: 75 MMHG | OXYGEN SATURATION: 100 % | BODY MASS INDEX: 28 KG/M2 | HEART RATE: 88 BPM | RESPIRATION RATE: 17 BRPM | WEIGHT: 200 LBS | SYSTOLIC BLOOD PRESSURE: 122 MMHG | HEIGHT: 71 IN

## 2024-05-29 DIAGNOSIS — N40.0 BENIGN PROSTATIC HYPERPLASIA WITHOUT LOWER URINARY TRACT SYMPTOMS: Primary | ICD-10-CM

## 2024-05-29 DIAGNOSIS — N13.8 BPH WITH OBSTRUCTION/LOWER URINARY TRACT SYMPTOMS: ICD-10-CM

## 2024-05-29 DIAGNOSIS — D61.818 PANCYTOPENIA (MULTI): ICD-10-CM

## 2024-05-29 DIAGNOSIS — R53.83 MALAISE AND FATIGUE: ICD-10-CM

## 2024-05-29 DIAGNOSIS — N18.30 CHRONIC KIDNEY DISEASE, STAGE 3 UNSPECIFIED (MULTI): ICD-10-CM

## 2024-05-29 DIAGNOSIS — C90.00 IGG MULTIPLE MYELOMA (MULTI): ICD-10-CM

## 2024-05-29 DIAGNOSIS — I10 ESSENTIAL (PRIMARY) HYPERTENSION: ICD-10-CM

## 2024-05-29 DIAGNOSIS — R53.81 MALAISE AND FATIGUE: ICD-10-CM

## 2024-05-29 DIAGNOSIS — E55.9 VITAMIN D DEFICIENCY, UNSPECIFIED: ICD-10-CM

## 2024-05-29 DIAGNOSIS — E11.9 TYPE 2 DIABETES MELLITUS WITHOUT COMPLICATION, WITHOUT LONG-TERM CURRENT USE OF INSULIN (MULTI): ICD-10-CM

## 2024-05-29 DIAGNOSIS — K21.9 GERD WITHOUT ESOPHAGITIS: ICD-10-CM

## 2024-05-29 DIAGNOSIS — N40.1 BPH WITH OBSTRUCTION/LOWER URINARY TRACT SYMPTOMS: ICD-10-CM

## 2024-05-29 DIAGNOSIS — N18.31 STAGE 3A CHRONIC KIDNEY DISEASE (MULTI): ICD-10-CM

## 2024-05-29 DIAGNOSIS — E11.21 TYPE 2 DIABETES MELLITUS WITH DIABETIC NEPHROPATHY (MULTI): ICD-10-CM

## 2024-05-29 DIAGNOSIS — I48.91 ATRIAL FIBRILLATION WITH RVR (MULTI): Primary | ICD-10-CM

## 2024-05-29 DIAGNOSIS — E78.49 OTHER HYPERLIPIDEMIA: ICD-10-CM

## 2024-05-29 LAB
25(OH)D3 SERPL-MCNC: 36 NG/ML (ref 30–100)
ALBUMIN SERPL BCP-MCNC: 5 G/DL (ref 3.4–5)
ANION GAP SERPL CALC-SCNC: 20 MMOL/L (ref 10–20)
BUN SERPL-MCNC: 23 MG/DL (ref 6–23)
CALCIUM SERPL-MCNC: 9.8 MG/DL (ref 8.6–10.6)
CHLORIDE SERPL-SCNC: 103 MMOL/L (ref 98–107)
CO2 SERPL-SCNC: 19 MMOL/L (ref 21–32)
CREAT SERPL-MCNC: 1.45 MG/DL (ref 0.5–1.3)
CREAT UR-MCNC: 135.8 MG/DL (ref 20–370)
EGFRCR SERPLBLD CKD-EPI 2021: 50 ML/MIN/1.73M*2
ERYTHROCYTE [DISTWIDTH] IN BLOOD BY AUTOMATED COUNT: 13.9 % (ref 11.5–14.5)
GLUCOSE SERPL-MCNC: 212 MG/DL (ref 74–99)
HCT VFR BLD AUTO: 38.5 % (ref 41–52)
HGB BLD-MCNC: 13.2 G/DL (ref 13.5–17.5)
KAPPA LC SERPL-MCNC: 1.08 MG/DL (ref 0.33–1.94)
KAPPA LC/LAMBDA SER: 1.52 {RATIO} (ref 0.26–1.65)
LAMBDA LC SERPL-MCNC: 0.71 MG/DL (ref 0.57–2.63)
MCH RBC QN AUTO: 30.9 PG (ref 26–34)
MCHC RBC AUTO-ENTMCNC: 34.3 G/DL (ref 32–36)
MCV RBC AUTO: 90 FL (ref 80–100)
MICROALBUMIN UR-MCNC: 41 MG/L
MICROALBUMIN/CREAT UR: 30.2 UG/MG CREAT
MUCOUS THREADS #/AREA URNS AUTO: ABNORMAL /LPF
NRBC BLD-RTO: 0 /100 WBCS (ref 0–0)
PHOSPHATE SERPL-MCNC: 3.6 MG/DL (ref 2.5–4.9)
PLATELET # BLD AUTO: 269 X10*3/UL (ref 150–450)
POTASSIUM SERPL-SCNC: 3.8 MMOL/L (ref 3.5–5.3)
RBC # BLD AUTO: 4.27 X10*6/UL (ref 4.5–5.9)
RBC #/AREA URNS AUTO: ABNORMAL /HPF
SODIUM SERPL-SCNC: 138 MMOL/L (ref 136–145)
WBC # BLD AUTO: 10.4 X10*3/UL (ref 4.4–11.3)
WBC #/AREA URNS AUTO: ABNORMAL /HPF

## 2024-05-29 PROCEDURE — 3078F DIAST BP <80 MM HG: CPT | Performed by: INTERNAL MEDICINE

## 2024-05-29 PROCEDURE — 80069 RENAL FUNCTION PANEL: CPT

## 2024-05-29 PROCEDURE — 1160F RVW MEDS BY RX/DR IN RCRD: CPT | Performed by: INTERNAL MEDICINE

## 2024-05-29 PROCEDURE — 81001 URINALYSIS AUTO W/SCOPE: CPT

## 2024-05-29 PROCEDURE — 85027 COMPLETE CBC AUTOMATED: CPT

## 2024-05-29 PROCEDURE — 82306 VITAMIN D 25 HYDROXY: CPT

## 2024-05-29 PROCEDURE — 82570 ASSAY OF URINE CREATININE: CPT

## 2024-05-29 PROCEDURE — 1036F TOBACCO NON-USER: CPT | Performed by: INTERNAL MEDICINE

## 2024-05-29 PROCEDURE — 3074F SYST BP LT 130 MM HG: CPT | Performed by: INTERNAL MEDICINE

## 2024-05-29 PROCEDURE — 99214 OFFICE O/P EST MOD 30 MIN: CPT | Performed by: INTERNAL MEDICINE

## 2024-05-29 PROCEDURE — 1159F MED LIST DOCD IN RCRD: CPT | Performed by: INTERNAL MEDICINE

## 2024-05-29 PROCEDURE — 82043 UR ALBUMIN QUANTITATIVE: CPT

## 2024-05-29 PROCEDURE — 36415 COLL VENOUS BLD VENIPUNCTURE: CPT

## 2024-05-29 ASSESSMENT — ENCOUNTER SYMPTOMS
MUSCULOSKELETAL NEGATIVE: 1
ALLERGIC/IMMUNOLOGIC NEGATIVE: 1
EYES NEGATIVE: 1
HEMATOLOGIC/LYMPHATIC NEGATIVE: 1
RESPIRATORY NEGATIVE: 1
CARDIOVASCULAR NEGATIVE: 1
GASTROINTESTINAL NEGATIVE: 1
PSYCHIATRIC NEGATIVE: 1
NEUROLOGICAL NEGATIVE: 1
CONSTITUTIONAL NEGATIVE: 1
ENDOCRINE NEGATIVE: 1

## 2024-05-29 NOTE — PROGRESS NOTES
"Subjective   Patient ID: Toby Ugalde is a 74 y.o. male who presents for Follow-up (Follow up).    HPI     Review of Systems   Constitutional: Negative.    HENT: Negative.     Eyes: Negative.    Respiratory: Negative.     Cardiovascular: Negative.    Gastrointestinal: Negative.    Endocrine: Negative.    Musculoskeletal: Negative.    Skin: Negative.    Allergic/Immunologic: Negative.    Neurological: Negative.    Hematological: Negative.    Psychiatric/Behavioral: Negative.     All other systems reviewed and are negative.      Objective   Pulse 88   Resp 17   Ht 1.803 m (5' 11\")   Wt 90.7 kg (200 lb)   SpO2 100%   BMI 27.89 kg/m²   Blood pressure 122/75, pulse 88, resp. rate 17, height 1.803 m (5' 11\"), weight 90.7 kg (200 lb), SpO2 100%.   Physical Exam  Vitals and nursing note reviewed.   Constitutional:       Appearance: Normal appearance.   HENT:      Head: Normocephalic and atraumatic.      Right Ear: Tympanic membrane, ear canal and external ear normal.      Left Ear: Tympanic membrane, ear canal and external ear normal. There is no impacted cerumen.      Nose: Nose normal.      Mouth/Throat:      Mouth: Mucous membranes are moist.      Pharynx: Oropharynx is clear.   Eyes:      Extraocular Movements: Extraocular movements intact.      Conjunctiva/sclera: Conjunctivae normal.      Pupils: Pupils are equal, round, and reactive to light.   Cardiovascular:      Rate and Rhythm: Normal rate and regular rhythm.      Pulses: Normal pulses.      Heart sounds: Normal heart sounds. No murmur heard.  Pulmonary:      Effort: Pulmonary effort is normal. No respiratory distress.      Breath sounds: Normal breath sounds. No stridor. No wheezing, rhonchi or rales.   Chest:      Chest wall: No tenderness.   Abdominal:      General: Abdomen is flat. Bowel sounds are normal. There is no distension.      Palpations: Abdomen is soft. There is no mass.      Tenderness: There is no abdominal tenderness. There is no right CVA " tenderness, left CVA tenderness, guarding or rebound.      Hernia: No hernia is present.   Musculoskeletal:         General: Normal range of motion.      Cervical back: Normal range of motion and neck supple.   Skin:     General: Skin is warm.      Capillary Refill: Capillary refill takes less than 2 seconds.   Neurological:      General: No focal deficit present.      Mental Status: He is alert.      Cranial Nerves: No cranial nerve deficit.      Sensory: No sensory deficit.      Motor: No weakness.      Coordination: Coordination normal.      Gait: Gait normal.      Deep Tendon Reflexes: Reflexes normal.   Psychiatric:         Mood and Affect: Mood normal.         Behavior: Behavior normal. Behavior is cooperative.         Thought Content: Thought content normal.         Judgment: Judgment normal.         Assessment/Plan   Problem List Items Addressed This Visit             ICD-10-CM    Pancytopenia (Multi) D61.818     Monitor CBC and on Chemotherapy now feels very tired post chemo yesterday          Atrial fibrillation with RVR (Multi) - Primary I48.91     Atrial fibrillation - persistent with  Ventricular rate is well controlled (in sinus rhythm). ON ELIQUIS  No Shortness of breath. Continues   Metoprolol to 50 mg BID              BPH with obstruction/lower urinary tract symptoms N40.1, N13.8     High PSA and follows with urology          GERD without esophagitis K21.9     GERD - Acid reflux disease. Rx. PPI (Prilosec/Prevacid/Protonix/Nexium) and educate diet and life style changes. Referred patient to an endoscopy (EGD) and check H. Pylori titers.          IgG multiple myeloma (Multi) C90.00    Malaise and fatigue R53.81, R53.83     Fatigue - check CMP(metabolic panel and elctrolytes) , CBC(complete blood cell count), TSH(thyroid function). Insomnia may play a role and sleep studies(rule out sleep apnea) are recommended . Educate sleep hygiene. Consider anxiety disorder vs. depression. Consider Stress test,  and 2DECHO.            Type 2 diabetes mellitus without complications (Multi) E11.9     DM - Hyperglycemia acfter the chemotherapy treatment  - NIDDM  . Reviewed with patient / Will check  HbA1c and fasting blood sugars. Educate home self monitoring and diary keeping(reviewed with patient home blood sugar levels /diary). Educate extensively low calorie diet and weight loss with exercise. Reviewed BS- diary and Rx. Regimen. [place] renal protection with ARB/ACEI.               Educate compliance with diet and Rx. And educate risks and autcomes.     Refill the Metformin 500 mg BID with meals                        Stage 3a chronic kidney disease (Multi) N18.31     CRI - Chronic Renal Insuficiency. Secondary to DM/HTN/Atherosclerosis. Follow BUN/Cr. and lytes.   RENOPROTECTION with ACEI/ARB (). Monitor microalbuminuria. Avoid hypotension and renal hypoperfusion. The patient was instructed to avoid NSAIDS and educate compliance with medications to control HTN(hypertension) and cholesterol and diabetes.                                          Anemia in other chronic diseases classified elsewhere D63.8        Anemia - of bleed/chronic disease. Check CBC , B12 and Folic Acid levels, Iron/Ferritn/TIBC, refer patient to endoscopy (upper/ lower). Target HCT > 30 %. Check guaic stool            Atrial fibrillation with RVR (Multi) - Primary I48.91       Atrial fibrillation - persistent with  Ventricular rate is well controlled (in sinus rhythm). ON ELIQUIS  No Shortness of breath. Continues   Metoprolol to 50 mg BID                 Benign hypertension I10       HTN - hypertension well/controlled .Target BP < 130/80  achieved. Educate low salt diet and exercise with weight loss. Educate home self monitoring and diary keeping. Educate risks of elevate blood pressure and benefits of prompt treatment.  Refill Amlodipine and Metoprolol            BPH with obstruction/lower urinary tract symptoms N40.1, N13.8       BPH - Benign  PROSTATIC Hypertrophy, + Dysuria/Nocturia, check PSA, prescribe Flomax 0.4mg qD and Avodart 0.5 mg qD vs. Finasteride 5 mg qD.  Educate exercises and Change in life style, prescribe vitamin E 400 IU qD. Consider referral to urology.            Chronic renal insufficiency N18.9       CRI - Chronic Renal Insuficiency. Secondary to DM/HTN/Atherosclerosis. Follow BUN/Cr. and lytes.   RENOPROTECTION with ACEI/ARB (). Monitor microalbuminuria. Avoid hypotension and renal hypoperfusion. The patient was instructed to avoid NSAIDS and educate compliance with medications to control HTN(hypertension) and cholesterol and diabetes.                                       Fatigue R53.83       Fatigue - check CMP(metabolic panel and elctrolytes) , CBC(complete blood cell count), TSH(thyroid function). Insomnia may play a role and sleep studies(rule out sleep apnea) are recommended . Educate sleep hygiene. Consider anxiety disorder vs. depression. Consider Stress test, and 2DECHO.             GERD without esophagitis K21.9       GERD - Acid reflux disease. Rx. PPI (Prilosec/Prevacid/Protonix/Nexium) and educate diet and life style changes. Referred patient to an endoscopy (EGD) and check H. Pylori titers.            Hypercholesterolemia E78.00       Hypercholesterolemia - Monitor lipid profile and educate patient upon risks of high cholesterol and targets. Educate diet and change in lifestyle and increase in exercises - Refill:  Atorvastatin    and educate compliance with medication and diet.              IgG multiple myeloma (Multi) C90.00       Monitor CBC and blood count and follow with hematology and stable now on Chemotherapy             Type 2 diabetes mellitus without complications (Multi) E11.9       DM - NIDDM  . Reviewed with patient / Will check  HbA1c and fasting blood sugars. Educate home self monitoring and diary keeping(reviewed with patient home blood sugar levels /diary). Educate extensively low calorie diet and  weight loss with exercise. Reviewed BS- diary and Rx. Regimen. [place] renal protection with ARB/ACEI.               Educate compliance with diet and Rx. And educate risks and autcomes.     Refill the Metformin 500 mg BID with meals                                                            Paroxysmal atrial fibrillation (CMS/[facility]) I48.0            Atrial fibrillation - persistent with  Ventricular rate is well controlled (in sinus rhythm). On Apixaban  No Shortness of breath. Continues   Metoprolol to 25 mg BID             Benign hypertension I10          HTN - hypertension well/controlled .Target BP < 130/80  achieved. Educate low salt diet and exercise with weight loss. Educate home self monitoring and diary keeping. Educate risks of elevate blood pressure and benefits of prompt treatment.  Refill Amlodipine and Metoprolol            BPH with obstruction/lower urinary tract symptoms N40.1, N13.8          BPH - Benign PROSTATIC Hypertrophy, + Dysuria/Nocturia, check PSA, prescribe Flomax 0.4mg qD and Avodart 0.5 mg qD vs. Finasteride 5 mg qD.  Educate exercises and Change in life style, prescribe vitamin E 400 IU qD. Consider referral to urology.            Chronic renal insufficiency N18.9          CRI - Chronic Renal Insuficiency. Secondary to DM/HTN/Atherosclerosis. Follow BUN/Cr. and lytes.   RENOPROTECTION with ACEI/ARB (). Monitor microalbuminuria. Avoid hypotension and renal hypoperfusion. The patient was instructed to avoid NSAIDS and educate compliance with medications to control HTN(hypertension) and cholesterol and diabetes.                                       Diabetes mellitus (CMS/[facility]) E11.9          DM - NIDDM  . Reviewed with patient / Will check  HbA1c and fasting blood sugars. Educate home self monitoring and diary keeping(reviewed with patient home blood sugar levels /diary). Educate extensively low calorie diet and weight loss with exercise. Reviewed BS- diary and Rx. Regimen. [place] renal  protection with ARB/ACEI.               Educate compliance with diet and Rx. And educate risks and autcomes.     Refill Metformin 500 mg BID with meals             GERD without esophagitis K21.9          GERD - Acid reflux disease. Rx. PPI (Prilosec/Prevacid/Protonix/Nexium) and educate diet and life style changes. Referred patient to an endoscopy (EGD) and check H. Pylori titers.            Lumbar degenerative disc disease M51.36          DDD - Degenerative disc disease of the Lumbo-Sacral (LS)/Cervical (C)  spine. Educate exercises and referred patient to Physical Therapy (PT). Ordered X-Ray's of the LS/C spine. Consider MRI. Radiculopathy in the distribution of L4-L5-S1/C3-C4-C5 nerve roots, needs NSAIDS (Naprosin 500mg BID vs. Arthrotec 75mg BID or Prednisone taper (Medrol dose pack), Flexeril 10 mg qHS, heat application, and pain control with Tylenol vs. Vicodin 5/325 mg TID.             Relevant Medications        traMADol (Ultram) 50 mg tablet        Other Relevant Orders        Opiate/Opioid/Benzo Prescription Compliance        Medicare annual wellness visit, subsequent - Primary Z00.00              Need for vaccination with 20-polyvalent pneumococcal conjugate vaccine Z23        Relevant Orders        Pneumococcal conjugate vaccine, 20-valent (PREVNAR 20)         Other Visit Diagnoses           Codes     Antibiotic-induced tinnitus of left ear     H93.12, T36.95XA                            Atrial fibrillation with RVR (CMS/[facility]) - Primary I48.91           Atrial fibrillation - persistent with  Ventricular rate is well controlled (in sinus rhythm). ON ELIQUIS  No Shortness of breath. Continues   Metoprolol to 50 mg BID             Relevant Medications       metoprolol tartrate (Lopressor) 50 mg tablet       Benign hypertension I10                         HTN - hypertension well/controlled .Target BP < 130/80  achieved. Educate low salt diet and exercise with weight loss. Educate home self monitoring and  diary keeping. Educate risks of elevate blood pressure and benefits of prompt treatment.  Refill Amlodipine and Metoprolol               Relevant Medications       metoprolol tartrate (Lopressor) 50 mg tablet       BPH with obstruction/lower urinary tract symptoms N40.1, N13.8         BPH - Benign PROSTATIC Hypertrophy, + Dysuria/Nocturia, check PSA, prescribe Flomax 0.4mg qD and Avodart 0.5 mg qD vs. Finasteride 5 mg qD.  Educate exercises and Change in life style, prescribe vitamin E 400 IU qD. Consider referral to urology.            Chronic renal insufficiency N18.9         CRI - Chronic Renal Insuficiency. Secondary to DM/HTN/Atherosclerosis. Follow BUN/Cr. and lytes.   RENOPROTECTION with ACEI/ARB (). Monitor microalbuminuria. Avoid hypotension and renal hypoperfusion. The patient was instructed to avoid NSAIDS and educate compliance with medications to control HTN(hypertension) and cholesterol and diabetes.                                       Diabetes mellitus (CMS/[facility]) E11.9         DM - NIDDM  . Reviewed with patient / Will check  HbA1c and fasting blood sugars. Educate home self monitoring and diary keeping(reviewed with patient home blood sugar levels /diary). Educate extensively low calorie diet and weight loss with exercise. Reviewed BS- diary and Rx. Regimen. [place] renal protection with ARB/ACEI.               Educate compliance with diet and Rx. And educate risks and autcomes.     Refill Metformin 500 mg BID with meals                                                GERD without esophagitis K21.9         GERD - Acid reflux disease. Rx. PPI (Prilosec/Prevacid/Protonix/Nexium) and educate diet and life style changes. Referred patient to an endoscopy (EGD) and check H. Pylori titers.            Hypercholesterolemia E78.00         Hypercholesterolemia - Monitor lipid profile and educate patient upon risks of high cholesterol and targets. Educate diet and change in lifestyle and increase in exercises  - Refill:  Atorvastatin    and educate compliance with medication and diet.              IgG multiple myeloma (CMS/[facility]) C90.00         Monitor CBC and blood count and follow with hematology and stable now on Chemotherapy             Malaise and fatigue R53.81, R53.83         Fatigue - check CMP(metabolic panel and elctrolytes) , CBC(complete blood cell count), TSH(thyroid function). Insomnia may play a role and sleep studies(rule out sleep apnea) are recommended . Educate sleep hygiene. Consider anxiety disorder vs. depression. Consider Stress test, and 2DECHO.                                                                                                  Anemia in other chronic diseases classified elsewhere D63.8             Anemia - of bleed/chronic disease. Check CBC , B12 and Folic Acid levels, Iron/Ferritn/TIBC, refer patient to endoscopy (upper/ lower). Target HCT > 30 %. Check guaic stool              Atrial fibrillation with RVR (CMS/[facility]) - Primary I48.91           Atrial fibrillation - persistent with  Ventricular rate is well controlled (in sinus rhythm). ON eLIQUIS  No Shortness of breath. Continues   Metoprolol to 50 mg BID                                  Immunizations/Injections       very important  Immunizations from outside sources need reconciliation.       Influenza, High Dose Seasonal, Preservative Free12/11/2020, 10/1/2018  Influenza, Unspecified9/22/2010  Influenza, seasonal, [id number]/6/2021, 9/12/2016, 9/9/2015,  ... (3 more)  Pfizer COVID-19 vaccine, Fall 2023, 12 years and older, (30mcg/0.3mL)10/16/2023  Pfizer COVID-19 vaccine, bivalent, age 12 years and older (30 mcg/0.3 mL)3/17/2023  Pneumococcal conjugate vaccine, 13-valent (PREVNAR 13)6/23/2015  Pneumococcal conjugate vaccine, 20-valent (PREVNAR 20)1/12/2024  Pneumococcal polysaccharide vaccine, 23-valent, age 2 years and older (PNEUMOVAX 23)4/11/2018  Tdap vaccine, age 7 year and older (BOOSTRIX, ADACEL)6/23/2015  Zoster vaccine,  recombinant, adult (SHINGRIX)8/16/2021, 6/16/2021                  Immunizations/Injections       very important  Immunizations from outside sources need reconciliation.       Influenza, High Dose Seasonal, Preservative Free12/11/2020, 10/1/2018  Influenza, Unspecified9/22/2010  Influenza, seasonal, [id number]/6/2021, 9/12/2016, 9/9/2015,  ... (3 more)  Pfizer COVID-19 vaccine, Fall 2023, 12 years and older, (30mcg/0.3mL)10/16/2023  Pfizer COVID-19 vaccine, bivalent, age 12 years and older (30 mcg/0.3 mL)3/17/2023  Pneumococcal conjugate vaccine, 13-valent (PREVNAR 13)6/23/2015  Pneumococcal conjugate vaccine, 20-valent (PREVNAR 20)1/12/2024  Pneumococcal polysaccharide vaccine, 23-valent, age 2 years and older (PNEUMOVAX 23)4/11/2018  Tdap vaccine, age 7 year and older (BOOSTRIX, ADACEL)6/23/2015  Zoster vaccine, recombinant, adult (SHINGRIX)8/16/2021, 6/16/2021

## 2024-05-29 NOTE — ASSESSMENT & PLAN NOTE
DM - Hyperglycemia acfter the chemotherapy treatment  - NIDDM  . Reviewed with patient / Will check  HbA1c and fasting blood sugars. Educate home self monitoring and diary keeping(reviewed with patient home blood sugar levels /diary). Educate extensively low calorie diet and weight loss with exercise. Reviewed BS- diary and Rx. Regimen. [place] renal protection with ARB/ACEI.               Educate compliance with diet and Rx. And educate risks and autcomes.     Refill the Metformin 500 mg BID with meals

## 2024-05-30 LAB
ALBUMIN: 4 G/DL (ref 3.4–5)
ALPHA 1 GLOBULIN: 0.3 G/DL (ref 0.2–0.6)
ALPHA 2 GLOBULIN: 0.9 G/DL (ref 0.4–1.1)
BETA GLOBULIN: 0.8 G/DL (ref 0.5–1.2)
GAMMA GLOBULIN: 0.6 G/DL (ref 0.5–1.4)
M-PROTEIN 1: 0.1 G/DL
PATH REVIEW-SERUM PROTEIN ELECTROPHORESIS: ABNORMAL
PROTEIN ELECTROPHORESIS COMMENT: ABNORMAL

## 2024-06-07 DIAGNOSIS — I10 BENIGN HYPERTENSION: ICD-10-CM

## 2024-06-07 RX ORDER — METOPROLOL TARTRATE 50 MG/1
50 TABLET ORAL EVERY 12 HOURS
Qty: 180 TABLET | Refills: 0 | Status: SHIPPED | OUTPATIENT
Start: 2024-06-07

## 2024-06-11 ENCOUNTER — TELEMEDICINE (OUTPATIENT)
Dept: UROLOGY | Facility: CLINIC | Age: 74
End: 2024-06-11
Payer: MEDICARE

## 2024-06-11 DIAGNOSIS — R97.20 ELEVATED PSA: ICD-10-CM

## 2024-06-11 DIAGNOSIS — N13.8 BPH WITH OBSTRUCTION/LOWER URINARY TRACT SYMPTOMS: Primary | ICD-10-CM

## 2024-06-11 DIAGNOSIS — N40.1 BPH WITH OBSTRUCTION/LOWER URINARY TRACT SYMPTOMS: Primary | ICD-10-CM

## 2024-06-11 PROCEDURE — 99214 OFFICE O/P EST MOD 30 MIN: CPT | Performed by: UROLOGY

## 2024-06-11 RX ORDER — TAMSULOSIN HYDROCHLORIDE 0.4 MG/1
0.4 CAPSULE ORAL DAILY
Qty: 30 CAPSULE | Refills: 11 | Status: SHIPPED | OUTPATIENT
Start: 2024-06-11 | End: 2025-06-11

## 2024-06-11 RX ORDER — FINASTERIDE 5 MG/1
5 TABLET, FILM COATED ORAL DAILY
Qty: 30 TABLET | Refills: 11 | Status: SHIPPED | OUTPATIENT
Start: 2024-06-11 | End: 2025-06-11

## 2024-06-11 NOTE — PROGRESS NOTES
Scribed for Dr. Kendra Segovia by Samara Dolan.  I, Dr. Kendra Segovia, have personally reviewed and agreed with the information entered by the Virtual Scribe. 06/11/24    This visit was completed via telemedicine. All issues as below were discussed and addressed but no physical exam was performed unless allowed by visual confirmation. If it was felt that the patient should be evaluated in clinic, then they were directed there. Patient verbal consented to the visit.    History of Present Illness:  TODAY: (06/11/24)  Toby Ugalde is a 74 y.o. male with history of increasing PSA with imaging for MRI.    Presents virtually for results of elevated PSA and MRI imaging results.     The patient PSA IS 8.72. The patient is having no urinary concerns at this time.    TO REVIEW:   Toby Ugalde is a 74 y.o. male  with history of BPH, and elevated PSA with negative prostate biopsy 06/29/2018, PI-RADS 3 on MRI 01/31/2019, and negative MRI 12/13/2020, and low libido with a history of low testosterone.  History of multiple myeloma. He presents today to review PSA and Testerone levels.     MRI Prostate:    IMPRESSION:  Limited evaluation due to susceptibility artifact from right total  hip arthroplasty with near nondiagnostic diffusion-weighted and ADC  images.      Within these limitations, there are BPH changes of the transition  zone. Diffuse non nodular hypointensities within the peripheral zone,  without evidence of focally restricted diffusion ( PI-RADS 2).      PI-RADS 2 - Low (clinically significant cancer is unlikely to be  present).      I personally reviewed the images/study and I agree with the findings  as stated by resident physician Dr. Macey Granado. This study was  interpreted at [facility],  [place], [place].    Past Medical History:   Diagnosis Date    Dental caries on pit and fissure surface penetrating into dentin 01/12/2024    Deposits (accretions) on teeth 01/12/2024    Essential  (primary) hypertension 11/20/2013    Benign essential hypertension    History of hypercholesterolemia 01/12/2024    History of hypertension 01/12/2024    History of malignant neoplasm of prostate 01/12/2024    Impacted cerumen 01/12/2024    Impacted cerumen, bilateral 02/16/2024    Infective pharyngitis 01/12/2024    Other intervertebral disc degeneration, lumbar region     Lumbar degenerative disc disease    Overweight with body mass index (BMI) 25.0-29.9 02/02/2022    Partial loss of teeth due to caries, class I 01/12/2024    Personal history of malignant neoplasm of prostate     History of malignant neoplasm of prostate    Personal history of other diseases of the circulatory system     History of hypertension    Personal history of other diseases of the circulatory system 06/29/2022    History of atrial tachycardia    Personal history of other diseases of the circulatory system     History of essential hypertension    Personal history of other diseases of the musculoskeletal system and connective tissue     History of arthritis    Personal history of other diseases of the musculoskeletal system and connective tissue     History of acute gouty arthritis    Personal history of other diseases of the musculoskeletal system and connective tissue 01/30/2019    History of left foot drop    Personal history of other diseases of the respiratory system     History of bronchitis    Personal history of other endocrine, nutritional and metabolic disease     History of hypercholesterolemia    Personal history of other endocrine, nutritional and metabolic disease     History of diabetes mellitus    Personal history of other endocrine, nutritional and metabolic disease 01/17/2014    History of diabetes mellitus    Post-traumatic stress disorder, chronic 11/10/2023    Primary localized osteoarthritis of pelvic region and thigh 12/17/2018    Pure hypercholesterolemia, unspecified 11/20/2013    Low-density-lipoid-type (LDL)  hyperlipoproteinemia    Snoring     Snoring     Past Surgical History:   Procedure Laterality Date    BACK SURGERY  11/20/2013    Back Surgery    TONSILLECTOMY  11/20/2013    Tonsillectomy    TOTAL KNEE ARTHROPLASTY  11/20/2013    Knee Replacement     Family History   Problem Relation Name Age of Onset    Heart attack Mother      Diabetes Brother      Stroke Maternal Grandfather       Social History     Tobacco Use   Smoking Status Never    Passive exposure: Never   Smokeless Tobacco Never     Current Outpatient Medications   Medication Sig Dispense Refill    acetaminophen (Tylenol) 500 mg tablet Take by mouth.      allopurinol (Zyloprim) 300 mg tablet Take 1 tablet (300 mg) by mouth if needed.      amLODIPine (Norvasc) 5 mg tablet TAKE 1 TABLET BY MOUTH EVERY DAY 90 tablet 3    apixaban (Eliquis) 5 mg tablet TAKE ONE (1) TABLET BY MOUTH 2 TIMES A  tablet 3    atorvastatin (Lipitor) 40 mg tablet TAKE 1 TABLET BY MOUTH EVERY DAY (Patient not taking: Reported on 4/30/2024) 90 tablet 3    atorvastatin (Lipitor) 80 mg tablet Take 1 tablet (80 mg) by mouth once daily. 90 tablet 3    colchicine, gout, 0.6 mg tablet Take 1 tablet (0.6 mg) by mouth once daily as needed.      dextromethorphan-guaifenesin (Mucinex DM)  mg 12 hr tablet Take 1 tablet by mouth every 12 hours if needed for cough.      finasteride (Proscar) 5 mg tablet Take 1 tablet (5 mg) by mouth once daily as needed.      fludrocortisone (Florinef) 0.1 mg tablet Take 1 tablet (0.1 mg) by mouth once daily.      fluticasone (Flonase) 50 mcg/actuation nasal spray Administer 1 spray into affected nostril(s) once daily as needed.      lenalidomide (Revlimid) 5 mg capsule Take by mouth.      metFORMIN (Glucophage) 500 mg tablet Take 1 tablet (500 mg) by mouth if needed.      metoprolol tartrate (Lopressor) 50 mg tablet TAKE 1 TABLET BY MOUTH EVERY 12 HOURS 180 tablet 0    pantoprazole (ProtoNix) 40 mg EC tablet Take 1 tablet (40 mg) by mouth once daily.       sildenafil (Viagra) 100 mg tablet Take 1 tablet (100 mg) by mouth if needed.  1 HOUR BEFORE NEEDED.      tamsulosin (Flomax) 0.4 mg 24 hr capsule Take 1 capsule (0.4 mg) by mouth once daily.       No current facility-administered medications for this visit.     Allergies   Allergen Reactions    Mold Other     Past medical, surgical, family and social history in the chart was reviewed and is accurate including any additions to what is in this HPI.    Review of systems:   Pertinent information as listed in the HPI.        Objective   There were no vitals taken for this visit.  Physical Exam:  Exam limited 2/2 being a telehealth visit.     Lab Review:  Lab Results   Component Value Date    WBC 10.4 05/29/2024    RBC 4.27 (L) 05/29/2024    HGB 13.2 (L) 05/29/2024    HCT 38.5 (L) 05/29/2024     05/29/2024      Lab Results   Component Value Date    BUN 23 05/29/2024    CREATININE 1.45 (H) 05/29/2024      Lab Results   Component Value Date    PSA 8.72 (H) 04/30/2024    HGBA1C 7.7 (A) 02/28/2022    HGBA1C 7.0 (H) 08/03/2020     Lab Results   Component Value Date    CHOL 153 09/27/2023    TRIG 109 09/27/2023    HDL 70.6 09/27/2023    ALT 16 05/28/2024    AST 15 05/28/2024     05/29/2024    K 3.8 05/29/2024     05/29/2024    CO2 19 (L) 05/29/2024    TSH 1.28 05/09/2022    INR 1.3 (H) 04/09/2022        ASSESSMENT:  Problem List Items Addressed This Visit    None     PLAN:    1: BPH:    Follow-up once a year. We will monitor urinary symptoms.  I will give medication refills tamsulosin and finasteride for 1 year.    2: Elevated PSA:  MRI negative    We will stop doing PSA due to advanced age. PSA levels are now within an acceptable range.    E&M visit today is associated with current or anticipated ongoing medical care services related to a patient's single, serious condition or a complex condition.    All questions were answered to the patient’s satisfaction.  Patient agrees with the plan and wishes to  proceed.    Scribed for Dr. Kendra Segovia by Samara Dolan.  I, Dr. Kendra Segovia, have personally reviewed and agreed with the information entered by the Virtual Scribe. 06/11/24

## 2024-06-23 ASSESSMENT — ENCOUNTER SYMPTOMS
FEVER: 0
VOMITING: 0
NAUSEA: 0
NUMBNESS: 1
CONSTIPATION: 1
DIZZINESS: 0
BLOOD IN STOOL: 0
FATIGUE: 1
WOUND: 0
DIAPHORESIS: 1
CHILLS: 0
APPETITE CHANGE: 1
ARTHRALGIAS: 1
DIARRHEA: 1

## 2024-06-23 NOTE — PROGRESS NOTES
Patient ID: Toby Ugalde is a 74 y.o. male.    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 3/6/17- bone survey was negative for lytic lesions.  3/15/17-  bone marrow revealed 30% plasma cells with kappa predominance. 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 Cano for complaints of fatigue.  Labs notable for hyponatremia to 127, anemia with a HGB of 9.  Cr 1.99. Protein elevated to  10.7.  Uric acid up to 8.9.      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  Skeletal survey (3/2/22) - lesion in the left rib.   PET:    Expansile hypermetabolic lytic mass involving the left lateral 9th  rib, correlating to suspected plasmacytoma described on osseous  survey performed 03/02/2022.  2. Diffusely increased FDG uptake throughout the bone marrow which  may relate to anemia/marrow activation, with marrow involvement by  myeloma not excluded.  Treatment:   Initiated (3/29/22) treatment for his relapsed Myeloma with Revlimid 10 mg by mouth days 1-14/21 days, Daratuzumab (3/29, & 4/5/22), SC Velcade (3/29 & 4/5/22), & weekly Dexamethasone complicated by several hospitaliizations for syncope, PE ,afib due to  velcade and revlimid.  Velcade discontinued     Recv'd weekly single agent Daraumumab from 5/31 - 7/19/22. Initiated (8/25/22) Revlimid 10 mg on days 1-21/28 days.     Has had marked reduction in paraprotein and free lyte chains  with M protein : 1.4 g/dl on 4/19/22 ,    [place] free lyte/ratio of 4.49 mg/dl/4.45.    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         IgG multiple myeloma (Multi)   9/6/2023 Initial Diagnosis    IgG multiple myeloma (CMS/HCC)     10/17/2023 -  Chemotherapy    Daratumumab, 28 Day Cycles - Maintenance       Response:     Past Medical History:     Past Medical History:   Diagnosis Date    Dental caries on pit and fissure surface penetrating into dentin 01/12/2024    Deposits (accretions) on teeth 01/12/2024    Essential (primary) hypertension 11/20/2013    Benign essential hypertension    History of hypercholesterolemia 01/12/2024    History of hypertension 01/12/2024    History of malignant neoplasm of prostate 01/12/2024    Impacted cerumen 01/12/2024    Impacted cerumen, bilateral 02/16/2024    Infective pharyngitis 01/12/2024    Other intervertebral disc degeneration, lumbar region     Lumbar degenerative disc disease    Overweight with body mass index (BMI) 25.0-29.9 02/02/2022    Partial loss of teeth due to caries, class I 01/12/2024    Personal history of malignant neoplasm of prostate     History of malignant neoplasm of prostate    Personal history of other diseases of the circulatory system     History of hypertension    Personal history of other diseases of the circulatory system 06/29/2022    History of atrial tachycardia    Personal history of other diseases of the circulatory system     History of essential hypertension    Personal history of other diseases of the musculoskeletal system and connective tissue     History of arthritis    Personal history of other diseases of the musculoskeletal system and connective tissue     History of acute gouty arthritis    Personal history of other diseases of the musculoskeletal system and connective  tissue 01/30/2019    History of left foot drop    Personal history of other diseases of the respiratory system     History of bronchitis    Personal history of other endocrine, nutritional and metabolic disease     History of hypercholesterolemia    Personal history of other endocrine, nutritional and metabolic disease     History of diabetes mellitus    Personal history of other endocrine, nutritional and metabolic disease 01/17/2014    History of diabetes mellitus    Post-traumatic stress disorder, chronic 11/10/2023    Primary localized osteoarthritis of pelvic region and thigh 12/17/2018    Pure hypercholesterolemia, unspecified 11/20/2013    Low-density-lipoid-type (LDL) hyperlipoproteinemia    Snoring     Snoring       Surgical History:     Past Surgical History:   Procedure Laterality Date    BACK SURGERY  11/20/2013    Back Surgery    TONSILLECTOMY  11/20/2013    Tonsillectomy    TOTAL KNEE ARTHROPLASTY  11/20/2013    Knee Replacement      Family History:     Family History   Problem Relation Name Age of Onset    Heart attack Mother      Diabetes Brother      Stroke Maternal Grandfather       Social History:  Reported history of agent orange exposure while in [employer] at [employer]. Retired  and post .  from his wife who is health care  power of .  Two sons and 2 grandchildren (1)    Social History     Tobacco Use    Smoking status: Never     Passive exposure: Never    Smokeless tobacco: Never   Substance Use Topics    Alcohol use: Yes     Comment: occasional    Drug use: Never      -------------------------------------------------------------------------------------------------------  Subjective       HPI    KANDI ROSE is a 74 year old Male h/o pAfib,Pes  well controlled T2DM, HTN, gout, IgG kappa MM with history as above.  Still not clear whether patient has ONJ.  Finally is working with dentist at the [facility].  Patient states he had a bone density 11/23  which does not show osteopenia.    Toby presents to the clinic today (6/25/24) unaccompanied for follow up evaulation of his multiple myeloma, blood work, and is scheduled to receive C20 of monthly subcutaneous daratumumab.  Remains on lenalidomide 5 mg daily on days 1-21 of 28 day cycle.  Will start next cycle on 6/27/24.  [facility]    Energy level is lower at times.  Uses cane for ambulation.  Appetite varies, some days is good and some days is poor.  Lost a few pounds since his last visit.    Bowels chronically rotate between diarrhea and constipation.  Takes prn imodium and stool softener.  Chronic left hip pain, using tramadol and tylenol.  Following with orthopedics.  Numbness and tingling to bilateral hands and feet.  Has gout to bilateral feet.  States he recently saw urology and they said his prostate is doing okay.  Repots rash to right groin skin folds that itches at times.  Dropped off paperwork for grants to assist with paying medical bills to [facility] and states that he doesn't need to speak to  today.      Review of Systems   Constitutional:  Positive for appetite change, diaphoresis (occassional night sweats about once per week, can be drenching) and fatigue. Negative for chills and fever.   Respiratory: Negative.     Cardiovascular: Negative.    Gastrointestinal:  Positive for constipation and diarrhea. Negative for blood in stool, nausea and vomiting.   Genitourinary: Negative.     Musculoskeletal:  Positive for arthralgias and gait problem.   Skin:  Positive for rash. Negative for wound.   Neurological:  Positive for gait problem and numbness. Negative for dizziness and light-headedness.   Hematological: Negative.    All other systems reviewed and are negative.  -------------------------------------------------------------------------------------------------------  Objective   BSA: 2.11 meters squared  /85   Pulse 65    Temp 36.3 °C (97.3 °F)   Resp 18   Wt 89.1 kg (196 lb 6.9 oz)   SpO2 98%   BMI 27.40 kg/m²     Physical Exam  Vitals reviewed.   Constitutional:       Appearance: Normal appearance.   HENT:      Head: Normocephalic and atraumatic.      Mouth/Throat:      Mouth: Mucous membranes are moist.   Eyes:      Extraocular Movements: Extraocular movements intact.      Conjunctiva/sclera: Conjunctivae normal.      Pupils: Pupils are equal, round, and reactive to light.   Cardiovascular:      Rate and Rhythm: Normal rate and regular rhythm.      Pulses: Normal pulses.      Heart sounds: Normal heart sounds.   Pulmonary:      Effort: Pulmonary effort is normal.      Breath sounds: Normal breath sounds.   Abdominal:      General: Abdomen is flat. Bowel sounds are normal.      Palpations: Abdomen is soft. There is no mass.      Tenderness: There is no abdominal tenderness.   Musculoskeletal:         General: No swelling. Normal range of motion.   Lymphadenopathy:      Comments: No lymphadenopathy   Skin:     General: Skin is warm and dry.             Comments: Hyperpigmented skin with irritation to right lower abdominal fold.     Neurological:      General: No focal deficit present.      Mental Status: He is alert and oriented to person, place, and time.   Psychiatric:         Mood and Affect: Mood normal.         Behavior: Behavior normal.         Thought Content: Thought content normal.         Judgment: Judgment normal.     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      Restaging 3/2022 shows high burden disease with 90% tumorous involvement of the bone marrow ,      Initiated (3/29/22) treatment for his  relapsed Myeloma with Revlimid 10 mg by mouth days 1-14/21 days, Daratuzumab (3/29, & 4/5/22), SC Velcade (3/29 & 4/5/22), & weekly Dexamethasone. complicated by several hospilizations for syncope, PE ,afib due to  velcade and revlimid.       Recv'd weekly single agent Daraumumab from 5/31 - 7/19/22. Initiated (8/25/22) Revlimid 10 mg on days 1-21/28 days.     Will continue monthly casey, rev, & dex until evidence of disease progression or unacceptable toxicity.      6/25/24:  Laboratory results from today: hgb 11.2, plt 236, WBC 4.3, and ANC 1.37.  Kappa and lambda free light chains stable.  M-protein stable at 0.1.  Receiving dose 20 of monthly daratumumab today.  Continue revlimid 5 mg daily on days 1-21 out of 28 day cycle, [facility]  Ig [address] Free Light Chain   Date Value Ref Range Status   06/25/2024 1.45 0.33 - 1.94 mg/dL Final   05/28/2024 1.08 0.33 - 1.94 mg/dL Final   04/30/2024 1.34 0.33 - 1.94 mg/dL Final     Ig Lambda Free Light Chain   Date Value Ref Range Status   06/25/2024 1.03 0.57 - 2.63 mg/dL Final   05/28/2024 0.71 0.57 - 2.63 mg/dL Final   04/30/2024 0.64 0.57 - 2.63 mg/dL Final     Kappa/Lambda Ratio   Date Value Ref Range Status   06/25/2024 1.41 0.26 - 1.65 Final   05/28/2024 1.52 0.26 - 1.65 Final   04/30/2024 2.09 (H) 0.26 - 1.65 Final     M-PROTEIN 1   Date Value Ref Range Status   06/25/2024 0.1 (H)   g/dL Final   05/28/2024 0.1 (H)   g/dL Final   04/30/2024 0.1 (H)   g/dL Final      #Atrial fib on chronic anticoagulation  on Eliquis 5 mg by mouth twice a day.  Patient confirmed on 50 mg by mouth twice a day of metoprolol.  Follows with Dr. Moon     PE (4/9/22) - Small occlusive embolic thrombus within the right lower lobe pulmonary artery branch, medially and posteriorly.  Cont Eliquis 5 mg twice a day.     Bone health:  Xgeva held due to concerns of ONJ and prolonged treatment in the past.  Bone density December 2023 was normal.  Hold for now.      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  Pt had his tooth pulled in 2019, however with prolonged bone exposure/infection, eventually underwent OMSF surgery in 10/2021. Unclear if he has denosumab assoc jaw necrosis. Patient finally had his bad tooth (L upper molar) removed on 5/25/23. [facility]  6/25/24:  Reports that he recently finished dental work and has dentures.      Social Work:  4/30/24: Requested to have DWIGHT Dominguez OSW-C contact patient regarding his medical bills.   6/25/24:  States he recently dropped off paperwork for grants for [id number].  Declines wanting speak with  today.       Intertrigo of right lower abdominal skin fold:  -hyperpigmented, irritated skin with reports of pruritus.  Possible fungal intertrigo.  Prescribed topical clotrimazole 1% cream.  Advised to keep skin dry.       RTC:   7/23/24:  Infusion visit for casey C21  8/20/24:  Follow up in 2 months for provider visit and casey C22  -------------------------------------------------------------------------------------------------------  EWELINA Novak-RONAN

## 2024-06-24 RX ORDER — FAMOTIDINE 10 MG/ML
20 INJECTION INTRAVENOUS ONCE AS NEEDED
OUTPATIENT
Start: 2024-07-23

## 2024-06-24 RX ORDER — MONTELUKAST SODIUM 10 MG/1
10 TABLET ORAL ONCE
OUTPATIENT
Start: 2024-07-23

## 2024-06-24 RX ORDER — ACETAMINOPHEN 325 MG/1
650 TABLET ORAL ONCE
Status: CANCELLED | OUTPATIENT
Start: 2024-06-25

## 2024-06-24 RX ORDER — DIPHENHYDRAMINE HCL 50 MG
50 CAPSULE ORAL ONCE
Status: CANCELLED | OUTPATIENT
Start: 2024-06-25

## 2024-06-24 RX ORDER — MONTELUKAST SODIUM 10 MG/1
10 TABLET ORAL ONCE
Status: CANCELLED | OUTPATIENT
Start: 2024-06-25

## 2024-06-24 RX ORDER — EPINEPHRINE 0.3 MG/.3ML
0.3 INJECTION SUBCUTANEOUS EVERY 5 MIN PRN
Status: CANCELLED | OUTPATIENT
Start: 2024-06-25

## 2024-06-24 RX ORDER — EPINEPHRINE 0.3 MG/.3ML
0.3 INJECTION SUBCUTANEOUS EVERY 5 MIN PRN
OUTPATIENT
Start: 2024-07-23

## 2024-06-24 RX ORDER — ALBUTEROL SULFATE 0.83 MG/ML
3 SOLUTION RESPIRATORY (INHALATION) AS NEEDED
OUTPATIENT
Start: 2024-07-23

## 2024-06-24 RX ORDER — FAMOTIDINE 10 MG/ML
20 INJECTION INTRAVENOUS ONCE AS NEEDED
Status: CANCELLED | OUTPATIENT
Start: 2024-06-25

## 2024-06-24 RX ORDER — DIPHENHYDRAMINE HCL 50 MG
50 CAPSULE ORAL ONCE
OUTPATIENT
Start: 2024-07-23

## 2024-06-24 RX ORDER — ALBUTEROL SULFATE 0.83 MG/ML
3 SOLUTION RESPIRATORY (INHALATION) AS NEEDED
Status: CANCELLED | OUTPATIENT
Start: 2024-06-25

## 2024-06-24 RX ORDER — DIPHENHYDRAMINE HYDROCHLORIDE 50 MG/ML
50 INJECTION INTRAMUSCULAR; INTRAVENOUS AS NEEDED
Status: CANCELLED | OUTPATIENT
Start: 2024-06-25

## 2024-06-24 RX ORDER — ACETAMINOPHEN 325 MG/1
650 TABLET ORAL ONCE
OUTPATIENT
Start: 2024-07-23

## 2024-06-24 RX ORDER — DIPHENHYDRAMINE HYDROCHLORIDE 50 MG/ML
50 INJECTION INTRAMUSCULAR; INTRAVENOUS AS NEEDED
OUTPATIENT
Start: 2024-07-23

## 2024-06-25 ENCOUNTER — OFFICE VISIT (OUTPATIENT)
Dept: HEMATOLOGY/ONCOLOGY | Facility: CLINIC | Age: 74
End: 2024-06-25
Payer: MEDICARE

## 2024-06-25 ENCOUNTER — SPECIALTY PHARMACY (OUTPATIENT)
Dept: PHARMACY | Facility: CLINIC | Age: 74
End: 2024-06-25

## 2024-06-25 ENCOUNTER — LAB (OUTPATIENT)
Dept: LAB | Facility: LAB | Age: 74
End: 2024-06-25
Payer: MEDICARE

## 2024-06-25 ENCOUNTER — INFUSION (OUTPATIENT)
Dept: HEMATOLOGY/ONCOLOGY | Facility: CLINIC | Age: 74
End: 2024-06-25
Payer: MEDICARE

## 2024-06-25 VITALS
OXYGEN SATURATION: 98 % | SYSTOLIC BLOOD PRESSURE: 152 MMHG | BODY MASS INDEX: 27.4 KG/M2 | HEART RATE: 65 BPM | WEIGHT: 196.43 LBS | DIASTOLIC BLOOD PRESSURE: 85 MMHG | TEMPERATURE: 97.3 F | RESPIRATION RATE: 18 BRPM

## 2024-06-25 DIAGNOSIS — C90.00 IGG MULTIPLE MYELOMA (MULTI): ICD-10-CM

## 2024-06-25 DIAGNOSIS — C90.00 IGG MULTIPLE MYELOMA (MULTI): Primary | ICD-10-CM

## 2024-06-25 DIAGNOSIS — Z86.711 HISTORY OF PULMONARY EMBOLISM: ICD-10-CM

## 2024-06-25 DIAGNOSIS — L30.4 INTERTRIGO: ICD-10-CM

## 2024-06-25 DIAGNOSIS — I48.0 PAROXYSMAL ATRIAL FIBRILLATION (MULTI): ICD-10-CM

## 2024-06-25 LAB
ALBUMIN SERPL BCP-MCNC: 4.1 G/DL (ref 3.4–5)
ALP SERPL-CCNC: 71 U/L (ref 33–136)
ALT SERPL W P-5'-P-CCNC: 19 U/L (ref 10–52)
ANION GAP SERPL CALC-SCNC: 16 MMOL/L (ref 10–20)
AST SERPL W P-5'-P-CCNC: 17 U/L (ref 9–39)
BASOPHILS # BLD AUTO: 0.04 X10*3/UL (ref 0–0.1)
BASOPHILS NFR BLD AUTO: 0.9 %
BILIRUB SERPL-MCNC: 0.6 MG/DL (ref 0–1.2)
BUN SERPL-MCNC: 17 MG/DL (ref 6–23)
CALCIUM SERPL-MCNC: 8.7 MG/DL (ref 8.6–10.6)
CHLORIDE SERPL-SCNC: 103 MMOL/L (ref 98–107)
CO2 SERPL-SCNC: 24 MMOL/L (ref 21–32)
CREAT SERPL-MCNC: 1.32 MG/DL (ref 0.5–1.3)
EGFRCR SERPLBLD CKD-EPI 2021: 56 ML/MIN/1.73M*2
EOSINOPHIL # BLD AUTO: 0.04 X10*3/UL (ref 0–0.4)
EOSINOPHIL NFR BLD AUTO: 0.9 %
ERYTHROCYTE [DISTWIDTH] IN BLOOD BY AUTOMATED COUNT: 13.6 % (ref 11.5–14.5)
GLUCOSE SERPL-MCNC: 151 MG/DL (ref 74–99)
HCT VFR BLD AUTO: 32.6 % (ref 41–52)
HGB BLD-MCNC: 11.2 G/DL (ref 13.5–17.5)
IGA SERPL-MCNC: 68 MG/DL (ref 70–400)
IGG SERPL-MCNC: 641 MG/DL (ref 700–1600)
IGM SERPL-MCNC: 11 MG/DL (ref 40–230)
IMM GRANULOCYTES # BLD AUTO: 0 X10*3/UL (ref 0–0.5)
IMM GRANULOCYTES NFR BLD AUTO: 0 % (ref 0–0.9)
LYMPHOCYTES # BLD AUTO: 2.35 X10*3/UL (ref 0.8–3)
LYMPHOCYTES NFR BLD AUTO: 54.3 %
MCH RBC QN AUTO: 30.6 PG (ref 26–34)
MCHC RBC AUTO-ENTMCNC: 34.4 G/DL (ref 32–36)
MCV RBC AUTO: 89 FL (ref 80–100)
MONOCYTES # BLD AUTO: 0.53 X10*3/UL (ref 0.05–0.8)
MONOCYTES NFR BLD AUTO: 12.2 %
NEUTROPHILS # BLD AUTO: 1.37 X10*3/UL (ref 1.6–5.5)
NEUTROPHILS NFR BLD AUTO: 31.7 %
NRBC BLD-RTO: ABNORMAL /100{WBCS}
PLATELET # BLD AUTO: 236 X10*3/UL (ref 150–450)
POTASSIUM SERPL-SCNC: 3.5 MMOL/L (ref 3.5–5.3)
PROT SERPL-MCNC: 6.1 G/DL (ref 6.4–8.2)
PROT SERPL-MCNC: 6.1 G/DL (ref 6.4–8.2)
RBC # BLD AUTO: 3.66 X10*6/UL (ref 4.5–5.9)
SODIUM SERPL-SCNC: 139 MMOL/L (ref 136–145)
WBC # BLD AUTO: 4.3 X10*3/UL (ref 4.4–11.3)

## 2024-06-25 PROCEDURE — 96401 CHEMO ANTI-NEOPL SQ/IM: CPT

## 2024-06-25 PROCEDURE — 1160F RVW MEDS BY RX/DR IN RCRD: CPT

## 2024-06-25 PROCEDURE — 80053 COMPREHEN METABOLIC PANEL: CPT

## 2024-06-25 PROCEDURE — 2500000001 HC RX 250 WO HCPCS SELF ADMINISTERED DRUGS (ALT 637 FOR MEDICARE OP): Performed by: INTERNAL MEDICINE

## 2024-06-25 PROCEDURE — 82784 ASSAY IGA/IGD/IGG/IGM EACH: CPT

## 2024-06-25 PROCEDURE — 99214 OFFICE O/P EST MOD 30 MIN: CPT

## 2024-06-25 PROCEDURE — 84155 ASSAY OF PROTEIN SERUM: CPT

## 2024-06-25 PROCEDURE — 1159F MED LIST DOCD IN RCRD: CPT

## 2024-06-25 PROCEDURE — 3077F SYST BP >= 140 MM HG: CPT

## 2024-06-25 PROCEDURE — 36415 COLL VENOUS BLD VENIPUNCTURE: CPT

## 2024-06-25 PROCEDURE — 3079F DIAST BP 80-89 MM HG: CPT

## 2024-06-25 PROCEDURE — 85025 COMPLETE CBC W/AUTO DIFF WBC: CPT

## 2024-06-25 PROCEDURE — 1125F AMNT PAIN NOTED PAIN PRSNT: CPT

## 2024-06-25 PROCEDURE — 83521 IG LIGHT CHAINS FREE EACH: CPT

## 2024-06-25 PROCEDURE — 2500000004 HC RX 250 GENERAL PHARMACY W/ HCPCS (ALT 636 FOR OP/ED): Performed by: INTERNAL MEDICINE

## 2024-06-25 PROCEDURE — 84165 PROTEIN E-PHORESIS SERUM: CPT

## 2024-06-25 PROCEDURE — 2500000004 HC RX 250 GENERAL PHARMACY W/ HCPCS (ALT 636 FOR OP/ED): Mod: JZ,JG | Performed by: INTERNAL MEDICINE

## 2024-06-25 RX ORDER — FAMOTIDINE 10 MG/ML
20 INJECTION INTRAVENOUS ONCE AS NEEDED
Status: DISCONTINUED | OUTPATIENT
Start: 2024-06-25 | End: 2024-06-25 | Stop reason: HOSPADM

## 2024-06-25 RX ORDER — ACETAMINOPHEN 325 MG/1
650 TABLET ORAL ONCE
Status: COMPLETED | OUTPATIENT
Start: 2024-06-25 | End: 2024-06-25

## 2024-06-25 RX ORDER — ALBUTEROL SULFATE 0.83 MG/ML
3 SOLUTION RESPIRATORY (INHALATION) AS NEEDED
Status: DISCONTINUED | OUTPATIENT
Start: 2024-06-25 | End: 2024-06-25 | Stop reason: HOSPADM

## 2024-06-25 RX ORDER — DIPHENHYDRAMINE HYDROCHLORIDE 50 MG/ML
50 INJECTION INTRAMUSCULAR; INTRAVENOUS AS NEEDED
Status: DISCONTINUED | OUTPATIENT
Start: 2024-06-25 | End: 2024-06-25 | Stop reason: HOSPADM

## 2024-06-25 RX ORDER — TRAMADOL HYDROCHLORIDE 50 MG/1
50 TABLET ORAL EVERY 8 HOURS PRN
COMMUNITY

## 2024-06-25 RX ORDER — MONTELUKAST SODIUM 10 MG/1
10 TABLET ORAL ONCE
Status: COMPLETED | OUTPATIENT
Start: 2024-06-25 | End: 2024-06-25

## 2024-06-25 RX ORDER — EPINEPHRINE 0.3 MG/.3ML
0.3 INJECTION SUBCUTANEOUS EVERY 5 MIN PRN
Status: DISCONTINUED | OUTPATIENT
Start: 2024-06-25 | End: 2024-06-25 | Stop reason: HOSPADM

## 2024-06-25 RX ORDER — DIPHENHYDRAMINE HCL 50 MG
50 CAPSULE ORAL ONCE
Status: COMPLETED | OUTPATIENT
Start: 2024-06-25 | End: 2024-06-25

## 2024-06-25 RX ORDER — CLOTRIMAZOLE 1 %
CREAM (GRAM) TOPICAL 2 TIMES DAILY
Qty: 14 G | Refills: 1 | Status: SHIPPED | OUTPATIENT
Start: 2024-06-25 | End: 2024-07-09

## 2024-06-25 ASSESSMENT — ENCOUNTER SYMPTOMS
RESPIRATORY NEGATIVE: 1
LOSS OF SENSATION IN FEET: 0
DEPRESSION: 0
CARDIOVASCULAR NEGATIVE: 1
OCCASIONAL FEELINGS OF UNSTEADINESS: 0
HEMATOLOGIC/LYMPHATIC NEGATIVE: 1
LIGHT-HEADEDNESS: 0

## 2024-06-25 ASSESSMENT — PAIN SCALES - GENERAL: PAINLEVEL: 2

## 2024-06-26 LAB
ALBUMIN: 3.6 G/DL (ref 3.4–5)
ALPHA 1 GLOBULIN: 0.3 G/DL (ref 0.2–0.6)
ALPHA 2 GLOBULIN: 0.9 G/DL (ref 0.4–1.1)
BETA GLOBULIN: 0.7 G/DL (ref 0.5–1.2)
GAMMA GLOBULIN: 0.6 G/DL (ref 0.5–1.4)
KAPPA LC SERPL-MCNC: 1.45 MG/DL (ref 0.33–1.94)
KAPPA LC/LAMBDA SER: 1.41 {RATIO} (ref 0.26–1.65)
LAMBDA LC SERPL-MCNC: 1.03 MG/DL (ref 0.57–2.63)
M-PROTEIN 1: 0.1 G/DL
PATH REVIEW-SERUM PROTEIN ELECTROPHORESIS: ABNORMAL
PROTEIN ELECTROPHORESIS COMMENT: ABNORMAL

## 2024-06-28 PROBLEM — L30.4 INTERTRIGO: Status: ACTIVE | Noted: 2024-06-28

## 2024-07-08 ENCOUNTER — APPOINTMENT (OUTPATIENT)
Dept: PRIMARY CARE | Facility: CLINIC | Age: 74
End: 2024-07-08
Payer: MEDICARE

## 2024-07-22 ENCOUNTER — APPOINTMENT (OUTPATIENT)
Dept: PRIMARY CARE | Facility: CLINIC | Age: 74
End: 2024-07-22
Payer: MEDICARE

## 2024-07-22 VITALS
BODY MASS INDEX: 27.44 KG/M2 | RESPIRATION RATE: 16 BRPM | WEIGHT: 196 LBS | HEIGHT: 71 IN | DIASTOLIC BLOOD PRESSURE: 70 MMHG | SYSTOLIC BLOOD PRESSURE: 120 MMHG | OXYGEN SATURATION: 99 % | HEART RATE: 56 BPM

## 2024-07-22 DIAGNOSIS — R53.83 OTHER FATIGUE: ICD-10-CM

## 2024-07-22 DIAGNOSIS — I48.91 ATRIAL FIBRILLATION WITH RVR (MULTI): Primary | ICD-10-CM

## 2024-07-22 DIAGNOSIS — E11.9 TYPE 2 DIABETES MELLITUS WITHOUT COMPLICATION, WITHOUT LONG-TERM CURRENT USE OF INSULIN (MULTI): ICD-10-CM

## 2024-07-22 DIAGNOSIS — I10 BENIGN HYPERTENSION: ICD-10-CM

## 2024-07-22 DIAGNOSIS — R97.20 ELEVATED PSA: ICD-10-CM

## 2024-07-22 PROCEDURE — 99214 OFFICE O/P EST MOD 30 MIN: CPT | Performed by: INTERNAL MEDICINE

## 2024-07-22 PROCEDURE — 1158F ADVNC CARE PLAN TLK DOCD: CPT | Performed by: INTERNAL MEDICINE

## 2024-07-22 PROCEDURE — 3078F DIAST BP <80 MM HG: CPT | Performed by: INTERNAL MEDICINE

## 2024-07-22 PROCEDURE — 1159F MED LIST DOCD IN RCRD: CPT | Performed by: INTERNAL MEDICINE

## 2024-07-22 PROCEDURE — 1123F ACP DISCUSS/DSCN MKR DOCD: CPT | Performed by: INTERNAL MEDICINE

## 2024-07-22 PROCEDURE — 3074F SYST BP LT 130 MM HG: CPT | Performed by: INTERNAL MEDICINE

## 2024-07-22 PROCEDURE — 1036F TOBACCO NON-USER: CPT | Performed by: INTERNAL MEDICINE

## 2024-07-22 RX ORDER — LOSARTAN POTASSIUM AND HYDROCHLOROTHIAZIDE 12.5; 1 MG/1; MG/1
1 TABLET ORAL DAILY
COMMUNITY
Start: 2024-03-28

## 2024-07-22 ASSESSMENT — ENCOUNTER SYMPTOMS
EYES NEGATIVE: 1
HEMATOLOGIC/LYMPHATIC NEGATIVE: 1
PSYCHIATRIC NEGATIVE: 1
CONSTITUTIONAL NEGATIVE: 1
RESPIRATORY NEGATIVE: 1
CARDIOVASCULAR NEGATIVE: 1
NEUROLOGICAL NEGATIVE: 1
GASTROINTESTINAL NEGATIVE: 1
MUSCULOSKELETAL NEGATIVE: 1
ALLERGIC/IMMUNOLOGIC NEGATIVE: 1
ENDOCRINE NEGATIVE: 1

## 2024-07-22 NOTE — ASSESSMENT & PLAN NOTE
BPH - Benign PROSTATIC Hypertrophy, + Dysuria/Nocturia, check PSA, prescribe Flomax 0.4mg qD and Avodart 0.5 mg qD vs. Finasteride 5 mg qD.  Educate exercises and Change in life style, prescribe vitamin E 400 IU qD. Consider referral to urology. And follows with urology

## 2024-07-22 NOTE — ASSESSMENT & PLAN NOTE
Atrial fibrillation - persistent with  Ventricular rate is well controlled (in sinus rhythm). ON ELIQUIS  No Shortness of breath. Continues  Eliquis 5mg BID  Metoprolol to 50 mg BID

## 2024-07-22 NOTE — ASSESSMENT & PLAN NOTE
DM - NIDDM  . Reviewed with patient / Will check  HbA1c and fasting blood sugars. Educate home self monitoring and diary keeping(reviewed with patient home blood sugar levels /diary). Educate extensively low calorie diet and weight loss with exercise. Reviewed BS- diary and Rx. Regimen. [place] renal protection with ARB/ACEI.               Educate compliance with diet and Rx. And educate risks and autcomes.  Refill Metformin 500 mg BID with meals

## 2024-07-22 NOTE — PROGRESS NOTES
"Subjective   Patient ID: Toby Ugalde is a 74 y.o. male who presents for Follow-up (Follow up ).    HPI     Review of Systems   Constitutional: Negative.    HENT: Negative.     Eyes: Negative.    Respiratory: Negative.     Cardiovascular: Negative.    Gastrointestinal: Negative.    Endocrine: Negative.    Musculoskeletal: Negative.    Skin: Negative.    Allergic/Immunologic: Negative.    Neurological: Negative.    Hematological: Negative.    Psychiatric/Behavioral: Negative.     All other systems reviewed and are negative.      Objective   Pulse 56   Resp 16   Ht 1.803 m (5' 11\")   Wt 88.9 kg (196 lb)   SpO2 99%   BMI 27.34 kg/m²   Blood pressure 120/70, pulse 56, resp. rate 16, height 1.803 m (5' 11\"), weight 88.9 kg (196 lb), SpO2 99%.   Physical Exam  Vitals and nursing note reviewed.   Constitutional:       Appearance: Normal appearance.   HENT:      Head: Normocephalic and atraumatic.      Right Ear: Tympanic membrane, ear canal and external ear normal.      Left Ear: Tympanic membrane, ear canal and external ear normal. There is no impacted cerumen.      Nose: Nose normal.      Mouth/Throat:      Mouth: Mucous membranes are moist.      Pharynx: Oropharynx is clear.   Eyes:      Extraocular Movements: Extraocular movements intact.      Conjunctiva/sclera: Conjunctivae normal.      Pupils: Pupils are equal, round, and reactive to light.   Cardiovascular:      Rate and Rhythm: Normal rate and regular rhythm.      Pulses: Normal pulses.      Heart sounds: Normal heart sounds. No murmur heard.  Pulmonary:      Effort: Pulmonary effort is normal. No respiratory distress.      Breath sounds: Normal breath sounds. No stridor. No wheezing, rhonchi or rales.   Chest:      Chest wall: No tenderness.   Abdominal:      General: Abdomen is flat. Bowel sounds are normal. There is no distension.      Palpations: Abdomen is soft. There is no mass.      Tenderness: There is no abdominal tenderness. There is no right CVA " tenderness, left CVA tenderness, guarding or rebound.      Hernia: No hernia is present.   Musculoskeletal:         General: Normal range of motion.      Cervical back: Normal range of motion and neck supple.   Skin:     General: Skin is warm.      Capillary Refill: Capillary refill takes less than 2 seconds.   Neurological:      General: No focal deficit present.      Mental Status: He is alert.      Cranial Nerves: No cranial nerve deficit.      Sensory: No sensory deficit.      Motor: No weakness.      Coordination: Coordination normal.      Gait: Gait normal.      Deep Tendon Reflexes: Reflexes normal.   Psychiatric:         Mood and Affect: Mood normal.         Behavior: Behavior normal. Behavior is cooperative.         Thought Content: Thought content normal.         Judgment: Judgment normal.         Assessment/Plan   Problem List Items Addressed This Visit             ICD-10-CM    Atrial fibrillation with RVR (Multi) - Primary I48.91     Atrial fibrillation - persistent with  Ventricular rate is well controlled (in sinus rhythm). ON ELIQUIS  No Shortness of breath. Continues  Eliquis 5mg BID  Metoprolol to 50 mg BID               Benign hypertension I10     HTN - hypertension well/controlled .Target BP < 130/80  achieved. Educate low salt diet and exercise with weight loss. Educate home self monitoring and diary keeping. Educate risks of elevate blood pressure and benefits of prompt treatment.  Refill Amlodipine and Metoprolol          Elevated PSA R97.20     BPH - Benign PROSTATIC Hypertrophy, + Dysuria/Nocturia, check PSA, prescribe Flomax 0.4mg qD and Avodart 0.5 mg qD vs. Finasteride 5 mg qD.  Educate exercises and Change in life style, prescribe vitamin E 400 IU qD. Consider referral to urology. And follows with urology          Fatigue R53.83     Fatigue - check CMP(metabolic panel and elctrolytes) , CBC(complete blood cell count), TSH(thyroid function). Insomnia may play a role and sleep studies(rule  out sleep apnea) are recommended . Educate sleep hygiene. Consider anxiety disorder vs. depression. Consider Stress test, and 2DECHO.           Type 2 diabetes mellitus without complications (Multi) E11.9     DM - NIDDM  . Reviewed with patient / Will check  HbA1c and fasting blood sugars. Educate home self monitoring and diary keeping(reviewed with patient home blood sugar levels /diary). Educate extensively low calorie diet and weight loss with exercise. Reviewed BS- diary and Rx. Regimen. [place] renal protection with ARB/ACEI.               Educate compliance with diet and Rx. And educate risks and autcomes.  Refill Metformin 500 mg BID with meals                                                      Pancytopenia (Multi) D61.818        Monitor CBC and on Chemotherapy now feels very tired post chemo yesterday            Atrial fibrillation with RVR (Multi) - Primary I48.91       Atrial fibrillation - persistent with  Ventricular rate is well controlled (in sinus rhythm). ON ELIQUIS  No Shortness of breath. Continues   Metoprolol to 50 mg BID                 BPH with obstruction/lower urinary tract symptoms N40.1, N13.8       High PSA and follows with urology            GERD without esophagitis K21.9       GERD - Acid reflux disease. Rx. PPI (Prilosec/Prevacid/Protonix/Nexium) and educate diet and life style changes. Referred patient to an endoscopy (EGD) and check H. Pylori titers.            IgG multiple myeloma (Multi) C90.00     Malaise and fatigue R53.81, R53.83       Fatigue - check CMP(metabolic panel and elctrolytes) , CBC(complete blood cell count), TSH(thyroid function). Insomnia may play a role and sleep studies(rule out sleep apnea) are recommended . Educate sleep hygiene. Consider anxiety disorder vs. depression. Consider Stress test, and 2DECHO.              Type 2 diabetes mellitus without complications (Multi) E11.9       DM - Hyperglycemia acfter the chemotherapy treatment  - NIDDM  . Reviewed  with patient / Will check  HbA1c and fasting blood sugars. Educate home self monitoring and diary keeping(reviewed with patient home blood sugar levels /diary). Educate extensively low calorie diet and weight loss with exercise. Reviewed BS- diary and Rx. Regimen. [place] renal protection with ARB/ACEI.               Educate compliance with diet and Rx. And educate risks and autcomes.     Refill the Metformin 500 mg BID with meals                          Stage 3a chronic kidney disease (Multi) N18.31       CRI - Chronic Renal Insuficiency. Secondary to DM/HTN/Atherosclerosis. Follow BUN/Cr. and lytes.   RENOPROTECTION with ACEI/ARB (). Monitor microalbuminuria. Avoid hypotension and renal hypoperfusion. The patient was instructed to avoid NSAIDS and educate compliance with medications to control HTN(hypertension) and cholesterol and diabetes.                                                     Anemia in other chronic diseases classified elsewhere D63.8           Anemia - of bleed/chronic disease. Check CBC , B12 and Folic Acid levels, Iron/Ferritn/TIBC, refer patient to endoscopy (upper/ lower). Target HCT > 30 %. Check guaic stool             Atrial fibrillation with RVR (Multi) - Primary I48.91         Atrial fibrillation - persistent with  Ventricular rate is well controlled (in sinus rhythm). ON ELIQUIS  No Shortness of breath. Continues   Metoprolol to 50 mg BID                 Benign hypertension I10         HTN - hypertension well/controlled .Target BP < 130/80  achieved. Educate low salt diet and exercise with weight loss. Educate home self monitoring and diary keeping. Educate risks of elevate blood pressure and benefits of prompt treatment.  Refill Amlodipine and Metoprolol             BPH with obstruction/lower urinary tract symptoms N40.1, N13.8         BPH - Benign PROSTATIC Hypertrophy, + Dysuria/Nocturia, check PSA, prescribe Flomax 0.4mg qD and Avodart 0.5 mg qD vs. Finasteride 5 mg qD.  Educate  exercises and Change in life style, prescribe vitamin E 400 IU qD. Consider referral to urology.             Chronic renal insufficiency N18.9         CRI - Chronic Renal Insuficiency. Secondary to DM/HTN/Atherosclerosis. Follow BUN/Cr. and lytes.   RENOPROTECTION with ACEI/ARB (). Monitor microalbuminuria. Avoid hypotension and renal hypoperfusion. The patient was instructed to avoid NSAIDS and educate compliance with medications to control HTN(hypertension) and cholesterol and diabetes.                                        Fatigue R53.83         Fatigue - check CMP(metabolic panel and elctrolytes) , CBC(complete blood cell count), TSH(thyroid function). Insomnia may play a role and sleep studies(rule out sleep apnea) are recommended . Educate sleep hygiene. Consider anxiety disorder vs. depression. Consider Stress test, and 2DECHO.              GERD without esophagitis K21.9         GERD - Acid reflux disease. Rx. PPI (Prilosec/Prevacid/Protonix/Nexium) and educate diet and life style changes. Referred patient to an endoscopy (EGD) and check H. Pylori titers.             Hypercholesterolemia E78.00         Hypercholesterolemia - Monitor lipid profile and educate patient upon risks of high cholesterol and targets. Educate diet and change in lifestyle and increase in exercises - Refill:  Atorvastatin    and educate compliance with medication and diet.               IgG multiple myeloma (Multi) C90.00         Monitor CBC and blood count and follow with hematology and stable now on Chemotherapy              Type 2 diabetes mellitus without complications (Multi) E11.9         DM - NIDDM  . Reviewed with patient / Will check  HbA1c and fasting blood sugars. Educate home self monitoring and diary keeping(reviewed with patient home blood sugar levels /diary). Educate extensively low calorie diet and weight loss with exercise. Reviewed BS- diary and Rx. Regimen. [place] renal protection with ARB/ACEI.                Educate compliance with diet and Rx. And educate risks and autcomes.     Refill the Metformin 500 mg BID with meals                                                                   Paroxysmal atrial fibrillation (CMS/[facility]) I48.0             Atrial fibrillation - persistent with  Ventricular rate is well controlled (in sinus rhythm). On Apixaban  No Shortness of breath. Continues   Metoprolol to 25 mg BID              Benign hypertension I10           HTN - hypertension well/controlled .Target BP < 130/80  achieved. Educate low salt diet and exercise with weight loss. Educate home self monitoring and diary keeping. Educate risks of elevate blood pressure and benefits of prompt treatment.  Refill Amlodipine and Metoprolol             BPH with obstruction/lower urinary tract symptoms N40.1, N13.8           BPH - Benign PROSTATIC Hypertrophy, + Dysuria/Nocturia, check PSA, prescribe Flomax 0.4mg qD and Avodart 0.5 mg qD vs. Finasteride 5 mg qD.  Educate exercises and Change in life style, prescribe vitamin E 400 IU qD. Consider referral to urology.             Chronic renal insufficiency N18.9           CRI - Chronic Renal Insuficiency. Secondary to DM/HTN/Atherosclerosis. Follow BUN/Cr. and lytes.   RENOPROTECTION with ACEI/ARB (). Monitor microalbuminuria. Avoid hypotension and renal hypoperfusion. The patient was instructed to avoid NSAIDS and educate compliance with medications to control HTN(hypertension) and cholesterol and diabetes.                                        Diabetes mellitus (CMS/[facility]) E11.9           DM - NIDDM  . Reviewed with patient / Will check  HbA1c and fasting blood sugars. Educate home self monitoring and diary keeping(reviewed with patient home blood sugar levels /diary). Educate extensively low calorie diet and weight loss with exercise. Reviewed BS- diary and Rx. Regimen. [place] renal protection with ARB/ACEI.               Educate compliance with diet and Rx. And educate risks and  autcomes.     Refill Metformin 500 mg BID with meals              GERD without esophagitis K21.9           GERD - Acid reflux disease. Rx. PPI (Prilosec/Prevacid/Protonix/Nexium) and educate diet and life style changes. Referred patient to an endoscopy (EGD) and check H. Pylori titers.             Lumbar degenerative disc disease M51.36           DDD - Degenerative disc disease of the Lumbo-Sacral (LS)/Cervical (C)  spine. Educate exercises and referred patient to Physical Therapy (PT). Ordered X-Ray's of the LS/C spine. Consider MRI. Radiculopathy in the distribution of L4-L5-S1/C3-C4-C5 nerve roots, needs NSAIDS (Naprosin 500mg BID vs. Arthrotec 75mg BID or Prednisone taper (Medrol dose pack), Flexeril 10 mg qHS, heat application, and pain control with Tylenol vs. Vicodin 5/325 mg TID.              Relevant Medications         traMADol (Ultram) 50 mg tablet         Other Relevant Orders         Opiate/Opioid/Benzo Prescription Compliance         Medicare annual wellness visit, subsequent - Primary Z00.00                Need for vaccination with 20-polyvalent pneumococcal conjugate vaccine Z23         Relevant Orders         Pneumococcal conjugate vaccine, 20-valent (PREVNAR 20)          Other Visit Diagnoses           Codes     Antibiotic-induced tinnitus of left ear     H93.12, T36.95XA                            Atrial fibrillation with RVR (CMS/HCC) - Primary I48.91           Atrial fibrillation - persistent with  Ventricular rate is well controlled (in sinus rhythm). ON ELIQUIS  No Shortness of breath. Continues   Metoprolol to 50 mg BID             Relevant Medications       metoprolol tartrate (Lopressor) 50 mg tablet       Benign hypertension I10                         HTN - hypertension well/controlled .Target BP < 130/80  achieved. Educate low salt diet and exercise with weight loss. Educate home self monitoring and diary keeping. Educate risks of elevate blood pressure and benefits of prompt treatment.   Refill Amlodipine and Metoprolol               Relevant Medications       metoprolol tartrate (Lopressor) 50 mg tablet       BPH with obstruction/lower urinary tract symptoms N40.1, N13.8         BPH - Benign PROSTATIC Hypertrophy, + Dysuria/Nocturia, check PSA, prescribe Flomax 0.4mg qD and Avodart 0.5 mg qD vs. Finasteride 5 mg qD.  Educate exercises and Change in life style, prescribe vitamin E 400 IU qD. Consider referral to urology.            Chronic renal insufficiency N18.9         CRI - Chronic Renal Insuficiency. Secondary to DM/HTN/Atherosclerosis. Follow BUN/Cr. and lytes.   RENOPROTECTION with ACEI/ARB (). Monitor microalbuminuria. Avoid hypotension and renal hypoperfusion. The patient was instructed to avoid NSAIDS and educate compliance with medications to control HTN(hypertension) and cholesterol and diabetes.                                       Diabetes mellitus (CMS/[facility]) E11.9         DM - NIDDM  . Reviewed with patient / Will check  HbA1c and fasting blood sugars. Educate home self monitoring and diary keeping(reviewed with patient home blood sugar levels /diary). Educate extensively low calorie diet and weight loss with exercise. Reviewed BS- diary and Rx. Regimen. [place] renal protection with ARB/ACEI.               Educate compliance with diet and Rx. And educate risks and autcomes.     Refill Metformin 500 mg BID with meals                                                GERD without esophagitis K21.9         GERD - Acid reflux disease. Rx. PPI (Prilosec/Prevacid/Protonix/Nexium) and educate diet and life style changes. Referred patient to an endoscopy (EGD) and check H. Pylori titers.            Hypercholesterolemia E78.00         Hypercholesterolemia - Monitor lipid profile and educate patient upon risks of high cholesterol and targets. Educate diet and change in lifestyle and increase in exercises - Refill:  Atorvastatin    and educate compliance with medication and diet.               IgG multiple myeloma (CMS/[facility]) C90.00         Monitor CBC and blood count and follow with hematology and stable now on Chemotherapy             Malaise and fatigue R53.81, R53.83         Fatigue - check CMP(metabolic panel and elctrolytes) , CBC(complete blood cell count), TSH(thyroid function). Insomnia may play a role and sleep studies(rule out sleep apnea) are recommended . Educate sleep hygiene. Consider anxiety disorder vs. depression. Consider Stress test, and 2DECHO.                                                                                                                         Anemia in other chronic diseases classified elsewhere D63.8             Anemia - of bleed/chronic disease. Check CBC , B12 and Folic Acid levels, Iron/Ferritn/TIBC, refer patient to endoscopy (upper/ lower). Target HCT > 30 %. Check guaic stool              Atrial fibrillation with RVR (CMS/[facility]) - Primary I48.91           Atrial fibrillation - persistent with  Ventricular rate is well controlled (in sinus rhythm). ON eLIQUIS  No Shortness of breath. Continues   Metoprolol to 50 mg BID                                  Immunizations/Injections       very important  Immunizations from outside sources need reconciliation.       Influenza, High Dose Seasonal, Preservative Free12/11/2020, 10/1/2018  Influenza, Unspecified9/22/2010  Influenza, seasonal, [id number]/6/2021, 9/12/2016, 9/9/2015,  ... (3 more)  Pfizer COVID-19 vaccine, Fall 2023, 12 years and older, (30mcg/0.3mL)10/16/2023  Pfizer COVID-19 vaccine, bivalent, age 12 years and older (30 mcg/0.3 mL)3/17/2023  Pneumococcal conjugate vaccine, 13-valent (PREVNAR 13)6/23/2015  Pneumococcal conjugate vaccine, 20-valent (PREVNAR 20)1/12/2024  Pneumococcal polysaccharide vaccine, 23-valent, age 2 years and older (PNEUMOVAX 23)4/11/2018  Tdap vaccine, age 7 year and older (BOOSTRIX, ADACEL)6/23/2015  Zoster vaccine, recombinant, adult (SHINGRIX)8/16/2021, 6/16/2021                   Immunizations/Injections       very important  Immunizations from outside sources need reconciliation.       Influenza, High Dose Seasonal, Preservative Free12/11/2020, 10/1/2018  Influenza, Unspecified9/22/2010  Influenza, seasonal, [id number]/6/2021, 9/12/2016, 9/9/2015,  ... (3 more)  Pfizer COVID-19 vaccine, Fall 2023, 12 years and older, (30mcg/0.3mL)10/16/2023  Pfizer COVID-19 vaccine, bivalent, age 12 years and older (30 mcg/0.3 mL)3/17/2023  Pneumococcal conjugate vaccine, 13-valent (PREVNAR 13)6/23/2015  Pneumococcal conjugate vaccine, 20-valent (PREVNAR 20)1/12/2024  Pneumococcal polysaccharide vaccine, 23-valent, age 2 years and older (PNEUMOVAX 23)4/11/2018  Tdap vaccine, age 7 year and older (BOOSTRIX, ADACEL)6/23/2015  Zoster vaccine, recombinant, adult (SHINGRIX)8/16/2021, 6/16/2021                            Immunizations/Injections      very important  Immunizations from outside sources need reconciliation.      Influenza, High Dose Seasonal, Preservative Free12/11/2020, 10/1/2018  Influenza, Unspecified9/22/2010  Influenza, seasonal, [id number]/6/2021, 9/12/2016, 9/9/2015,  ... (3 more)  Pfizer COVID-19 vaccine, Fall 2023, 12 years and older, (30mcg/0.3mL)10/16/2023  Pfizer COVID-19 vaccine, bivalent, age 12 years and older (30 mcg/0.3 mL)3/17/2023  Pneumococcal conjugate vaccine, 13-valent (PREVNAR 13)6/23/2015  Pneumococcal conjugate vaccine, 20-valent (PREVNAR 20)1/12/2024  Pneumococcal polysaccharide vaccine, 23-valent, age 2 years and older (PNEUMOVAX 23)4/11/2018  Tdap vaccine, age 7 year and older (BOOSTRIX, ADACEL)6/23/2015  Zoster vaccine, recombinant, adult (SHINGRIX)8/16/2021, 6/16/2021

## 2024-07-23 ENCOUNTER — INFUSION (OUTPATIENT)
Dept: HEMATOLOGY/ONCOLOGY | Facility: CLINIC | Age: 74
End: 2024-07-23
Payer: MEDICARE

## 2024-07-23 ENCOUNTER — APPOINTMENT (OUTPATIENT)
Dept: HEMATOLOGY/ONCOLOGY | Facility: CLINIC | Age: 74
End: 2024-07-23
Payer: MEDICARE

## 2024-07-23 ENCOUNTER — LAB (OUTPATIENT)
Dept: LAB | Facility: CLINIC | Age: 74
End: 2024-07-23
Payer: MEDICARE

## 2024-07-23 VITALS
HEART RATE: 68 BPM | BODY MASS INDEX: 27.43 KG/M2 | WEIGHT: 196.65 LBS | TEMPERATURE: 97 F | RESPIRATION RATE: 16 BRPM | SYSTOLIC BLOOD PRESSURE: 139 MMHG | DIASTOLIC BLOOD PRESSURE: 77 MMHG

## 2024-07-23 DIAGNOSIS — C90.00: ICD-10-CM

## 2024-07-23 DIAGNOSIS — C90.00 IGG MULTIPLE MYELOMA (MULTI): ICD-10-CM

## 2024-07-23 LAB
ALBUMIN SERPL BCP-MCNC: 4 G/DL (ref 3.4–5)
ALP SERPL-CCNC: 62 U/L (ref 33–136)
ALT SERPL W P-5'-P-CCNC: 23 U/L (ref 10–52)
ANION GAP SERPL CALC-SCNC: 14 MMOL/L (ref 10–20)
AST SERPL W P-5'-P-CCNC: 19 U/L (ref 9–39)
BASOPHILS # BLD AUTO: 0.03 X10*3/UL (ref 0–0.1)
BASOPHILS NFR BLD AUTO: 0.8 %
BILIRUB SERPL-MCNC: 0.6 MG/DL (ref 0–1.2)
BUN SERPL-MCNC: 20 MG/DL (ref 6–23)
CALCIUM SERPL-MCNC: 8 MG/DL (ref 8.6–10.6)
CHLORIDE SERPL-SCNC: 105 MMOL/L (ref 98–107)
CO2 SERPL-SCNC: 25 MMOL/L (ref 21–32)
CREAT SERPL-MCNC: 1.4 MG/DL (ref 0.5–1.3)
EGFRCR SERPLBLD CKD-EPI 2021: 52 ML/MIN/1.73M*2
EOSINOPHIL # BLD AUTO: 0.11 X10*3/UL (ref 0–0.4)
EOSINOPHIL NFR BLD AUTO: 2.9 %
ERYTHROCYTE [DISTWIDTH] IN BLOOD BY AUTOMATED COUNT: 14 % (ref 11.5–14.5)
GLUCOSE SERPL-MCNC: 114 MG/DL (ref 74–99)
HCT VFR BLD AUTO: 32.9 % (ref 41–52)
HGB BLD-MCNC: 11 G/DL (ref 13.5–17.5)
IGA SERPL-MCNC: 59 MG/DL (ref 70–400)
IGG SERPL-MCNC: 561 MG/DL (ref 700–1600)
IGM SERPL-MCNC: 10 MG/DL (ref 40–230)
IMM GRANULOCYTES # BLD AUTO: 0 X10*3/UL (ref 0–0.5)
IMM GRANULOCYTES NFR BLD AUTO: 0 % (ref 0–0.9)
LYMPHOCYTES # BLD AUTO: 1.68 X10*3/UL (ref 0.8–3)
LYMPHOCYTES NFR BLD AUTO: 44.7 %
MCH RBC QN AUTO: 30.6 PG (ref 26–34)
MCHC RBC AUTO-ENTMCNC: 33.4 G/DL (ref 32–36)
MCV RBC AUTO: 92 FL (ref 80–100)
MONOCYTES # BLD AUTO: 0.56 X10*3/UL (ref 0.05–0.8)
MONOCYTES NFR BLD AUTO: 14.9 %
NEUTROPHILS # BLD AUTO: 1.38 X10*3/UL (ref 1.6–5.5)
NEUTROPHILS NFR BLD AUTO: 36.7 %
NRBC BLD-RTO: ABNORMAL /100{WBCS}
PLATELET # BLD AUTO: 170 X10*3/UL (ref 150–450)
POTASSIUM SERPL-SCNC: 3.7 MMOL/L (ref 3.5–5.3)
PROT SERPL-MCNC: 5.8 G/DL (ref 6.4–8.2)
PROT SERPL-MCNC: 5.8 G/DL (ref 6.4–8.2)
RBC # BLD AUTO: 3.59 X10*6/UL (ref 4.5–5.9)
SODIUM SERPL-SCNC: 140 MMOL/L (ref 136–145)
WBC # BLD AUTO: 3.8 X10*3/UL (ref 4.4–11.3)

## 2024-07-23 PROCEDURE — 84155 ASSAY OF PROTEIN SERUM: CPT

## 2024-07-23 PROCEDURE — 82784 ASSAY IGA/IGD/IGG/IGM EACH: CPT

## 2024-07-23 PROCEDURE — 2500000004 HC RX 250 GENERAL PHARMACY W/ HCPCS (ALT 636 FOR OP/ED): Mod: JZ,JG | Performed by: INTERNAL MEDICINE

## 2024-07-23 PROCEDURE — 85025 COMPLETE CBC W/AUTO DIFF WBC: CPT

## 2024-07-23 PROCEDURE — 96401 CHEMO ANTI-NEOPL SQ/IM: CPT

## 2024-07-23 PROCEDURE — 80053 COMPREHEN METABOLIC PANEL: CPT

## 2024-07-23 PROCEDURE — 83521 IG LIGHT CHAINS FREE EACH: CPT

## 2024-07-23 PROCEDURE — 2500000001 HC RX 250 WO HCPCS SELF ADMINISTERED DRUGS (ALT 637 FOR MEDICARE OP): Performed by: INTERNAL MEDICINE

## 2024-07-23 PROCEDURE — 84165 PROTEIN E-PHORESIS SERUM: CPT

## 2024-07-23 PROCEDURE — 36415 COLL VENOUS BLD VENIPUNCTURE: CPT

## 2024-07-23 PROCEDURE — 2500000004 HC RX 250 GENERAL PHARMACY W/ HCPCS (ALT 636 FOR OP/ED): Performed by: INTERNAL MEDICINE

## 2024-07-23 RX ORDER — MONTELUKAST SODIUM 10 MG/1
10 TABLET ORAL ONCE
Status: COMPLETED | OUTPATIENT
Start: 2024-07-23 | End: 2024-07-23

## 2024-07-23 RX ORDER — ACETAMINOPHEN 325 MG/1
650 TABLET ORAL ONCE
Status: COMPLETED | OUTPATIENT
Start: 2024-07-23 | End: 2024-07-23

## 2024-07-23 RX ORDER — DIPHENHYDRAMINE HCL 50 MG
50 CAPSULE ORAL ONCE
Status: COMPLETED | OUTPATIENT
Start: 2024-07-23 | End: 2024-07-23

## 2024-07-23 RX ORDER — FAMOTIDINE 10 MG/ML
20 INJECTION INTRAVENOUS ONCE AS NEEDED
Status: DISCONTINUED | OUTPATIENT
Start: 2024-07-23 | End: 2024-07-23 | Stop reason: HOSPADM

## 2024-07-23 RX ORDER — ALBUTEROL SULFATE 0.83 MG/ML
3 SOLUTION RESPIRATORY (INHALATION) AS NEEDED
Status: DISCONTINUED | OUTPATIENT
Start: 2024-07-23 | End: 2024-07-23 | Stop reason: HOSPADM

## 2024-07-23 RX ORDER — EPINEPHRINE 0.3 MG/.3ML
0.3 INJECTION SUBCUTANEOUS EVERY 5 MIN PRN
Status: DISCONTINUED | OUTPATIENT
Start: 2024-07-23 | End: 2024-07-23 | Stop reason: HOSPADM

## 2024-07-23 RX ORDER — DIPHENHYDRAMINE HYDROCHLORIDE 50 MG/ML
50 INJECTION INTRAMUSCULAR; INTRAVENOUS AS NEEDED
Status: DISCONTINUED | OUTPATIENT
Start: 2024-07-23 | End: 2024-07-23 | Stop reason: HOSPADM

## 2024-07-23 ASSESSMENT — PAIN SCALES - GENERAL: PAINLEVEL: 3

## 2024-07-24 LAB
ALBUMIN: 3.5 G/DL (ref 3.4–5)
ALPHA 1 GLOBULIN: 0.3 G/DL (ref 0.2–0.6)
ALPHA 2 GLOBULIN: 0.8 G/DL (ref 0.4–1.1)
BETA GLOBULIN: 0.7 G/DL (ref 0.5–1.2)
GAMMA GLOBULIN: 0.5 G/DL (ref 0.5–1.4)
KAPPA LC SERPL-MCNC: 1.53 MG/DL (ref 0.33–1.94)
KAPPA LC/LAMBDA SER: 1.44 {RATIO} (ref 0.26–1.65)
LAMBDA LC SERPL-MCNC: 1.06 MG/DL (ref 0.57–2.63)
M-PROTEIN 1: 0.1 G/DL
PATH REVIEW-SERUM PROTEIN ELECTROPHORESIS: ABNORMAL
PROTEIN ELECTROPHORESIS COMMENT: ABNORMAL

## 2024-08-07 ENCOUNTER — OFFICE VISIT (OUTPATIENT)
Dept: CARDIOLOGY | Facility: HOSPITAL | Age: 74
End: 2024-08-07
Payer: MEDICARE

## 2024-08-07 VITALS
RESPIRATION RATE: 18 BRPM | BODY MASS INDEX: 26.48 KG/M2 | SYSTOLIC BLOOD PRESSURE: 149 MMHG | DIASTOLIC BLOOD PRESSURE: 77 MMHG | WEIGHT: 185 LBS | HEIGHT: 70 IN | HEART RATE: 69 BPM | OXYGEN SATURATION: 100 %

## 2024-08-07 DIAGNOSIS — I48.91 ATRIAL FIBRILLATION WITH RVR (MULTI): Primary | ICD-10-CM

## 2024-08-07 LAB
ATRIAL RATE: 69 BPM
P AXIS: 31 DEGREES
P OFFSET: 179 MS
P ONSET: 128 MS
PR INTERVAL: 160 MS
Q ONSET: 208 MS
QRS COUNT: 11 BEATS
QRS DURATION: 92 MS
QT INTERVAL: 408 MS
QTC CALCULATION(BAZETT): 437 MS
QTC FREDERICIA: 427 MS
R AXIS: -6 DEGREES
T AXIS: 45 DEGREES
T OFFSET: 412 MS
VENTRICULAR RATE: 69 BPM

## 2024-08-07 PROCEDURE — 3077F SYST BP >= 140 MM HG: CPT | Performed by: INTERNAL MEDICINE

## 2024-08-07 PROCEDURE — 93010 ELECTROCARDIOGRAM REPORT: CPT | Performed by: INTERNAL MEDICINE

## 2024-08-07 PROCEDURE — 3078F DIAST BP <80 MM HG: CPT | Performed by: INTERNAL MEDICINE

## 2024-08-07 PROCEDURE — 1123F ACP DISCUSS/DSCN MKR DOCD: CPT | Performed by: INTERNAL MEDICINE

## 2024-08-07 PROCEDURE — 1159F MED LIST DOCD IN RCRD: CPT | Performed by: INTERNAL MEDICINE

## 2024-08-07 PROCEDURE — 99214 OFFICE O/P EST MOD 30 MIN: CPT | Performed by: INTERNAL MEDICINE

## 2024-08-07 PROCEDURE — 93005 ELECTROCARDIOGRAM TRACING: CPT | Performed by: INTERNAL MEDICINE

## 2024-08-07 ASSESSMENT — ENCOUNTER SYMPTOMS
OCCASIONAL FEELINGS OF UNSTEADINESS: 0
LOSS OF SENSATION IN FEET: 0
DEPRESSION: 0

## 2024-08-07 NOTE — PROGRESS NOTES
Referred by Geoffrey Lei MD provider found for No chief complaint on file.       Toby Ugalde is a 74 y.o. year old male patient with h/o PAF on medical treatment. Presents for 1 year follow up.    PMHx/PSHx: As above    FamHx: unremarkable     Allergies:  Allergies   Allergen Reactions    Mold Other        Review of Systems    Constitutional: not feeling tired.   Eyes: no eyesight problems.   ENT: no hearing loss and no nosebleeds.   Cardiovascular: no intermittent leg claudication and as noted in HPI.   Respiratory: no chronic cough and no shortness of breath.   Gastrointestinal: no change in bowel habits and no blood in stools.   Genitourinary: no urinary frequency and no hematuria.   Skin: no skin rashes.   Neurological: no seizures and no frequent falls.   Psychiatric: no depression and not suicidal.   All other systems have been reviewed and are negative for complaint.     Outpatient Medications:  Current Outpatient Medications   Medication Instructions    acetaminophen (Tylenol) 500 mg tablet oral    allopurinol (ZYLOPRIM) 300 mg, oral, As needed    amLODIPine (NORVASC) 5 mg, oral, Daily    apixaban (Eliquis) 5 mg tablet TAKE ONE (1) TABLET BY MOUTH 2 TIMES A DAY    atorvastatin (LIPITOR) 80 mg, oral, Daily    colchicine 0.6 mg, oral, Daily PRN    dextromethorphan-guaifenesin (Mucinex DM)  mg 12 hr tablet 1 tablet, oral, Every 12 hours PRN    finasteride (PROSCAR) 5 mg, oral, Daily    fludrocortisone (Florinef) 0.1 mg tablet 1 tablet, oral, Daily    fluticasone (Flonase) 50 mcg/actuation nasal spray 1 spray, nasal, Daily PRN    lenalidomide (Revlimid) 5 mg capsule oral    losartan-hydrochlorothiazide (Hyzaar) 100-12.5 mg tablet 1 tablet, oral, Daily    metFORMIN (GLUCOPHAGE) 500 mg, oral, As needed    metoprolol tartrate (LOPRESSOR) 50 mg, oral, Every 12 hours    pantoprazole (ProtoNix) 40 mg EC tablet 1 tablet, oral, Daily    sildenafil (VIAGRA) 100 mg, oral, As needed,  1 HOUR BEFORE NEEDED.     "tamsulosin (FLOMAX) 0.4 mg, oral, Daily    traMADol (ULTRAM) 50 mg, oral, Every 8 hours PRN         Last Recorded Vitals:      4/29/2024     9:21 AM 4/30/2024    10:09 AM 5/28/2024    11:19 AM 5/29/2024    11:40 AM 6/25/2024     8:03 AM 7/22/2024     1:48 PM 7/23/2024     9:28 AM   Vitals   Systolic 120 143 169 122 152 120 139   Diastolic 70 77 86 75 85 70 77   Heart Rate 76 80 67 88 65 56 68   Temp  35.9 °C (96.6 °F) 36.5 °C (97.7 °F)  36.3 °C (97.3 °F)  36.1 °C (97 °F)   Resp 16 18 18 17 18 16 16   Height (in) 1.803 m (5' 11\")   1.803 m (5' 11\")  1.803 m (5' 11\")    Weight (lb) 194 195.11 200.18 200 196.43 196 196.65   BMI 27.06 kg/m2 27.21 kg/m2 27.92 kg/m2 27.89 kg/m2 27.4 kg/m2 27.34 kg/m2 27.43 kg/m2   BSA (m2) 2.1 m2 2.11 m2 2.13 m2 2.13 m2 2.11 m2 2.11 m2 2.11 m2   Visit Report Report Report  Report Report Report     Visit Vitals  Smoking Status Never        Physical Exam:  Constitutional: alert and in no acute distress.   Eyes: no erythema, swelling or discharge from the eye .   Neck: neck is supple, symmetric, trachea midline, no masses  and no thyromegaly .   Pulmonary: no increased work of breathing or signs of respiratory distress  and lungs clear to auscultation.    Cardiovascular: carotid pulses 2+ bilaterally with no bruit , JVP was normal, no thrills , regular rhythm, normal S1 and S2, no murmurs , pedal pulses 2+ bilaterally  and no edema .   Abdomen: abdomen non-tender, no masses  and no hepatomegaly .   Skin: skin warm and dry, normal skin turgor .   Psychiatric judgment and insight is normal  and oriented to person, place and time .        Assessment/Plan   Problem List Items Addressed This Visit             ICD-10-CM    Atrial fibrillation with RVR (Multi) - Primary I48.91    Relevant Orders    ECG 12 Lead       Toby Ugalde is a 74 y.o. year old male patient with h/o PAF on medical treatment. Presents for 1 year follow up.  His current ECG shows NSR with narrow QRS and HR of 69 bpm. The patient " reports doing well and denies symptoms. His current ECG shows NSR with narrow QRS and HR of 69 bpm. Will continue his current medications and follow up in 1 year.         Geoffrey Garcia MD  Cardiac Electrophysiology      Thank you very much for allowing me to participate in the care of this pleasant patient. Please do not hesitate to contact me with any further questions or concerns regarding his care.    **Disclaimer: This note was dictated by speech recognition, and every effort has been made to prevent any error in transcription, however minor errors may be present**

## 2024-08-19 ENCOUNTER — APPOINTMENT (OUTPATIENT)
Dept: PRIMARY CARE | Facility: CLINIC | Age: 74
End: 2024-08-19
Payer: MEDICARE

## 2024-08-19 VITALS
HEART RATE: 55 BPM | DIASTOLIC BLOOD PRESSURE: 65 MMHG | WEIGHT: 195 LBS | HEIGHT: 70 IN | BODY MASS INDEX: 27.92 KG/M2 | RESPIRATION RATE: 17 BRPM | SYSTOLIC BLOOD PRESSURE: 100 MMHG | OXYGEN SATURATION: 100 %

## 2024-08-19 DIAGNOSIS — N18.31 STAGE 3A CHRONIC KIDNEY DISEASE (MULTI): ICD-10-CM

## 2024-08-19 DIAGNOSIS — M51.36 LUMBAR DEGENERATIVE DISC DISEASE: ICD-10-CM

## 2024-08-19 DIAGNOSIS — D61.818 PANCYTOPENIA (MULTI): ICD-10-CM

## 2024-08-19 DIAGNOSIS — R97.20 ELEVATED PSA: ICD-10-CM

## 2024-08-19 DIAGNOSIS — I10 BENIGN HYPERTENSION: ICD-10-CM

## 2024-08-19 DIAGNOSIS — E11.9 TYPE 2 DIABETES MELLITUS WITHOUT COMPLICATION, WITHOUT LONG-TERM CURRENT USE OF INSULIN (MULTI): ICD-10-CM

## 2024-08-19 DIAGNOSIS — K21.9 GERD WITHOUT ESOPHAGITIS: ICD-10-CM

## 2024-08-19 DIAGNOSIS — I48.91 ATRIAL FIBRILLATION WITH RVR (MULTI): Primary | ICD-10-CM

## 2024-08-19 DIAGNOSIS — C90.00 IGG MULTIPLE MYELOMA (MULTI): ICD-10-CM

## 2024-08-19 PROBLEM — D56.1 BETA THALASSEMIA (MULTI): Status: ACTIVE | Noted: 2024-08-19

## 2024-08-19 PROCEDURE — 1123F ACP DISCUSS/DSCN MKR DOCD: CPT | Performed by: INTERNAL MEDICINE

## 2024-08-19 PROCEDURE — 1036F TOBACCO NON-USER: CPT | Performed by: INTERNAL MEDICINE

## 2024-08-19 PROCEDURE — 99214 OFFICE O/P EST MOD 30 MIN: CPT | Performed by: INTERNAL MEDICINE

## 2024-08-19 PROCEDURE — 1159F MED LIST DOCD IN RCRD: CPT | Performed by: INTERNAL MEDICINE

## 2024-08-19 PROCEDURE — 3074F SYST BP LT 130 MM HG: CPT | Performed by: INTERNAL MEDICINE

## 2024-08-19 PROCEDURE — 3078F DIAST BP <80 MM HG: CPT | Performed by: INTERNAL MEDICINE

## 2024-08-19 RX ORDER — DIPHENHYDRAMINE HCL 50 MG
50 CAPSULE ORAL ONCE
Status: CANCELLED | OUTPATIENT
Start: 2024-08-20

## 2024-08-19 RX ORDER — EPINEPHRINE 0.3 MG/.3ML
0.3 INJECTION SUBCUTANEOUS EVERY 5 MIN PRN
OUTPATIENT
Start: 2024-09-17

## 2024-08-19 RX ORDER — MONTELUKAST SODIUM 10 MG/1
10 TABLET ORAL ONCE
Status: CANCELLED | OUTPATIENT
Start: 2024-08-20

## 2024-08-19 RX ORDER — FAMOTIDINE 10 MG/ML
20 INJECTION INTRAVENOUS ONCE AS NEEDED
Status: CANCELLED | OUTPATIENT
Start: 2024-08-20

## 2024-08-19 RX ORDER — MONTELUKAST SODIUM 10 MG/1
10 TABLET ORAL ONCE
OUTPATIENT
Start: 2024-09-17

## 2024-08-19 RX ORDER — EPINEPHRINE 0.3 MG/.3ML
0.3 INJECTION SUBCUTANEOUS EVERY 5 MIN PRN
Status: CANCELLED | OUTPATIENT
Start: 2024-08-20

## 2024-08-19 RX ORDER — ALBUTEROL SULFATE 0.83 MG/ML
3 SOLUTION RESPIRATORY (INHALATION) AS NEEDED
OUTPATIENT
Start: 2024-09-17

## 2024-08-19 RX ORDER — DIPHENHYDRAMINE HCL 50 MG
50 CAPSULE ORAL ONCE
OUTPATIENT
Start: 2024-09-17

## 2024-08-19 RX ORDER — DIPHENHYDRAMINE HYDROCHLORIDE 50 MG/ML
50 INJECTION INTRAMUSCULAR; INTRAVENOUS AS NEEDED
OUTPATIENT
Start: 2024-09-17

## 2024-08-19 RX ORDER — ACETAMINOPHEN 325 MG/1
650 TABLET ORAL ONCE
Status: CANCELLED | OUTPATIENT
Start: 2024-08-20

## 2024-08-19 RX ORDER — FAMOTIDINE 10 MG/ML
20 INJECTION INTRAVENOUS ONCE AS NEEDED
OUTPATIENT
Start: 2024-09-17

## 2024-08-19 RX ORDER — ACETAMINOPHEN 325 MG/1
650 TABLET ORAL ONCE
OUTPATIENT
Start: 2024-09-17

## 2024-08-19 RX ORDER — ALBUTEROL SULFATE 0.83 MG/ML
3 SOLUTION RESPIRATORY (INHALATION) AS NEEDED
Status: CANCELLED | OUTPATIENT
Start: 2024-08-20

## 2024-08-19 RX ORDER — DIPHENHYDRAMINE HYDROCHLORIDE 50 MG/ML
50 INJECTION INTRAMUSCULAR; INTRAVENOUS AS NEEDED
Status: CANCELLED | OUTPATIENT
Start: 2024-08-20

## 2024-08-19 ASSESSMENT — ENCOUNTER SYMPTOMS
GASTROINTESTINAL NEGATIVE: 1
BLOOD IN STOOL: 0
NEUROLOGICAL NEGATIVE: 1
WOUND: 0
CARDIOVASCULAR NEGATIVE: 1
PSYCHIATRIC NEGATIVE: 1
MUSCULOSKELETAL NEGATIVE: 1
ARTHRALGIAS: 1
ALLERGIC/IMMUNOLOGIC NEGATIVE: 1
CHILLS: 0
FEVER: 0
HEMATOLOGIC/LYMPHATIC NEGATIVE: 1
HEMATOLOGIC/LYMPHATIC NEGATIVE: 1
CONSTITUTIONAL NEGATIVE: 1
EYES NEGATIVE: 1
DIARRHEA: 1
ENDOCRINE NEGATIVE: 1
NAUSEA: 0
CONSTIPATION: 1
NUMBNESS: 1
CARDIOVASCULAR NEGATIVE: 1
DIAPHORESIS: 1
RESPIRATORY NEGATIVE: 1
VOMITING: 0
FATIGUE: 1

## 2024-08-19 NOTE — ASSESSMENT & PLAN NOTE
DDD - Degenerative disc disease of the Lumbo-Sacral (LS)/  spine. Educate exercises and referred patient to Physical Therapy (PT). Ordered X-Ray's of the LS/spine. Consider MRI. Radiculopathy in the distribution of L4-L5-S1/nerve roots, needs NSAIDS (Naprosin 500mg BID vs. Arthrotec 75mg BID or Prednisone taper (Medrol dose pack), Flexeril 10 mg qHS, heat application, and pain control with Tylenol

## 2024-08-19 NOTE — ASSESSMENT & PLAN NOTE
DM - NIDDM  . Reviewed with patient / Will check  HbA1c and fasting blood sugars. Educate home self monitoring and diary keeping(reviewed with patient home blood sugar levels /diary). Educate extensively low calorie diet and weight loss with exercise. Reviewed BS- diary and Rx. Regimen. [place] renal protection with ARB/ACEI.               Educate compliance with diet and Rx. And educate risks and autcomes.  Refill Metformin 500 mg BID with meals

## 2024-08-19 NOTE — PROGRESS NOTES
"Subjective   Patient ID: Toby Ugalde is a 74 y.o. male who presents for Follow-up (Follow up).    HPI     Review of Systems   Constitutional: Negative.    HENT: Negative.     Eyes: Negative.    Respiratory: Negative.     Cardiovascular: Negative.    Gastrointestinal: Negative.    Endocrine: Negative.    Musculoskeletal: Negative.    Skin: Negative.    Allergic/Immunologic: Negative.    Neurological: Negative.    Hematological: Negative.    Psychiatric/Behavioral: Negative.     All other systems reviewed and are negative.      Objective   Pulse 55   Resp 17   Ht 1.778 m (5' 10\")   Wt 88.5 kg (195 lb)   SpO2 100%   BMI 27.98 kg/m²   Blood pressure 100/65, pulse 55, resp. rate 17, height 1.778 m (5' 10\"), weight 88.5 kg (195 lb), SpO2 100%.   Physical Exam  Vitals and nursing note reviewed.   Constitutional:       Appearance: Normal appearance.   HENT:      Head: Normocephalic and atraumatic.      Right Ear: Tympanic membrane, ear canal and external ear normal.      Left Ear: Tympanic membrane, ear canal and external ear normal. There is no impacted cerumen.      Nose: Nose normal.      Mouth/Throat:      Mouth: Mucous membranes are moist.      Pharynx: Oropharynx is clear.   Eyes:      Extraocular Movements: Extraocular movements intact.      Conjunctiva/sclera: Conjunctivae normal.      Pupils: Pupils are equal, round, and reactive to light.   Cardiovascular:      Rate and Rhythm: Normal rate and regular rhythm.      Pulses: Normal pulses.      Heart sounds: Normal heart sounds. No murmur heard.  Pulmonary:      Effort: Pulmonary effort is normal. No respiratory distress.      Breath sounds: Normal breath sounds. No stridor. No wheezing, rhonchi or rales.   Chest:      Chest wall: No tenderness.   Abdominal:      General: Abdomen is flat. Bowel sounds are normal. There is no distension.      Palpations: Abdomen is soft. There is no mass.      Tenderness: There is no abdominal tenderness. There is no right CVA " tenderness, left CVA tenderness, guarding or rebound.      Hernia: No hernia is present.   Musculoskeletal:         General: Normal range of motion.      Cervical back: Normal range of motion and neck supple.   Skin:     General: Skin is warm.      Capillary Refill: Capillary refill takes less than 2 seconds.   Neurological:      General: No focal deficit present.      Mental Status: He is alert.      Cranial Nerves: No cranial nerve deficit.      Sensory: No sensory deficit.      Motor: No weakness.      Coordination: Coordination normal.      Gait: Gait normal.      Deep Tendon Reflexes: Reflexes normal.   Psychiatric:         Mood and Affect: Mood normal.         Behavior: Behavior normal. Behavior is cooperative.         Thought Content: Thought content normal.         Judgment: Judgment normal.         Assessment/Plan   Problem List Items Addressed This Visit             ICD-10-CM    Pancytopenia (Multi) D61.818     Monitor CBC and on Chemotherapy now feels very tired post chemo yesterday          Atrial fibrillation with RVR (Multi) - Primary I48.91     Atrial fibrillation - persistent with  Ventricular rate is well controlled (in sinus rhythm). ON ELIQUIS  No Shortness of breath. Continues  Eliquis 5mg BID  Metoprolol to 50 mg BID                Benign hypertension I10     HTN - hypertension well/controlled .Target BP < 130/80  achieved. Educate low salt diet and exercise with weight loss. Educate home self monitoring and diary keeping. Educate risks of elevate blood pressure and benefits of prompt treatment.  Refill Amlodipine and Metoprolol          Elevated PSA R97.20     Monitor PSA and follows with urology          GERD without esophagitis K21.9     GERD - Acid reflux disease. Rx. PPI (Prilosec/Prevacid/Protonix/Nexium) and educate diet and life style changes. Referred patient to an endoscopy (EGD) and check H. Pylori titers.          IgG multiple myeloma (Multi) C90.00     Monitor CBC and blood count  and follow with hematology and stable now on Chemotherapy           Lumbar degenerative disc disease M51.36     DDD - Degenerative disc disease of the Lumbo-Sacral (LS)/  spine. Educate exercises and referred patient to Physical Therapy (PT). Ordered X-Ray's of the LS/spine. Consider MRI. Radiculopathy in the distribution of L4-L5-S1/nerve roots, needs NSAIDS (Naprosin 500mg BID vs. Arthrotec 75mg BID or Prednisone taper (Medrol dose pack), Flexeril 10 mg qHS, heat application, and pain control with Tylenol          Type 2 diabetes mellitus without complications (Multi) E11.9     DM - NIDDM  . Reviewed with patient / Will check  HbA1c and fasting blood sugars. Educate home self monitoring and diary keeping(reviewed with patient home blood sugar levels /diary). Educate extensively low calorie diet and weight loss with exercise. Reviewed BS- diary and Rx. Regimen. [place] renal protection with ARB/ACEI.               Educate compliance with diet and Rx. And educate risks and autcomes.  Refill Metformin 500 mg BID with meals                              Stage 3a chronic kidney disease (Multi) N18.31     CRI - Chronic Renal Insuficiency. Secondary to DM/HTN/Atherosclerosis. Follow BUN/Cr. and lytes.   RENOPROTECTION with ACEI/ARB (). Monitor microalbuminuria. Avoid hypotension and renal hypoperfusion. The patient was instructed to avoid NSAIDS and educate compliance with medications to control HTN(hypertension) and cholesterol and diabetes.                                          Atrial fibrillation with RVR (Multi) - Primary I48.91        Atrial fibrillation - persistent with  Ventricular rate is well controlled (in sinus rhythm). ON ELIQUIS  No Shortness of breath. Continues  Eliquis 5mg BID  Metoprolol to 50 mg BID                 Benign hypertension I10       HTN - hypertension well/controlled .Target BP < 130/80  achieved. Educate low salt diet and exercise with weight loss. Educate home self monitoring and  diary keeping. Educate risks of elevate blood pressure and benefits of prompt treatment.  Refill Amlodipine and Metoprolol            Elevated PSA R97.20       BPH - Benign PROSTATIC Hypertrophy, + Dysuria/Nocturia, check PSA, prescribe Flomax 0.4mg qD and Avodart 0.5 mg qD vs. Finasteride 5 mg qD.  Educate exercises and Change in life style, prescribe vitamin E 400 IU qD. Consider referral to urology. And follows with urology            Fatigue R53.83       Fatigue - check CMP(metabolic panel and elctrolytes) , CBC(complete blood cell count), TSH(thyroid function). Insomnia may play a role and sleep studies(rule out sleep apnea) are recommended . Educate sleep hygiene. Consider anxiety disorder vs. depression. Consider Stress test, and 2DECHO.             Type 2 diabetes mellitus without complications (Multi) E11.9       DM - NIDDM  . Reviewed with patient / Will check  HbA1c and fasting blood sugars. Educate home self monitoring and diary keeping(reviewed with patient home blood sugar levels /diary). Educate extensively low calorie diet and weight loss with exercise. Reviewed BS- diary and Rx. Regimen. [place] renal protection with ARB/ACEI.               Educate compliance with diet and Rx. And educate risks and autcomes.  Refill Metformin 500 mg BID with meals

## 2024-08-20 ENCOUNTER — INFUSION (OUTPATIENT)
Dept: HEMATOLOGY/ONCOLOGY | Facility: CLINIC | Age: 74
End: 2024-08-20
Payer: MEDICARE

## 2024-08-20 ENCOUNTER — OFFICE VISIT (OUTPATIENT)
Dept: HEMATOLOGY/ONCOLOGY | Facility: CLINIC | Age: 74
End: 2024-08-20
Payer: MEDICARE

## 2024-08-20 ENCOUNTER — LAB (OUTPATIENT)
Dept: LAB | Facility: CLINIC | Age: 74
End: 2024-08-20
Payer: MEDICARE

## 2024-08-20 VITALS
OXYGEN SATURATION: 94 % | DIASTOLIC BLOOD PRESSURE: 75 MMHG | SYSTOLIC BLOOD PRESSURE: 135 MMHG | HEART RATE: 62 BPM | RESPIRATION RATE: 18 BRPM | BODY MASS INDEX: 28.22 KG/M2 | TEMPERATURE: 96.1 F | WEIGHT: 196.65 LBS

## 2024-08-20 DIAGNOSIS — C90.00 IGG MULTIPLE MYELOMA (MULTI): Primary | ICD-10-CM

## 2024-08-20 DIAGNOSIS — I48.0 PAROXYSMAL ATRIAL FIBRILLATION (MULTI): ICD-10-CM

## 2024-08-20 DIAGNOSIS — E78.00 HYPERCHOLESTEROLEMIA: ICD-10-CM

## 2024-08-20 DIAGNOSIS — C90.00 IGG MULTIPLE MYELOMA (MULTI): ICD-10-CM

## 2024-08-20 DIAGNOSIS — Z86.711 HISTORY OF PULMONARY EMBOLISM: ICD-10-CM

## 2024-08-20 LAB
ALBUMIN SERPL BCP-MCNC: 4.3 G/DL (ref 3.4–5)
ALP SERPL-CCNC: 63 U/L (ref 33–136)
ALT SERPL W P-5'-P-CCNC: 21 U/L (ref 10–52)
ANION GAP SERPL CALC-SCNC: 15 MMOL/L (ref 10–20)
AST SERPL W P-5'-P-CCNC: 15 U/L (ref 9–39)
BASOPHILS # BLD AUTO: 0.01 X10*3/UL (ref 0–0.1)
BASOPHILS NFR BLD AUTO: 0.3 %
BILIRUB SERPL-MCNC: 0.7 MG/DL (ref 0–1.2)
BUN SERPL-MCNC: 16 MG/DL (ref 6–23)
CALCIUM SERPL-MCNC: 9.2 MG/DL (ref 8.6–10.6)
CHLORIDE SERPL-SCNC: 104 MMOL/L (ref 98–107)
CHOLEST SERPL-MCNC: 145 MG/DL (ref 0–199)
CHOLESTEROL/HDL RATIO: 2
CO2 SERPL-SCNC: 25 MMOL/L (ref 21–32)
CREAT SERPL-MCNC: 1.45 MG/DL (ref 0.5–1.3)
EGFRCR SERPLBLD CKD-EPI 2021: 50 ML/MIN/1.73M*2
EOSINOPHIL # BLD AUTO: 0.17 X10*3/UL (ref 0–0.4)
EOSINOPHIL NFR BLD AUTO: 4.4 %
ERYTHROCYTE [DISTWIDTH] IN BLOOD BY AUTOMATED COUNT: 14.2 % (ref 11.5–14.5)
GLUCOSE SERPL-MCNC: 157 MG/DL (ref 74–99)
HCT VFR BLD AUTO: 35.1 % (ref 41–52)
HDLC SERPL-MCNC: 72.8 MG/DL
HGB BLD-MCNC: 11.8 G/DL (ref 13.5–17.5)
IGA SERPL-MCNC: 57 MG/DL (ref 70–400)
IGG SERPL-MCNC: 628 MG/DL (ref 700–1600)
IGM SERPL-MCNC: 9 MG/DL (ref 40–230)
IMM GRANULOCYTES # BLD AUTO: 0 X10*3/UL (ref 0–0.5)
IMM GRANULOCYTES NFR BLD AUTO: 0 % (ref 0–0.9)
LDLC SERPL CALC-MCNC: 61 MG/DL
LYMPHOCYTES # BLD AUTO: 2.09 X10*3/UL (ref 0.8–3)
LYMPHOCYTES NFR BLD AUTO: 54.4 %
MCH RBC QN AUTO: 30.7 PG (ref 26–34)
MCHC RBC AUTO-ENTMCNC: 33.6 G/DL (ref 32–36)
MCV RBC AUTO: 91 FL (ref 80–100)
MONOCYTES # BLD AUTO: 0.54 X10*3/UL (ref 0.05–0.8)
MONOCYTES NFR BLD AUTO: 14.1 %
NEUTROPHILS # BLD AUTO: 1.03 X10*3/UL (ref 1.6–5.5)
NEUTROPHILS NFR BLD AUTO: 26.8 %
NON HDL CHOLESTEROL: 72 MG/DL (ref 0–149)
NRBC BLD-RTO: ABNORMAL /100{WBCS}
PLATELET # BLD AUTO: 132 X10*3/UL (ref 150–450)
POTASSIUM SERPL-SCNC: 3.7 MMOL/L (ref 3.5–5.3)
PROT SERPL-MCNC: 6.1 G/DL (ref 6.4–8.2)
PROT SERPL-MCNC: 6.1 G/DL (ref 6.4–8.2)
RBC # BLD AUTO: 3.84 X10*6/UL (ref 4.5–5.9)
SODIUM SERPL-SCNC: 140 MMOL/L (ref 136–145)
TRIGL SERPL-MCNC: 54 MG/DL (ref 0–149)
VLDL: 11 MG/DL (ref 0–40)
WBC # BLD AUTO: 3.8 X10*3/UL (ref 4.4–11.3)

## 2024-08-20 PROCEDURE — 84165 PROTEIN E-PHORESIS SERUM: CPT

## 2024-08-20 PROCEDURE — 1036F TOBACCO NON-USER: CPT

## 2024-08-20 PROCEDURE — 83521 IG LIGHT CHAINS FREE EACH: CPT

## 2024-08-20 PROCEDURE — 1160F RVW MEDS BY RX/DR IN RCRD: CPT

## 2024-08-20 PROCEDURE — 36415 COLL VENOUS BLD VENIPUNCTURE: CPT

## 2024-08-20 PROCEDURE — 82784 ASSAY IGA/IGD/IGG/IGM EACH: CPT

## 2024-08-20 PROCEDURE — 3075F SYST BP GE 130 - 139MM HG: CPT

## 2024-08-20 PROCEDURE — 84155 ASSAY OF PROTEIN SERUM: CPT

## 2024-08-20 PROCEDURE — 99214 OFFICE O/P EST MOD 30 MIN: CPT

## 2024-08-20 PROCEDURE — 1159F MED LIST DOCD IN RCRD: CPT

## 2024-08-20 PROCEDURE — 80053 COMPREHEN METABOLIC PANEL: CPT

## 2024-08-20 PROCEDURE — 96401 CHEMO ANTI-NEOPL SQ/IM: CPT

## 2024-08-20 PROCEDURE — 85025 COMPLETE CBC W/AUTO DIFF WBC: CPT

## 2024-08-20 PROCEDURE — 3078F DIAST BP <80 MM HG: CPT

## 2024-08-20 PROCEDURE — 2500000001 HC RX 250 WO HCPCS SELF ADMINISTERED DRUGS (ALT 637 FOR MEDICARE OP): Performed by: INTERNAL MEDICINE

## 2024-08-20 PROCEDURE — 1123F ACP DISCUSS/DSCN MKR DOCD: CPT

## 2024-08-20 PROCEDURE — 80061 LIPID PANEL: CPT

## 2024-08-20 PROCEDURE — 1125F AMNT PAIN NOTED PAIN PRSNT: CPT

## 2024-08-20 PROCEDURE — 2500000004 HC RX 250 GENERAL PHARMACY W/ HCPCS (ALT 636 FOR OP/ED): Performed by: INTERNAL MEDICINE

## 2024-08-20 PROCEDURE — 2500000004 HC RX 250 GENERAL PHARMACY W/ HCPCS (ALT 636 FOR OP/ED): Mod: JZ,JG | Performed by: INTERNAL MEDICINE

## 2024-08-20 RX ORDER — MONTELUKAST SODIUM 10 MG/1
10 TABLET ORAL ONCE
Status: COMPLETED | OUTPATIENT
Start: 2024-08-20 | End: 2024-08-20

## 2024-08-20 RX ORDER — ALBUTEROL SULFATE 0.83 MG/ML
3 SOLUTION RESPIRATORY (INHALATION) AS NEEDED
Status: DISCONTINUED | OUTPATIENT
Start: 2024-08-20 | End: 2024-08-20 | Stop reason: HOSPADM

## 2024-08-20 RX ORDER — DIPHENHYDRAMINE HYDROCHLORIDE 50 MG/ML
50 INJECTION INTRAMUSCULAR; INTRAVENOUS AS NEEDED
Status: DISCONTINUED | OUTPATIENT
Start: 2024-08-20 | End: 2024-08-20 | Stop reason: HOSPADM

## 2024-08-20 RX ORDER — EPINEPHRINE 0.3 MG/.3ML
0.3 INJECTION SUBCUTANEOUS EVERY 5 MIN PRN
Status: DISCONTINUED | OUTPATIENT
Start: 2024-08-20 | End: 2024-08-20 | Stop reason: HOSPADM

## 2024-08-20 RX ORDER — ACETAMINOPHEN 325 MG/1
650 TABLET ORAL ONCE
Status: COMPLETED | OUTPATIENT
Start: 2024-08-20 | End: 2024-08-20

## 2024-08-20 RX ORDER — FAMOTIDINE 10 MG/ML
20 INJECTION INTRAVENOUS ONCE AS NEEDED
Status: DISCONTINUED | OUTPATIENT
Start: 2024-08-20 | End: 2024-08-20 | Stop reason: HOSPADM

## 2024-08-20 RX ORDER — DIPHENHYDRAMINE HCL 50 MG
50 CAPSULE ORAL ONCE
Status: COMPLETED | OUTPATIENT
Start: 2024-08-20 | End: 2024-08-20

## 2024-08-20 ASSESSMENT — ENCOUNTER SYMPTOMS
APPETITE CHANGE: 0
SHORTNESS OF BREATH: 1
UNEXPECTED WEIGHT CHANGE: 0

## 2024-08-20 ASSESSMENT — PAIN SCALES - GENERAL: PAINLEVEL: 2

## 2024-08-20 NOTE — SIGNIFICANT EVENT

## 2024-08-20 NOTE — PROGRESS NOTES
Patient ID: Toby Ugalde is a 74 y.o. male.    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 3/6/17- bone survey was negative for lytic lesions.  3/15/17-  bone marrow revealed 30% plasma cells with kappa predominance. 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 Cano for complaints of fatigue.  Labs notable for hyponatremia to 127, anemia with a HGB of 9.  Cr 1.99. Protein elevated to  10.7.  Uric acid up to 8.9.      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  Skeletal survey (3/2/22) - lesion in the left rib.   PET:    Expansile hypermetabolic lytic mass involving the left lateral 9th  rib, correlating to suspected plasmacytoma described on osseous  survey performed 03/02/2022.  2. Diffusely increased FDG uptake throughout the bone marrow which  may relate to anemia/marrow activation, with marrow involvement by  myeloma not excluded.  Treatment:   Initiated (3/29/22) treatment for his relapsed Myeloma with Revlimid 10 mg by mouth days 1-14/21 days, Daratuzumab (3/29, & 4/5/22), SC Velcade (3/29 & 4/5/22), & weekly Dexamethasone complicated by several hospitaliizations for syncope, PE ,afib due to  velcade and revlimid.  Velcade discontinued     Recv'd weekly single agent Daraumumab from 5/31 - 7/19/22. Initiated (8/25/22) Revlimid 10 mg on days 1-21/28 days.     Has had marked reduction in paraprotein and free lyte chains  with M protein : 1.4 g/dl on 4/19/22 ,    [place] free lyte/ratio of 4.49 mg/dl/4.45.    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         IgG multiple myeloma (Multi)   9/6/2023 Initial Diagnosis    IgG multiple myeloma (CMS/HCC)     10/17/2023 -  Chemotherapy    Daratumumab, 28 Day Cycles - Maintenance       Response:     Past Medical History:     Past Medical History:   Diagnosis Date    Dental caries on pit and fissure surface penetrating into dentin 01/12/2024    Deposits (accretions) on teeth 01/12/2024    Essential (primary) hypertension 11/20/2013    Benign essential hypertension    History of hypercholesterolemia 01/12/2024    History of hypertension 01/12/2024    History of malignant neoplasm of prostate 01/12/2024    Impacted cerumen 01/12/2024    Impacted cerumen, bilateral 02/16/2024    Infective pharyngitis 01/12/2024    Other intervertebral disc degeneration, lumbar region     Lumbar degenerative disc disease    Overweight with body mass index (BMI) 25.0-29.9 02/02/2022    Partial loss of teeth due to caries, class I 01/12/2024    Personal history of malignant neoplasm of prostate     History of malignant neoplasm of prostate    Personal history of other diseases of the circulatory system     History of hypertension    Personal history of other diseases of the circulatory system 06/29/2022    History of atrial tachycardia    Personal history of other diseases of the circulatory system     History of essential hypertension    Personal history of other diseases of the musculoskeletal system and connective tissue     History of arthritis    Personal history of other diseases of the musculoskeletal system and connective tissue     History of acute gouty arthritis    Personal history of other diseases of the musculoskeletal system and connective  tissue 01/30/2019    History of left foot drop    Personal history of other diseases of the respiratory system     History of bronchitis    Personal history of other endocrine, nutritional and metabolic disease     History of hypercholesterolemia    Personal history of other endocrine, nutritional and metabolic disease     History of diabetes mellitus    Personal history of other endocrine, nutritional and metabolic disease 01/17/2014    History of diabetes mellitus    Post-traumatic stress disorder, chronic 11/10/2023    Primary localized osteoarthritis of pelvic region and thigh 12/17/2018    Pure hypercholesterolemia, unspecified 11/20/2013    Low-density-lipoid-type (LDL) hyperlipoproteinemia    Snoring     Snoring     Surgical History:     Past Surgical History:   Procedure Laterality Date    BACK SURGERY  11/20/2013    Back Surgery    TONSILLECTOMY  11/20/2013    Tonsillectomy    TOTAL KNEE ARTHROPLASTY  11/20/2013    Knee Replacement      Family History:     Family History   Problem Relation Name Age of Onset    Heart attack Mother      Diabetes Brother      Stroke Maternal Grandfather       Social History:  Reported history of agent orange exposure while in [employer] at [employer]. Retired  and post .  from his wife who is health care  power of .  Two sons and 2 grandchildren (1)    Social History     Tobacco Use    Smoking status: Never     Passive exposure: Never    Smokeless tobacco: Never   Substance Use Topics    Alcohol use: Yes     Comment: occasional    Drug use: Never      -------------------------------------------------------------------------------------------------------  Subjective       HPI    KANDI ROSE is a 74 year old Male h/o pAfib,Pes  well controlled T2DM, HTN, gout, IgG kappa MM with history as above.  Still not clear whether patient has ONJ.  Finally is working with dentist at the [facility].  Patient states he had a bone density 11/23 which  does not show osteopenia.    Toby presents to the clinic today (8/20/24) unaccompanied for follow up evaulation of his multiple myeloma, blood work, and is scheduled to receive C22 of monthly subcutaneous daratumumab.  Remains on lenalidomide 5 mg daily on days 1-21 of 28 day cycle.  Today, he is on day 20 of his 28 day cycle of lenalidomide.  [facility]    Energy level is up and down.  Uses cane for ambulation.  Appetite is doing a little better.  Weight is up a few pounds since last visit.  Reports ongoing shortness of breath on exertion.  Bowels chronically rotate between diarrhea and constipation.  Takes prn imodium and stool softener.  Chronic left hip pain, using tramadol and tylenol.  Following with orthopedics.  Received a cortisol injection about 2 weeks ago to left hip with plan to eventually have left hip replacement.  Numbness and tingling to bilateral hands and feet.     Review of Systems   Constitutional:  Positive for diaphoresis (occassional night sweats about once per week, can be drenching) and fatigue. Negative for appetite change, chills, fever and unexpected weight change.   Respiratory:  Positive for shortness of breath (on exertion).    Cardiovascular: Negative.    Gastrointestinal:  Positive for constipation and diarrhea. Negative for blood in stool, nausea and vomiting.   Genitourinary: Negative.     Musculoskeletal:  Positive for arthralgias and gait problem.   Skin:  Negative for rash and wound.   Neurological:  Positive for gait problem and numbness.   Hematological: Negative.    All other systems reviewed and are negative.  -------------------------------------------------------------------------------------------------------  Objective   BSA: 2.1 meters squared  /75   Pulse 62   Temp 35.6 °C (96.1 °F)   Resp 18   Wt 89.2 kg (196 lb 10.4 oz)   SpO2 94%   BMI 28.22 kg/m²     Physical Exam  Vitals reviewed.   Constitutional:        Appearance: Normal appearance.   HENT:      Head: Normocephalic and atraumatic.      Mouth/Throat:      Mouth: Mucous membranes are moist.   Eyes:      Extraocular Movements: Extraocular movements intact.      Conjunctiva/sclera: Conjunctivae normal.      Pupils: Pupils are equal, round, and reactive to light.   Cardiovascular:      Rate and Rhythm: Normal rate and regular rhythm.      Pulses: Normal pulses.      Heart sounds: Normal heart sounds.   Pulmonary:      Effort: Pulmonary effort is normal.      Breath sounds: Normal breath sounds.   Abdominal:      General: Abdomen is flat. Bowel sounds are normal.      Palpations: Abdomen is soft. There is no mass.      Tenderness: There is no abdominal tenderness.   Musculoskeletal:         General: No swelling. Normal range of motion.   Lymphadenopathy:      Comments: No lymphadenopathy   Skin:     General: Skin is warm and dry.   Neurological:      General: No focal deficit present.      Mental Status: He is alert and oriented to person, place, and time.   Psychiatric:         Mood and Affect: Mood normal.         Behavior: Behavior normal.         Thought Content: Thought content normal.         Judgment: Judgment normal.   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      Restaging 3/2022 shows high burden disease with 90% tumorous involvement of the bone marrow ,   Initiated (3/29/22) treatment for his relapsed Myeloma with Revlimid 10 mg by mouth days 1-14/21 days, Daratuzumab (3/29, & 4/5/22), SC Velcade (3/29 & 4/5/22), & weekly Dexamethasone. complicated by several hospilizations for syncope, PE ,afib due to velcade and revlimid.    Recv'd weekly  single agent Daraumumab from 5/31 - 7/19/22. Initiated (8/25/22) Revlimid 10 mg on days 1-21/28 days.     Will continue monthly casey, rev, & dex until evidence of disease progression or unacceptable toxicity.    8/20/24:   [place] and lambda free light chains stable from 7/23/24, level in process from today.  M-protein stable at 0.1 (7/23/24), level in process from today.  Hgb 11.8, plt 132, WBC 3.8, and ANC 1.03.  Dr. Waller was okay to continue with C22 of daratumumab despite blood counts.  Continue revlimid 5 mg daily on days 1-21 out of 28 day cycle.    Ig [place]ush Free Light Chain   Date Value Ref Range Status   07/23/2024 1.53 0.33 - 1.94 mg/dL Final   06/25/2024 1.45 0.33 - 1.94 mg/dL Final   05/28/2024 1.08 0.33 - 1.94 mg/dL Final   04/30/2024 1.34 0.33 - 1.94 mg/dL Final   04/02/2024 1.62 0.33 - 1.94 mg/dL Final     Ig Lambda Free Light Chain   Date Value Ref Range Status   07/23/2024 1.06 0.57 - 2.63 mg/dL Final   06/25/2024 1.03 0.57 - 2.63 mg/dL Final   05/28/2024 0.71 0.57 - 2.63 mg/dL Final   04/30/2024 0.64 0.57 - 2.63 mg/dL Final   04/02/2024 0.86 0.57 - 2.63 mg/dL Final     Kappa/Lambda Ratio   Date Value Ref Range Status   07/23/2024 1.44 0.26 - 1.65 Final   06/25/2024 1.41 0.26 - 1.65 Final   05/28/2024 1.52 0.26 - 1.65 Final   04/30/2024 2.09 (H) 0.26 - 1.65 Final   04/02/2024 1.88 (H) 0.26 - 1.65 Final     M-PROTEIN 1   Date Value Ref Range Status   07/23/2024 0.1 (H)   g/dL Final   06/25/2024 0.1 (H)   g/dL Final   05/28/2024 0.1 (H)   g/dL Final   04/30/2024 0.1 (H)   g/dL Final   04/02/2024 0.1 (H)   g/dL Final      #Atrial fib on chronic anticoagulation  on Eliquis 5 mg by mouth twice a day.  Patient confirmed on 50 mg by mouth twice a day of metoprolol.  Follows with Dr. Moon and Dr. Garcia.       PE (4/9/22) - Small occlusive embolic thrombus within the right lower lobe pulmonary artery branch, medially and posteriorly.  Cont Eliquis 5 mg twice a day.     Bone health:  Xgeva held due to concerns  of ONJ and prolonged treatment in the past.  Bone density December 2023 was normal.  Hold for now.      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  Pt had his tooth pulled in 2019, however with prolonged bone exposure/infection, eventually underwent OMSF surgery in 10/2021. Unclear if he has denosumab assoc jaw necrosis. Patient finally had his bad tooth (L upper molar) removed on 5/25/23. [facility]  6/25/24:  Reports that he recently finished dental work and has dentures.      Social Work:  4/30/24: Requested to have DWIGHT Dominguez OSW-C contact patient regarding his medical bills.   8/20/24:  Filled out applications for grants for [employer] and Infectious.  Reports he does not need assistance for medical paperwork or bills today.      Intertrigo of right lower abdominal skin fold:  -hyperpigmented, irritated skin with reports of pruritus.  Possible fungal intertrigo.  Prescribed topical clotrimazole 1% cream.  Advised to keep skin dry.    8/20/24:  Reports rash has improved.       RTC:   9/17/24:  Infusion appt for C23 of monthly daratumumab  10/15/24:  Follow up visit with provider in 2 months and due for C24 daratumumab  -------------------------------------------------------------------------------------------------------  ESPINOZA Novak

## 2024-08-21 LAB
KAPPA LC SERPL-MCNC: 1.99 MG/DL (ref 0.33–1.94)
KAPPA LC/LAMBDA SER: 1.95 {RATIO} (ref 0.26–1.65)
LAMBDA LC SERPL-MCNC: 1.02 MG/DL (ref 0.57–2.63)

## 2024-08-23 LAB
ALBUMIN: 3.8 G/DL (ref 3.4–5)
ALPHA 1 GLOBULIN: 0.3 G/DL (ref 0.2–0.6)
ALPHA 2 GLOBULIN: 0.8 G/DL (ref 0.4–1.1)
BETA GLOBULIN: 0.7 G/DL (ref 0.5–1.2)
GAMMA GLOBULIN: 0.5 G/DL (ref 0.5–1.4)
M-PROTEIN 1: 0.1 G/DL
PATH REVIEW-SERUM PROTEIN ELECTROPHORESIS: ABNORMAL
PROTEIN ELECTROPHORESIS COMMENT: ABNORMAL

## 2024-09-11 ENCOUNTER — TELEPHONE (OUTPATIENT)
Dept: PHARMACY | Facility: HOSPITAL | Age: 74
End: 2024-09-11
Payer: MEDICARE

## 2024-09-11 NOTE — TELEPHONE ENCOUNTER
Population Health: Outreach by Ambulatory Pharmacy Team    Payor: [employer] [place]  Reason: Adherence  Medication: Atorvastatin  Outcome: Left Voicemail    SUPRIYA TATUM

## 2024-09-11 NOTE — TELEPHONE ENCOUNTER
I reviewed the telephone encounter and agree with the student’s findings and plans as written. Case discussed with student.    Janelle Bell, PharmD

## 2024-09-17 ENCOUNTER — INFUSION (OUTPATIENT)
Dept: HEMATOLOGY/ONCOLOGY | Facility: CLINIC | Age: 74
End: 2024-09-17
Payer: MEDICARE

## 2024-09-17 VITALS
OXYGEN SATURATION: 100 % | HEART RATE: 82 BPM | BODY MASS INDEX: 28.53 KG/M2 | DIASTOLIC BLOOD PRESSURE: 85 MMHG | TEMPERATURE: 97.3 F | RESPIRATION RATE: 18 BRPM | WEIGHT: 198.85 LBS | SYSTOLIC BLOOD PRESSURE: 132 MMHG

## 2024-09-17 DIAGNOSIS — C90.00 IGG MULTIPLE MYELOMA (MULTI): ICD-10-CM

## 2024-09-17 LAB
ALBUMIN SERPL BCP-MCNC: 3.9 G/DL (ref 3.4–5)
ALP SERPL-CCNC: 52 U/L (ref 33–136)
ALT SERPL W P-5'-P-CCNC: 14 U/L (ref 10–52)
ANION GAP SERPL CALC-SCNC: 16 MMOL/L (ref 10–20)
AST SERPL W P-5'-P-CCNC: 10 U/L (ref 9–39)
BASOPHILS # BLD AUTO: 0.01 X10*3/UL (ref 0–0.1)
BASOPHILS NFR BLD AUTO: 0.3 %
BILIRUB SERPL-MCNC: 0.6 MG/DL (ref 0–1.2)
BUN SERPL-MCNC: 17 MG/DL (ref 6–23)
CALCIUM SERPL-MCNC: 8.8 MG/DL (ref 8.6–10.6)
CHLORIDE SERPL-SCNC: 104 MMOL/L (ref 98–107)
CO2 SERPL-SCNC: 22 MMOL/L (ref 21–32)
CREAT SERPL-MCNC: 1.52 MG/DL (ref 0.5–1.3)
EGFRCR SERPLBLD CKD-EPI 2021: 47 ML/MIN/1.73M*2
EOSINOPHIL # BLD AUTO: 0.17 X10*3/UL (ref 0–0.4)
EOSINOPHIL NFR BLD AUTO: 4.8 %
ERYTHROCYTE [DISTWIDTH] IN BLOOD BY AUTOMATED COUNT: 13.7 % (ref 11.5–14.5)
GLUCOSE SERPL-MCNC: 123 MG/DL (ref 74–99)
HCT VFR BLD AUTO: 33.7 % (ref 41–52)
HGB BLD-MCNC: 11.2 G/DL (ref 13.5–17.5)
IMM GRANULOCYTES # BLD AUTO: 0.02 X10*3/UL (ref 0–0.5)
IMM GRANULOCYTES NFR BLD AUTO: 0.6 % (ref 0–0.9)
LYMPHOCYTES # BLD AUTO: 1.81 X10*3/UL (ref 0.8–3)
LYMPHOCYTES NFR BLD AUTO: 50.8 %
MCH RBC QN AUTO: 30.4 PG (ref 26–34)
MCHC RBC AUTO-ENTMCNC: 33.2 G/DL (ref 32–36)
MCV RBC AUTO: 91 FL (ref 80–100)
MONOCYTES # BLD AUTO: 0.55 X10*3/UL (ref 0.05–0.8)
MONOCYTES NFR BLD AUTO: 15.4 %
NEUTROPHILS # BLD AUTO: 1 X10*3/UL (ref 1.6–5.5)
NEUTROPHILS NFR BLD AUTO: 28.1 %
NRBC BLD-RTO: ABNORMAL /100{WBCS}
PLATELET # BLD AUTO: 129 X10*3/UL (ref 150–450)
POTASSIUM SERPL-SCNC: 3.2 MMOL/L (ref 3.5–5.3)
PROT SERPL-MCNC: 6.2 G/DL (ref 6.4–8.2)
PROT SERPL-MCNC: 6.2 G/DL (ref 6.4–8.2)
RBC # BLD AUTO: 3.69 X10*6/UL (ref 4.5–5.9)
SODIUM SERPL-SCNC: 139 MMOL/L (ref 136–145)
WBC # BLD AUTO: 3.6 X10*3/UL (ref 4.4–11.3)

## 2024-09-17 PROCEDURE — 83521 IG LIGHT CHAINS FREE EACH: CPT

## 2024-09-17 PROCEDURE — 84165 PROTEIN E-PHORESIS SERUM: CPT

## 2024-09-17 PROCEDURE — 2500000004 HC RX 250 GENERAL PHARMACY W/ HCPCS (ALT 636 FOR OP/ED): Performed by: INTERNAL MEDICINE

## 2024-09-17 PROCEDURE — 84155 ASSAY OF PROTEIN SERUM: CPT

## 2024-09-17 PROCEDURE — 2500000001 HC RX 250 WO HCPCS SELF ADMINISTERED DRUGS (ALT 637 FOR MEDICARE OP): Performed by: INTERNAL MEDICINE

## 2024-09-17 PROCEDURE — 85025 COMPLETE CBC W/AUTO DIFF WBC: CPT

## 2024-09-17 PROCEDURE — 2500000004 HC RX 250 GENERAL PHARMACY W/ HCPCS (ALT 636 FOR OP/ED): Mod: JZ,JG | Performed by: INTERNAL MEDICINE

## 2024-09-17 PROCEDURE — 96401 CHEMO ANTI-NEOPL SQ/IM: CPT

## 2024-09-17 PROCEDURE — 80053 COMPREHEN METABOLIC PANEL: CPT

## 2024-09-17 RX ORDER — MONTELUKAST SODIUM 10 MG/1
10 TABLET ORAL ONCE
Status: COMPLETED | OUTPATIENT
Start: 2024-09-17 | End: 2024-09-17

## 2024-09-17 RX ORDER — ALBUTEROL SULFATE 0.83 MG/ML
3 SOLUTION RESPIRATORY (INHALATION) AS NEEDED
Status: DISCONTINUED | OUTPATIENT
Start: 2024-09-17 | End: 2024-09-17 | Stop reason: HOSPADM

## 2024-09-17 RX ORDER — FAMOTIDINE 10 MG/ML
20 INJECTION INTRAVENOUS ONCE AS NEEDED
Status: DISCONTINUED | OUTPATIENT
Start: 2024-09-17 | End: 2024-09-17 | Stop reason: HOSPADM

## 2024-09-17 RX ORDER — DIPHENHYDRAMINE HYDROCHLORIDE 50 MG/ML
50 INJECTION INTRAMUSCULAR; INTRAVENOUS AS NEEDED
Status: DISCONTINUED | OUTPATIENT
Start: 2024-09-17 | End: 2024-09-17 | Stop reason: HOSPADM

## 2024-09-17 RX ORDER — ACETAMINOPHEN 325 MG/1
650 TABLET ORAL ONCE
Status: COMPLETED | OUTPATIENT
Start: 2024-09-17 | End: 2024-09-17

## 2024-09-17 RX ORDER — EPINEPHRINE 0.3 MG/.3ML
0.3 INJECTION SUBCUTANEOUS EVERY 5 MIN PRN
Status: DISCONTINUED | OUTPATIENT
Start: 2024-09-17 | End: 2024-09-17 | Stop reason: HOSPADM

## 2024-09-17 RX ORDER — DIPHENHYDRAMINE HCL 50 MG
50 CAPSULE ORAL ONCE
Status: COMPLETED | OUTPATIENT
Start: 2024-09-17 | End: 2024-09-17

## 2024-09-17 ASSESSMENT — PAIN SCALES - GENERAL: PAINLEVEL: 2

## 2024-09-17 NOTE — SIGNIFICANT EVENT

## 2024-09-18 LAB
KAPPA LC SERPL-MCNC: 2.18 MG/DL (ref 0.33–1.94)
KAPPA LC/LAMBDA SER: 2.4 {RATIO} (ref 0.26–1.65)
LAMBDA LC SERPL-MCNC: 0.91 MG/DL (ref 0.57–2.63)

## 2024-09-20 ENCOUNTER — APPOINTMENT (OUTPATIENT)
Dept: PRIMARY CARE | Facility: CLINIC | Age: 74
End: 2024-09-20
Payer: MEDICARE

## 2024-09-20 VITALS
SYSTOLIC BLOOD PRESSURE: 115 MMHG | HEIGHT: 70 IN | OXYGEN SATURATION: 100 % | DIASTOLIC BLOOD PRESSURE: 65 MMHG | WEIGHT: 196.21 LBS | RESPIRATION RATE: 18 BRPM | HEART RATE: 67 BPM | BODY MASS INDEX: 28.09 KG/M2

## 2024-09-20 DIAGNOSIS — N40.1 BPH WITH OBSTRUCTION/LOWER URINARY TRACT SYMPTOMS: ICD-10-CM

## 2024-09-20 DIAGNOSIS — N13.8 BPH WITH OBSTRUCTION/LOWER URINARY TRACT SYMPTOMS: ICD-10-CM

## 2024-09-20 DIAGNOSIS — N18.2 CHRONIC RENAL IMPAIRMENT, STAGE 2 (MILD): ICD-10-CM

## 2024-09-20 DIAGNOSIS — E11.9 TYPE 2 DIABETES MELLITUS WITHOUT COMPLICATION, WITHOUT LONG-TERM CURRENT USE OF INSULIN (MULTI): ICD-10-CM

## 2024-09-20 DIAGNOSIS — C90.00 IGG MULTIPLE MYELOMA (MULTI): ICD-10-CM

## 2024-09-20 DIAGNOSIS — K21.9 GERD WITHOUT ESOPHAGITIS: ICD-10-CM

## 2024-09-20 DIAGNOSIS — I10 BENIGN HYPERTENSION: ICD-10-CM

## 2024-09-20 DIAGNOSIS — M51.36 LUMBAR DEGENERATIVE DISC DISEASE: ICD-10-CM

## 2024-09-20 DIAGNOSIS — I48.91 ATRIAL FIBRILLATION WITH RVR (MULTI): Primary | ICD-10-CM

## 2024-09-20 DIAGNOSIS — D56.1 BETA THALASSEMIA (MULTI): ICD-10-CM

## 2024-09-20 DIAGNOSIS — M16.0 BILATERAL PRIMARY OSTEOARTHRITIS OF HIP: ICD-10-CM

## 2024-09-20 LAB
ALBUMIN: 3.8 G/DL (ref 3.4–5)
ALPHA 1 GLOBULIN: 0.3 G/DL (ref 0.2–0.6)
ALPHA 2 GLOBULIN: 0.9 G/DL (ref 0.4–1.1)
BETA GLOBULIN: 0.7 G/DL (ref 0.5–1.2)
GAMMA GLOBULIN: 0.5 G/DL (ref 0.5–1.4)
M-PROTEIN 1: 0.1 G/DL
PATH REVIEW-SERUM PROTEIN ELECTROPHORESIS: ABNORMAL
PROTEIN ELECTROPHORESIS COMMENT: ABNORMAL

## 2024-09-20 PROCEDURE — 3078F DIAST BP <80 MM HG: CPT | Performed by: INTERNAL MEDICINE

## 2024-09-20 PROCEDURE — 1036F TOBACCO NON-USER: CPT | Performed by: INTERNAL MEDICINE

## 2024-09-20 PROCEDURE — 3074F SYST BP LT 130 MM HG: CPT | Performed by: INTERNAL MEDICINE

## 2024-09-20 PROCEDURE — 1159F MED LIST DOCD IN RCRD: CPT | Performed by: INTERNAL MEDICINE

## 2024-09-20 PROCEDURE — G2211 COMPLEX E/M VISIT ADD ON: HCPCS | Performed by: INTERNAL MEDICINE

## 2024-09-20 PROCEDURE — 1123F ACP DISCUSS/DSCN MKR DOCD: CPT | Performed by: INTERNAL MEDICINE

## 2024-09-20 PROCEDURE — 99214 OFFICE O/P EST MOD 30 MIN: CPT | Performed by: INTERNAL MEDICINE

## 2024-09-20 ASSESSMENT — ENCOUNTER SYMPTOMS
NEUROLOGICAL NEGATIVE: 1
HEMATOLOGIC/LYMPHATIC NEGATIVE: 1
MUSCULOSKELETAL NEGATIVE: 1
GASTROINTESTINAL NEGATIVE: 1
CARDIOVASCULAR NEGATIVE: 1
ENDOCRINE NEGATIVE: 1
ALLERGIC/IMMUNOLOGIC NEGATIVE: 1
EYES NEGATIVE: 1
PSYCHIATRIC NEGATIVE: 1
RESPIRATORY NEGATIVE: 1
CONSTITUTIONAL NEGATIVE: 1

## 2024-09-20 NOTE — ASSESSMENT & PLAN NOTE
Monitor CBC and blood count and follow with hematology and stable now on Chemotherapy  tolerated well

## 2024-09-20 NOTE — ASSESSMENT & PLAN NOTE
DM - NIDDM  . Reviewed with patient / Will check  HbA1c and fasting blood sugars. Educate home self monitoring and diary keeping(reviewed with patient home blood sugar levels /diary). Educate extensively low calorie diet and weight loss with exercise. Reviewed BS- diary and Rx. Regimen. [place] renal protection with ARB/ACEI.               Educate compliance with diet and Rx. And educate risks and autcomes.     Refill Metformin 500 mg BID with meals

## 2024-10-10 RX ORDER — DIPHENHYDRAMINE HYDROCHLORIDE 50 MG/ML
50 INJECTION INTRAMUSCULAR; INTRAVENOUS AS NEEDED
OUTPATIENT
Start: 2024-11-19

## 2024-10-10 RX ORDER — FAMOTIDINE 10 MG/ML
20 INJECTION INTRAVENOUS ONCE AS NEEDED
OUTPATIENT
Start: 2024-10-22

## 2024-10-10 RX ORDER — FAMOTIDINE 10 MG/ML
20 INJECTION INTRAVENOUS ONCE AS NEEDED
OUTPATIENT
Start: 2024-11-19

## 2024-10-10 RX ORDER — DIPHENHYDRAMINE HYDROCHLORIDE 50 MG/ML
50 INJECTION INTRAMUSCULAR; INTRAVENOUS AS NEEDED
OUTPATIENT
Start: 2024-10-22

## 2024-10-10 RX ORDER — ACETAMINOPHEN 325 MG/1
650 TABLET ORAL ONCE
OUTPATIENT
Start: 2024-11-19

## 2024-10-10 RX ORDER — ALBUTEROL SULFATE 0.83 MG/ML
3 SOLUTION RESPIRATORY (INHALATION) AS NEEDED
OUTPATIENT
Start: 2024-11-19

## 2024-10-10 RX ORDER — DIPHENHYDRAMINE HCL 50 MG
50 CAPSULE ORAL ONCE
OUTPATIENT
Start: 2024-10-22

## 2024-10-10 RX ORDER — EPINEPHRINE 0.3 MG/.3ML
0.3 INJECTION SUBCUTANEOUS EVERY 5 MIN PRN
OUTPATIENT
Start: 2024-11-19

## 2024-10-10 RX ORDER — DIPHENHYDRAMINE HCL 50 MG
50 CAPSULE ORAL ONCE
OUTPATIENT
Start: 2024-11-19

## 2024-10-10 RX ORDER — ALBUTEROL SULFATE 0.83 MG/ML
3 SOLUTION RESPIRATORY (INHALATION) AS NEEDED
OUTPATIENT
Start: 2024-10-22

## 2024-10-10 RX ORDER — MONTELUKAST SODIUM 10 MG/1
10 TABLET ORAL ONCE
OUTPATIENT
Start: 2024-11-19

## 2024-10-10 RX ORDER — ACETAMINOPHEN 325 MG/1
650 TABLET ORAL ONCE
OUTPATIENT
Start: 2024-10-22

## 2024-10-10 RX ORDER — MONTELUKAST SODIUM 10 MG/1
10 TABLET ORAL ONCE
OUTPATIENT
Start: 2024-10-22

## 2024-10-10 RX ORDER — EPINEPHRINE 0.3 MG/.3ML
0.3 INJECTION SUBCUTANEOUS EVERY 5 MIN PRN
OUTPATIENT
Start: 2024-10-22

## 2024-10-11 DIAGNOSIS — N40.1 BPH WITH OBSTRUCTION/LOWER URINARY TRACT SYMPTOMS: ICD-10-CM

## 2024-10-11 DIAGNOSIS — N13.8 BPH WITH OBSTRUCTION/LOWER URINARY TRACT SYMPTOMS: ICD-10-CM

## 2024-10-11 DIAGNOSIS — R97.20 ELEVATED PSA: ICD-10-CM

## 2024-10-13 ASSESSMENT — ENCOUNTER SYMPTOMS
VOMITING: 0
UNEXPECTED WEIGHT CHANGE: 0
CHILLS: 0
SHORTNESS OF BREATH: 1
FEVER: 0
CONSTIPATION: 1
NUMBNESS: 1
HEMATOLOGIC/LYMPHATIC NEGATIVE: 1
FATIGUE: 1
ARTHRALGIAS: 1
BLOOD IN STOOL: 0
APPETITE CHANGE: 0
NAUSEA: 0
WOUND: 0
CARDIOVASCULAR NEGATIVE: 1
DIAPHORESIS: 1
DIARRHEA: 1

## 2024-10-13 NOTE — PROGRESS NOTES
Patient ID: Toby Ugalde is a 74 y.o. male.    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 3/6/17- bone survey was negative for lytic lesions.  3/15/17-  bone marrow revealed 30% plasma cells with kappa predominance. Started on Rd  - 2017 - 12/2019: Rd->R 10mg (d/t hyperGlc a/w dex so d/c'ed), best response [place]; Rd held d/t dental procedure and hip replacement, but never restarted  - 3/2022: worsening anemia since 3/22, worsening Cr since 3/21; SPE-IgGK 3.5g, K/L 147; bone survey: LT 9th rib possible plasmacytoma     Late February 2022 patient was seen by his PCP Dr. Cano for complaints of fatigue.  Labs notable for hyponatremia to 127, anemia with a HGB of 9.  Cr 1.99. Protein elevated to  10.7.  Uric acid up to 8.9.      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  Skeletal survey (3/2/22) - lesion in the left rib.   PET:    Expansile hypermetabolic lytic mass involving the left lateral 9th  rib, correlating to suspected plasmacytoma described on osseous  survey performed 03/02/2022.  2. Diffusely increased FDG uptake throughout the bone marrow which  may relate to anemia/marrow activation, with marrow involvement by  myeloma not excluded.  Treatment:   Initiated (3/29/22) treatment for his relapsed Myeloma with Revlimid 10 mg by mouth days 1-14/21 days, Daratuzumab (3/29, & 4/5/22), SC Velcade (3/29 & 4/5/22), & weekly Dexamethasone complicated by several hospitaliizations for syncope, PE ,afib due to  velcade and revlimid.  Velcade discontinued     Recv'd weekly single agent Daraumumab from 5/31 - 7/19/22. Initiated (8/25/22) Revlimid 10 mg on days 1-21/28 days.     Has had marked reduction in paraprotein and free lyte chains  with M protein : 1.4 g/dl on 4/19/22 ,    [place] free lyte/ratio of 4.49 mg/dl/4.45.    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         IgG multiple myeloma (Multi)   9/6/2023 Initial Diagnosis    IgG multiple myeloma (CMS/HCC)     10/17/2023 -  Chemotherapy    Daratumumab, 28 Day Cycles - Maintenance       Response:     Past Medical History:     Past Medical History:   Diagnosis Date   • Dental caries on pit and fissure surface penetrating into dentin 01/12/2024   • Deposits (accretions) on teeth 01/12/2024   • Essential (primary) hypertension 11/20/2013    Benign essential hypertension   • History of hypercholesterolemia 01/12/2024   • History of hypertension 01/12/2024   • History of malignant neoplasm of prostate 01/12/2024   • Impacted cerumen 01/12/2024   • Impacted cerumen, bilateral 02/16/2024   • Infective pharyngitis 01/12/2024   • Other intervertebral disc degeneration, lumbar region     Lumbar degenerative disc disease   • Overweight with body mass index (BMI) 25.0-29.9 02/02/2022   • Partial loss of teeth due to caries, class I 01/12/2024   • Personal history of malignant neoplasm of prostate     History of malignant neoplasm of prostate   • Personal history of other diseases of the circulatory system     History of hypertension   • Personal history of other diseases of the circulatory system 06/29/2022    History of atrial tachycardia   • Personal history of other diseases of the circulatory system     History of essential hypertension   • Personal history of other diseases of the musculoskeletal system and connective tissue     History of arthritis   • Personal history of other diseases of the musculoskeletal system and connective tissue     History of acute gouty arthritis   • Personal history of other diseases of the musculoskeletal  system and connective tissue 01/30/2019    History of left foot drop   • Personal history of other diseases of the respiratory system     History of bronchitis   • Personal history of other endocrine, nutritional and metabolic disease     History of hypercholesterolemia   • Personal history of other endocrine, nutritional and metabolic disease     History of diabetes mellitus   • Personal history of other endocrine, nutritional and metabolic disease 01/17/2014    History of diabetes mellitus   • Post-traumatic stress disorder, chronic 11/10/2023   • Primary localized osteoarthritis of pelvic region and thigh 12/17/2018   • Pure hypercholesterolemia, unspecified 11/20/2013    Low-density-lipoid-type (LDL) hyperlipoproteinemia   • Snoring     Snoring     Surgical History:     Past Surgical History:   Procedure Laterality Date   • BACK SURGERY  11/20/2013    Back Surgery   • TONSILLECTOMY  11/20/2013    Tonsillectomy   • TOTAL KNEE ARTHROPLASTY  11/20/2013    Knee Replacement      Family History:     Family History   Problem Relation Name Age of Onset   • Heart attack Mother     • Diabetes Brother     • Stroke Maternal Grandfather       Social History:  Reported history of agent orange exposure while in [employer] at [employer]. Retired  and post .  from his wife who is health care  power of .  Two sons and 2 grandchildren (1)    Social History     Tobacco Use   • Smoking status: Never     Passive exposure: Never   • Smokeless tobacco: Never   Substance Use Topics   • Alcohol use: Yes     Comment: occasional   • Drug use: Never      -------------------------------------------------------------------------------------------------------  Subjective       HPI    KANDI ROSE is a 74 year old Male h/o pAfib,Pes  well controlled T2DM, HTN, gout, IgG kappa MM with history as above.  Still not clear whether patient has ONJ.  Finally is working with dentist at [facility].  Patient  states he had a bone density 11/23 which does not show osteopenia.    Toby presents to the clinic today (8/20/24) unaccompanied for follow up evaulation of his multiple myeloma, blood work, and is scheduled to receive C22 of monthly subcutaneous daratumumab.  Remains on lenalidomide 5 mg daily on days 1-21 of 28 day cycle.  Today, he is on day 20 of his 28 day cycle of lenalidomide.  [facility]    Energy level is up and down.  Uses cane for ambulation.  Appetite is doing a little better.  Weight is up a few pounds since last visit.  Reports ongoing shortness of breath on exertion.  Bowels chronically rotate between diarrhea and constipation.  Takes prn imodium and stool softener.  Chronic left hip pain, using tramadol and tylenol.  Following with orthopedics.  Received a cortisol injection about 2 weeks ago to left hip with plan to eventually have left hip replacement.  Numbness and tingling to bilateral hands and feet.     Review of Systems   Constitutional:  Positive for diaphoresis (occassional night sweats about once per week, can be drenching) and fatigue. Negative for appetite change, chills, fever and unexpected weight change.   Respiratory:  Positive for shortness of breath (on exertion).    Cardiovascular: Negative.    Gastrointestinal:  Positive for constipation and diarrhea. Negative for blood in stool, nausea and vomiting.   Genitourinary: Negative.     Musculoskeletal:  Positive for arthralgias and gait problem.   Skin:  Negative for rash and wound.   Neurological:  Positive for gait problem and numbness.   Hematological: Negative.    All other systems reviewed and are negative.  -------------------------------------------------------------------------------------------------------  Objective   BSA: There is no height or weight on file to calculate BSA.  There were no vitals taken for this visit.    Physical Exam  Vitals reviewed.   Constitutional:        Appearance: Normal appearance.   HENT:      Head: Normocephalic and atraumatic.      Mouth/Throat:      Mouth: Mucous membranes are moist.   Eyes:      Extraocular Movements: Extraocular movements intact.      Conjunctiva/sclera: Conjunctivae normal.      Pupils: Pupils are equal, round, and reactive to light.   Cardiovascular:      Rate and Rhythm: Normal rate and regular rhythm.      Pulses: Normal pulses.      Heart sounds: Normal heart sounds.   Pulmonary:      Effort: Pulmonary effort is normal.      Breath sounds: Normal breath sounds.   Abdominal:      General: Abdomen is flat. Bowel sounds are normal.      Palpations: Abdomen is soft. There is no mass.      Tenderness: There is no abdominal tenderness.   Musculoskeletal:         General: No swelling. Normal range of motion.   Lymphadenopathy:      Comments: No lymphadenopathy   Skin:     General: Skin is warm and dry.   Neurological:      General: No focal deficit present.      Mental Status: He is alert and oriented to person, place, and time.   Psychiatric:         Mood and Affect: Mood normal.         Behavior: Behavior normal.         Thought Content: Thought content normal.         Judgment: Judgment normal.   Performance Status:  Symptomatic; fully ambulatory  -------------------------------------------------------------------------------------------------------  Assessment/Plan      75 yo M h/o IgG kappa MM s/p Rd [id number] best response [place] stopped d/t dental procedure, presented on 3/2022 for worsening anemia/Cr d/t relapsed MM, and likely LT 9th rib plasmacytoma.      Restaging 3/2022 shows high burden disease with 90% tumorous involvement of the bone marrow ,   Initiated (3/29/22) treatment for his relapsed Myeloma with Revlimid 10 mg by mouth days 1-14/21 days, Daratuzumab (3/29, & 4/5/22), SC Velcade (3/29 & 4/5/22), & weekly Dexamethasone. complicated by several hospilizations for syncope, PE ,afib due to velcade and revlimid.    Recv'd weekly  single agent Daraumumab from 5/31 - 7/19/22. Initiated (8/25/22) Revlimid 10 mg on days 1-21/28 days.     Will continue monthly casey, rev, & dex until evidence of disease progression or unacceptable toxicity.    8/20/24:   [place] and lambda free light chains stable from 7/23/24, level in process from today.  M-protein stable at 0.1 (7/23/24), level in process from today.  Hgb 11.8, plt 132, WBC 3.8, and ANC 1.03.  Dr. Waller was okay to continue with C22 of daratumumab despite blood counts.  Continue revlimid 5 mg daily on days 1-21 out of 28 day cycle.    Ig [place] Free Light Chain   Date Value Ref Range Status   09/17/2024 2.18 (H) 0.33 - 1.94 mg/dL Final   08/20/2024 1.99 (H) 0.33 - 1.94 mg/dL Final   07/23/2024 1.53 0.33 - 1.94 mg/dL Final   06/25/2024 1.45 0.33 - 1.94 mg/dL Final   05/28/2024 1.08 0.33 - 1.94 mg/dL Final     Ig Lambda Free Light Chain   Date Value Ref Range Status   09/17/2024 0.91 0.57 - 2.63 mg/dL Final   08/20/2024 1.02 0.57 - 2.63 mg/dL Final   07/23/2024 1.06 0.57 - 2.63 mg/dL Final   06/25/2024 1.03 0.57 - 2.63 mg/dL Final   05/28/2024 0.71 0.57 - 2.63 mg/dL Final     Kappa/Lambda Ratio   Date Value Ref Range Status   09/17/2024 2.40 (H) 0.26 - 1.65 Final   08/20/2024 1.95 (H) 0.26 - 1.65 Final   07/23/2024 1.44 0.26 - 1.65 Final   06/25/2024 1.41 0.26 - 1.65 Final   05/28/2024 1.52 0.26 - 1.65 Final     M-PROTEIN 1   Date Value Ref Range Status   09/17/2024 0.1 (H)   g/dL Final   08/20/2024 0.1 (H)   g/dL Final   07/23/2024 0.1 (H)   g/dL Final   06/25/2024 0.1 (H)   g/dL Final   05/28/2024 0.1 (H)   g/dL Final      #Atrial fib on chronic anticoagulation  on Eliquis 5 mg by mouth twice a day.  Patient confirmed on 50 mg by mouth twice a day of metoprolol.  Follows with Dr. Moon and Dr. Garcia.       PE (4/9/22) - Small occlusive embolic thrombus within the right lower lobe pulmonary artery branch, medially and posteriorly.  Cont Eliquis 5 mg twice a day.     Bone health:  Xgeva held due to  concerns of ONJ and prolonged treatment in the past.  Bone density December 2023 was normal.  Hold for now.      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  Pt had his tooth pulled in 2019, however with prolonged bone exposure/infection, eventually underwent OMSF surgery in 10/2021. Unclear if he has denosumab assoc jaw necrosis. Patient finally had his bad tooth (L upper molar) removed on 5/25/23. [facility]  6/25/24:  Reports that he recently finished dental work and has dentures.      Social Work:  4/30/24: Requested to have DWIGHT Dominguez OSW-C contact patient regarding his medical bills.   8/20/24:  Filled out applications for grants for [employer] and [employer].  Reports he does not need assistance for medical paperwork or bills today.      Intertrigo of right lower abdominal skin fold:  -hyperpigmented, irritated skin with reports of pruritus.  Possible fungal intertrigo.  Prescribed topical clotrimazole 1% cream.  Advised to keep skin dry.    8/20/24:  Reports rash has improved.       RTC:   9/17/24:  Infusion appt for C23 of monthly daratumumab  10/15/24:  Follow up visit with provider in 2 months and due for C24 daratumumab  -------------------------------------------------------------------------------------------------------  ESPINOZA Novak

## 2024-10-14 RX ORDER — TAMSULOSIN HYDROCHLORIDE 0.4 MG/1
0.4 CAPSULE ORAL DAILY
Qty: 90 CAPSULE | Refills: 1 | Status: SHIPPED | OUTPATIENT
Start: 2024-10-14

## 2024-10-14 RX ORDER — FINASTERIDE 5 MG/1
5 TABLET, FILM COATED ORAL DAILY
Qty: 90 TABLET | Refills: 1 | Status: SHIPPED | OUTPATIENT
Start: 2024-10-14

## 2024-10-15 ENCOUNTER — APPOINTMENT (OUTPATIENT)
Dept: HEMATOLOGY/ONCOLOGY | Facility: CLINIC | Age: 74
End: 2024-10-15
Payer: MEDICARE

## 2024-10-15 ENCOUNTER — TELEPHONE (OUTPATIENT)
Dept: HEMATOLOGY/ONCOLOGY | Facility: CLINIC | Age: 74
End: 2024-10-15
Payer: MEDICARE

## 2024-10-15 ENCOUNTER — TELEPHONE (OUTPATIENT)
Dept: HEMATOLOGY/ONCOLOGY | Facility: HOSPITAL | Age: 74
End: 2024-10-15
Payer: MEDICARE

## 2024-10-15 ENCOUNTER — DOCUMENTATION (OUTPATIENT)
Dept: HEMATOLOGY/ONCOLOGY | Facility: HOSPITAL | Age: 74
End: 2024-10-15
Payer: MEDICARE

## 2024-10-15 DIAGNOSIS — C90.00 IGG MULTIPLE MYELOMA (MULTI): Primary | ICD-10-CM

## 2024-10-15 NOTE — PROGRESS NOTES
Called Mr. Ugalde after he cancelled provider visit and infusion appt today due to neck pain. Reports pain feels muscular and he is using prn tramdol and heating pad.  Advised to contact PCP or be evaluated at urgent care if pain worsens or does not resolve.  Will contact patient with dates and times of when appts can be rescheduled for next week.  Understanding verbalized.

## 2024-10-15 NOTE — TELEPHONE ENCOUNTER
Spoke with patient in regards to rescheduling his appts from today. Patient reports he is having significant neck pain that makes him unable to straighten his neck or lift his arm. He stated that if it does not improve he will be going to ER. I agreed with this plan. I informed him of updated appt to 8/22 at 8am for NP and 830 for infusion. Patient verbalized understanding and agreement with the plan.  Rachna Stone RN

## 2024-10-21 ASSESSMENT — ENCOUNTER SYMPTOMS
WOUND: 0
NUMBNESS: 1
APPETITE CHANGE: 0
HEMATOLOGIC/LYMPHATIC NEGATIVE: 1
VOMITING: 0
BLOOD IN STOOL: 0
CHILLS: 0
CONSTIPATION: 1
FATIGUE: 1
FEVER: 0
ARTHRALGIAS: 1
DIARRHEA: 1
CARDIOVASCULAR NEGATIVE: 1
DIAPHORESIS: 1
SHORTNESS OF BREATH: 1
UNEXPECTED WEIGHT CHANGE: 0
NAUSEA: 0

## 2024-10-21 NOTE — PROGRESS NOTES
Patient ID: Toby Ugalde is a 74 y.o. male.    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 3/6/17- bone survey was negative for lytic lesions.  3/15/17-  bone marrow revealed 30% plasma cells with kappa predominance. Started on Rd  - 2017 - 12/2019: Rd->R 10mg (d/t hyperGlc a/w dex so d/c'ed), best response [place]; Rd held d/t dental procedure and hip replacement, but never restarted  - 3/2022: worsening anemia since 3/22, worsening Cr since 3/21; SPE-IgGK 3.5g, K/L 147; bone survey: LT 9th rib possible plasmacytoma     Late February 2022 patient was seen by his PCP Dr. Cano for complaints of fatigue.  Labs notable for hyponatremia to 127, anemia with a HGB of 9.  Cr 1.99. Protein elevated to  10.7.  Uric acid up to 8.9.      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  Skeletal survey (3/2/22) - lesion in the left rib.   PET:    Expansile hypermetabolic lytic mass involving the left lateral 9th  rib, correlating to suspected plasmacytoma described on osseous  survey performed 03/02/2022.  2. Diffusely increased FDG uptake throughout the bone marrow which  may relate to anemia/marrow activation, with marrow involvement by  myeloma not excluded.  Treatment:   Initiated (3/29/22) treatment for his relapsed Myeloma with Revlimid 10 mg by mouth days 1-14/21 days, Daratuzumab (3/29, & 4/5/22), SC Velcade (3/29 & 4/5/22), & weekly Dexamethasone complicated by several hospitaliizations for syncope, PE ,afib due to  velcade and revlimid.  Velcade discontinued     Recv'd weekly single agent Daraumumab from 5/31 - 7/19/22. Initiated (8/25/22) Revlimid 10 mg on days 1-21/28 days.     Has had marked reduction in paraprotein and free lyte chains  with M protein : 1.4 g/dl on 4/19/22 ,    [place] free lyte/ratio of 4.49 mg/dl/4.45.    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         IgG multiple myeloma (Multi)   9/6/2023 Initial Diagnosis    IgG multiple myeloma (CMS/HCC)     10/17/2023 -  Chemotherapy    Daratumumab, 28 Day Cycles - Maintenance       Response:     Past Medical History:     Past Medical History:   Diagnosis Date    Dental caries on pit and fissure surface penetrating into dentin 01/12/2024    Deposits (accretions) on teeth 01/12/2024    Essential (primary) hypertension 11/20/2013    Benign essential hypertension    History of hypercholesterolemia 01/12/2024    History of hypertension 01/12/2024    History of malignant neoplasm of prostate 01/12/2024    Impacted cerumen 01/12/2024    Impacted cerumen, bilateral 02/16/2024    Infective pharyngitis 01/12/2024    Other intervertebral disc degeneration, lumbar region     Lumbar degenerative disc disease    Overweight with body mass index (BMI) 25.0-29.9 02/02/2022    Partial loss of teeth due to caries, class I 01/12/2024    Personal history of malignant neoplasm of prostate     History of malignant neoplasm of prostate    Personal history of other diseases of the circulatory system     History of hypertension    Personal history of other diseases of the circulatory system 06/29/2022    History of atrial tachycardia    Personal history of other diseases of the circulatory system     History of essential hypertension    Personal history of other diseases of the musculoskeletal system and connective tissue     History of arthritis    Personal history of other diseases of the musculoskeletal system and connective tissue     History of acute gouty arthritis    Personal history of other diseases of the musculoskeletal system and connective  tissue 01/30/2019    History of left foot drop    Personal history of other diseases of the respiratory system     History of bronchitis    Personal history of other endocrine, nutritional and metabolic disease     History of hypercholesterolemia    Personal history of other endocrine, nutritional and metabolic disease     History of diabetes mellitus    Personal history of other endocrine, nutritional and metabolic disease 01/17/2014    History of diabetes mellitus    Post-traumatic stress disorder, chronic 11/10/2023    Primary localized osteoarthritis of pelvic region and thigh 12/17/2018    Pure hypercholesterolemia, unspecified 11/20/2013    Low-density-lipoid-type (LDL) hyperlipoproteinemia    Snoring     Snoring     Surgical History:     Past Surgical History:   Procedure Laterality Date    BACK SURGERY  11/20/2013    Back Surgery    TONSILLECTOMY  11/20/2013    Tonsillectomy    TOTAL KNEE ARTHROPLASTY  11/20/2013    Knee Replacement      Family History:     Family History   Problem Relation Name Age of Onset    Heart attack Mother      Diabetes Brother      Stroke Maternal Grandfather       Social History:  Reported history of agent orange exposure while in [employer] at [employer]. Retired  and post .  from his wife who is health care  power of .  Two sons and 2 grandchildren (1)      Social History     Tobacco Use    Smoking status: Never     Passive exposure: Never    Smokeless tobacco: Never   Substance Use Topics    Alcohol use: Yes     Comment: occasional    Drug use: Never      -------------------------------------------------------------------------------------------------------  Subjective       HPI    KANDI ROSE is a 74 year old Male h/o pAfib,Pes  well controlled T2DM, HTN, gout, IgG kappa MM with history as above.  Still not clear whether patient has ONJ.  Finally is working with dentist at the [facility].  Patient states he had a bone density 11/23  "which does not show osteopenia.    Toby presents to the clinic today (8/20/24) unaccompanied for follow up evaulation of his multiple myeloma, blood work, and is scheduled to receive C22 of monthly subcutaneous daratumumab.  Remains on lenalidomide 5 mg daily on days 1-21 of 28 day cycle.  Today, he is on day 20 of his 28 day cycle of lenalidomide.  [facility]    Energy level is up and down.  Uses cane for ambulation.  Appetite is doing a little better.  Weight is up a few pounds since last visit.  Reports ongoing shortness of breath on exertion.  Bowels chronically rotate between diarrhea and constipation.  Takes prn imodium and stool softener.  Chronic left hip pain, using tramadol and tylenol.  Following with orthopedics.  Received a cortisol injection about 2 weeks ago to left hip with plan to eventually have left hip replacement.  Numbness and tingling to bilateral hands and feet.     10/22/24: Patient seen for follow up. Reports he hurt his shoulder last week, but it is feeling much better today. Notes he has been particularly SOB for the last week + RAMIREZ, and notices occasional palpitations. Admits to some dizziness but no syncope.  Feels his throat has been a little \"scratchy\" but no cough/ congestion/ discolored sputum/fevers. Denies n/v/c, fevers, chills night sweats, unintentional weight loss, new or progressive pain, syncope, edema.    Review of Systems   Constitutional:  Positive for diaphoresis (occassional night sweats about once per week, can be drenching) and fatigue. Negative for appetite change, chills, fever and unexpected weight change.   Respiratory:  Positive for shortness of breath (on exertion).    Cardiovascular: Negative.    Gastrointestinal:  Positive for constipation and diarrhea. Negative for blood in stool, nausea and vomiting.   Genitourinary: Negative.     Musculoskeletal:  Positive for arthralgias and gait problem.   Skin:  Negative for " rash and wound.   Neurological:  Positive for gait problem and numbness.   Hematological: Negative.    All other systems reviewed and are negative.  -------------------------------------------------------------------------------------------------------  Objective   BSA: There is no height or weight on file to calculate BSA.  There were no vitals taken for this visit.    Physical Exam  Vitals reviewed.   Constitutional:       Appearance: Normal appearance.   HENT:      Head: Normocephalic and atraumatic.      Mouth/Throat:      Mouth: Mucous membranes are moist.   Eyes:      Extraocular Movements: Extraocular movements intact.      Conjunctiva/sclera: Conjunctivae normal.      Pupils: Pupils are equal, round, and reactive to light.   Cardiovascular:      Rate and Rhythm: Normal rate and regular rhythm.      Pulses: Normal pulses.      Heart sounds: Normal heart sounds.   Pulmonary:      Effort: Pulmonary effort is normal.      Breath sounds: Normal breath sounds.   Abdominal:      General: Abdomen is flat. Bowel sounds are normal.      Palpations: Abdomen is soft. There is no mass.      Tenderness: There is no abdominal tenderness.   Musculoskeletal:         General: No swelling. Normal range of motion.   Lymphadenopathy:      Comments: No lymphadenopathy   Skin:     General: Skin is warm and dry.   Neurological:      General: No focal deficit present.      Mental Status: He is alert and oriented to person, place, and time.   Psychiatric:         Mood and Affect: Mood normal.         Behavior: Behavior normal.         Thought Content: Thought content normal.         Judgment: Judgment normal.   Performance Status:  Symptomatic; fully ambulatory  -------------------------------------------------------------------------------------------------------  Assessment/Plan      75 yo M h/o IgG kappa MM s/p Rd [id number] best response [place] stopped d/t dental procedure, presented on 3/2022 for worsening anemia/Cr d/t relapsed MM,  and likely LT 9th rib plasmacytoma.      Restaging 3/2022 shows high burden disease with 90% tumorous involvement of the bone marrow ,   Initiated (3/29/22) treatment for his relapsed Myeloma with Revlimid 10 mg by mouth days 1-14/21 days, Daratuzumab (3/29, & 4/5/22), SC Velcade (3/29 & 4/5/22), & weekly Dexamethasone. complicated by several hospilizations for syncope, PE ,afib due to velcade and revlimid.    Recv'd weekly single agent Daraumumab from 5/31 - 7/19/22. Initiated (8/25/22) Revlimid 10 mg on days 1-21/28 days.     Will continue monthly casey, rev, & dex until evidence of disease progression or unacceptable toxicity.    8/20/24:   [place] and lambda free light chains stable from 7/23/24, level in process from today.  M-protein stable at 0.1 (7/23/24), level in process from today.  Hgb 11.8, plt 132, WBC 3.8, and ANC 1.03.  Dr. Waller was okay to continue with C22 of daratumumab despite blood counts.  Continue revlimid 5 mg daily on days 1-21 out of 28 day cycle.    Ig [place] Free Light Chain   Date Value Ref Range Status   09/17/2024 2.18 (H) 0.33 - 1.94 mg/dL Final   08/20/2024 1.99 (H) 0.33 - 1.94 mg/dL Final   07/23/2024 1.53 0.33 - 1.94 mg/dL Final   06/25/2024 1.45 0.33 - 1.94 mg/dL Final   05/28/2024 1.08 0.33 - 1.94 mg/dL Final     Ig Lambda Free Light Chain   Date Value Ref Range Status   09/17/2024 0.91 0.57 - 2.63 mg/dL Final   08/20/2024 1.02 0.57 - 2.63 mg/dL Final   07/23/2024 1.06 0.57 - 2.63 mg/dL Final   06/25/2024 1.03 0.57 - 2.63 mg/dL Final   05/28/2024 0.71 0.57 - 2.63 mg/dL Final     Kappa/Lambda Ratio   Date Value Ref Range Status   09/17/2024 2.40 (H) 0.26 - 1.65 Final   08/20/2024 1.95 (H) 0.26 - 1.65 Final   07/23/2024 1.44 0.26 - 1.65 Final   06/25/2024 1.41 0.26 - 1.65 Final   05/28/2024 1.52 0.26 - 1.65 Final     M-PROTEIN 1   Date Value Ref Range Status   09/17/2024 0.1 (H)   g/dL Final   08/20/2024 0.1 (H)   g/dL Final   07/23/2024 0.1 (H)   g/dL Final   06/25/2024 0.1 (H)    g/dL Final   05/28/2024 0.1 (H)   g/dL Final      #Atrial fib on chronic anticoagulation  on Eliquis 5 mg by mouth twice a day.  Patient confirmed on 50 mg by mouth twice a day of metoprolol.  Follows with Dr. Moon and Dr. Garcia.  Recently (10/22/24) has been feeling more SOB - advised to reach out to cardiology     PE (4/9/22) - Small occlusive embolic thrombus within the right lower lobe pulmonary artery branch, medially and posteriorly.  Cont Eliquis 5 mg twice a day.     Bone health:  Xgeva held due to concerns of ONJ and prolonged treatment in the past.  Bone density December 2023 was normal.  Hold for now.      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  Pt had his tooth pulled in 2019, however with prolonged bone exposure/infection, eventually underwent OMSF surgery in 10/2021. Unclear if he has denosumab assoc jaw necrosis. Patient finally had his bad tooth (L upper molar) removed on 5/25/23. [facility]  6/25/24:  Reports that he recently finished dental work and has dentures.      Social Work:  4/30/24: Requested to have DWIGHT Dominguez OSW-C contact patient regarding his medical bills.   8/20/24:  Filled out applications for grants for [employer] and [employer].  Reports he does not need assistance for medical paperwork or bills today.      Intertrigo of right lower abdominal skin fold:  -hyperpigmented, irritated skin with reports of pruritus.  Possible fungal intertrigo.  Prescribed topical clotrimazole 1% cream.  Advised to keep skin dry.    8/20/24:  Reports rash has improved.       RTC:     -------------------------------------------------------------------------------------------------------  ESPINOZA Reynolds

## 2024-10-22 ENCOUNTER — OFFICE VISIT (OUTPATIENT)
Dept: HEMATOLOGY/ONCOLOGY | Facility: CLINIC | Age: 74
End: 2024-10-22
Payer: MEDICARE

## 2024-10-22 ENCOUNTER — INFUSION (OUTPATIENT)
Dept: HEMATOLOGY/ONCOLOGY | Facility: CLINIC | Age: 74
End: 2024-10-22
Payer: MEDICARE

## 2024-10-22 VITALS
SYSTOLIC BLOOD PRESSURE: 114 MMHG | WEIGHT: 190.81 LBS | TEMPERATURE: 96.6 F | HEART RATE: 90 BPM | DIASTOLIC BLOOD PRESSURE: 69 MMHG | RESPIRATION RATE: 18 BRPM | OXYGEN SATURATION: 99 % | BODY MASS INDEX: 27.38 KG/M2

## 2024-10-22 DIAGNOSIS — C90.00 IGG MULTIPLE MYELOMA (MULTI): ICD-10-CM

## 2024-10-22 DIAGNOSIS — C90.00 IGG MULTIPLE MYELOMA (MULTI): Primary | ICD-10-CM

## 2024-10-22 LAB
ALBUMIN SERPL BCP-MCNC: 3.9 G/DL (ref 3.4–5)
ALP SERPL-CCNC: 94 U/L (ref 33–136)
ALT SERPL W P-5'-P-CCNC: 22 U/L (ref 10–52)
ANION GAP SERPL CALC-SCNC: 19 MMOL/L (ref 10–20)
AST SERPL W P-5'-P-CCNC: 14 U/L (ref 9–39)
BASOPHILS # BLD AUTO: 0.02 X10*3/UL (ref 0–0.1)
BASOPHILS NFR BLD AUTO: 0.5 %
BILIRUB SERPL-MCNC: 0.9 MG/DL (ref 0–1.2)
BUN SERPL-MCNC: 15 MG/DL (ref 6–23)
CALCIUM SERPL-MCNC: 9 MG/DL (ref 8.6–10.6)
CHLORIDE SERPL-SCNC: 104 MMOL/L (ref 98–107)
CO2 SERPL-SCNC: 21 MMOL/L (ref 21–32)
CREAT SERPL-MCNC: 1.56 MG/DL (ref 0.5–1.3)
EGFRCR SERPLBLD CKD-EPI 2021: 46 ML/MIN/1.73M*2
EOSINOPHIL # BLD AUTO: 0.06 X10*3/UL (ref 0–0.4)
EOSINOPHIL NFR BLD AUTO: 1.4 %
ERYTHROCYTE [DISTWIDTH] IN BLOOD BY AUTOMATED COUNT: 13.2 % (ref 11.5–14.5)
GLUCOSE SERPL-MCNC: 155 MG/DL (ref 74–99)
HCT VFR BLD AUTO: 37.6 % (ref 41–52)
HGB BLD-MCNC: 12.7 G/DL (ref 13.5–17.5)
IGA SERPL-MCNC: 87 MG/DL (ref 70–400)
IGG SERPL-MCNC: 589 MG/DL (ref 700–1600)
IGM SERPL-MCNC: 14 MG/DL (ref 40–230)
IMM GRANULOCYTES # BLD AUTO: 0.01 X10*3/UL (ref 0–0.5)
IMM GRANULOCYTES NFR BLD AUTO: 0.2 % (ref 0–0.9)
LYMPHOCYTES # BLD AUTO: 2.08 X10*3/UL (ref 0.8–3)
LYMPHOCYTES NFR BLD AUTO: 47.6 %
MCH RBC QN AUTO: 29.7 PG (ref 26–34)
MCHC RBC AUTO-ENTMCNC: 33.8 G/DL (ref 32–36)
MCV RBC AUTO: 88 FL (ref 80–100)
MONOCYTES # BLD AUTO: 0.44 X10*3/UL (ref 0.05–0.8)
MONOCYTES NFR BLD AUTO: 10.1 %
NEUTROPHILS # BLD AUTO: 1.76 X10*3/UL (ref 1.6–5.5)
NEUTROPHILS NFR BLD AUTO: 40.2 %
NRBC BLD-RTO: ABNORMAL /100{WBCS}
PLATELET # BLD AUTO: 322 X10*3/UL (ref 150–450)
POTASSIUM SERPL-SCNC: 3 MMOL/L (ref 3.5–5.3)
PROT SERPL-MCNC: 6.4 G/DL (ref 6.4–8.2)
PROT SERPL-MCNC: 6.4 G/DL (ref 6.4–8.2)
RBC # BLD AUTO: 4.27 X10*6/UL (ref 4.5–5.9)
SODIUM SERPL-SCNC: 141 MMOL/L (ref 136–145)
WBC # BLD AUTO: 4.4 X10*3/UL (ref 4.4–11.3)

## 2024-10-22 PROCEDURE — 99215 OFFICE O/P EST HI 40 MIN: CPT

## 2024-10-22 PROCEDURE — 1159F MED LIST DOCD IN RCRD: CPT

## 2024-10-22 PROCEDURE — 3074F SYST BP LT 130 MM HG: CPT

## 2024-10-22 PROCEDURE — 2500000001 HC RX 250 WO HCPCS SELF ADMINISTERED DRUGS (ALT 637 FOR MEDICARE OP): Performed by: INTERNAL MEDICINE

## 2024-10-22 PROCEDURE — 80053 COMPREHEN METABOLIC PANEL: CPT

## 2024-10-22 PROCEDURE — 1123F ACP DISCUSS/DSCN MKR DOCD: CPT

## 2024-10-22 PROCEDURE — 1126F AMNT PAIN NOTED NONE PRSNT: CPT

## 2024-10-22 PROCEDURE — 2500000004 HC RX 250 GENERAL PHARMACY W/ HCPCS (ALT 636 FOR OP/ED): Mod: JZ,JG | Performed by: INTERNAL MEDICINE

## 2024-10-22 PROCEDURE — 2500000004 HC RX 250 GENERAL PHARMACY W/ HCPCS (ALT 636 FOR OP/ED): Performed by: INTERNAL MEDICINE

## 2024-10-22 PROCEDURE — 85025 COMPLETE CBC W/AUTO DIFF WBC: CPT

## 2024-10-22 PROCEDURE — 96401 CHEMO ANTI-NEOPL SQ/IM: CPT

## 2024-10-22 PROCEDURE — 1036F TOBACCO NON-USER: CPT

## 2024-10-22 PROCEDURE — 3078F DIAST BP <80 MM HG: CPT

## 2024-10-22 PROCEDURE — 83521 IG LIGHT CHAINS FREE EACH: CPT

## 2024-10-22 PROCEDURE — 82784 ASSAY IGA/IGD/IGG/IGM EACH: CPT

## 2024-10-22 PROCEDURE — 84165 PROTEIN E-PHORESIS SERUM: CPT

## 2024-10-22 RX ORDER — ACETAMINOPHEN 325 MG/1
650 TABLET ORAL ONCE
Status: COMPLETED | OUTPATIENT
Start: 2024-10-22 | End: 2024-10-22

## 2024-10-22 RX ORDER — EPINEPHRINE 0.3 MG/.3ML
0.3 INJECTION SUBCUTANEOUS EVERY 5 MIN PRN
Status: DISCONTINUED | OUTPATIENT
Start: 2024-10-22 | End: 2024-10-22 | Stop reason: HOSPADM

## 2024-10-22 RX ORDER — ALBUTEROL SULFATE 0.83 MG/ML
3 SOLUTION RESPIRATORY (INHALATION) AS NEEDED
Status: DISCONTINUED | OUTPATIENT
Start: 2024-10-22 | End: 2024-10-22 | Stop reason: HOSPADM

## 2024-10-22 RX ORDER — DIPHENHYDRAMINE HYDROCHLORIDE 50 MG/ML
50 INJECTION INTRAMUSCULAR; INTRAVENOUS AS NEEDED
Status: DISCONTINUED | OUTPATIENT
Start: 2024-10-22 | End: 2024-10-22 | Stop reason: HOSPADM

## 2024-10-22 RX ORDER — MONTELUKAST SODIUM 10 MG/1
10 TABLET ORAL ONCE
Status: COMPLETED | OUTPATIENT
Start: 2024-10-22 | End: 2024-10-22

## 2024-10-22 RX ORDER — DIPHENHYDRAMINE HCL 50 MG
50 CAPSULE ORAL ONCE
Status: COMPLETED | OUTPATIENT
Start: 2024-10-22 | End: 2024-10-22

## 2024-10-22 RX ORDER — FAMOTIDINE 10 MG/ML
20 INJECTION INTRAVENOUS ONCE AS NEEDED
Status: DISCONTINUED | OUTPATIENT
Start: 2024-10-22 | End: 2024-10-22 | Stop reason: HOSPADM

## 2024-10-22 ASSESSMENT — PAIN SCALES - GENERAL: PAINLEVEL_OUTOF10: 0-NO PAIN

## 2024-10-23 LAB
KAPPA LC SERPL-MCNC: 2.36 MG/DL (ref 0.33–1.94)
KAPPA LC/LAMBDA SER: 2.17 {RATIO} (ref 0.26–1.65)
LAMBDA LC SERPL-MCNC: 1.09 MG/DL (ref 0.57–2.63)

## 2024-10-24 LAB
ALBUMIN: 3.5 G/DL (ref 3.4–5)
ALPHA 1 GLOBULIN: 0.3 G/DL (ref 0.2–0.6)
ALPHA 2 GLOBULIN: 1.2 G/DL (ref 0.4–1.1)
BETA GLOBULIN: 0.8 G/DL (ref 0.5–1.2)
GAMMA GLOBULIN: 0.6 G/DL (ref 0.5–1.4)
M-PROTEIN 1: 0.1 G/DL
PATH REVIEW-SERUM PROTEIN ELECTROPHORESIS: ABNORMAL
PROTEIN ELECTROPHORESIS COMMENT: ABNORMAL

## 2024-10-25 ENCOUNTER — DOCUMENTATION (OUTPATIENT)
Dept: HEMATOLOGY/ONCOLOGY | Facility: CLINIC | Age: 74
End: 2024-10-25

## 2024-10-25 ENCOUNTER — APPOINTMENT (OUTPATIENT)
Dept: PRIMARY CARE | Facility: CLINIC | Age: 74
End: 2024-10-25
Payer: MEDICARE

## 2024-10-25 ENCOUNTER — TELEPHONE (OUTPATIENT)
Dept: HEMATOLOGY/ONCOLOGY | Facility: CLINIC | Age: 74
End: 2024-10-25

## 2024-10-25 DIAGNOSIS — E87.6 HYPOKALEMIA: Primary | ICD-10-CM

## 2024-10-25 RX ORDER — POTASSIUM CHLORIDE 750 MG/1
20 TABLET, FILM COATED, EXTENDED RELEASE ORAL DAILY
Qty: 14 TABLET | Refills: 0 | Status: SHIPPED | OUTPATIENT
Start: 2024-10-25 | End: 2024-11-01

## 2024-10-25 NOTE — PROGRESS NOTES
Left pt a voice mail message to inform of potassium of 3.0 from labs drawn on 10/22/24. Sent prescription for potassium chloride 20 meq daily for 7 days to [facility] pharmacy on file. Advised pt to contact PCP to see if he needs on potassium replacement due to diuretics. Reviewed foods high in potassium.  Advised to call [phone] with questions or concerns.

## 2024-11-03 RX ORDER — HEPARIN 100 UNIT/ML
500 SYRINGE INTRAVENOUS AS NEEDED
OUTPATIENT
Start: 2024-11-03

## 2024-11-03 RX ORDER — HEPARIN SODIUM,PORCINE/PF 10 UNIT/ML
50 SYRINGE (ML) INTRAVENOUS AS NEEDED
OUTPATIENT
Start: 2024-11-03

## 2024-11-06 ENCOUNTER — OFFICE VISIT (OUTPATIENT)
Dept: CARDIOLOGY | Facility: HOSPITAL | Age: 74
End: 2024-11-06
Payer: MEDICARE

## 2024-11-06 VITALS
WEIGHT: 194.5 LBS | HEIGHT: 70 IN | OXYGEN SATURATION: 99 % | BODY MASS INDEX: 27.84 KG/M2 | HEART RATE: 101 BPM | SYSTOLIC BLOOD PRESSURE: 120 MMHG | DIASTOLIC BLOOD PRESSURE: 88 MMHG

## 2024-11-06 DIAGNOSIS — I10 BENIGN HYPERTENSION: ICD-10-CM

## 2024-11-06 DIAGNOSIS — I48.91 ATRIAL FIBRILLATION WITH RVR (MULTI): Primary | ICD-10-CM

## 2024-11-06 DIAGNOSIS — I48.0 PAROXYSMAL ATRIAL FIBRILLATION (MULTI): ICD-10-CM

## 2024-11-06 DIAGNOSIS — Z01.818 PREOP TESTING: Primary | ICD-10-CM

## 2024-11-06 DIAGNOSIS — E11.9 TYPE 2 DIABETES MELLITUS WITHOUT COMPLICATION, WITHOUT LONG-TERM CURRENT USE OF INSULIN (MULTI): ICD-10-CM

## 2024-11-06 DIAGNOSIS — E78.00 HYPERCHOLESTEROLEMIA: ICD-10-CM

## 2024-11-06 LAB
ATRIAL RATE: 104 BPM
Q ONSET: 227 MS
QRS COUNT: 17 BEATS
QRS DURATION: 90 MS
QT INTERVAL: 318 MS
QTC CALCULATION(BAZETT): 412 MS
QTC FREDERICIA: 378 MS
R AXIS: 26 DEGREES
T AXIS: 39 DEGREES
T OFFSET: 386 MS
VENTRICULAR RATE: 101 BPM

## 2024-11-06 PROCEDURE — 99214 OFFICE O/P EST MOD 30 MIN: CPT | Performed by: INTERNAL MEDICINE

## 2024-11-06 PROCEDURE — 93005 ELECTROCARDIOGRAM TRACING: CPT | Performed by: INTERNAL MEDICINE

## 2024-11-06 PROCEDURE — 1123F ACP DISCUSS/DSCN MKR DOCD: CPT | Performed by: INTERNAL MEDICINE

## 2024-11-06 PROCEDURE — 1036F TOBACCO NON-USER: CPT | Performed by: INTERNAL MEDICINE

## 2024-11-06 PROCEDURE — 1159F MED LIST DOCD IN RCRD: CPT | Performed by: INTERNAL MEDICINE

## 2024-11-06 RX ORDER — LOSARTAN POTASSIUM 100 MG/1
100 TABLET ORAL DAILY
Qty: 90 TABLET | Refills: 3 | Status: SHIPPED | OUTPATIENT
Start: 2024-11-06 | End: 2025-11-06

## 2024-11-06 RX ORDER — METOPROLOL TARTRATE 100 MG/1
100 TABLET ORAL 2 TIMES DAILY
Qty: 180 TABLET | Refills: 3 | Status: SHIPPED | OUTPATIENT
Start: 2024-11-06 | End: 2025-11-06

## 2024-11-06 ASSESSMENT — ENCOUNTER SYMPTOMS
OCCASIONAL FEELINGS OF UNSTEADINESS: 0
DEPRESSION: 0
LOSS OF SENSATION IN FEET: 0

## 2024-11-06 NOTE — PATIENT INSTRUCTIONS
Stop losartan/hydrochlorothiazide (Hyzaar).  Start losartan 100 mg daily.  Increase metoprolol to tartrate to 100 mg twice a day.  Follow-up with Dr. Garcia.  Follow-up in 6 months.

## 2024-11-06 NOTE — PROGRESS NOTES
Referred by Dr. Oliveros ref. provider found provider found for   Chief Complaint   Patient presents with    Atrial Fibrillation        Toby Ugalde is a 74 y.o. year old male patient with h/o Paroxysmal atrial fibrillation, Hypertension, Pulmonary embolism 4/9/22, GERD, Anemia, Multiple myeloma, PE. He is on medical treatment for his A Fib. Was seen in August and found to be maintaining normal rhythm. Was seen by Dr. Moon in Cardiology clinic today and found to be back in A fib. Increased the metoprolol doses and referred to me for evaluation.      PMHx/PSHx: As above    FamHx: unremarkable     Allergies:  Allergies   Allergen Reactions    Mold Other        Review of Systems    Constitutional: not feeling tired.   Eyes: no eyesight problems.   ENT: no hearing loss and no nosebleeds.   Cardiovascular: no intermittent leg claudication and as noted in HPI.   Respiratory: no chronic cough and no shortness of breath.   Gastrointestinal: no change in bowel habits and no blood in stools.   Genitourinary: no urinary frequency and no hematuria.   Skin: no skin rashes.   Neurological: no seizures and no frequent falls.   Psychiatric: no depression and not suicidal.   All other systems have been reviewed and are negative for complaint.     Outpatient Medications:  Current Outpatient Medications   Medication Instructions    acetaminophen (Tylenol) 500 mg tablet Take by mouth.    allopurinol (ZYLOPRIM) 300 mg, As needed    amLODIPine (NORVASC) 5 mg, oral, Daily    apixaban (Eliquis) 5 mg tablet TAKE ONE (1) TABLET BY MOUTH 2 TIMES A DAY    atorvastatin (LIPITOR) 80 mg, oral, Daily    colchicine 0.6 mg, Daily PRN    finasteride (PROSCAR) 5 mg, oral, Daily    fludrocortisone (Florinef) 0.1 mg tablet 1 tablet, Daily    fluticasone (Flonase) 50 mcg/actuation nasal spray 1 spray, Daily PRN    lenalidomide (Revlimid) 5 mg capsule Take by mouth.    losartan (COZAAR) 100 mg, oral, Daily    metFORMIN (GLUCOPHAGE) 500 mg, As needed     "metoprolol tartrate (LOPRESSOR) 100 mg, oral, 2 times daily    pantoprazole (ProtoNix) 40 mg EC tablet 1 tablet, Daily    sildenafil (VIAGRA) 100 mg, As needed    tamsulosin (FLOMAX) 0.4 mg, oral, Daily    traMADol (ULTRAM) 50 mg, Every 8 hours PRN         Last Recorded Vitals:      8/19/2024    11:54 AM 8/20/2024    10:27 AM 8/20/2024    10:30 AM 9/17/2024     8:04 AM 9/20/2024    11:03 AM 10/22/2024     8:14 AM 11/6/2024     3:27 PM   Vitals   Systolic 100 145 135 132 115 114 120   Diastolic 65 87 75 85 65 69 88   Heart Rate 55 62  82 67 90 101   Temp  35.6 °C (96.1 °F)  36.3 °C (97.3 °F)  35.9 °C (96.6 °F)    Resp 17 18  18 18 18    Height (in) 1.778 m (5' 10\")    1.778 m (5' 10\")  1.778 m (5' 10\")   Weight (lb) 195 196.65  198.86 196.21 190.81 194.5   BMI 27.98 kg/m2 28.22 kg/m2  28.53 kg/m2 28.15 kg/m2 27.38 kg/m2 27.91 kg/m2   BSA (m2) 2.09 m2 2.1 m2  2.11 m2 2.1 m2 2.07 m2 2.09 m2   Visit Report Report Report Report  Report Report Report    Report    Visit Vitals  Smoking Status Never        Physical Exam:  Constitutional: alert and in no acute distress.   Eyes: no erythema, swelling or discharge from the eye .   Neck: neck is supple, symmetric, trachea midline, no masses  and no thyromegaly .   Pulmonary: no increased work of breathing or signs of respiratory distress  and lungs clear to auscultation.    Cardiovascular: carotid pulses 2+ bilaterally with no bruit , JVP was normal, no thrills , regular rhythm, normal S1 and S2, no murmurs , pedal pulses 2+ bilaterally  and no edema .   Abdomen: abdomen non-tender, no masses  and no hepatomegaly .   Skin: skin warm and dry, normal skin turgor .   Psychiatric judgment and insight is normal  and oriented to person, place and time .        Assessment/Plan   Problem List Items Addressed This Visit             ICD-10-CM    Paroxysmal atrial fibrillation (Multi) I48.0    Relevant Orders    Basic Metabolic Panel    CBC     Other Visit Diagnoses         Codes    Preop " testing    -  Primary Z01.818    Relevant Orders    Basic Metabolic Panel    CBC            Toby Ugalde is a 74 y.o. year old male patient with h/o Paroxysmal atrial fibrillation, Hypertension, Pulmonary embolism 4/9/22, GERD, Anemia, Multiple myeloma, PE. He is on medical treatment for his A Fib. Was seen in August and found to be maintaining normal rhythm. Was seen by Dr. Moon in Cardiology clinic today and found to be back in A fib. Increased the metoprolol doses and referred to me for evaluation.    His current ECG shows A Fib with narrow QRS and HR of 101 bpm. I think will be of benefit to proceed with DC cardioversion. All the R/B/A discussed with the patient who expressed understanding and agrees to proceed.         Geoffrey Garcia MD  Cardiac Electrophysiology      Thank you very much for allowing me to participate in the care of this pleasant patient. Please do not hesitate to contact me with any further questions or concerns regarding his care.    **Disclaimer: This note was dictated by speech recognition, and every effort has been made to prevent any error in transcription, however minor errors may be present**

## 2024-11-06 NOTE — PROGRESS NOTES
Primary Care Physician: Atnoine Cano MD  Date of Visit: 11/06/2024  3:20 PM EST  Location of visit: [facility]     Chief Complaint:   Chief Complaint   Patient presents with    Follow-up        HPI / Summary:   Toby Ugalde is a 74 y.o. male presents for followup.  He has no particular complaints.  He notes sporadic mild dyspnea on exertion when walking prolonged distances.  He is able to walk up and down stairs without chest pain or shortness of breath.  He notes rare palpitations lasting a few minutes.  The patient denies chest pain, lightheadedness, syncope, orthopnea, paroxysmal nocturnal dyspnea, lower extremity edema, or bleeding problems.          Past Medical History:   Diagnosis Date    Dental caries on pit and fissure surface penetrating into dentin 01/12/2024    Deposits (accretions) on teeth 01/12/2024    Essential (primary) hypertension 11/20/2013    Benign essential hypertension    History of hypercholesterolemia 01/12/2024    History of hypertension 01/12/2024    History of malignant neoplasm of prostate 01/12/2024    Impacted cerumen 01/12/2024    Impacted cerumen, bilateral 02/16/2024    Infective pharyngitis 01/12/2024    Other intervertebral disc degeneration, lumbar region     Lumbar degenerative disc disease    Overweight with body mass index (BMI) 25.0-29.9 02/02/2022    Partial loss of teeth due to caries, class I 01/12/2024    Personal history of malignant neoplasm of prostate     History of malignant neoplasm of prostate    Personal history of other diseases of the circulatory system     History of hypertension    Personal history of other diseases of the circulatory system 06/29/2022    History of atrial tachycardia    Personal history of other diseases of the circulatory system     History of essential hypertension    Personal history of other diseases of the musculoskeletal system and connective tissue     History of arthritis    Personal history of other diseases of the  musculoskeletal system and connective tissue     History of acute gouty arthritis    Personal history of other diseases of the musculoskeletal system and connective tissue 01/30/2019    History of left foot drop    Personal history of other diseases of the respiratory system     History of bronchitis    Personal history of other endocrine, nutritional and metabolic disease     History of hypercholesterolemia    Personal history of other endocrine, nutritional and metabolic disease     History of diabetes mellitus    Personal history of other endocrine, nutritional and metabolic disease 01/17/2014    History of diabetes mellitus    Post-traumatic stress disorder, chronic 11/10/2023    Primary localized osteoarthritis of pelvic region and thigh 12/17/2018    Pure hypercholesterolemia, unspecified 11/20/2013    Low-density-lipoid-type (LDL) hyperlipoproteinemia    Snoring     Snoring        Past Surgical History:   Procedure Laterality Date    BACK SURGERY  11/20/2013    Back Surgery    TONSILLECTOMY  11/20/2013    Tonsillectomy    TOTAL KNEE ARTHROPLASTY  11/20/2013    Knee Replacement          Social History:   reports that he has never smoked. He has never been exposed to tobacco smoke. He has never used smokeless tobacco. He reports current alcohol use. He reports that he does not use drugs.     Family History:  family history includes Diabetes in his brother; Heart attack in his mother; Stroke in his maternal grandfather.      Allergies:  Allergies   Allergen Reactions    Mold Other       Outpatient Medications:  Current Outpatient Medications   Medication Instructions    acetaminophen (Tylenol) 500 mg tablet Take by mouth.    allopurinol (ZYLOPRIM) 300 mg, As needed    amLODIPine (NORVASC) 5 mg, oral, Daily    apixaban (Eliquis) 5 mg tablet TAKE ONE (1) TABLET BY MOUTH 2 TIMES A DAY    atorvastatin (LIPITOR) 80 mg, oral, Daily    colchicine 0.6 mg, Daily PRN    finasteride (PROSCAR) 5 mg, oral, Daily     "fludrocortisone (Florinef) 0.1 mg tablet 1 tablet, Daily    fluticasone (Flonase) 50 mcg/actuation nasal spray 1 spray, Daily PRN    lenalidomide (Revlimid) 5 mg capsule Take by mouth.    losartan-hydrochlorothiazide (Hyzaar) 100-12.5 mg tablet 1 tablet, Daily    metFORMIN (GLUCOPHAGE) 500 mg, As needed    metoprolol tartrate (LOPRESSOR) 50 mg, oral, Every 12 hours    pantoprazole (ProtoNix) 40 mg EC tablet 1 tablet, Daily    sildenafil (VIAGRA) 100 mg, As needed    tamsulosin (FLOMAX) 0.4 mg, oral, Daily    traMADol (ULTRAM) 50 mg, Every 8 hours PRN       Physical Exam:  Vitals:    11/06/24 1527   BP: 120/88   BP Location: Right arm   Pulse: 101   SpO2: 99%   Weight: 88.2 kg (194 lb 8 oz)   Height: 1.778 m (5' 10\")     Wt Readings from Last 5 Encounters:   11/06/24 88.2 kg (194 lb 8 oz)   10/22/24 86.5 kg (190 lb 12.9 oz)   09/20/24 89 kg (196 lb 3.4 oz)   09/17/24 90.2 kg (198 lb 13.7 oz)   08/20/24 89.2 kg (196 lb 10.4 oz)     Body mass index is 27.91 kg/m².   General: Well-developed well-nourished in no acute distress  HEENT: Normocephalic atraumatic  Neck: Supple, JVP is normal negative hepatojugular reflux 2+ carotid pulses without bruit  Pulmonary: Normal respiratory effort, clear to auscultation  Cardiovascular: No right ventricular heave, normal S1 and S2, no murmurs rubs or gallops  Abdomen: Soft nontender nondistended  Extremities: Warm without edema 2+ radial pulses bilaterally   Neurologic: Alert and oriented x3  Psychiatric: Normal mood and affect     Last Labs:  CMP:  Recent Labs     10/22/24  0820 09/17/24  0812 08/20/24  1025    139 140   K 3.0* 3.2* 3.7    104 104   CO2 21 22 25   ANIONGAP 19 16 15   BUN 15 17 16   CREATININE 1.56* 1.52* 1.45*   EGFR 46* 47* 50*   GLUCOSE 155* 123* 157*     Recent Labs     10/22/24  0820 09/17/24  0812 08/20/24  1025   ALBUMIN 3.9 3.9 4.3   ALKPHOS 94 52 63   ALT 22 14 21   AST 14 10 15   BILITOT 0.9 0.6 0.7     CBC:  Recent Labs     10/22/24  0820 " 09/17/24  0812 08/20/24  1025   WBC 4.4 3.6* 3.8*   HGB 12.7* 11.2* 11.8*   HCT 37.6* 33.7* 35.1*    129* 132*   MCV 88 91 91     COAG:   Recent Labs     04/09/22  1328 03/01/22  1740   INR 1.3* 2.8*     HEME/ENDO:  Recent Labs     09/27/23  1153 01/10/23  1351 12/13/22  0833 05/09/22  1053 02/28/22  1005 10/06/21  1134 07/02/21  1210   FERRITIN 421* 315* 416*  --   --   --   --    IRONSAT 36 22* 31  --   --   --   --    TSH  --   --   --  1.28 2.87 1.56 0.65   HGBA1C  --   --   --   --  7.7* 7.4* 7.5      CARDIAC:   Recent Labs     05/31/23  0936 05/02/23  0913 04/04/23  0855 12/13/22  0833 04/29/22  1522 04/09/22  1328   LDH CANCELED 109 119   < >  --   --    TROPHS  --   --   --   --  7  --    BNP  --   --   --   --   --  20    < > = values in this interval not displayed.     Recent Labs     08/20/24  1025 09/27/23  1120 05/09/22  1053 02/28/22  1005   CHOL 145 153 169 102   LDLF  --  61 92 45   LDLCALC 61  --   --   --    HDL 72.8 70.6 53.8 38.6*   TRIG 54 109 115 90       Last Cardiology Tests:  ECG:  An electrocardiogram performed today that I reviewed shows atrial fibrillation with a rapid ventricular response.    Echo:  Echocardiogram April 11, 2022  CONCLUSIONS:   1. The left ventricular systolic function is normal with a 60% estimated ejection fraction.   2. Compared with study from 9/6/2018, Right ventricle appears mildly enlarged.    Cath:      Stress Test:  Exercise nuclear stress test September 2018  IMPRESSION:  1. Normal stress myocardial perfusion imaging. No evidence of  stress-induced ischemia or scar.  2. Well-maintained left ventricular function.         Cardiac Imaging:  CT chest for PE April 30, 2022  VESSELS:  Normal caliber aorta with mild  aortic atherosclerosis.  Borderline enlargement of the main pulmonary artery measuring up to  3.1 cm which may be seen with pulmonary hypertension. Evaluation of  the lower pulmonary artery vessels is partially degraded by  respiratory motion.  Previously seen filling defect appears improved  since prior imaging. No definite new pulmonary embolism seen to the  segmental level.     HEART: Cardiomegaly. Coronary artery calcifications noted however  exam is not optimized for evaluation. No significant pericardial  effusion.      Assessment/Plan   Diagnoses and all orders for this visit:  Atrial fibrillation with RVR (Multi)  -     ECG 12 lead (Clinic Performed)  Hypercholesterolemia  Benign hypertension  -     losartan (Cozaar) 100 mg tablet; Take 1 tablet (100 mg) by mouth once daily.  -     metoprolol tartrate (Lopressor) 100 mg tablet; Take 1 tablet (100 mg) by mouth 2 times a day.  Type 2 diabetes mellitus without complication, without long-term current use of insulin (Multi)    In summary Mr. Ugalde is a pleasant 74 year-old -American male with a past medical history significant for coronary atherosclerosis on CT, paroxysmal atrial fibrillation, hypertension, hyperlipidemia, type II non-insulin-requiring diabetes, pulmonary embolism in April 2022, and multiple myeloma.  He is relatively asymptomatic from a cardiac perspective.  He is euvolemic on exam.  He is back in atrial fibrillation today with a rapid ventricular response.  I did increase his metoprolol to 100 mg twice daily.  I also discussed this with Dr. Garcia who agreed to see him today.  Given his low potassium and dosing increase in metoprolol I did discontinue his hydrochlorothiazide.  He should continue his other cardiovascular medications.  He has pending labs from his primary physician to recheck his potassium.  He will follow-up with electrophysiology as scheduled.  We will see him back in follow-up in 6 months.      Orders:  Orders Placed This Encounter   Procedures    ECG 12 lead (Clinic Performed)      Followup Appts:  Future Appointments   Date Time Provider Department Center   11/11/2024  2:15 PM Antoine Cano MD [id number]   11/19/2024  9:00 AM INF 09 MINOFF  [id number] [facility]   11/22/2024  9:30 AM Antoine Cano MD [id number] [facility]   12/17/2024  9:00 AM EWELINA Novak-CNP [id number] [facility]   12/17/2024  9:30 AM INF 10 MINOFF [id number] [facility]   12/20/2024 10:15 AM Antoine Cano MD [id number] [facility]   1/17/2025  9:15 AM Antoine Cano MD [id number] [facility]   6/16/2025  9:50 AM Kendra Segovia MD [id number] [facility]   8/6/2025  8:30 AM Mora Lin, APRN-CNP [id number] [facility]           ____________________________________________________________  MD Cez[facility]

## 2024-11-11 ENCOUNTER — LAB (OUTPATIENT)
Dept: LAB | Facility: LAB | Age: 74
End: 2024-11-11
Payer: MEDICARE

## 2024-11-11 ENCOUNTER — APPOINTMENT (OUTPATIENT)
Dept: PRIMARY CARE | Facility: CLINIC | Age: 74
End: 2024-11-11
Payer: MEDICARE

## 2024-11-11 VITALS
SYSTOLIC BLOOD PRESSURE: 110 MMHG | HEART RATE: 83 BPM | BODY MASS INDEX: 28.06 KG/M2 | WEIGHT: 196 LBS | RESPIRATION RATE: 19 BRPM | DIASTOLIC BLOOD PRESSURE: 65 MMHG | OXYGEN SATURATION: 99 % | HEIGHT: 70 IN

## 2024-11-11 DIAGNOSIS — C90.00: ICD-10-CM

## 2024-11-11 DIAGNOSIS — D53.1 MEGALOBLASTIC ANEMIA: ICD-10-CM

## 2024-11-11 DIAGNOSIS — E11.9 TYPE 2 DIABETES MELLITUS WITHOUT COMPLICATION, WITHOUT LONG-TERM CURRENT USE OF INSULIN (MULTI): ICD-10-CM

## 2024-11-11 DIAGNOSIS — I48.0 PAROXYSMAL ATRIAL FIBRILLATION (MULTI): ICD-10-CM

## 2024-11-11 DIAGNOSIS — Z00.00 ANNUAL PHYSICAL EXAM: Primary | ICD-10-CM

## 2024-11-11 DIAGNOSIS — M10.9 GOUT OF RIGHT KNEE, UNSPECIFIED CAUSE, UNSPECIFIED CHRONICITY: ICD-10-CM

## 2024-11-11 DIAGNOSIS — I48.91 ATRIAL FIBRILLATION WITH RVR (MULTI): ICD-10-CM

## 2024-11-11 DIAGNOSIS — D61.818 PANCYTOPENIA: ICD-10-CM

## 2024-11-11 DIAGNOSIS — Z01.818 PREOP TESTING: ICD-10-CM

## 2024-11-11 DIAGNOSIS — M16.10 ARTHRITIS OF HIP: ICD-10-CM

## 2024-11-11 LAB
ANION GAP SERPL CALC-SCNC: 15 MMOL/L (ref 10–20)
BUN SERPL-MCNC: 13 MG/DL (ref 6–23)
CALCIUM SERPL-MCNC: 8.5 MG/DL (ref 8.6–10.6)
CHLORIDE SERPL-SCNC: 103 MMOL/L (ref 98–107)
CO2 SERPL-SCNC: 24 MMOL/L (ref 21–32)
CREAT SERPL-MCNC: 1.49 MG/DL (ref 0.5–1.3)
EGFRCR SERPLBLD CKD-EPI 2021: 48 ML/MIN/1.73M*2
ERYTHROCYTE [DISTWIDTH] IN BLOOD BY AUTOMATED COUNT: 14.6 % (ref 11.5–14.5)
GLUCOSE SERPL-MCNC: 160 MG/DL (ref 74–99)
HCT VFR BLD AUTO: 33.2 % (ref 41–52)
HGB BLD-MCNC: 10.7 G/DL (ref 13.5–17.5)
MCH RBC QN AUTO: 30.1 PG (ref 26–34)
MCHC RBC AUTO-ENTMCNC: 32.2 G/DL (ref 32–36)
MCV RBC AUTO: 93 FL (ref 80–100)
NRBC BLD-RTO: 0 /100 WBCS (ref 0–0)
PLATELET # BLD AUTO: 117 X10*3/UL (ref 150–450)
POTASSIUM SERPL-SCNC: 3.9 MMOL/L (ref 3.5–5.3)
RBC # BLD AUTO: 3.56 X10*6/UL (ref 4.5–5.9)
SODIUM SERPL-SCNC: 138 MMOL/L (ref 136–145)
WBC # BLD AUTO: 4.8 X10*3/UL (ref 4.4–11.3)

## 2024-11-11 PROCEDURE — 1036F TOBACCO NON-USER: CPT | Performed by: INTERNAL MEDICINE

## 2024-11-11 PROCEDURE — 1159F MED LIST DOCD IN RCRD: CPT | Performed by: INTERNAL MEDICINE

## 2024-11-11 PROCEDURE — 80048 BASIC METABOLIC PNL TOTAL CA: CPT

## 2024-11-11 PROCEDURE — 3074F SYST BP LT 130 MM HG: CPT | Performed by: INTERNAL MEDICINE

## 2024-11-11 PROCEDURE — 1123F ACP DISCUSS/DSCN MKR DOCD: CPT | Performed by: INTERNAL MEDICINE

## 2024-11-11 PROCEDURE — 99214 OFFICE O/P EST MOD 30 MIN: CPT | Performed by: INTERNAL MEDICINE

## 2024-11-11 PROCEDURE — 99397 PER PM REEVAL EST PAT 65+ YR: CPT | Performed by: INTERNAL MEDICINE

## 2024-11-11 PROCEDURE — 36415 COLL VENOUS BLD VENIPUNCTURE: CPT

## 2024-11-11 PROCEDURE — 85027 COMPLETE CBC AUTOMATED: CPT

## 2024-11-11 PROCEDURE — 3078F DIAST BP <80 MM HG: CPT | Performed by: INTERNAL MEDICINE

## 2024-11-11 RX ORDER — COLCHICINE 0.6 MG/1
0.6 TABLET ORAL DAILY
Qty: 30 TABLET | Refills: 2 | Status: SHIPPED | OUTPATIENT
Start: 2024-11-11

## 2024-11-11 RX ORDER — TRAMADOL HYDROCHLORIDE 50 MG/1
50 TABLET ORAL EVERY 8 HOURS PRN
Qty: 90 TABLET | Refills: 1 | Status: SHIPPED | OUTPATIENT
Start: 2024-11-11

## 2024-11-11 RX ORDER — ALLOPURINOL 300 MG/1
300 TABLET ORAL DAILY
Qty: 90 TABLET | Refills: 0 | Status: SHIPPED | OUTPATIENT
Start: 2024-11-11

## 2024-11-11 ASSESSMENT — ENCOUNTER SYMPTOMS
MUSCULOSKELETAL NEGATIVE: 1
HEMATOLOGIC/LYMPHATIC NEGATIVE: 1
NEUROLOGICAL NEGATIVE: 1
ALLERGIC/IMMUNOLOGIC NEGATIVE: 1
RESPIRATORY NEGATIVE: 1
ENDOCRINE NEGATIVE: 1
EYES NEGATIVE: 1
GASTROINTESTINAL NEGATIVE: 1
CONSTITUTIONAL NEGATIVE: 1
PSYCHIATRIC NEGATIVE: 1
CARDIOVASCULAR NEGATIVE: 1

## 2024-11-11 NOTE — PROGRESS NOTES
"Subjective   Patient ID: Toby Ugalde is a 74 y.o. male who presents for Annual Exam (Cpe/Already had flu shot ).    HPI     Review of Systems   Constitutional: Negative.    HENT: Negative.     Eyes: Negative.    Respiratory: Negative.     Cardiovascular: Negative.    Gastrointestinal: Negative.    Endocrine: Negative.    Musculoskeletal: Negative.    Skin: Negative.    Allergic/Immunologic: Negative.    Neurological: Negative.    Hematological: Negative.    Psychiatric/Behavioral: Negative.     All other systems reviewed and are negative.      Objective   Pulse 83   Resp 19   Ht 1.778 m (5' 10\")   Wt 88.9 kg (196 lb)   SpO2 99%   BMI 28.12 kg/m²   Blood pressure 110/65, pulse 83, resp. rate 19, height 1.778 m (5' 10\"), weight 88.9 kg (196 lb), SpO2 99%.   Physical Exam  Vitals and nursing note reviewed.   Constitutional:       Appearance: Normal appearance.   HENT:      Head: Normocephalic and atraumatic.      Right Ear: Tympanic membrane, ear canal and external ear normal.      Left Ear: Tympanic membrane, ear canal and external ear normal. There is no impacted cerumen.      Nose: Nose normal.      Mouth/Throat:      Mouth: Mucous membranes are moist.      Pharynx: Oropharynx is clear.   Eyes:      Extraocular Movements: Extraocular movements intact.      Conjunctiva/sclera: Conjunctivae normal.      Pupils: Pupils are equal, round, and reactive to light.   Cardiovascular:      Rate and Rhythm: Normal rate and regular rhythm.      Pulses: Normal pulses.      Heart sounds: Normal heart sounds. No murmur heard.  Pulmonary:      Effort: Pulmonary effort is normal. No respiratory distress.      Breath sounds: Normal breath sounds. No stridor. No wheezing, rhonchi or rales.   Chest:      Chest wall: No tenderness.   Abdominal:      General: Abdomen is flat. Bowel sounds are normal. There is no distension.      Palpations: Abdomen is soft. There is no mass.      Tenderness: There is no abdominal tenderness. There is " no right CVA tenderness, left CVA tenderness, guarding or rebound.      Hernia: No hernia is present.   Musculoskeletal:         General: Normal range of motion.      Cervical back: Normal range of motion and neck supple.   Skin:     General: Skin is warm.      Capillary Refill: Capillary refill takes less than 2 seconds.   Neurological:      General: No focal deficit present.      Mental Status: He is alert.      Cranial Nerves: No cranial nerve deficit.      Sensory: No sensory deficit.      Motor: No weakness.      Coordination: Coordination normal.      Gait: Gait normal.      Deep Tendon Reflexes: Reflexes normal.   Psychiatric:         Mood and Affect: Mood normal.         Behavior: Behavior normal. Behavior is cooperative.         Thought Content: Thought content normal.         Judgment: Judgment normal.         Assessment/Plan   Problem List Items Addressed This Visit             ICD-10-CM    Pancytopenia D61.818     Monitor CBC and on Chemotherapy now feels very tired post chemo yesterday          Atrial fibrillation with RVR (Multi) I48.91     Atrial fibrillation - persistent with  Ventricular rate is well controlled (in sinus rhythm). ON ELIQUIS  No Shortness of breath. Continues  Eliquis 5mg BID  Metoprolol to 50 mg BID           Arthritis of hip M16.10    Relevant Medications    traMADol (Ultram) 50 mg tablet    Gout of right knee M10.9    Relevant Medications    allopurinol (Zyloprim) 300 mg tablet    colchicine 0.6 mg tablet    Megaloblastic anemia D53.1     Anemia - of bleed/chronic disease. Check CBC , B12 and Folic Acid levels, Iron/Ferritn/TIBC, refer patient to endoscopy (upper/ lower). Target HCT > 30 %. Check guaic stool          Multiple myeloma, stage 1 (Multi) C90.00     Stable and follows with oncology Haematology and monitor CBC blood count              Type 2 diabetes mellitus without complications (Multi) E11.9     DM - NIDDM . Reviewed with patient / Will check HbA1c and fasting blood  sugars. Educate home self monitoring and diary keeping(reviewed with patient home blood sugar levels /diary). Educate extensively low calorie diet and weight loss with exercise. Reviewed BS- diary and Rx. Regimen. [place] renal protection with ARB/ACEI. Educate compliance with diet and Rx. And educate risks and autcomes. Refill Metformin 500 mg BID with meals          Annual physical exam - Primary Z00.00     No recent hospitalizations.    All medications reviewed and reconciled by me the provider..  No use of controlled substances or opiates.    Family history, social history reviewed, no changes.    Patient does not smoke.    Patient does not drink.    Patient hydrates adequately daily.  Eats a well-balanced healthy diet.     Exercises adequately including walking and doing weightbearing exercises.    Patient denies any difficulty with memory or cognition.     Denies any difficulty with hearing.  Patient does not wear hearing aids.    No fall risk.  No recent falls.  Denies any difficulty walking.    Patient with no history of depression anxiety, denies any loss of interest, no feeling of sadness, no lack of motivation.    Patient is independent in all ADLs and IADLs.  Independent bathing, dressing, walking.  Takes care of own finances, shopping and cooking.     End-of-life decision-making power of  reviewed with patient.     Risk Factors Identified During Visit: None.   Influenza: influenza vaccine was previously given.   Pneumovax 23: Pneumovax 23 vaccine was previously given.   Prevnar 13: Prevnar 13 vaccine was previously given.   Shingles Vaccine: Shingles vaccine was previously given.   Prostate cancer screening: Screening is current.   Colorectal Cancer Screening: screening is current.   Abdominal Aortic Aneurysm screening: screening is current.   HIV screening: screening not indicated.       Preventive measures - Recommend ASAP :Specialist. Colonoscopy (educate patient risks of colon cancer)  refer patient to GI specialist. Ophthalmology and retina exam recommend yearly exams refer patient to an Ophthalmologist. BPH - (Benign Prostatic Hypertrophy) refer patient to an Urologist for rectal exam and PSA check.             Atrial fibrillation with RVR (Multi) - Primary I48.91        Atrial fibrillation - persistent with  Ventricular rate is well controlled (in sinus rhythm). ON ELIQUIS  No Shortness of breath. Continues  Eliquis 5mg BID  Metoprolol to 50 mg BID             Benign hypertension I10       HTN - hypertension well/controlled .Target BP < 130/80  achieved. Educate low salt diet and exercise with weight loss. Educate home self monitoring and diary keeping. Educate risks of elevate blood pressure and benefits of prompt treatment.  Refill Amlodipine and Metoprolol            BPH with obstruction/lower urinary tract symptoms N40.1, N13.8       High PSA and follows with urology                Chronic renal insufficiency N18.9       CRI - Chronic Renal Insuficiency. Secondary to DM/HTN/Atherosclerosis. Follow BUN/Cr. and lytes.   RENOPROTECTION with ACEI/ARB (). Monitor microalbuminuria. Avoid hypotension and renal hypoperfusion. The patient was instructed to avoid NSAIDS and educate compliance with medications to control HTN(hypertension) and cholesterol and diabetes.                                       Diabetes mellitus (Multi) E11.9       DM - NIDDM  . Reviewed with patient / Will check  HbA1c and fasting blood sugars. Educate home self monitoring and diary keeping(reviewed with patient home blood sugar levels /diary). Educate extensively low calorie diet and weight loss with exercise. Reviewed BS- diary and Rx. Regimen. [place] renal protection with ARB/ACEI.               Educate compliance with diet and Rx. And educate risks and autcomes.     Refill Metformin 500 mg BID with meals                 GERD without esophagitis K21.9       GERD - Acid reflux disease. Rx. PPI  (Prilosec/Prevacid/Protonix/Nexium) and educate diet and life style changes. Referred patient to an endoscopy (EGD) and check H. Pylori titers.            IgG multiple myeloma (Multi) C90.00       Monitor CBC and blood count and follow with hematology and stable now on Chemotherapy  tolerated well            Lumbar degenerative disc disease M51.36       DDD - Degenerative disc disease of the Lumbo-Sacral (LS)/  spine. Educate exercises and referred patient to Physical Therapy (PT). Ordered X-Ray's of the LS/spine. Consider MRI. Radiculopathy in the distribution of L4-L5-S1/nerve roots, needs NSAIDS (Naprosin 500mg BID vs. Arthrotec 75mg BID or Prednisone taper (Medrol dose pack), Flexeril 10 mg qHS, heat application, and pain control with Tylenol                Bilateral primary osteoarthritis of hip M16.0       Hip DJD advnced legt worse than the rigtht and awaiting THR and follows with Ortho            Beta thalassemia (Multi) D56.1               Pancytopenia (Multi) D61.818         Monitor CBC and on Chemotherapy now feels very tired post chemo yesterday            Atrial fibrillation with RVR (Multi) - Primary I48.91       Atrial fibrillation - persistent with  Ventricular rate is well controlled (in sinus rhythm). ON ELIQUIS  No Shortness of breath. Continues  Eliquis 5mg BID  Metoprolol to 50 mg BID                   Benign hypertension I10       HTN - hypertension well/controlled .Target BP < 130/80  achieved. Educate low salt diet and exercise with weight loss. Educate home self monitoring and diary keeping. Educate risks of elevate blood pressure and benefits of prompt treatment.  Refill Amlodipine and Metoprolol            Elevated PSA R97.20       Monitor PSA and follows with urology            GERD without esophagitis K21.9       GERD - Acid reflux disease. Rx. PPI (Prilosec/Prevacid/Protonix/Nexium) and educate diet and life style changes. Referred patient to an endoscopy (EGD) and check H. Pylori  titers.            IgG multiple myeloma (Multi) C90.00       Monitor CBC and blood count and follow with hematology and stable now on Chemotherapy             Lumbar degenerative disc disease M51.36       DDD - Degenerative disc disease of the Lumbo-Sacral (LS)/  spine. Educate exercises and referred patient to Physical Therapy (PT). Ordered X-Ray's of the LS/spine. Consider MRI. Radiculopathy in the distribution of L4-L5-S1/nerve roots, needs NSAIDS (Naprosin 500mg BID vs. Arthrotec 75mg BID or Prednisone taper (Medrol dose pack), Flexeril 10 mg qHS, heat application, and pain control with Tylenol            Type 2 diabetes mellitus without complications (Multi) E11.9       DM - NIDDM  . Reviewed with patient / Will check  HbA1c and fasting blood sugars. Educate home self monitoring and diary keeping(reviewed with patient home blood sugar levels /diary). Educate extensively low calorie diet and weight loss with exercise. Reviewed BS- diary and Rx. Regimen. [place] renal protection with ARB/ACEI.               Educate compliance with diet and Rx. And educate risks and autcomes.  Refill Metformin 500 mg BID with meals                                Stage 3a chronic kidney disease (Multi) N18.31       CRI - Chronic Renal Insuficiency. Secondary to DM/HTN/Atherosclerosis. Follow BUN/Cr. and lytes.   RENOPROTECTION with ACEI/ARB (). Monitor microalbuminuria. Avoid hypotension and renal hypoperfusion. The patient was instructed to avoid NSAIDS and educate compliance with medications to control HTN(hypertension) and cholesterol and diabetes.                                                       Atrial fibrillation with RVR (Multi) - Primary I48.91         Atrial fibrillation - persistent with  Ventricular rate is well controlled (in sinus rhythm). ON ELIQUIS  No Shortness of breath. Continues  Eliquis 5mg BID  Metoprolol to 50 mg BID                 Benign hypertension I10       HTN - hypertension well/controlled  .Target BP < 130/80  achieved. Educate low salt diet and exercise with weight loss. Educate home self monitoring and diary keeping. Educate risks of elevate blood pressure and benefits of prompt treatment.  Refill Amlodipine and Metoprolol            Elevated PSA R97.20       BPH - Benign PROSTATIC Hypertrophy, + Dysuria/Nocturia, check PSA, prescribe Flomax 0.4mg qD and Avodart 0.5 mg qD vs. Finasteride 5 mg qD.  Educate exercises and Change in life style, prescribe vitamin E 400 IU qD. Consider referral to urology. And follows with urology            Fatigue R53.83       Fatigue - check CMP(metabolic panel and elctrolytes) , CBC(complete blood cell count), TSH(thyroid function). Insomnia may play a role and sleep studies(rule out sleep apnea) are recommended . Educate sleep hygiene. Consider anxiety disorder vs. depression. Consider Stress test, and 2DECHO.             Type 2 diabetes mellitus without complications (Multi) E11.9       DM - NIDDM  . Reviewed with patient / Will check  HbA1c and fasting blood sugars. Educate home self monitoring and diary keeping(reviewed with patient home blood sugar levels /diary). Educate extensively low calorie diet and weight loss with exercise. Reviewed BS- diary and Rx. Regimen. [place] renal protection with ARB/ACEI.               Educate compliance with diet and Rx. And educate risks and autcomes.  Refill Metformin 500 mg BID with meals                                                                     Immunizations/Injections       very important  Immunizations from outside sources need reconciliation.       Flu vaccine, trivalent, preservative free, HIGH-DOSE, age 65y+ (Fluzone)12/11/2020, 10/1/2018  Influenza, Unspecified9/22/2010  Influenza, seasonal, [id number]/6/2021, 9/12/2016, 9/9/2015,  ... (3 more)  Pfizer COVID-19 vaccine, 12 years and older, (30mcg/0.3mL) (Comirnaty)10/16/2023  Pfizer COVID-19 vaccine, bivalent, age 12 years and older (30 mcg/0.3  mL)3/17/2023  Pneumococcal conjugate vaccine, 13-valent (PREVNAR 13)6/23/2015  Pneumococcal conjugate vaccine, 20-valent (PREVNAR 20)1/12/2024  Pneumococcal polysaccharide vaccine, 23-valent, age 2 years and older (PNEUMOVAX 23)4/11/2018  Tdap vaccine, age 7 year and older (BOOSTRIX, ADACEL)6/23/2015  Zoster vaccine, recombinant, adult (SHINGRIX)8/16/2021, 6/16/2021             Immunizations/Injections      very important  Immunizations from outside sources need reconciliation.      Flu vaccine, trivalent, preservative free, HIGH-DOSE, age 65y+ (Fluzone)12/11/2020, 10/1/2018  Influenza, Unspecified9/22/2010  Influenza, seasonal, [id number]/6/2021, 9/12/2016, 9/9/2015,  ... (3 more)  Pfizer COVID-19 vaccine, 12 years and older, (30mcg/0.3mL) (Comirnaty)9/25/2024, 10/16/2023  Pfizer COVID-19 vaccine, bivalent, age 12 years and older (30 mcg/0.3 mL)3/17/2023  Pneumococcal conjugate vaccine, 13-valent (PREVNAR 13)6/23/2015  Pneumococcal conjugate vaccine, 20-valent (PREVNAR 20)1/12/2024  Pneumococcal polysaccharide vaccine, 23-valent, age 2 years and older (PNEUMOVAX 23)4/11/2018  Tdap vaccine, age 7 year and older (BOOSTRIX, ADACEL)6/23/2015  Zoster vaccine, recombinant, adult (SHINGRIX)8/16/2021, 6/16/2021

## 2024-11-11 NOTE — ASSESSMENT & PLAN NOTE
No recent hospitalizations.    All medications reviewed and reconciled by me the provider..  No use of controlled substances or opiates.    Family history, social history reviewed, no changes.    Patient does not smoke.    Patient does not drink.    Patient hydrates adequately daily.  Eats a well-balanced healthy diet.     Exercises adequately including walking and doing weightbearing exercises.    Patient denies any difficulty with memory or cognition.     Denies any difficulty with hearing.  Patient does not wear hearing aids.    No fall risk.  No recent falls.  Denies any difficulty walking.    Patient with no history of depression anxiety, denies any loss of interest, no feeling of sadness, no lack of motivation.    Patient is independent in all ADLs and IADLs.  Independent bathing, dressing, walking.  Takes care of own finances, shopping and cooking.     End-of-life decision-making power of  reviewed with patient.     Risk Factors Identified During Visit: None.   Influenza: influenza vaccine was previously given.   Pneumovax 23: Pneumovax 23 vaccine was previously given.   Prevnar 13: Prevnar 13 vaccine was previously given.   Shingles Vaccine: Shingles vaccine was previously given.   Prostate cancer screening: Screening is current.   Colorectal Cancer Screening: screening is current.   Abdominal Aortic Aneurysm screening: screening is current.   HIV screening: screening not indicated.       Preventive measures - Recommend ASAP :Specialist. Colonoscopy (educate patient risks of colon cancer) refer patient to GI specialist. Ophthalmology and retina exam recommend yearly exams refer patient to an Ophthalmologist. BPH - (Benign Prostatic Hypertrophy) refer patient to an Urologist for rectal exam and PSA check.

## 2024-11-11 NOTE — ASSESSMENT & PLAN NOTE
DM - NIDDM . Reviewed with patient / Will check HbA1c and fasting blood sugars. Educate home self monitoring and diary keeping(reviewed with patient home blood sugar levels /diary). Educate extensively low calorie diet and weight loss with exercise. Reviewed BS- diary and Rx. Regimen. [place] renal protection with ARB/ACEI. Educate compliance with diet and Rx. And educate risks and autcomes. Refill Metformin 500 mg BID with meals

## 2024-11-12 ENCOUNTER — APPOINTMENT (OUTPATIENT)
Dept: HEMATOLOGY/ONCOLOGY | Facility: CLINIC | Age: 74
End: 2024-11-12
Payer: MEDICARE

## 2024-11-12 RX ORDER — DEXTROSE 50 % IN WATER (D50W) INTRAVENOUS SYRINGE
25
OUTPATIENT
Start: 2024-11-12

## 2024-11-12 RX ORDER — DEXTROSE 50 % IN WATER (D50W) INTRAVENOUS SYRINGE
12.5
OUTPATIENT
Start: 2024-11-12

## 2024-11-14 ENCOUNTER — TELEPHONE (OUTPATIENT)
Dept: PRIMARY CARE | Facility: CLINIC | Age: 74
End: 2024-11-14
Payer: MEDICARE

## 2024-11-14 ENCOUNTER — HOSPITAL ENCOUNTER (OUTPATIENT)
Dept: CARDIOLOGY | Facility: HOSPITAL | Age: 74
Discharge: HOME | End: 2024-11-14
Payer: MEDICARE

## 2024-11-14 DIAGNOSIS — I48.0 PAROXYSMAL ATRIAL FIBRILLATION (MULTI): ICD-10-CM

## 2024-11-14 NOTE — NURSING NOTE
Dr. Garcia spoke with patient.  No need for cardioversion today.  Will set up appointment for 2 months to follow up.  Home going instructions specific to procedure given. Easily arousable; responding appropriately. Vs +/- 20% of pre procedure status. Significant complications are absent. Ambulates without dizziness/age appropriate activity, pulse ox above or equal to 92% on room air/ordered oxygen treatment. Care Plan Complete. Discharge to home accompanied by (family). Discharged via wheelchair.

## 2024-11-14 NOTE — TELEPHONE ENCOUNTER
[facility] TRAMADOL 1 TAB EVERY 8 HOURS PATIENT PLAN NOT COVERING MAY NEED A PA PLEASE CALL [phone]

## 2024-11-14 NOTE — NURSING NOTE
Patient here for cardioversion.  EKG obtained with results of Normal sinus rhythm.  Possible left atrial enlargement , left ventricular hypertrophy, inferior infarct, age undetermined.   CNP updated.

## 2024-11-14 NOTE — CARE PLAN
Patient presented today for cardioversion for hx atrial fibrillation. Noted to be in SR per EKG. Discussed with Dr. Garcia and cardioversion canceled. He will continue with current medications and will follow up with Dr. Garcia in around 2 months (appt to be arranged). He denies any c/o cp, sob, palpitations, LH/dizziness/syncope.     Debbie Borrero APRN-CNP  Interventional Lab/Cardiology   [facility]

## 2024-11-19 ENCOUNTER — INFUSION (OUTPATIENT)
Dept: HEMATOLOGY/ONCOLOGY | Facility: CLINIC | Age: 74
End: 2024-11-19
Payer: MEDICARE

## 2024-11-19 ENCOUNTER — LAB (OUTPATIENT)
Dept: LAB | Facility: CLINIC | Age: 74
End: 2024-11-19
Payer: MEDICARE

## 2024-11-19 VITALS
HEART RATE: 81 BPM | TEMPERATURE: 95.4 F | WEIGHT: 195.88 LBS | RESPIRATION RATE: 18 BRPM | SYSTOLIC BLOOD PRESSURE: 113 MMHG | BODY MASS INDEX: 28.11 KG/M2 | DIASTOLIC BLOOD PRESSURE: 76 MMHG | OXYGEN SATURATION: 97 %

## 2024-11-19 DIAGNOSIS — C90.00 IGG MULTIPLE MYELOMA (MULTI): ICD-10-CM

## 2024-11-19 DIAGNOSIS — C90.00: ICD-10-CM

## 2024-11-19 LAB
ALBUMIN SERPL BCP-MCNC: 4.2 G/DL (ref 3.4–5)
ALP SERPL-CCNC: 68 U/L (ref 33–136)
ALT SERPL W P-5'-P-CCNC: 21 U/L (ref 10–52)
ANION GAP SERPL CALC-SCNC: 15 MMOL/L (ref 10–20)
AST SERPL W P-5'-P-CCNC: 15 U/L (ref 9–39)
BASOPHILS # BLD AUTO: 0.02 X10*3/UL (ref 0–0.1)
BASOPHILS NFR BLD AUTO: 0.5 %
BILIRUB SERPL-MCNC: 0.7 MG/DL (ref 0–1.2)
BUN SERPL-MCNC: 13 MG/DL (ref 6–23)
CALCIUM SERPL-MCNC: 8.3 MG/DL (ref 8.6–10.6)
CHLORIDE SERPL-SCNC: 106 MMOL/L (ref 98–107)
CO2 SERPL-SCNC: 23 MMOL/L (ref 21–32)
CREAT SERPL-MCNC: 1.38 MG/DL (ref 0.5–1.3)
EGFRCR SERPLBLD CKD-EPI 2021: 53 ML/MIN/1.73M*2
EOSINOPHIL # BLD AUTO: 0.13 X10*3/UL (ref 0–0.4)
EOSINOPHIL NFR BLD AUTO: 3.1 %
ERYTHROCYTE [DISTWIDTH] IN BLOOD BY AUTOMATED COUNT: 14.8 % (ref 11.5–14.5)
GLUCOSE SERPL-MCNC: 161 MG/DL (ref 74–99)
HCT VFR BLD AUTO: 34.2 % (ref 41–52)
HGB BLD-MCNC: 11.2 G/DL (ref 13.5–17.5)
IGA SERPL-MCNC: 56 MG/DL (ref 70–400)
IGG SERPL-MCNC: 560 MG/DL (ref 700–1600)
IGM SERPL-MCNC: 9 MG/DL (ref 40–230)
IMM GRANULOCYTES # BLD AUTO: 0 X10*3/UL (ref 0–0.5)
IMM GRANULOCYTES NFR BLD AUTO: 0 % (ref 0–0.9)
LYMPHOCYTES # BLD AUTO: 2.11 X10*3/UL (ref 0.8–3)
LYMPHOCYTES NFR BLD AUTO: 50.8 %
MCH RBC QN AUTO: 29.6 PG (ref 26–34)
MCHC RBC AUTO-ENTMCNC: 32.7 G/DL (ref 32–36)
MCV RBC AUTO: 90 FL (ref 80–100)
MONOCYTES # BLD AUTO: 0.53 X10*3/UL (ref 0.05–0.8)
MONOCYTES NFR BLD AUTO: 12.8 %
NEUTROPHILS # BLD AUTO: 1.36 X10*3/UL (ref 1.6–5.5)
NEUTROPHILS NFR BLD AUTO: 32.8 %
NRBC BLD-RTO: ABNORMAL /100{WBCS}
PLATELET # BLD AUTO: 147 X10*3/UL (ref 150–450)
POTASSIUM SERPL-SCNC: 3.4 MMOL/L (ref 3.5–5.3)
PROT SERPL-MCNC: 6.1 G/DL (ref 6.4–8.2)
PROT SERPL-MCNC: 6.1 G/DL (ref 6.4–8.2)
RBC # BLD AUTO: 3.79 X10*6/UL (ref 4.5–5.9)
SODIUM SERPL-SCNC: 141 MMOL/L (ref 136–145)
WBC # BLD AUTO: 4.2 X10*3/UL (ref 4.4–11.3)

## 2024-11-19 PROCEDURE — 84155 ASSAY OF PROTEIN SERUM: CPT

## 2024-11-19 PROCEDURE — 2500000004 HC RX 250 GENERAL PHARMACY W/ HCPCS (ALT 636 FOR OP/ED): Mod: JZ,JG | Performed by: INTERNAL MEDICINE

## 2024-11-19 PROCEDURE — 36415 COLL VENOUS BLD VENIPUNCTURE: CPT

## 2024-11-19 PROCEDURE — 82784 ASSAY IGA/IGD/IGG/IGM EACH: CPT

## 2024-11-19 PROCEDURE — 96401 CHEMO ANTI-NEOPL SQ/IM: CPT

## 2024-11-19 PROCEDURE — 2500000001 HC RX 250 WO HCPCS SELF ADMINISTERED DRUGS (ALT 637 FOR MEDICARE OP): Performed by: INTERNAL MEDICINE

## 2024-11-19 PROCEDURE — 85025 COMPLETE CBC W/AUTO DIFF WBC: CPT

## 2024-11-19 PROCEDURE — 83521 IG LIGHT CHAINS FREE EACH: CPT

## 2024-11-19 PROCEDURE — 2500000004 HC RX 250 GENERAL PHARMACY W/ HCPCS (ALT 636 FOR OP/ED): Performed by: INTERNAL MEDICINE

## 2024-11-19 PROCEDURE — 84165 PROTEIN E-PHORESIS SERUM: CPT

## 2024-11-19 PROCEDURE — 80053 COMPREHEN METABOLIC PANEL: CPT

## 2024-11-19 RX ORDER — FAMOTIDINE 10 MG/ML
20 INJECTION INTRAVENOUS ONCE AS NEEDED
Status: DISCONTINUED | OUTPATIENT
Start: 2024-11-19 | End: 2024-11-19 | Stop reason: HOSPADM

## 2024-11-19 RX ORDER — DIPHENHYDRAMINE HYDROCHLORIDE 50 MG/ML
50 INJECTION INTRAMUSCULAR; INTRAVENOUS AS NEEDED
Status: DISCONTINUED | OUTPATIENT
Start: 2024-11-19 | End: 2024-11-19 | Stop reason: HOSPADM

## 2024-11-19 RX ORDER — DIPHENHYDRAMINE HCL 50 MG
50 CAPSULE ORAL ONCE
Status: COMPLETED | OUTPATIENT
Start: 2024-11-19 | End: 2024-11-19

## 2024-11-19 RX ORDER — ACETAMINOPHEN 325 MG/1
650 TABLET ORAL ONCE
Status: COMPLETED | OUTPATIENT
Start: 2024-11-19 | End: 2024-11-19

## 2024-11-19 RX ORDER — EPINEPHRINE 0.3 MG/.3ML
0.3 INJECTION SUBCUTANEOUS EVERY 5 MIN PRN
Status: DISCONTINUED | OUTPATIENT
Start: 2024-11-19 | End: 2024-11-19 | Stop reason: HOSPADM

## 2024-11-19 RX ORDER — ALBUTEROL SULFATE 0.83 MG/ML
3 SOLUTION RESPIRATORY (INHALATION) AS NEEDED
Status: DISCONTINUED | OUTPATIENT
Start: 2024-11-19 | End: 2024-11-19 | Stop reason: HOSPADM

## 2024-11-19 RX ORDER — MONTELUKAST SODIUM 10 MG/1
10 TABLET ORAL ONCE
Status: COMPLETED | OUTPATIENT
Start: 2024-11-19 | End: 2024-11-19

## 2024-11-19 ASSESSMENT — PAIN SCALES - GENERAL: PAINLEVEL_OUTOF10: 0-NO PAIN

## 2024-11-20 DIAGNOSIS — M16.10 ARTHRITIS OF HIP: ICD-10-CM

## 2024-11-20 LAB
ALBUMIN: 3.8 G/DL (ref 3.4–5)
ALPHA 1 GLOBULIN: 0.3 G/DL (ref 0.2–0.6)
ALPHA 2 GLOBULIN: 0.8 G/DL (ref 0.4–1.1)
BETA GLOBULIN: 0.7 G/DL (ref 0.5–1.2)
GAMMA GLOBULIN: 0.5 G/DL (ref 0.5–1.4)
KAPPA LC SERPL-MCNC: 1.82 MG/DL (ref 0.33–1.94)
KAPPA LC/LAMBDA SER: 2.43 {RATIO} (ref 0.26–1.65)
LAMBDA LC SERPL-MCNC: 0.75 MG/DL (ref 0.57–2.63)
M-PROTEIN 1: 0.1 G/DL
PATH REVIEW-SERUM PROTEIN ELECTROPHORESIS: ABNORMAL
PROTEIN ELECTROPHORESIS COMMENT: ABNORMAL

## 2024-11-20 RX ORDER — TRAMADOL HYDROCHLORIDE 50 MG/1
50 TABLET ORAL EVERY 8 HOURS PRN
Qty: 90 TABLET | Refills: 1 | Status: SHIPPED | OUTPATIENT
Start: 2024-11-20

## 2024-11-22 ENCOUNTER — APPOINTMENT (OUTPATIENT)
Dept: PRIMARY CARE | Facility: CLINIC | Age: 74
End: 2024-11-22
Payer: MEDICARE

## 2024-12-07 DIAGNOSIS — M10.9 GOUT OF RIGHT KNEE, UNSPECIFIED CAUSE, UNSPECIFIED CHRONICITY: ICD-10-CM

## 2024-12-09 RX ORDER — COLCHICINE 0.6 MG/1
0.6 TABLET ORAL DAILY
Qty: 90 TABLET | Refills: 1 | Status: SHIPPED | OUTPATIENT
Start: 2024-12-09

## 2024-12-11 ENCOUNTER — APPOINTMENT (OUTPATIENT)
Dept: PRIMARY CARE | Facility: CLINIC | Age: 74
End: 2024-12-11
Payer: MEDICARE

## 2024-12-11 VITALS
WEIGHT: 193 LBS | HEART RATE: 82 BPM | SYSTOLIC BLOOD PRESSURE: 130 MMHG | RESPIRATION RATE: 22 BRPM | OXYGEN SATURATION: 92 % | BODY MASS INDEX: 27.63 KG/M2 | HEIGHT: 70 IN | DIASTOLIC BLOOD PRESSURE: 75 MMHG

## 2024-12-11 DIAGNOSIS — E11.21 TYPE 2 DIABETES MELLITUS WITH DIABETIC NEPHROPATHY, WITHOUT LONG-TERM CURRENT USE OF INSULIN: ICD-10-CM

## 2024-12-11 DIAGNOSIS — R53.83 OTHER FATIGUE: ICD-10-CM

## 2024-12-11 DIAGNOSIS — N18.31 STAGE 3A CHRONIC KIDNEY DISEASE (MULTI): ICD-10-CM

## 2024-12-11 DIAGNOSIS — I10 BENIGN HYPERTENSION: ICD-10-CM

## 2024-12-11 DIAGNOSIS — E78.00 HYPERCHOLESTEROLEMIA: ICD-10-CM

## 2024-12-11 DIAGNOSIS — I48.91 ATRIAL FIBRILLATION WITH RVR (MULTI): Primary | ICD-10-CM

## 2024-12-11 DIAGNOSIS — E11.9 TYPE 2 DIABETES MELLITUS WITHOUT COMPLICATION, WITHOUT LONG-TERM CURRENT USE OF INSULIN (MULTI): ICD-10-CM

## 2024-12-11 DIAGNOSIS — C90.00 IGG MULTIPLE MYELOMA (MULTI): ICD-10-CM

## 2024-12-11 PROBLEM — M1A.9XX0 CHRONIC GOUT, UNSPECIFIED, WITHOUT TOPHUS (TOPHI): Status: RESOLVED | Noted: 2024-12-11 | Resolved: 2024-12-11

## 2024-12-11 PROBLEM — M10.9 GOUT, UNSPECIFIED: Status: RESOLVED | Noted: 2024-12-11 | Resolved: 2024-12-11

## 2024-12-11 PROCEDURE — 99214 OFFICE O/P EST MOD 30 MIN: CPT | Performed by: INTERNAL MEDICINE

## 2024-12-11 PROCEDURE — 3075F SYST BP GE 130 - 139MM HG: CPT | Performed by: INTERNAL MEDICINE

## 2024-12-11 PROCEDURE — 1036F TOBACCO NON-USER: CPT | Performed by: INTERNAL MEDICINE

## 2024-12-11 PROCEDURE — 1123F ACP DISCUSS/DSCN MKR DOCD: CPT | Performed by: INTERNAL MEDICINE

## 2024-12-11 PROCEDURE — G2211 COMPLEX E/M VISIT ADD ON: HCPCS | Performed by: INTERNAL MEDICINE

## 2024-12-11 PROCEDURE — 3078F DIAST BP <80 MM HG: CPT | Performed by: INTERNAL MEDICINE

## 2024-12-11 PROCEDURE — 1159F MED LIST DOCD IN RCRD: CPT | Performed by: INTERNAL MEDICINE

## 2024-12-11 ASSESSMENT — ENCOUNTER SYMPTOMS
ENDOCRINE NEGATIVE: 1
ALLERGIC/IMMUNOLOGIC NEGATIVE: 1
GASTROINTESTINAL NEGATIVE: 1
CONSTITUTIONAL NEGATIVE: 1
PSYCHIATRIC NEGATIVE: 1
HEMATOLOGIC/LYMPHATIC NEGATIVE: 1
MUSCULOSKELETAL NEGATIVE: 1
EYES NEGATIVE: 1
CARDIOVASCULAR NEGATIVE: 1
NEUROLOGICAL NEGATIVE: 1
RESPIRATORY NEGATIVE: 1

## 2024-12-11 NOTE — PROGRESS NOTES
"Subjective   Patient ID: Toby Ugalde is a 74 y.o. male who presents for Follow-up (Follow up).    HPI     Review of Systems   Constitutional: Negative.    HENT: Negative.     Eyes: Negative.    Respiratory: Negative.     Cardiovascular: Negative.    Gastrointestinal: Negative.    Endocrine: Negative.    Musculoskeletal: Negative.    Skin: Negative.    Allergic/Immunologic: Negative.    Neurological: Negative.    Hematological: Negative.    Psychiatric/Behavioral: Negative.     All other systems reviewed and are negative.      Objective   /75   Pulse 82   Resp 22   Ht 1.778 m (5' 10\")   Wt 87.5 kg (193 lb)   SpO2 92%   BMI 27.69 kg/m²     Physical Exam  Vitals and nursing note reviewed.   Constitutional:       Appearance: Normal appearance.   HENT:      Head: Normocephalic and atraumatic.      Right Ear: Tympanic membrane, ear canal and external ear normal.      Left Ear: Tympanic membrane, ear canal and external ear normal. There is no impacted cerumen.      Nose: Nose normal.      Mouth/Throat:      Mouth: Mucous membranes are moist.      Pharynx: Oropharynx is clear.   Eyes:      Extraocular Movements: Extraocular movements intact.      Conjunctiva/sclera: Conjunctivae normal.      Pupils: Pupils are equal, round, and reactive to light.   Cardiovascular:      Rate and Rhythm: Normal rate and regular rhythm.      Pulses: Normal pulses.      Heart sounds: Normal heart sounds. No murmur heard.  Pulmonary:      Effort: Pulmonary effort is normal. No respiratory distress.      Breath sounds: Normal breath sounds. No stridor. No wheezing, rhonchi or rales.   Chest:      Chest wall: No tenderness.   Abdominal:      General: Abdomen is flat. Bowel sounds are normal. There is no distension.      Palpations: Abdomen is soft. There is no mass.      Tenderness: There is no abdominal tenderness. There is no right CVA tenderness, left CVA tenderness, guarding or rebound.      Hernia: No hernia is present. "   Musculoskeletal:         General: Normal range of motion.      Cervical back: Normal range of motion and neck supple.   Skin:     General: Skin is warm.      Capillary Refill: Capillary refill takes less than 2 seconds.   Neurological:      General: No focal deficit present.      Mental Status: He is alert.      Cranial Nerves: No cranial nerve deficit.      Sensory: No sensory deficit.      Motor: No weakness.      Coordination: Coordination normal.      Gait: Gait normal.      Deep Tendon Reflexes: Reflexes normal.   Psychiatric:         Mood and Affect: Mood normal.         Behavior: Behavior normal. Behavior is cooperative.         Thought Content: Thought content normal.         Judgment: Judgment normal.         Assessment/Plan   Problem List Items Addressed This Visit             ICD-10-CM    Atrial fibrillation with RVR (Multi) - Primary I48.91     Atrial fibrillation - persistent with  Ventricular rate is well controlled (in sinus rhythm). ON ELIQUIS  No Shortness of breath. Continues  Eliquis 5mg BID  Metoprolol to 50 mg BID           Benign hypertension I10     HTN - hypertension well/controlled .Target BP < 130/80  achieved. Educate low salt diet and exercise with weight loss. Educate home self monitoring and diary keeping. Educate risks of elevate blood pressure and benefits of prompt treatment.  Refill Amlodipine and Metoprolol          Diabetes mellitus (Multi) E11.9     DM - NIDDM  . Reviewed with patient / Will check  HbA1c and fasting blood sugars. Educate home self monitoring and diary keeping(reviewed with patient home blood sugar levels /diary). Educate extensively low calorie diet and weight loss with exercise. Reviewed BS- diary and Rx. Regimen. [place] renal protection with ARB/ACEI.               Educate compliance with diet and Rx. And educate risks and autcomes.     Refill Metformin 500 mg BID with meals                 Fatigue R53.83     Fatigue - check CMP(metabolic panel and  elctrolytes) , CBC(complete blood cell count), TSH(thyroid function). Insomnia may play a role and sleep studies(rule out sleep apnea) are recommended . Educate sleep hygiene. Consider anxiety disorder vs. depression. Consider Stress test, and 2DECHO.           Hypercholesterolemia E78.00     Hypercholesterolemia - Monitor lipid profile and educate patient upon risks of high cholesterol and targets. Educate diet and change in lifestyle and increase in exercises - Refill:  Atorvastatin    and educate compliance with medication and diet.            IgG multiple myeloma (Multi) C90.00     Monitor CBC and blood count and follow with hematology and stable now on Chemotherapy  tolerated well          Stage 3a chronic kidney disease (Multi) N18.31     CRI - Chronic Renal Insuficiency. Secondary to DM/HTN/Atherosclerosis. Follow BUN/Cr. and lytes.   RENOPROTECTION with ACEI/ARB (). Monitor microalbuminuria. Avoid hypotension and renal hypoperfusion. The patient was instructed to avoid NSAIDS and educate compliance with medications to control HTN(hypertension) and cholesterol and diabetes.                                     Type 2 diabetes mellitus with diabetic nephropathy, without long-term current use of insulin E11.21       Pancytopenia D61.818        Monitor CBC and on Chemotherapy now feels very tired post chemo yesterday            Atrial fibrillation with RVR (Multi) I48.91       Atrial fibrillation - persistent with  Ventricular rate is well controlled (in sinus rhythm). ON ELIQUIS  No Shortness of breath. Continues  Eliquis 5mg BID  Metoprolol to 50 mg BID             Arthritis of hip M16.10     Relevant Medications     traMADol (Ultram) 50 mg tablet     Gout of right knee M10.9     Relevant Medications     allopurinol (Zyloprim) 300 mg tablet     colchicine 0.6 mg tablet     Megaloblastic anemia D53.1       Anemia - of bleed/chronic disease. Check CBC , B12 and Folic Acid levels, Iron/Ferritn/TIBC, refer patient  to endoscopy (upper/ lower). Target HCT > 30 %. Check guaic stool            Multiple myeloma, stage 1 (Multi) C90.00       Stable and follows with oncology Haematology and monitor CBC blood count                 Type 2 diabetes mellitus without complications (Multi) E11.9       DM - NIDDM . Reviewed with patient / Will check HbA1c and fasting blood sugars. Educate home self monitoring and diary keeping(reviewed with patient home blood sugar levels /diary). Educate extensively low calorie diet and weight loss with exercise. Reviewed BS- diary and Rx. Regimen. [place] renal protection with ARB/ACEI. Educate compliance with diet and Rx. And educate risks and autcomes. Refill Metformin 500 mg BID with meals            Z00.00                   Atrial fibrillation with RVR (Multi) - Primary I48.91         Atrial fibrillation - persistent with  Ventricular rate is well controlled (in sinus rhythm). ON ELIQUIS  No Shortness of breath. Continues  Eliquis 5mg BID  Metoprolol to 50 mg BID             Benign hypertension I10       HTN - hypertension well/controlled .Target BP < 130/80  achieved. Educate low salt diet and exercise with weight loss. Educate home self monitoring and diary keeping. Educate risks of elevate blood pressure and benefits of prompt treatment.  Refill Amlodipine and Metoprolol            BPH with obstruction/lower urinary tract symptoms N40.1, N13.8       High PSA and follows with urology                Chronic renal insufficiency N18.9       CRI - Chronic Renal Insuficiency. Secondary to DM/HTN/Atherosclerosis. Follow BUN/Cr. and lytes.   RENOPROTECTION with ACEI/ARB (). Monitor microalbuminuria. Avoid hypotension and renal hypoperfusion. The patient was instructed to avoid NSAIDS and educate compliance with medications to control HTN(hypertension) and cholesterol and diabetes.                                       Diabetes mellitus (Multi) E11.9       DM - NIDDM  . Reviewed with patient / Will check   HbA1c and fasting blood sugars. Educate home self monitoring and diary keeping(reviewed with patient home blood sugar levels /diary). Educate extensively low calorie diet and weight loss with exercise. Reviewed BS- diary and Rx. Regimen. [place] renal protection with ARB/ACEI.               Educate compliance with diet and Rx. And educate risks and autcomes.     Refill Metformin 500 mg BID with meals                 GERD without esophagitis K21.9       GERD - Acid reflux disease. Rx. PPI (Prilosec/Prevacid/Protonix/Nexium) and educate diet and life style changes. Referred patient to an endoscopy (EGD) and check H. Pylori titers.            IgG multiple myeloma (Multi) C90.00       Monitor CBC and blood count and follow with hematology and stable now on Chemotherapy  tolerated well            Lumbar degenerative disc disease M51.36       DDD - Degenerative disc disease of the Lumbo-Sacral (LS)/  spine. Educate exercises and referred patient to Physical Therapy (PT). Ordered X-Ray's of the LS/spine. Consider MRI. Radiculopathy in the distribution of L4-L5-S1/nerve roots, needs NSAIDS (Naprosin 500mg BID vs. Arthrotec 75mg BID or Prednisone taper (Medrol dose pack), Flexeril 10 mg qHS, heat application, and pain control with Tylenol                Bilateral primary osteoarthritis of hip M16.0       Hip DJD advnced legt worse than the rigtht and awaiting THR and follows with Ortho            Beta thalassemia (Multi) D56.1                   Pancytopenia (Multi) D61.818         Monitor CBC and on Chemotherapy now feels very tired post chemo yesterday            Atrial fibrillation with RVR (Multi) - Primary I48.91       Atrial fibrillation - persistent with  Ventricular rate is well controlled (in sinus rhythm). ON ELIQUIS  No Shortness of breath. Continues  Eliquis 5mg BID  Metoprolol to 50 mg BID                   Benign hypertension I10       HTN - hypertension well/controlled .Target BP < 130/80  achieved. Educate  low salt diet and exercise with weight loss. Educate home self monitoring and diary keeping. Educate risks of elevate blood pressure and benefits of prompt treatment.  Refill Amlodipine and Metoprolol            Elevated PSA R97.20       Monitor PSA and follows with urology            GERD without esophagitis K21.9       GERD - Acid reflux disease. Rx. PPI (Prilosec/Prevacid/Protonix/Nexium) and educate diet and life style changes. Referred patient to an endoscopy (EGD) and check H. Pylori titers.            IgG multiple myeloma (Multi) C90.00       Monitor CBC and blood count and follow with hematology and stable now on Chemotherapy             Lumbar degenerative disc disease M51.36       DDD - Degenerative disc disease of the Lumbo-Sacral (LS)/  spine. Educate exercises and referred patient to Physical Therapy (PT). Ordered X-Ray's of the LS/spine. Consider MRI. Radiculopathy in the distribution of L4-L5-S1/nerve roots, needs NSAIDS (Naprosin 500mg BID vs. Arthrotec 75mg BID or Prednisone taper (Medrol dose pack), Flexeril 10 mg qHS, heat application, and pain control with Tylenol            Type 2 diabetes mellitus without complications (Multi) E11.9       DM - NIDDM  . Reviewed with patient / Will check  HbA1c and fasting blood sugars. Educate home self monitoring and diary keeping(reviewed with patient home blood sugar levels /diary). Educate extensively low calorie diet and weight loss with exercise. Reviewed BS- diary and Rx. Regimen. [place] renal protection with ARB/ACEI.               Educate compliance with diet and Rx. And educate risks and autcomes.  Refill Metformin 500 mg BID with meals                                Stage 3a chronic kidney disease (Multi) N18.31       CRI - Chronic Renal Insuficiency. Secondary to DM/HTN/Atherosclerosis. Follow BUN/Cr. and lytes.   RENOPROTECTION with ACEI/ARB (). Monitor microalbuminuria. Avoid hypotension and renal hypoperfusion. The patient was instructed to  avoid NSAIDS and educate compliance with medications to control HTN(hypertension) and cholesterol and diabetes.                                                       Atrial fibrillation with RVR (Multi) - Primary I48.91         Atrial fibrillation - persistent with  Ventricular rate is well controlled (in sinus rhythm). ON ELIQUIS  No Shortness of breath. Continues  Eliquis 5mg BID  Metoprolol to 50 mg BID                 Benign hypertension I10       HTN - hypertension well/controlled .Target BP < 130/80  achieved. Educate low salt diet and exercise with weight loss. Educate home self monitoring and diary keeping. Educate risks of elevate blood pressure and benefits of prompt treatment.  Refill Amlodipine and Metoprolol            Elevated PSA R97.20       BPH - Benign PROSTATIC Hypertrophy, + Dysuria/Nocturia, check PSA, prescribe Flomax 0.4mg qD and Avodart 0.5 mg qD vs. Finasteride 5 mg qD.  Educate exercises and Change in life style, prescribe vitamin E 400 IU qD. Consider referral to urology. And follows with urology            Fatigue R53.83       Fatigue - check CMP(metabolic panel and elctrolytes) , CBC(complete blood cell count), TSH(thyroid function). Insomnia may play a role and sleep studies(rule out sleep apnea) are recommended . Educate sleep hygiene. Consider anxiety disorder vs. depression. Consider Stress test, and 2DECHO.             Type 2 diabetes mellitus without complications (Multi) E11.9       DM - NIDDM  . Reviewed with patient / Will check  HbA1c and fasting blood sugars. Educate home self monitoring and diary keeping(reviewed with patient home blood sugar levels /diary). Educate extensively low calorie diet and weight loss with exercise. Reviewed BS- diary and Rx. Regimen. [place] renal protection with ARB/ACEI.               Educate compliance with diet and Rx. And educate risks and autcomes.  Refill Metformin 500 mg BID with meals                                                                      Immunizations/Injections       very important  Immunizations from outside sources need reconciliation.       Flu vaccine, trivalent, preservative free, HIGH-DOSE, age 65y+ (Fluzone)12/11/2020, 10/1/2018  Influenza, Unspecified9/22/2010  Influenza, seasonal, [id number]/6/2021, 9/12/2016, 9/9/2015,  ... (3 more)  Pfizer COVID-19 vaccine, 12 years and older, (30mcg/0.3mL) (Comirnaty)10/16/2023  Pfizer COVID-19 vaccine, bivalent, age 12 years and older (30 mcg/0.3 mL)3/17/2023  Pneumococcal conjugate vaccine, 13-valent (PREVNAR 13)6/23/2015  Pneumococcal conjugate vaccine, 20-valent (PREVNAR 20)1/12/2024  Pneumococcal polysaccharide vaccine, 23-valent, age 2 years and older (PNEUMOVAX 23)4/11/2018  Tdap vaccine, age 7 year and older (BOOSTRIX, ADACEL)6/23/2015  Zoster vaccine, recombinant, adult (SHINGRIX)8/16/2021, 6/16/2021               Immunizations/Injections       very important  Immunizations from outside sources need reconciliation.       Flu vaccine, trivalent, preservative free, HIGH-DOSE, age 65y+ (Fluzone)12/11/2020, 10/1/2018  Influenza, Unspecified9/22/2010  Influenza, seasonal, ywvhnijmpo49/6/2021, 9/12/2016, 9/9/2015,  ... (3 more)  Pfizer COVID-19 vaccine, 12 years and older, (30mcg/0.3mL) (Comirnaty)9/25/2024, 10/16/2023  Pfizer COVID-19 vaccine, bivalent, age 12 years and older (30 mcg/0.3 mL)3/17/2023  Pneumococcal conjugate vaccine, 13-valent (PREVNAR 13)6/23/2015  Pneumococcal conjugate vaccine, 20-valent (PREVNAR 20)1/12/2024  Pneumococcal polysaccharide vaccine, 23-valent, age 2 years and older (PNEUMOVAX 23)4/11/2018  Tdap vaccine, age 7 year and older (BOOSTRIX, ADACEL)6/23/2015  Zoster vaccine, recombinant, adult (SHINGRIX)8/16/2021, 6/16/2021              Immunizations/Injections      very important  Immunizations from outside sources need reconciliation.      Flu vaccine, trivalent, preservative free, HIGH-DOSE, age 65y+ (Fluzone)12/11/2020, 10/1/2018  Influenza,  Unspecified9/22/2010  Influenza, seasonal, qfaljxwbox51/6/2021, 9/12/2016, 9/9/2015,  ... (3 more)  Pfizer COVID-19 vaccine, 12 years and older, (30mcg/0.3mL) (Comirnaty)9/25/2024, 10/16/2023  Pfizer COVID-19 vaccine, bivalent, age 12 years and older (30 mcg/0.3 mL)3/17/2023  Pneumococcal conjugate vaccine, 13-valent (PREVNAR 13)6/23/2015  Pneumococcal conjugate vaccine, 20-valent (PREVNAR 20)1/12/2024  Pneumococcal polysaccharide vaccine, 23-valent, age 2 years and older (PNEUMOVAX 23)4/11/2018  Tdap vaccine, age 7 year and older (BOOSTRIX, ADACEL)6/23/2015  Zoster vaccine, recombinant, adult (SHINGRIX)8/16/2021, 6/16/2021

## 2024-12-11 NOTE — ASSESSMENT & PLAN NOTE
DM - NIDDM  . Reviewed with patient / Will check  HbA1c and fasting blood sugars. Educate home self monitoring and diary keeping(reviewed with patient home blood sugar levels /diary). Educate extensively low calorie diet and weight loss with exercise. Reviewed BS- diary and Rx. Regimen. [place] renal protection with ARB/ACEI.               Educate compliance with diet and Rx. And educate risks and autcomes.     Refill Metformin 500 mg BID with meals

## 2024-12-16 RX ORDER — DIPHENHYDRAMINE HCL 50 MG
50 CAPSULE ORAL ONCE
Status: CANCELLED | OUTPATIENT
Start: 2024-12-17

## 2024-12-16 RX ORDER — ALBUTEROL SULFATE 0.83 MG/ML
3 SOLUTION RESPIRATORY (INHALATION) AS NEEDED
Status: CANCELLED | OUTPATIENT
Start: 2024-12-17

## 2024-12-16 RX ORDER — FAMOTIDINE 10 MG/ML
20 INJECTION INTRAVENOUS ONCE AS NEEDED
Status: CANCELLED | OUTPATIENT
Start: 2024-12-17

## 2024-12-16 RX ORDER — ALBUTEROL SULFATE 0.83 MG/ML
3 SOLUTION RESPIRATORY (INHALATION) AS NEEDED
OUTPATIENT
Start: 2025-01-14

## 2024-12-16 RX ORDER — ACETAMINOPHEN 325 MG/1
650 TABLET ORAL ONCE
Status: CANCELLED | OUTPATIENT
Start: 2024-12-17

## 2024-12-16 RX ORDER — DIPHENHYDRAMINE HYDROCHLORIDE 50 MG/ML
50 INJECTION INTRAMUSCULAR; INTRAVENOUS AS NEEDED
OUTPATIENT
Start: 2025-01-14

## 2024-12-16 RX ORDER — MONTELUKAST SODIUM 10 MG/1
10 TABLET ORAL ONCE
Status: CANCELLED | OUTPATIENT
Start: 2024-12-17

## 2024-12-16 RX ORDER — FAMOTIDINE 10 MG/ML
20 INJECTION INTRAVENOUS ONCE AS NEEDED
OUTPATIENT
Start: 2025-01-14

## 2024-12-16 RX ORDER — EPINEPHRINE 0.3 MG/.3ML
0.3 INJECTION SUBCUTANEOUS EVERY 5 MIN PRN
Status: CANCELLED | OUTPATIENT
Start: 2024-12-17

## 2024-12-16 RX ORDER — ACETAMINOPHEN 325 MG/1
650 TABLET ORAL ONCE
OUTPATIENT
Start: 2025-01-14

## 2024-12-16 RX ORDER — DIPHENHYDRAMINE HCL 50 MG
50 CAPSULE ORAL ONCE
OUTPATIENT
Start: 2025-01-14

## 2024-12-16 RX ORDER — MONTELUKAST SODIUM 10 MG/1
10 TABLET ORAL ONCE
OUTPATIENT
Start: 2025-01-14

## 2024-12-16 RX ORDER — DIPHENHYDRAMINE HYDROCHLORIDE 50 MG/ML
50 INJECTION INTRAMUSCULAR; INTRAVENOUS AS NEEDED
Status: CANCELLED | OUTPATIENT
Start: 2024-12-17

## 2024-12-16 RX ORDER — EPINEPHRINE 0.3 MG/.3ML
0.3 INJECTION SUBCUTANEOUS EVERY 5 MIN PRN
OUTPATIENT
Start: 2025-01-14

## 2024-12-16 ASSESSMENT — ENCOUNTER SYMPTOMS
WOUND: 0
UNEXPECTED WEIGHT CHANGE: 0
SHORTNESS OF BREATH: 1
BLOOD IN STOOL: 0
FATIGUE: 1
ARTHRALGIAS: 1
CHILLS: 0
APPETITE CHANGE: 0
VOMITING: 0
NUMBNESS: 1
CONSTIPATION: 1
FEVER: 0
NAUSEA: 0
CARDIOVASCULAR NEGATIVE: 1
HEMATOLOGIC/LYMPHATIC NEGATIVE: 1
DIARRHEA: 1

## 2024-12-17 ENCOUNTER — INFUSION (OUTPATIENT)
Dept: HEMATOLOGY/ONCOLOGY | Facility: CLINIC | Age: 74
End: 2024-12-17
Payer: MEDICARE

## 2024-12-17 ENCOUNTER — LAB (OUTPATIENT)
Dept: LAB | Facility: CLINIC | Age: 74
End: 2024-12-17
Payer: MEDICARE

## 2024-12-17 ENCOUNTER — OFFICE VISIT (OUTPATIENT)
Dept: HEMATOLOGY/ONCOLOGY | Facility: CLINIC | Age: 74
End: 2024-12-17
Payer: MEDICARE

## 2024-12-17 VITALS
OXYGEN SATURATION: 98 % | HEART RATE: 70 BPM | BODY MASS INDEX: 28.6 KG/M2 | RESPIRATION RATE: 18 BRPM | WEIGHT: 199.3 LBS | SYSTOLIC BLOOD PRESSURE: 135 MMHG | DIASTOLIC BLOOD PRESSURE: 70 MMHG | TEMPERATURE: 97.7 F

## 2024-12-17 DIAGNOSIS — C90.00 IGG MULTIPLE MYELOMA (MULTI): Primary | ICD-10-CM

## 2024-12-17 DIAGNOSIS — I48.91 ATRIAL FIBRILLATION, UNSPECIFIED TYPE (MULTI): ICD-10-CM

## 2024-12-17 DIAGNOSIS — C90.00 IGG MULTIPLE MYELOMA (MULTI): ICD-10-CM

## 2024-12-17 DIAGNOSIS — Z86.711 HISTORY OF PULMONARY EMBOLISM: ICD-10-CM

## 2024-12-17 LAB
ALBUMIN SERPL BCP-MCNC: 4.2 G/DL (ref 3.4–5)
ALP SERPL-CCNC: 70 U/L (ref 33–136)
ALT SERPL W P-5'-P-CCNC: 19 U/L (ref 10–52)
ANION GAP SERPL CALC-SCNC: 16 MMOL/L (ref 10–20)
AST SERPL W P-5'-P-CCNC: 14 U/L (ref 9–39)
BASOPHILS # BLD AUTO: 0.01 X10*3/UL (ref 0–0.1)
BASOPHILS NFR BLD AUTO: 0.2 %
BILIRUB SERPL-MCNC: 0.7 MG/DL (ref 0–1.2)
BUN SERPL-MCNC: 14 MG/DL (ref 6–23)
CALCIUM SERPL-MCNC: 8.3 MG/DL (ref 8.6–10.6)
CHLORIDE SERPL-SCNC: 102 MMOL/L (ref 98–107)
CO2 SERPL-SCNC: 25 MMOL/L (ref 21–32)
CREAT SERPL-MCNC: 1.45 MG/DL (ref 0.5–1.3)
EGFRCR SERPLBLD CKD-EPI 2021: 50 ML/MIN/1.73M*2
EOSINOPHIL # BLD AUTO: 0.13 X10*3/UL (ref 0–0.4)
EOSINOPHIL NFR BLD AUTO: 2.4 %
ERYTHROCYTE [DISTWIDTH] IN BLOOD BY AUTOMATED COUNT: 14.5 % (ref 11.5–14.5)
GLUCOSE SERPL-MCNC: 149 MG/DL (ref 74–99)
HCT VFR BLD AUTO: 33.8 % (ref 41–52)
HGB BLD-MCNC: 11.5 G/DL (ref 13.5–17.5)
IGA SERPL-MCNC: 38 MG/DL (ref 70–400)
IGG SERPL-MCNC: 563 MG/DL (ref 700–1600)
IGM SERPL-MCNC: 9 MG/DL (ref 40–230)
IMM GRANULOCYTES # BLD AUTO: 0.01 X10*3/UL (ref 0–0.5)
IMM GRANULOCYTES NFR BLD AUTO: 0.2 % (ref 0–0.9)
LYMPHOCYTES # BLD AUTO: 2.96 X10*3/UL (ref 0.8–3)
LYMPHOCYTES NFR BLD AUTO: 54.5 %
MCH RBC QN AUTO: 31.1 PG (ref 26–34)
MCHC RBC AUTO-ENTMCNC: 34 G/DL (ref 32–36)
MCV RBC AUTO: 91 FL (ref 80–100)
MONOCYTES # BLD AUTO: 0.78 X10*3/UL (ref 0.05–0.8)
MONOCYTES NFR BLD AUTO: 14.4 %
NEUTROPHILS # BLD AUTO: 1.54 X10*3/UL (ref 1.6–5.5)
NEUTROPHILS NFR BLD AUTO: 28.3 %
NRBC BLD-RTO: ABNORMAL /100{WBCS}
PLATELET # BLD AUTO: 140 X10*3/UL (ref 150–450)
POTASSIUM SERPL-SCNC: 3.5 MMOL/L (ref 3.5–5.3)
PROT SERPL-MCNC: 6.2 G/DL (ref 6.4–8.2)
PROT SERPL-MCNC: 6.2 G/DL (ref 6.4–8.2)
RBC # BLD AUTO: 3.7 X10*6/UL (ref 4.5–5.9)
SODIUM SERPL-SCNC: 139 MMOL/L (ref 136–145)
WBC # BLD AUTO: 5.4 X10*3/UL (ref 4.4–11.3)

## 2024-12-17 PROCEDURE — 2500000004 HC RX 250 GENERAL PHARMACY W/ HCPCS (ALT 636 FOR OP/ED): Mod: JZ,JG | Performed by: INTERNAL MEDICINE

## 2024-12-17 PROCEDURE — 85025 COMPLETE CBC W/AUTO DIFF WBC: CPT

## 2024-12-17 PROCEDURE — 96401 CHEMO ANTI-NEOPL SQ/IM: CPT

## 2024-12-17 PROCEDURE — 36415 COLL VENOUS BLD VENIPUNCTURE: CPT

## 2024-12-17 PROCEDURE — 83521 IG LIGHT CHAINS FREE EACH: CPT

## 2024-12-17 PROCEDURE — 1123F ACP DISCUSS/DSCN MKR DOCD: CPT

## 2024-12-17 PROCEDURE — 84155 ASSAY OF PROTEIN SERUM: CPT | Mod: 59

## 2024-12-17 PROCEDURE — 99214 OFFICE O/P EST MOD 30 MIN: CPT

## 2024-12-17 PROCEDURE — 1126F AMNT PAIN NOTED NONE PRSNT: CPT

## 2024-12-17 PROCEDURE — 84165 PROTEIN E-PHORESIS SERUM: CPT

## 2024-12-17 PROCEDURE — 80053 COMPREHEN METABOLIC PANEL: CPT

## 2024-12-17 PROCEDURE — 2500000001 HC RX 250 WO HCPCS SELF ADMINISTERED DRUGS (ALT 637 FOR MEDICARE OP): Performed by: INTERNAL MEDICINE

## 2024-12-17 PROCEDURE — 3075F SYST BP GE 130 - 139MM HG: CPT

## 2024-12-17 PROCEDURE — 82784 ASSAY IGA/IGD/IGG/IGM EACH: CPT

## 2024-12-17 PROCEDURE — 2500000004 HC RX 250 GENERAL PHARMACY W/ HCPCS (ALT 636 FOR OP/ED): Performed by: INTERNAL MEDICINE

## 2024-12-17 PROCEDURE — 1159F MED LIST DOCD IN RCRD: CPT

## 2024-12-17 PROCEDURE — 1160F RVW MEDS BY RX/DR IN RCRD: CPT

## 2024-12-17 PROCEDURE — 3078F DIAST BP <80 MM HG: CPT

## 2024-12-17 RX ORDER — ACETAMINOPHEN 325 MG/1
650 TABLET ORAL ONCE
Status: COMPLETED | OUTPATIENT
Start: 2024-12-17 | End: 2024-12-17

## 2024-12-17 RX ORDER — DIPHENHYDRAMINE HCL 50 MG
50 CAPSULE ORAL ONCE
Status: COMPLETED | OUTPATIENT
Start: 2024-12-17 | End: 2024-12-17

## 2024-12-17 RX ORDER — MONTELUKAST SODIUM 10 MG/1
10 TABLET ORAL ONCE
Status: COMPLETED | OUTPATIENT
Start: 2024-12-17 | End: 2024-12-17

## 2024-12-17 RX ORDER — ALBUTEROL SULFATE 0.83 MG/ML
3 SOLUTION RESPIRATORY (INHALATION) AS NEEDED
Status: DISCONTINUED | OUTPATIENT
Start: 2024-12-17 | End: 2024-12-17 | Stop reason: HOSPADM

## 2024-12-17 RX ORDER — FAMOTIDINE 10 MG/ML
20 INJECTION INTRAVENOUS ONCE AS NEEDED
Status: DISCONTINUED | OUTPATIENT
Start: 2024-12-17 | End: 2024-12-17 | Stop reason: HOSPADM

## 2024-12-17 RX ORDER — EPINEPHRINE 0.3 MG/.3ML
0.3 INJECTION SUBCUTANEOUS EVERY 5 MIN PRN
Status: DISCONTINUED | OUTPATIENT
Start: 2024-12-17 | End: 2024-12-17 | Stop reason: HOSPADM

## 2024-12-17 RX ORDER — DIPHENHYDRAMINE HYDROCHLORIDE 50 MG/ML
50 INJECTION INTRAMUSCULAR; INTRAVENOUS AS NEEDED
Status: DISCONTINUED | OUTPATIENT
Start: 2024-12-17 | End: 2024-12-17 | Stop reason: HOSPADM

## 2024-12-17 ASSESSMENT — ENCOUNTER SYMPTOMS: DIAPHORESIS: 1

## 2024-12-17 ASSESSMENT — PAIN SCALES - GENERAL: PAINLEVEL_OUTOF10: 0-NO PAIN

## 2024-12-17 NOTE — PROGRESS NOTES
Patient ID: Toby Ugalde is a 74 y.o. male.    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 3/6/17- bone survey was negative for lytic lesions.  3/15/17-  bone marrow revealed 30% plasma cells with kappa predominance. Started on Rd  - 2017 - 12/2019: Rd->R 10mg (d/t hyperGlc a/w dex so d/c'ed), best response [place]; Rd held d/t dental procedure and hip replacement, but never restarted  - 3/2022: worsening anemia since 3/22, worsening Cr since 3/21; SPE-IgGK 3.5g, K/L 147; bone survey: LT 9th rib possible plasmacytoma     Late February 2022 patient was seen by his PCP Dr. Cano for complaints of fatigue.  Labs notable for hyponatremia to 127, anemia with a HGB of 9.  Cr 1.99. Protein elevated to  10.7.  Uric acid up to 8.9.      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  Skeletal survey (3/2/22) - lesion in the left rib.   PET:    Expansile hypermetabolic lytic mass involving the left lateral 9th  rib, correlating to suspected plasmacytoma described on osseous  survey performed 03/02/2022.  2. Diffusely increased FDG uptake throughout the bone marrow which  may relate to anemia/marrow activation, with marrow involvement by  myeloma not excluded.  Treatment:   Initiated (3/29/22) treatment for his relapsed Myeloma with Revlimid 10 mg by mouth days 1-14/21 days, Daratuzumab (3/29, & 4/5/22), SC Velcade (3/29 & 4/5/22), & weekly Dexamethasone complicated by several hospitaliizations for syncope, PE ,afib due to  velcade and revlimid.  Velcade discontinued     Recv'd weekly single agent Daraumumab from 5/31 - 7/19/22. Initiated (8/25/22) Revlimid 10 mg on days 1-21/28 days.     Has had marked reduction in paraprotein and free lyte chains  with M protein : 1.4 g/dl on 4/19/22 ,    [place] free lyte/ratio of 4.49 mg/dl/4.45.    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         IgG multiple myeloma (Multi)   9/6/2023 Initial Diagnosis    IgG multiple myeloma (CMS/HCC)     10/17/2023 -  Chemotherapy    Daratumumab, 28 Day Cycles - Maintenance       Response:     Past Medical History:     Past Medical History:   Diagnosis Date    Chronic gout, unspecified, without tophus (tophi) 12/11/2024    Dental caries on pit and fissure surface penetrating into dentin 01/12/2024    Deposits (accretions) on teeth 01/12/2024    Essential (primary) hypertension 11/20/2013    Benign essential hypertension    Gout, unspecified 12/11/2024    History of hypercholesterolemia 01/12/2024    History of hypertension 01/12/2024    History of malignant neoplasm of prostate 01/12/2024    Impacted cerumen 01/12/2024    Impacted cerumen, bilateral 02/16/2024    Infective pharyngitis 01/12/2024    Other intervertebral disc degeneration, lumbar region     Lumbar degenerative disc disease    Overweight with body mass index (BMI) 25.0-29.9 02/02/2022    Partial loss of teeth due to caries, class I 01/12/2024    Personal history of malignant neoplasm of prostate     History of malignant neoplasm of prostate    Personal history of other diseases of the circulatory system     History of hypertension    Personal history of other diseases of the circulatory system 06/29/2022    History of atrial tachycardia    Personal history of other diseases of the circulatory system     History of essential hypertension    Personal history of other diseases of the musculoskeletal system and connective tissue     History of arthritis    Personal history of other diseases of the musculoskeletal system and connective tissue     History of acute  gouty arthritis    Personal history of other diseases of the musculoskeletal system and connective tissue 01/30/2019    History of left foot drop    Personal history of other diseases of the respiratory system     History of bronchitis    Personal history of other endocrine, nutritional and metabolic disease     History of hypercholesterolemia    Personal history of other endocrine, nutritional and metabolic disease     History of diabetes mellitus    Personal history of other endocrine, nutritional and metabolic disease 01/17/2014    History of diabetes mellitus    Post-traumatic stress disorder, chronic 11/10/2023    Primary localized osteoarthritis of pelvic region and thigh 12/17/2018    Pure hypercholesterolemia, unspecified 11/20/2013    Low-density-lipoid-type (LDL) hyperlipoproteinemia    Snoring     Snoring     Surgical History:     Past Surgical History:   Procedure Laterality Date    BACK SURGERY  11/20/2013    Back Surgery    TONSILLECTOMY  11/20/2013    Tonsillectomy    TOTAL KNEE ARTHROPLASTY  11/20/2013    Knee Replacement      Family History:     Family History   Problem Relation Name Age of Onset    Heart attack Mother      Diabetes Brother      Stroke Maternal Grandfather       Social History:  Reported history of agent orange exposure while in [employer] at [employer]. Retired  and post .  from his wife who is health care  power of .  Two sons and 2 grandchildren (1)      Social History     Tobacco Use    Smoking status: Never     Passive exposure: Never    Smokeless tobacco: Never   Substance Use Topics    Alcohol use: Yes     Comment: occasional    Drug use: Never      -------------------------------------------------------------------------------------------------------  Subjective       HPI    KANDI ROSE is a 74 year old Male h/o pAfib, T2DM, HTN, gout, IgG kappa MM with history as above.  Still not clear whether patient has ONJ.  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  Patient states he had a bone density 11/23 which does not show osteopenia.    Toby presents to the clinic today (12/17/24) unaccompanied for follow up evaulation of his multiple myeloma, blood work, and is scheduled to receive C27 of monthly subcutaneous daratumumab.  Remains on lenalidomide 5 mg daily on days 1-21 of 28 day cycle.  Today, he is on day 7 of his 28 day cycle of lenalidomide.  [facility]    No changes since last visit.  Energy level is medium range.  Uses cane for ambulation.  Continues to have night sweats every 1-2 weeks. Appetite is okay, depends on the day.  Can get full quickly some days.  Weight is stable.  Continues to have shortness of breath on exertion.  Bowels chronically rotate between diarrhea and constipation.  Takes prn imodium and stool softener.  Chronic left hip pain, using tramadol and tylenol.  Following with orthopedics.  Received a cortisol injection left hip with previously plan to eventually have left hip replacement.  Numbness and tingling to bilateral feet and hands.  Was recently scheduled for cardioversion but when he went for procedure was no longer in afib, so cardioversion was cancelled.      Review of Systems   Constitutional:  Positive for diaphoresis (occassional night sweats about once per week to 2 weeks, can be drenching) and fatigue. Negative for appetite change, chills, fever and unexpected weight change.   Respiratory:  Positive for shortness of breath (on exertion).    Cardiovascular: Negative.    Gastrointestinal:  Positive for constipation and diarrhea. Negative for blood in stool, nausea and vomiting.   Genitourinary: Negative.     Musculoskeletal:  Positive for arthralgias. Negative for gait problem (uses cane).   Skin:  Negative for rash and wound.   Neurological:  Positive for numbness. Negative for gait problem (uses cane).   Hematological: Negative.    All other systems  reviewed and are negative.  -------------------------------------------------------------------------------------------------------  Objective   BSA: 2.11 meters squared  /70   Pulse 70   Temp 36.5 °C (97.7 °F)   Resp 18   Wt 90.4 kg (199 lb 4.7 oz)   SpO2 98%   BMI 28.60 kg/m²     Physical Exam  Vitals reviewed.   Constitutional:       Appearance: Normal appearance.   HENT:      Head: Normocephalic and atraumatic.      Mouth/Throat:      Mouth: Mucous membranes are moist.   Eyes:      Extraocular Movements: Extraocular movements intact.      Conjunctiva/sclera: Conjunctivae normal.      Pupils: Pupils are equal, round, and reactive to light.   Cardiovascular:      Rate and Rhythm: Normal rate and regular rhythm.      Pulses: Normal pulses.      Heart sounds: Normal heart sounds.   Pulmonary:      Effort: Pulmonary effort is normal.      Breath sounds: Normal breath sounds.   Abdominal:      General: Abdomen is flat. Bowel sounds are normal.      Palpations: Abdomen is soft. There is no mass.      Tenderness: There is no abdominal tenderness.   Musculoskeletal:         General: No swelling. Normal range of motion.   Lymphadenopathy:      Comments: No lymphadenopathy   Skin:     General: Skin is warm and dry.   Neurological:      General: No focal deficit present.      Mental Status: He is alert and oriented to person, place, and time.   Psychiatric:         Mood and Affect: Mood normal.         Behavior: Behavior normal.         Thought Content: Thought content normal.         Judgment: Judgment normal.     Performance Status:  Symptomatic; fully ambulatory  -------------------------------------------------------------------------------------------------------  Assessment/Plan      75 yo M h/o IgG kappa MM s/p Rd 2173-1923 best response [place] stopped d/t dental procedure, presented on 3/2022 for worsening anemia/Cr d/t relapsed MM, and likely LT 9th rib plasmacytoma.      Restaging 3/2022 shows high burden  disease with 90% tumorous involvement of the bone marrow ,   Initiated (3/29/22) treatment for his relapsed Myeloma with Revlimid 10 mg by mouth days 1-14/21 days, Daratuzumab (3/29, & 4/5/22), SC Velcade (3/29 & 4/5/22), & weekly Dexamethasone. complicated by several hospilizations for syncope, PE ,afib due to velcade and revlimid.    Recv'd weekly single agent Daraumumab from 5/31 - 7/19/22. Initiated (8/25/22) Revlimid 10 mg on days 1-21/28 days.     Will continue monthly casey, rev, & dex until evidence of disease progression or unacceptable toxicity.    12/17/24:  CBC results from today are stable.  Kappa and lambda free light chains stable from 11/19/24, level in process from today.  M-protein stable at 0.1 (11/19/24), level in process from today.  No concerning findings on physical examination.  Scheduled to receive C27 daratumumab today.  Continue revlimid 5 mg daily on days 1-21 out of 28 day cycle.  Follow up visit in 2 months.    Ig [place] Free Light Chain   Date Value Ref Range Status   11/19/2024 1.82 0.33 - 1.94 mg/dL Final   10/22/2024 2.36 (H) 0.33 - 1.94 mg/dL Final   09/17/2024 2.18 (H) 0.33 - 1.94 mg/dL Final   08/20/2024 1.99 (H) 0.33 - 1.94 mg/dL Final   07/23/2024 1.53 0.33 - 1.94 mg/dL Final     Ig Lambda Free Light Chain   Date Value Ref Range Status   11/19/2024 0.75 0.57 - 2.63 mg/dL Final   10/22/2024 1.09 0.57 - 2.63 mg/dL Final   09/17/2024 0.91 0.57 - 2.63 mg/dL Final   08/20/2024 1.02 0.57 - 2.63 mg/dL Final   07/23/2024 1.06 0.57 - 2.63 mg/dL Final     Kappa/Lambda Ratio   Date Value Ref Range Status   11/19/2024 2.43 (H) 0.26 - 1.65 Final   10/22/2024 2.17 (H) 0.26 - 1.65 Final   09/17/2024 2.40 (H) 0.26 - 1.65 Final   08/20/2024 1.95 (H) 0.26 - 1.65 Final   07/23/2024 1.44 0.26 - 1.65 Final     M-PROTEIN 1   Date Value Ref Range Status   11/19/2024 0.1 (H)   g/dL Final   10/22/2024 0.1 (H)   g/dL Final   09/17/2024 0.1 (H)   g/dL Final   08/20/2024 0.1 (H)   g/dL Final   07/23/2024  0.1 (H)   g/dL Final      #Atrial fib on chronic anticoagulation  on Eliquis 5 mg by mouth twice a day.  Patient confirmed on 50 mg by mouth twice a day of metoprolol.  Follows with Dr. Moon and Dr. Garcia.  Recently (10/22/24) has been feeling more SOB - advised to reach out to cardiology  12/17/24:  Cardioversion planned for 11/14/24 was cancelled as Toby was back in NSR.  Following with Dr. Garcia and Dr. Moon.  Currently on eliquis 5 mg BID and metoprolol 100 mg BID.    PE (4/9/22) - Small occlusive embolic thrombus within the right lower lobe pulmonary artery branch, medially and posteriorly.  Cont Eliquis 5 mg twice a day.     Bone health:  Xgeva held due to concerns of ONJ and prolonged treatment in the past.  Bone density December 2023 was normal.  Hold for now.      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  Pt had his tooth pulled in 2019, however with prolonged bone exposure/infection, eventually underwent OMSF surgery in 10/2021. Unclear if he has denosumab assoc jaw necrosis. Patient finally had his bad tooth (L upper molar) removed on 5/25/23. [facility]  6/25/24:  Reports that he recently finished dental work and has dentures.      Social Work:  4/30/24: Requested to have DWIGHT Dominguez OSW-C contact patient regarding his medical bills.   8/20/24:  Filled out applications for grants for [employer] and [employer].  Reports he does not need assistance for medical paperwork or bills today.    12/17/24:   Reports he does not need assistance with social work today.     RTC:   1/14/25:  Infusion appt for C28 casey  2/11/25:  Follow up visit with provider and C29 casey  -------------------------------------------------------------------------------------------------------  EWELINA Novak-RONAN

## 2024-12-18 LAB
ALBUMIN: 3.9 G/DL (ref 3.4–5)
ALPHA 1 GLOBULIN: 0.3 G/DL (ref 0.2–0.6)
ALPHA 2 GLOBULIN: 0.8 G/DL (ref 0.4–1.1)
BETA GLOBULIN: 0.7 G/DL (ref 0.5–1.2)
GAMMA GLOBULIN: 0.5 G/DL (ref 0.5–1.4)
KAPPA LC SERPL-MCNC: 2.37 MG/DL (ref 0.33–1.94)
KAPPA LC/LAMBDA SER: 2.6 {RATIO} (ref 0.26–1.65)
LAMBDA LC SERPL-MCNC: 0.91 MG/DL (ref 0.57–2.63)
M-PROTEIN 1: 0.1 G/DL
PATH REVIEW-SERUM PROTEIN ELECTROPHORESIS: ABNORMAL
PROTEIN ELECTROPHORESIS COMMENT: ABNORMAL

## 2024-12-20 ENCOUNTER — APPOINTMENT (OUTPATIENT)
Dept: PRIMARY CARE | Facility: CLINIC | Age: 74
End: 2024-12-20
Payer: MEDICARE

## 2025-01-08 ENCOUNTER — OFFICE VISIT (OUTPATIENT)
Dept: CARDIOLOGY | Facility: HOSPITAL | Age: 75
End: 2025-01-08
Payer: MEDICARE

## 2025-01-08 VITALS
WEIGHT: 197 LBS | BODY MASS INDEX: 28.2 KG/M2 | SYSTOLIC BLOOD PRESSURE: 92 MMHG | RESPIRATION RATE: 16 BRPM | DIASTOLIC BLOOD PRESSURE: 62 MMHG | OXYGEN SATURATION: 97 % | HEIGHT: 70 IN | HEART RATE: 94 BPM

## 2025-01-08 DIAGNOSIS — I48.91 ATRIAL FIBRILLATION WITH RVR (MULTI): Primary | ICD-10-CM

## 2025-01-08 LAB
ATRIAL RATE: 94 BPM
P AXIS: 23 DEGREES
P OFFSET: 192 MS
P ONSET: 149 MS
PR INTERVAL: 152 MS
Q ONSET: 225 MS
QRS COUNT: 16 BEATS
QRS DURATION: 92 MS
QT INTERVAL: 398 MS
QTC CALCULATION(BAZETT): 497 MS
QTC FREDERICIA: 462 MS
R AXIS: -9 DEGREES
T AXIS: 44 DEGREES
T OFFSET: 424 MS
VENTRICULAR RATE: 94 BPM

## 2025-01-08 PROCEDURE — 93005 ELECTROCARDIOGRAM TRACING: CPT | Performed by: INTERNAL MEDICINE

## 2025-01-08 PROCEDURE — 1159F MED LIST DOCD IN RCRD: CPT | Performed by: INTERNAL MEDICINE

## 2025-01-08 PROCEDURE — 99214 OFFICE O/P EST MOD 30 MIN: CPT | Mod: 25 | Performed by: INTERNAL MEDICINE

## 2025-01-08 PROCEDURE — 99214 OFFICE O/P EST MOD 30 MIN: CPT | Performed by: INTERNAL MEDICINE

## 2025-01-08 PROCEDURE — 93010 ELECTROCARDIOGRAM REPORT: CPT | Performed by: INTERNAL MEDICINE

## 2025-01-08 PROCEDURE — 1036F TOBACCO NON-USER: CPT | Performed by: INTERNAL MEDICINE

## 2025-01-08 PROCEDURE — 3078F DIAST BP <80 MM HG: CPT | Performed by: INTERNAL MEDICINE

## 2025-01-08 PROCEDURE — 3074F SYST BP LT 130 MM HG: CPT | Performed by: INTERNAL MEDICINE

## 2025-01-08 PROCEDURE — 1123F ACP DISCUSS/DSCN MKR DOCD: CPT | Performed by: INTERNAL MEDICINE

## 2025-01-08 RX ORDER — METOPROLOL TARTRATE 50 MG/1
50 TABLET ORAL 2 TIMES DAILY
Start: 2025-01-08 | End: 2026-01-08

## 2025-01-08 ASSESSMENT — ENCOUNTER SYMPTOMS
LOSS OF SENSATION IN FEET: 0
DEPRESSION: 0
OCCASIONAL FEELINGS OF UNSTEADINESS: 0

## 2025-01-08 NOTE — PROGRESS NOTES
Referred by Geoffrey Lei MD provider found for   Chief Complaint   Patient presents with    Atrial Fibrillation        Toby Ugalde is a 74 y.o. year old male patient with h/o Paroxysmal atrial fibrillation, Hypertension, Pulmonary embolism 4/9/22, GERD, Anemia, Multiple myeloma, PE. He is on medical treatment for his A Fib. He was recently seen with A Fib recurrence and had his metoprolol increased. Was scheduled for DCC although was back to NSR at the time of the procedure which was cancelled. Presents today for follow up.     PMHx/PSHx: As above    FamHx: unremarkable     Allergies:  Allergies   Allergen Reactions    Mold Other        Review of Systems    Constitutional: not feeling tired.   Eyes: no eyesight problems.   ENT: no hearing loss and no nosebleeds.   Cardiovascular: no intermittent leg claudication and as noted in HPI.   Respiratory: no chronic cough and no shortness of breath.   Gastrointestinal: no change in bowel habits and no blood in stools.   Genitourinary: no urinary frequency and no hematuria.   Skin: no skin rashes.   Neurological: no seizures and no frequent falls.   Psychiatric: no depression and not suicidal.   All other systems have been reviewed and are negative for complaint.     Outpatient Medications:  Current Outpatient Medications   Medication Instructions    acetaminophen (Tylenol) 500 mg tablet Take by mouth.    allopurinol (ZYLOPRIM) 300 mg, oral, Daily    amLODIPine (NORVASC) 5 mg, oral, Daily    apixaban (Eliquis) 5 mg tablet TAKE ONE (1) TABLET BY MOUTH 2 TIMES A DAY    atorvastatin (LIPITOR) 80 mg, oral, Daily    colchicine 0.6 mg, oral, Daily    finasteride (PROSCAR) 5 mg, oral, Daily    fludrocortisone (Florinef) 0.1 mg tablet 1 tablet, Daily    fluticasone (Flonase) 50 mcg/actuation nasal spray 1 spray, Daily PRN    lenalidomide (Revlimid) 5 mg capsule Take by mouth.    losartan (COZAAR) 100 mg, oral, Daily    metFORMIN (GLUCOPHAGE) 500 mg, As needed    metoprolol  "tartrate (LOPRESSOR) 100 mg, oral, 2 times daily    pantoprazole (ProtoNix) 40 mg EC tablet 1 tablet, Daily    sildenafil (VIAGRA) 100 mg, As needed    tamsulosin (FLOMAX) 0.4 mg, oral, Daily    traMADol (ULTRAM) 50 mg, oral, Every 8 hours PRN         Last Recorded Vitals:      9/20/2024    11:03 AM 10/22/2024     8:14 AM 11/6/2024     3:27 PM 11/11/2024     2:18 PM 11/19/2024     9:22 AM 12/11/2024     1:05 PM 12/17/2024     9:07 AM   Vitals   Systolic 115 114 120 110 113 130 135   Diastolic 65 69 88 65 76 75 70   BP Location   Right arm       Heart Rate 67 90 101 83 81 82 70   Temp  35.9 °C (96.6 °F)   35.2 °C (95.4 °F)  36.5 °C (97.7 °F)   Resp 18 18  19 18 22 18   Height 1.778 m (5' 10\")  1.778 m (5' 10\") 1.778 m (5' 10\")  1.778 m (5' 10\")    Weight (lb) 196.21 190.81 194.5 196 195.88 193 199.3   BMI 28.15 kg/m2 27.38 kg/m2 27.91 kg/m2 28.12 kg/m2 28.11 kg/m2 27.69 kg/m2 28.6 kg/m2   BSA (m2) 2.1 m2 2.07 m2 2.09 m2 2.1 m2 2.1 m2 2.08 m2 2.11 m2   Visit Report Report Report Report    Report Report  Report Report    Visit Vitals  Smoking Status Never        Physical Exam:  Constitutional: alert and in no acute distress.   Eyes: no erythema, swelling or discharge from the eye .   Neck: neck is supple, symmetric, trachea midline, no masses  and no thyromegaly .   Pulmonary: no increased work of breathing or signs of respiratory distress  and lungs clear to auscultation.    Cardiovascular: carotid pulses 2+ bilaterally with no bruit , JVP was normal, no thrills , regular rhythm, normal S1 and S2, no murmurs , pedal pulses 2+ bilaterally  and no edema .   Abdomen: abdomen non-tender, no masses  and no hepatomegaly .   Skin: skin warm and dry, normal skin turgor .   Psychiatric judgment and insight is normal  and oriented to person, place and time .        Assessment/Plan   Problem List Items Addressed This Visit             ICD-10-CM    Atrial fibrillation with RVR (Multi) - Primary I48.91    Relevant Orders    ECG 12 " lead (Clinic Performed)       Toby Ugalde is a 74 y.o. year old male patient with h/o Paroxysmal atrial fibrillation, Hypertension, Pulmonary embolism 4/9/22, GERD, Anemia, Multiple myeloma, PE. He is on medical treatment for his A Fib. He was recently seen with A Fib recurrence and had his metoprolol increased. Was scheduled for DCC although was back to NSR at the time of the procedure which was cancelled. Presents today for follow up.   The patient reports doing well. His BP was low and he was a little unsteady. Will decrease his metoprolol in half. His current ECG shows NSR with narrow QRS and HR of 94 bpm. Will follow up in 6 months.         Geoffrey Garcia MD  Cardiac Electrophysiology      Thank you very much for allowing me to participate in the care of this pleasant patient. Please do not hesitate to contact me with any further questions or concerns regarding his care.    **Disclaimer: This note was dictated by speech recognition, and every effort has been made to prevent any error in transcription, however minor errors may be present**

## 2025-01-14 ENCOUNTER — INFUSION (OUTPATIENT)
Dept: HEMATOLOGY/ONCOLOGY | Facility: CLINIC | Age: 75
End: 2025-01-14
Payer: MEDICARE

## 2025-01-14 ENCOUNTER — APPOINTMENT (OUTPATIENT)
Dept: HEMATOLOGY/ONCOLOGY | Facility: CLINIC | Age: 75
End: 2025-01-14
Payer: MEDICARE

## 2025-01-14 VITALS
WEIGHT: 199.7 LBS | DIASTOLIC BLOOD PRESSURE: 70 MMHG | HEART RATE: 58 BPM | TEMPERATURE: 97 F | RESPIRATION RATE: 16 BRPM | OXYGEN SATURATION: 93 % | SYSTOLIC BLOOD PRESSURE: 130 MMHG | BODY MASS INDEX: 28.65 KG/M2

## 2025-01-14 DIAGNOSIS — C90.00 IGG MULTIPLE MYELOMA (MULTI): ICD-10-CM

## 2025-01-14 LAB
ALBUMIN SERPL BCP-MCNC: 4.1 G/DL (ref 3.4–5)
ALP SERPL-CCNC: 59 U/L (ref 33–136)
ALT SERPL W P-5'-P-CCNC: 13 U/L (ref 10–52)
ANION GAP SERPL CALC-SCNC: 15 MMOL/L (ref 10–20)
AST SERPL W P-5'-P-CCNC: 9 U/L (ref 9–39)
BASOPHILS # BLD AUTO: 0.02 X10*3/UL (ref 0–0.1)
BASOPHILS NFR BLD AUTO: 0.5 %
BILIRUB SERPL-MCNC: 0.7 MG/DL (ref 0–1.2)
BUN SERPL-MCNC: 21 MG/DL (ref 6–23)
CALCIUM SERPL-MCNC: 8.6 MG/DL (ref 8.6–10.6)
CHLORIDE SERPL-SCNC: 106 MMOL/L (ref 98–107)
CO2 SERPL-SCNC: 22 MMOL/L (ref 21–32)
CREAT SERPL-MCNC: 1.56 MG/DL (ref 0.5–1.3)
EGFRCR SERPLBLD CKD-EPI 2021: 46 ML/MIN/1.73M*2
EOSINOPHIL # BLD AUTO: 0.17 X10*3/UL (ref 0–0.4)
EOSINOPHIL NFR BLD AUTO: 4.4 %
ERYTHROCYTE [DISTWIDTH] IN BLOOD BY AUTOMATED COUNT: 14.1 % (ref 11.5–14.5)
GLUCOSE SERPL-MCNC: 118 MG/DL (ref 74–99)
HCT VFR BLD AUTO: 33.3 % (ref 41–52)
HGB BLD-MCNC: 11.1 G/DL (ref 13.5–17.5)
IMM GRANULOCYTES # BLD AUTO: 0.01 X10*3/UL (ref 0–0.5)
IMM GRANULOCYTES NFR BLD AUTO: 0.3 % (ref 0–0.9)
LYMPHOCYTES # BLD AUTO: 1.69 X10*3/UL (ref 0.8–3)
LYMPHOCYTES NFR BLD AUTO: 43.4 %
MCH RBC QN AUTO: 30.6 PG (ref 26–34)
MCHC RBC AUTO-ENTMCNC: 33.3 G/DL (ref 32–36)
MCV RBC AUTO: 92 FL (ref 80–100)
MONOCYTES # BLD AUTO: 0.68 X10*3/UL (ref 0.05–0.8)
MONOCYTES NFR BLD AUTO: 17.5 %
NEUTROPHILS # BLD AUTO: 1.32 X10*3/UL (ref 1.6–5.5)
NEUTROPHILS NFR BLD AUTO: 33.9 %
NRBC BLD-RTO: ABNORMAL /100{WBCS}
PLATELET # BLD AUTO: 129 X10*3/UL (ref 150–450)
POTASSIUM SERPL-SCNC: 3.7 MMOL/L (ref 3.5–5.3)
PROT SERPL-MCNC: 5.9 G/DL (ref 6.4–8.2)
PROT SERPL-MCNC: 5.9 G/DL (ref 6.4–8.2)
RBC # BLD AUTO: 3.63 X10*6/UL (ref 4.5–5.9)
SODIUM SERPL-SCNC: 139 MMOL/L (ref 136–145)
WBC # BLD AUTO: 3.9 X10*3/UL (ref 4.4–11.3)

## 2025-01-14 PROCEDURE — 84155 ASSAY OF PROTEIN SERUM: CPT | Mod: 59

## 2025-01-14 PROCEDURE — 96401 CHEMO ANTI-NEOPL SQ/IM: CPT

## 2025-01-14 PROCEDURE — 84165 PROTEIN E-PHORESIS SERUM: CPT

## 2025-01-14 PROCEDURE — 83521 IG LIGHT CHAINS FREE EACH: CPT

## 2025-01-14 PROCEDURE — 85025 COMPLETE CBC W/AUTO DIFF WBC: CPT

## 2025-01-14 PROCEDURE — 2500000001 HC RX 250 WO HCPCS SELF ADMINISTERED DRUGS (ALT 637 FOR MEDICARE OP): Performed by: INTERNAL MEDICINE

## 2025-01-14 PROCEDURE — 2500000004 HC RX 250 GENERAL PHARMACY W/ HCPCS (ALT 636 FOR OP/ED): Mod: JZ,JG,TB | Performed by: INTERNAL MEDICINE

## 2025-01-14 PROCEDURE — 80053 COMPREHEN METABOLIC PANEL: CPT

## 2025-01-14 PROCEDURE — 2500000004 HC RX 250 GENERAL PHARMACY W/ HCPCS (ALT 636 FOR OP/ED): Performed by: INTERNAL MEDICINE

## 2025-01-14 RX ORDER — FAMOTIDINE 10 MG/ML
20 INJECTION INTRAVENOUS ONCE AS NEEDED
Status: DISCONTINUED | OUTPATIENT
Start: 2025-01-14 | End: 2025-01-14 | Stop reason: HOSPADM

## 2025-01-14 RX ORDER — MONTELUKAST SODIUM 10 MG/1
10 TABLET ORAL ONCE
Status: COMPLETED | OUTPATIENT
Start: 2025-01-14 | End: 2025-01-14

## 2025-01-14 RX ORDER — EPINEPHRINE 0.3 MG/.3ML
0.3 INJECTION SUBCUTANEOUS EVERY 5 MIN PRN
Status: DISCONTINUED | OUTPATIENT
Start: 2025-01-14 | End: 2025-01-14 | Stop reason: HOSPADM

## 2025-01-14 RX ORDER — ACETAMINOPHEN 325 MG/1
650 TABLET ORAL ONCE
Status: COMPLETED | OUTPATIENT
Start: 2025-01-14 | End: 2025-01-14

## 2025-01-14 RX ORDER — ALBUTEROL SULFATE 0.83 MG/ML
3 SOLUTION RESPIRATORY (INHALATION) AS NEEDED
Status: DISCONTINUED | OUTPATIENT
Start: 2025-01-14 | End: 2025-01-14 | Stop reason: HOSPADM

## 2025-01-14 RX ORDER — DIPHENHYDRAMINE HYDROCHLORIDE 50 MG/ML
50 INJECTION INTRAMUSCULAR; INTRAVENOUS AS NEEDED
Status: DISCONTINUED | OUTPATIENT
Start: 2025-01-14 | End: 2025-01-14 | Stop reason: HOSPADM

## 2025-01-14 RX ORDER — DIPHENHYDRAMINE HCL 50 MG
50 CAPSULE ORAL ONCE
Status: COMPLETED | OUTPATIENT
Start: 2025-01-14 | End: 2025-01-14

## 2025-01-14 RX ADMIN — DARATUMUMAB AND HYALURONIDASE-FIHJ (HUMAN RECOMBINANT) 1800 MG: 1800; 30000 INJECTION SUBCUTANEOUS at 08:29

## 2025-01-14 RX ADMIN — ACETAMINOPHEN 650 MG: 325 TABLET ORAL at 07:46

## 2025-01-14 RX ADMIN — DIPHENHYDRAMINE HYDROCHLORIDE 50 MG: 50 CAPSULE ORAL at 07:47

## 2025-01-14 RX ADMIN — MONTELUKAST 10 MG: 10 TABLET, FILM COATED ORAL at 07:46

## 2025-01-14 RX ADMIN — DEXAMETHASONE 20 MG: 6 TABLET ORAL at 07:46

## 2025-01-14 ASSESSMENT — PAIN SCALES - GENERAL: PAINLEVEL_OUTOF10: 0-NO PAIN

## 2025-01-15 LAB
ALBUMIN: 3.7 G/DL (ref 3.4–5)
ALPHA 1 GLOBULIN: 0.2 G/DL (ref 0.2–0.6)
ALPHA 2 GLOBULIN: 0.7 G/DL (ref 0.4–1.1)
BETA GLOBULIN: 0.7 G/DL (ref 0.5–1.2)
GAMMA GLOBULIN: 0.6 G/DL (ref 0.5–1.4)
KAPPA LC SERPL-MCNC: 2.79 MG/DL (ref 0.33–1.94)
KAPPA LC/LAMBDA SER: 3.28 {RATIO} (ref 0.26–1.65)
LAMBDA LC SERPL-MCNC: 0.85 MG/DL (ref 0.57–2.63)
M-PROTEIN 1: 0.1 G/DL
PATH REVIEW-SERUM PROTEIN ELECTROPHORESIS: ABNORMAL
PROTEIN ELECTROPHORESIS COMMENT: ABNORMAL

## 2025-01-17 ENCOUNTER — APPOINTMENT (OUTPATIENT)
Dept: PRIMARY CARE | Facility: CLINIC | Age: 75
End: 2025-01-17
Payer: MEDICARE

## 2025-01-22 ENCOUNTER — LAB (OUTPATIENT)
Dept: LAB | Facility: LAB | Age: 75
End: 2025-01-22
Payer: MEDICARE

## 2025-01-22 ENCOUNTER — OFFICE VISIT (OUTPATIENT)
Dept: PRIMARY CARE | Facility: CLINIC | Age: 75
End: 2025-01-22
Payer: MEDICARE

## 2025-01-22 VITALS
DIASTOLIC BLOOD PRESSURE: 80 MMHG | HEART RATE: 82 BPM | SYSTOLIC BLOOD PRESSURE: 130 MMHG | BODY MASS INDEX: 28.35 KG/M2 | RESPIRATION RATE: 22 BRPM | WEIGHT: 198 LBS | HEIGHT: 70 IN | OXYGEN SATURATION: 91 %

## 2025-01-22 DIAGNOSIS — C90.00 IGG MULTIPLE MYELOMA (MULTI): ICD-10-CM

## 2025-01-22 DIAGNOSIS — E11.9 TYPE 2 DIABETES MELLITUS WITHOUT COMPLICATION, WITHOUT LONG-TERM CURRENT USE OF INSULIN (MULTI): ICD-10-CM

## 2025-01-22 DIAGNOSIS — M16.10 ARTHRITIS OF HIP: ICD-10-CM

## 2025-01-22 DIAGNOSIS — N40.1 BPH WITH OBSTRUCTION/LOWER URINARY TRACT SYMPTOMS: ICD-10-CM

## 2025-01-22 DIAGNOSIS — N18.2 CHRONIC RENAL IMPAIRMENT, STAGE 2 (MILD): ICD-10-CM

## 2025-01-22 DIAGNOSIS — K21.9 GERD WITHOUT ESOPHAGITIS: ICD-10-CM

## 2025-01-22 DIAGNOSIS — Z00.00 MEDICARE ANNUAL WELLNESS VISIT, SUBSEQUENT: ICD-10-CM

## 2025-01-22 DIAGNOSIS — Z00.00 ROUTINE GENERAL MEDICAL EXAMINATION AT HEALTH CARE FACILITY: Primary | ICD-10-CM

## 2025-01-22 DIAGNOSIS — D56.1 BETA THALASSEMIA (MULTI): ICD-10-CM

## 2025-01-22 DIAGNOSIS — E11.21 TYPE 2 DIABETES MELLITUS WITH DIABETIC NEPHROPATHY, WITHOUT LONG-TERM CURRENT USE OF INSULIN: ICD-10-CM

## 2025-01-22 DIAGNOSIS — N18.31 STAGE 3A CHRONIC KIDNEY DISEASE (MULTI): ICD-10-CM

## 2025-01-22 DIAGNOSIS — F19.20 CONTROLLED DRUG DEPENDENCE (MULTI): ICD-10-CM

## 2025-01-22 DIAGNOSIS — I10 BENIGN HYPERTENSION: ICD-10-CM

## 2025-01-22 DIAGNOSIS — I48.91 ATRIAL FIBRILLATION WITH RVR (MULTI): ICD-10-CM

## 2025-01-22 DIAGNOSIS — D61.818 PANCYTOPENIA: ICD-10-CM

## 2025-01-22 DIAGNOSIS — N13.8 BPH WITH OBSTRUCTION/LOWER URINARY TRACT SYMPTOMS: ICD-10-CM

## 2025-01-22 DIAGNOSIS — E78.00 HYPERCHOLESTEROLEMIA: ICD-10-CM

## 2025-01-22 PROBLEM — E66.9 OBESITY: Status: ACTIVE | Noted: 2024-09-09

## 2025-01-22 LAB
AMPHETAMINES UR QL SCN: NORMAL
BARBITURATES UR QL SCN: NORMAL
BZE UR QL SCN: NORMAL
CANNABINOIDS UR QL SCN: NORMAL
CREAT UR-MCNC: 105.6 MG/DL (ref 20–370)
PCP UR QL SCN: NORMAL

## 2025-01-22 PROCEDURE — 99214 OFFICE O/P EST MOD 30 MIN: CPT | Performed by: INTERNAL MEDICINE

## 2025-01-22 PROCEDURE — 3075F SYST BP GE 130 - 139MM HG: CPT | Performed by: INTERNAL MEDICINE

## 2025-01-22 PROCEDURE — 80365 DRUG SCREENING OXYCODONE: CPT

## 2025-01-22 PROCEDURE — 80368 SEDATIVE HYPNOTICS: CPT

## 2025-01-22 PROCEDURE — 80354 DRUG SCREENING FENTANYL: CPT

## 2025-01-22 PROCEDURE — 80346 BENZODIAZEPINES1-12: CPT

## 2025-01-22 PROCEDURE — G0439 PPPS, SUBSEQ VISIT: HCPCS | Performed by: INTERNAL MEDICINE

## 2025-01-22 PROCEDURE — 80358 DRUG SCREENING METHADONE: CPT

## 2025-01-22 PROCEDURE — 3079F DIAST BP 80-89 MM HG: CPT | Performed by: INTERNAL MEDICINE

## 2025-01-22 PROCEDURE — 82570 ASSAY OF URINE CREATININE: CPT

## 2025-01-22 PROCEDURE — 1160F RVW MEDS BY RX/DR IN RCRD: CPT | Performed by: INTERNAL MEDICINE

## 2025-01-22 PROCEDURE — 1170F FXNL STATUS ASSESSED: CPT | Performed by: INTERNAL MEDICINE

## 2025-01-22 PROCEDURE — 1159F MED LIST DOCD IN RCRD: CPT | Performed by: INTERNAL MEDICINE

## 2025-01-22 PROCEDURE — 80373 DRUG SCREENING TRAMADOL: CPT

## 2025-01-22 PROCEDURE — 1123F ACP DISCUSS/DSCN MKR DOCD: CPT | Performed by: INTERNAL MEDICINE

## 2025-01-22 PROCEDURE — 1036F TOBACCO NON-USER: CPT | Performed by: INTERNAL MEDICINE

## 2025-01-22 PROCEDURE — 80307 DRUG TEST PRSMV CHEM ANLYZR: CPT

## 2025-01-22 PROCEDURE — 80361 OPIATES 1 OR MORE: CPT

## 2025-01-22 RX ORDER — TRAMADOL HYDROCHLORIDE 50 MG/1
50 TABLET ORAL EVERY 8 HOURS PRN
Qty: 90 TABLET | Refills: 2 | Status: SHIPPED | OUTPATIENT
Start: 2025-01-22

## 2025-01-22 ASSESSMENT — ENCOUNTER SYMPTOMS
ALLERGIC/IMMUNOLOGIC NEGATIVE: 1
LOSS OF SENSATION IN FEET: 1
GASTROINTESTINAL NEGATIVE: 1
ENDOCRINE NEGATIVE: 1
CONSTITUTIONAL NEGATIVE: 1
RESPIRATORY NEGATIVE: 1
CARDIOVASCULAR NEGATIVE: 1
PSYCHIATRIC NEGATIVE: 1
HEMATOLOGIC/LYMPHATIC NEGATIVE: 1
MUSCULOSKELETAL NEGATIVE: 1
NEUROLOGICAL NEGATIVE: 1
DEPRESSION: 0
OCCASIONAL FEELINGS OF UNSTEADINESS: 1
EYES NEGATIVE: 1

## 2025-01-22 ASSESSMENT — PATIENT HEALTH QUESTIONNAIRE - PHQ9
1. LITTLE INTEREST OR PLEASURE IN DOING THINGS: NOT AT ALL
SUM OF ALL RESPONSES TO PHQ9 QUESTIONS 1 AND 2: 0
2. FEELING DOWN, DEPRESSED OR HOPELESS: NOT AT ALL

## 2025-01-22 ASSESSMENT — ACTIVITIES OF DAILY LIVING (ADL)
DOING_HOUSEWORK: INDEPENDENT
TAKING_MEDICATION: INDEPENDENT
BATHING: INDEPENDENT
MANAGING_FINANCES: INDEPENDENT
DRESSING: INDEPENDENT
GROCERY_SHOPPING: INDEPENDENT

## 2025-01-22 NOTE — ASSESSMENT & PLAN NOTE
DM - NIDDM  . Reviewed with patient / Will check  HbA1c and fasting blood sugars. Educate home self monitoring and diary keeping(reviewed with patient home blood sugar levels /diary). Educate extensively low calorie diet and weight loss with exercise. Reviewed BS- diary and Rx. Regimen. Otley renal protection with ARB/ACEI.               Educate compliance with diet and Rx. And educate risks and autcomes.     Refill Metformin 500 mg BID with meals

## 2025-01-22 NOTE — PROGRESS NOTES
"Subjective   Reason for Visit: Toby Ugalde is an 74 y.o. male here for a Medicare Wellness visit. Follows up for diabetes and Multiple myeloma on chemotherapy and felt fatigued but today he feels better and CRI          Reviewed all medications by prescribing practitioner or clinical pharmacist (such as prescriptions, OTCs, herbal therapies and supplements) and documented in the medical record.    HPI    Patient Care Team:  Antoine Cano MD as PCP - General (Internal Medicine)  Antoine Cano MD as PCP - [employer] PCP  Yumiko Waller MD as Consulting Physician (Hematology and Oncology)  Jose Ghotra MD as Consulting Physician (Hematology and Oncology)     Review of Systems   Constitutional: Negative.    HENT: Negative.     Eyes: Negative.    Respiratory: Negative.     Cardiovascular: Negative.    Gastrointestinal: Negative.    Endocrine: Negative.    Musculoskeletal: Negative.    Skin: Negative.    Allergic/Immunologic: Negative.    Neurological: Negative.    Hematological: Negative.    Psychiatric/Behavioral: Negative.     All other systems reviewed and are negative.      Objective   Vitals:  /80   Pulse 82   Resp 22   Ht 1.778 m (5' 10\")   Wt 89.8 kg (198 lb)   SpO2 91%   BMI 28.41 kg/m²       Physical Exam  Vitals and nursing note reviewed.   Constitutional:       Appearance: Normal appearance.   HENT:      Head: Normocephalic and atraumatic.      Right Ear: Tympanic membrane, ear canal and external ear normal.      Left Ear: Tympanic membrane, ear canal and external ear normal. There is no impacted cerumen.      Nose: Nose normal.      Mouth/Throat:      Mouth: Mucous membranes are moist.      Pharynx: Oropharynx is clear.   Eyes:      Extraocular Movements: Extraocular movements intact.      Conjunctiva/sclera: Conjunctivae normal.      Pupils: Pupils are equal, round, and reactive to light.   Cardiovascular:      Rate and Rhythm: Normal rate and regular rhythm.     "  Pulses: Normal pulses.      Heart sounds: Normal heart sounds. No murmur heard.  Pulmonary:      Effort: Pulmonary effort is normal. No respiratory distress.      Breath sounds: Normal breath sounds. No stridor. No wheezing, rhonchi or rales.   Chest:      Chest wall: No tenderness.   Abdominal:      General: Abdomen is flat. Bowel sounds are normal. There is no distension.      Palpations: Abdomen is soft. There is no mass.      Tenderness: There is no abdominal tenderness. There is no right CVA tenderness, left CVA tenderness, guarding or rebound.      Hernia: No hernia is present.   Musculoskeletal:         General: Normal range of motion.      Cervical back: Normal range of motion and neck supple.   Skin:     General: Skin is warm.      Capillary Refill: Capillary refill takes less than 2 seconds.   Neurological:      General: No focal deficit present.      Mental Status: He is alert.      Cranial Nerves: No cranial nerve deficit.      Sensory: No sensory deficit.      Motor: No weakness.      Coordination: Coordination normal.      Gait: Gait normal.      Deep Tendon Reflexes: Reflexes normal.   Psychiatric:         Mood and Affect: Mood normal.         Behavior: Behavior normal. Behavior is cooperative.         Thought Content: Thought content normal.         Judgment: Judgment normal.         Assessment & Plan  Arthritis of hip    Orders:    traMADol (Ultram) 50 mg tablet; Take 1 tablet (50 mg) by mouth every 8 hours if needed for severe pain (7 - 10) or moderate pain (4 - 6).    Opiate/Opioid/Benzo Prescription Compliance; Future    Routine general medical examination at health care facility    Orders:    1 Year Follow Up In Primary Care - Wellness Exam; Future    Atrial fibrillation with RVR (Multi)  Atrial fibrillation - persistent with  Ventricular rate is well controlled (in sinus rhythm). ON ELIQUIS  No Shortness of breath. Continues  Eliquis 5mg BID  Metoprolol to 50 mg BID           Benign  hypertension  HTN - hypertension well/controlled .Target BP < 130/80  achieved. Educate low salt diet and exercise with weight loss. Educate home self monitoring and diary keeping. Educate risks of elevate blood pressure and benefits of prompt treatment.  Refill Amlodipine and Metoprolol          Hypercholesterolemia  Hypercholesterolemia - Monitor lipid profile and educate patient upon risks of high cholesterol and targets. Educate diet and change in lifestyle and increase in exercises - Refill:  Atorvastatin    and educate compliance with medication and diet.            Type 2 diabetes mellitus without complication, without long-term current use of insulin (Multi)  DM - NIDDM  . Reviewed with patient / Will check  HbA1c and fasting blood sugars. Educate home self monitoring and diary keeping(reviewed with patient home blood sugar levels /diary). Educate extensively low calorie diet and weight loss with exercise. Reviewed BS- diary and Rx. Regimen. [place] renal protection with ARB/ACEI.               Educate compliance with diet and Rx. And educate risks and autcomes.     Refill Metformin 500 mg BID with meals           GERD without esophagitis  GERD - Acid reflux disease. Rx. PPI (Prilosec/Prevacid/Protonix/Nexium) and educate diet and life style changes. Referred patient to an endoscopy (EGD) and check H. Pylori titers.          BPH with obstruction/lower urinary tract symptoms  High PSA and follows with urology              Chronic renal impairment, stage 2 (mild)  CRI - Chronic Renal Insuficiency. Secondary to DM/HTN/Atherosclerosis. Follow BUN/Cr. and lytes.   RENOPROTECTION with ACEI/ARB (). Monitor microalbuminuria. Avoid hypotension and renal hypoperfusion. The patient was instructed to avoid NSAIDS and educate compliance with medications to control HTN(hypertension) and cholesterol and diabetes.                                     IgG multiple myeloma (Multi)  Monitor CBC and blood count and follow with  hematology and stable now on Chemotherapy           Medicare annual wellness visit, subsequent  No recent hospitalizations.     All medications reviewed and reconciled by me the provider..  No use of controlled substances or opiates.     Family history, social history reviewed, no changes.     Patient does not smoke.     Patient does not drink.     Patient hydrates adequately daily.  Eats a well-balanced healthy diet.      Exercises adequately including walking and doing weightbearing exercises.     Patient denies any difficulty with memory or cognition.      Denies any difficulty with hearing.  Patient does not wear hearing aids.     No fall risk.  No recent falls.  Denies any difficulty walking.     Patient with no history of depression anxiety, denies any loss of interest, no feeling of sadness, no lack of motivation.     Patient is independent in all ADLs and IADLs.  Independent bathing, dressing, walking.  Takes care of own finances, shopping and cooking.      End-of-life decision-making power of  reviewed with patient.      Risk Factors Identified During Visit: None.   Influenza: influenza vaccine was previously given.   Pneumovax 23: Pneumovax 23 vaccine was previously given.   Prevnar 13: Prevnar 13 vaccine was previously given.   Shingles Vaccine: Shingles vaccine was previously given.   Prostate cancer screening: Screening is current.   Colorectal Cancer Screening: screening is current.   Abdominal Aortic Aneurysm screening: screening is current.   HIV screening: screening not indicated.        Preventive measures - Recommend ASAP :Specialist. Colonoscopy (educate patient risks of colon cancer) refer patient to GI specialist. Ophthalmology and retina exam recommend yearly exams refer patient to an Ophthalmologist. BPH - (Benign Prostatic Hypertrophy) refer patient to an Urologist for rectal exam and PSA check.          Pancytopenia         Type 2 diabetes mellitus with diabetic nephropathy, without  long-term current use of insulin         Beta thalassemia (Multi)         Stage 3a chronic kidney disease (Multi)         Controlled drug dependence (Multi)    Orders:    Opiate/Opioid/Benzo Prescription Compliance; Future           Cardiac Risk Assessment  20 - 25 minutes were spent discussing Cardiovascular risk and, if needed, lifestyle modifications were recommended, including nutritional choices, exercise, and elimination of habits contributing to risk.   Aspirin use/disuse was discussed following the guidelines below:  low dose ASA ( mg) should be considered:    If prior Heart Attack/Stroke/Peripheral vascular disease:  Generally recommend daily low dose aspirin unless extremely high bleeding risk (e.g., gastrointestinal).    If no prior Heart Attack/Stroke/Peripheral vascular disease:              Age over 70: Do not use Aspirin for prevention    Age less than 70 and 10-year cardiovascular disease risk is >20%: use low dose Aspirin for prevention.               Atrial fibrillation with RVR (Multi) - Primary I48.91        Atrial fibrillation - persistent with  Ventricular rate is well controlled (in sinus rhythm). ON ELIQUIS  No Shortness of breath. Continues  Eliquis 5mg BID  Metoprolol to 50 mg BID             Benign hypertension I10       HTN - hypertension well/controlled .Target BP < 130/80  achieved. Educate low salt diet and exercise with weight loss. Educate home self monitoring and diary keeping. Educate risks of elevate blood pressure and benefits of prompt treatment.  Refill Amlodipine and Metoprolol            Diabetes mellitus (Multi) E11.9       DM - NIDDM  . Reviewed with patient / Will check  HbA1c and fasting blood sugars. Educate home self monitoring and diary keeping(reviewed with patient home blood sugar levels /diary). Educate extensively low calorie diet and weight loss with exercise. Reviewed BS- diary and Rx. Regimen. [place] renal protection with ARB/ACEI.               Educate  compliance with diet and Rx. And educate risks and autcomes.     Refill Metformin 500 mg BID with meals                    Fatigue R53.83       Fatigue - check CMP(metabolic panel and elctrolytes) , CBC(complete blood cell count), TSH(thyroid function). Insomnia may play a role and sleep studies(rule out sleep apnea) are recommended . Educate sleep hygiene. Consider anxiety disorder vs. depression. Consider Stress test, and 2DECHO.             Hypercholesterolemia E78.00       Hypercholesterolemia - Monitor lipid profile and educate patient upon risks of high cholesterol and targets. Educate diet and change in lifestyle and increase in exercises - Refill:  Atorvastatin    and educate compliance with medication and diet.              IgG multiple myeloma (Multi) C90.00       Monitor CBC and blood count and follow with hematology and stable now on Chemotherapy  tolerated well            Stage 3a chronic kidney disease (Multi) N18.31       CRI - Chronic Renal Insuficiency. Secondary to DM/HTN/Atherosclerosis. Follow BUN/Cr. and lytes.   RENOPROTECTION with ACEI/ARB (). Monitor microalbuminuria. Avoid hypotension and renal hypoperfusion. The patient was instructed to avoid NSAIDS and educate compliance with medications to control HTN(hypertension) and cholesterol and diabetes.                                       Type 2 diabetes mellitus with diabetic nephropathy, without long-term current use of insulin E11.21               Pancytopenia D61.818         Monitor CBC and on Chemotherapy now feels very tired post chemo yesterday            Atrial fibrillation with RVR (Multi) I48.91       Atrial fibrillation - persistent with  Ventricular rate is well controlled (in sinus rhythm). ON ELIQUIS  No Shortness of breath. Continues  Eliquis 5mg BID  Metoprolol to 50 mg BID             Arthritis of hip M16.10     Relevant Medications     traMADol (Ultram) 50 mg tablet     Gout of right knee M10.9     Relevant Medications      allopurinol (Zyloprim) 300 mg tablet     colchicine 0.6 mg tablet     Megaloblastic anemia D53.1       Anemia - of bleed/chronic disease. Check CBC , B12 and Folic Acid levels, Iron/Ferritn/TIBC, refer patient to endoscopy (upper/ lower). Target HCT > 30 %. Check guaic stool            Multiple myeloma, stage 1 (Multi) C90.00       Stable and follows with oncology Haematology and monitor CBC blood count                 Type 2 diabetes mellitus without complications (Multi) E11.9       DM - NIDDM . Reviewed with patient / Will check HbA1c and fasting blood sugars. Educate home self monitoring and diary keeping(reviewed with patient home blood sugar levels /diary). Educate extensively low calorie diet and weight loss with exercise. Reviewed BS- diary and Rx. Regimen. [place] renal protection with ARB/ACEI. Educate compliance with diet and Rx. And educate risks and autcomes. Refill Metformin 500 mg BID with meals              Z00.00                         Atrial fibrillation with RVR (Multi) - Primary I48.91         Atrial fibrillation - persistent with  Ventricular rate is well controlled (in sinus rhythm). ON ELIQUIS  No Shortness of breath. Continues  Eliquis 5mg BID  Metoprolol to 50 mg BID             Benign hypertension I10       HTN - hypertension well/controlled .Target BP < 130/80  achieved. Educate low salt diet and exercise with weight loss. Educate home self monitoring and diary keeping. Educate risks of elevate blood pressure and benefits of prompt treatment.  Refill Amlodipine and Metoprolol            BPH with obstruction/lower urinary tract symptoms N40.1, N13.8       High PSA and follows with urology                Chronic renal insufficiency N18.9       CRI - Chronic Renal Insuficiency. Secondary to DM/HTN/Atherosclerosis. Follow BUN/Cr. and lytes.   RENOPROTECTION with ACEI/ARB (). Monitor microalbuminuria. Avoid hypotension and renal hypoperfusion. The patient was instructed to avoid NSAIDS and  educate compliance with medications to control HTN(hypertension) and cholesterol and diabetes.                                       Diabetes mellitus (Multi) E11.9       DM - NIDDM  . Reviewed with patient / Will check  HbA1c and fasting blood sugars. Educate home self monitoring and diary keeping(reviewed with patient home blood sugar levels /diary). Educate extensively low calorie diet and weight loss with exercise. Reviewed BS- diary and Rx. Regimen. [place] renal protection with ARB/ACEI.               Educate compliance with diet and Rx. And educate risks and autcomes.     Refill Metformin 500 mg BID with meals                 GERD without esophagitis K21.9       GERD - Acid reflux disease. Rx. PPI (Prilosec/Prevacid/Protonix/Nexium) and educate diet and life style changes. Referred patient to an endoscopy (EGD) and check H. Pylori titers.            IgG multiple myeloma (Multi) C90.00       Monitor CBC and blood count and follow with hematology and stable now on Chemotherapy  tolerated well            Lumbar degenerative disc disease M51.36       DDD - Degenerative disc disease of the Lumbo-Sacral (LS)/  spine. Educate exercises and referred patient to Physical Therapy (PT). Ordered X-Ray's of the LS/spine. Consider MRI. Radiculopathy in the distribution of L4-L5-S1/nerve roots, needs NSAIDS (Naprosin 500mg BID vs. Arthrotec 75mg BID or Prednisone taper (Medrol dose pack), Flexeril 10 mg qHS, heat application, and pain control with Tylenol                Bilateral primary osteoarthritis of hip M16.0       Hip DJD advnced legt worse than the rigtht and awaiting THR and follows with Ortho            Beta thalassemia (Multi) D56.1                   Pancytopenia (Multi) D61.818         Monitor CBC and on Chemotherapy now feels very tired post chemo yesterday            Atrial fibrillation with RVR (Multi) - Primary I48.91       Atrial fibrillation - persistent with  Ventricular rate is well controlled (in sinus  rhythm). ON ELIQUIS  No Shortness of breath. Continues  Eliquis 5mg BID  Metoprolol to 50 mg BID                   Benign hypertension I10       HTN - hypertension well/controlled .Target BP < 130/80  achieved. Educate low salt diet and exercise with weight loss. Educate home self monitoring and diary keeping. Educate risks of elevate blood pressure and benefits of prompt treatment.  Refill Amlodipine and Metoprolol            Elevated PSA R97.20       Monitor PSA and follows with urology            GERD without esophagitis K21.9       GERD - Acid reflux disease. Rx. PPI (Prilosec/Prevacid/Protonix/Nexium) and educate diet and life style changes. Referred patient to an endoscopy (EGD) and check H. Pylori titers.            IgG multiple myeloma (Multi) C90.00       Monitor CBC and blood count and follow with hematology and stable now on Chemotherapy             Lumbar degenerative disc disease M51.36       DDD - Degenerative disc disease of the Lumbo-Sacral (LS)/  spine. Educate exercises and referred patient to Physical Therapy (PT). Ordered X-Ray's of the LS/spine. Consider MRI. Radiculopathy in the distribution of L4-L5-S1/nerve roots, needs NSAIDS (Naprosin 500mg BID vs. Arthrotec 75mg BID or Prednisone taper (Medrol dose pack), Flexeril 10 mg qHS, heat application, and pain control with Tylenol            Type 2 diabetes mellitus without complications (Multi) E11.9       DM - NIDDM  . Reviewed with patient / Will check  HbA1c and fasting blood sugars. Educate home self monitoring and diary keeping(reviewed with patient home blood sugar levels /diary). Educate extensively low calorie diet and weight loss with exercise. Reviewed BS- diary and Rx. Regimen. [place] renal protection with ARB/ACEI.               Educate compliance with diet and Rx. And educate risks and autcomes.  Refill Metformin 500 mg BID with meals                                Stage 3a chronic kidney disease (Multi) N18.31       CRI - Chronic  Renal Insuficiency. Secondary to DM/HTN/Atherosclerosis. Follow BUN/Cr. and lytes.   RENOPROTECTION with ACEI/ARB (). Monitor microalbuminuria. Avoid hypotension and renal hypoperfusion. The patient was instructed to avoid NSAIDS and educate compliance with medications to control HTN(hypertension) and cholesterol and diabetes.                                                       Atrial fibrillation with RVR (Multi) - Primary I48.91         Atrial fibrillation - persistent with  Ventricular rate is well controlled (in sinus rhythm). ON ELIQUIS  No Shortness of breath. Continues  Eliquis 5mg BID  Metoprolol to 50 mg BID                 Benign hypertension I10       HTN - hypertension well/controlled .Target BP < 130/80  achieved. Educate low salt diet and exercise with weight loss. Educate home self monitoring and diary keeping. Educate risks of elevate blood pressure and benefits of prompt treatment.  Refill Amlodipine and Metoprolol            Elevated PSA R97.20       BPH - Benign PROSTATIC Hypertrophy, + Dysuria/Nocturia, check PSA, prescribe Flomax 0.4mg qD and Avodart 0.5 mg qD vs. Finasteride 5 mg qD.  Educate exercises and Change in life style, prescribe vitamin E 400 IU qD. Consider referral to urology. And follows with urology            Fatigue R53.83       Fatigue - check CMP(metabolic panel and elctrolytes) , CBC(complete blood cell count), TSH(thyroid function). Insomnia may play a role and sleep studies(rule out sleep apnea) are recommended . Educate sleep hygiene. Consider anxiety disorder vs. depression. Consider Stress test, and 2DECHO.             Type 2 diabetes mellitus without complications (Multi) E11.9       DM - NIDDM  . Reviewed with patient / Will check  HbA1c and fasting blood sugars. Educate home self monitoring and diary keeping(reviewed with patient home blood sugar levels /diary). Educate extensively low calorie diet and weight loss with exercise. Reviewed BS- diary and Rx. Regimen.  [place] renal protection with ARB/ACEI.               Educate compliance with diet and Rx. And educate risks and autcomes.  Refill Metformin 500 mg BID with meals                                                                     Immunizations/Injections       very important  Immunizations from outside sources need reconciliation.       Flu vaccine, trivalent, preservative free, HIGH-DOSE, age 65y+ (Fluzone)12/11/2020, 10/1/2018  Influenza, Unspecified9/22/2010  Influenza, seasonal, [id number]/6/2021, 9/12/2016, 9/9/2015,  ... (3 more)  Pfizer COVID-19 vaccine, 12 years and older, (30mcg/0.3mL) (Comirnaty)10/16/2023  Pfizer COVID-19 vaccine, bivalent, age 12 years and older (30 mcg/0.3 mL)3/17/2023  Pneumococcal conjugate vaccine, 13-valent (PREVNAR 13)6/23/2015  Pneumococcal conjugate vaccine, 20-valent (PREVNAR 20)1/12/2024  Pneumococcal polysaccharide vaccine, 23-valent, age 2 years and older (PNEUMOVAX 23)4/11/2018  Tdap vaccine, age 7 year and older (BOOSTRIX, ADACEL)6/23/2015  Zoster vaccine, recombinant, adult (SHINGRIX)8/16/2021, 6/16/2021               Immunizations/Injections       very important  Immunizations from outside sources need reconciliation.       Flu vaccine, trivalent, preservative free, HIGH-DOSE, age 65y+ (Fluzone)12/11/2020, 10/1/2018  Influenza, Unspecified9/22/2010  Influenza, seasonal, fjvengcvrm99/6/2021, 9/12/2016, 9/9/2015,  ... (3 more)  Pfizer COVID-19 vaccine, 12 years and older, (30mcg/0.3mL) (Comirnaty)9/25/2024, 10/16/2023  Pfizer COVID-19 vaccine, bivalent, age 12 years and older (30 mcg/0.3 mL)3/17/2023  Pneumococcal conjugate vaccine, 13-valent (PREVNAR 13)6/23/2015  Pneumococcal conjugate vaccine, 20-valent (PREVNAR 20)1/12/2024  Pneumococcal polysaccharide vaccine, 23-valent, age 2 years and older (PNEUMOVAX 23)4/11/2018  Tdap vaccine, age 7 year and older (BOOSTRIX, ADACEL)6/23/2015  Zoster vaccine, recombinant, adult (SHINGRIX)8/16/2021, 6/16/2021                 Immunizations/Injections       very important  Immunizations from outside sources need reconciliation.       Flu vaccine, trivalent, preservative free, HIGH-DOSE, age 65y+ (Fluzone)12/11/2020, 10/1/2018  Influenza, Unspecified9/22/2010  Influenza, seasonal, mzfbnfirbq85/6/2021, 9/12/2016, 9/9/2015,  ... (3 more)  Pfizer COVID-19 vaccine, 12 years and older, (30mcg/0.3mL) (Comirnaty)9/25/2024, 10/16/2023  Pfizer COVID-19 vaccine, bivalent, age 12 years and older (30 mcg/0.3 mL)3/17/2023  Pneumococcal conjugate vaccine, 13-valent (PREVNAR 13)6/23/2015  Pneumococcal conjugate vaccine, 20-valent (PREVNAR 20)1/12/2024  Pneumococcal polysaccharide vaccine, 23-valent, age 2 years and older (PNEUMOVAX 23)4/11/2018  Tdap vaccine, age 7 year and older (BOOSTRIX, ADACEL)6/23/2015  Zoster vaccine, recombinant, adult (SHINGRIX)8/16/2021, 6/16/2021              Immunizations/Injections      very important  Immunizations from outside sources need reconciliation.      Flu vaccine, trivalent, preservative free, HIGH-DOSE, age 65y+ (Fluzone)12/11/2020, 10/1/2018  Influenza, Unspecified9/22/2010  Influenza, seasonal, sixhksuitz11/6/2021, 9/12/2016, 9/9/2015,  ... (3 more)  Pfizer COVID-19 vaccine, 12 years and older, (30mcg/0.3mL) (Comirnaty)9/25/2024, 10/16/2023  Pfizer COVID-19 vaccine, bivalent, age 12 years and older (30 mcg/0.3 mL)3/17/2023  Pneumococcal conjugate vaccine, 13-valent (PREVNAR 13)6/23/2015  Pneumococcal conjugate vaccine, 20-valent (PREVNAR 20)1/12/2024  Pneumococcal polysaccharide vaccine, 23-valent, age 2 years and older (PNEUMOVAX 23)4/11/2018  Tdap vaccine, age 7 year and older (BOOSTRIX, ADACEL)6/23/2015  Zoster vaccine, recombinant, adult (SHINGRIX)8/16/2021, 6/16/2021

## 2025-01-22 NOTE — ASSESSMENT & PLAN NOTE
Orders:    traMADol (Ultram) 50 mg tablet; Take 1 tablet (50 mg) by mouth every 8 hours if needed for severe pain (7 - 10) or moderate pain (4 - 6).    Opiate/Opioid/Benzo Prescription Compliance; Future

## 2025-01-28 ENCOUNTER — TELEPHONE (OUTPATIENT)
Dept: PHARMACY | Facility: HOSPITAL | Age: 75
End: 2025-01-28
Payer: MEDICARE

## 2025-01-28 NOTE — TELEPHONE ENCOUNTER
Population Health: Outreach by Ambulatory Pharmacy Team    Patient: Toby Ugalde  Primary Care Provider (PCP): Antoine Cano MD  Payor: [facility]  Reason: Adherence  Medication(s): atorvastatin 40mg  Outcome: Left Voicemail    Felicia Mora [facility]   Pharmacy Resident

## 2025-01-28 NOTE — TELEPHONE ENCOUNTER
I reviewed the progress note and agree with the resident’s findings and plans as written. Case discussed with resident.    Janelle Bell, PharmD

## 2025-02-02 DIAGNOSIS — I10 ESSENTIAL (PRIMARY) HYPERTENSION: Primary | ICD-10-CM

## 2025-02-03 RX ORDER — AMLODIPINE BESYLATE 5 MG/1
5 TABLET ORAL DAILY
Qty: 90 TABLET | Refills: 3 | Status: SHIPPED | OUTPATIENT
Start: 2025-02-03 | End: 2026-02-03

## 2025-02-09 ASSESSMENT — ENCOUNTER SYMPTOMS
NUMBNESS: 1
BLOOD IN STOOL: 0
FEVER: 0
DIAPHORESIS: 1
NAUSEA: 0
APPETITE CHANGE: 0
VOMITING: 0
FATIGUE: 1
SHORTNESS OF BREATH: 1
ARTHRALGIAS: 1
HEMATOLOGIC/LYMPHATIC NEGATIVE: 1
DIARRHEA: 1
CONSTIPATION: 1
UNEXPECTED WEIGHT CHANGE: 0
CARDIOVASCULAR NEGATIVE: 1
CHILLS: 0

## 2025-02-10 DIAGNOSIS — C90.00 IGG MULTIPLE MYELOMA (MULTI): Primary | ICD-10-CM

## 2025-02-10 DIAGNOSIS — M10.9 GOUT OF RIGHT KNEE, UNSPECIFIED CAUSE, UNSPECIFIED CHRONICITY: ICD-10-CM

## 2025-02-10 RX ORDER — DEXAMETHASONE 4 MG/1
20 TABLET ORAL ONCE
OUTPATIENT
Start: 2025-03-11

## 2025-02-10 RX ORDER — DIPHENHYDRAMINE HYDROCHLORIDE 50 MG/ML
50 INJECTION INTRAMUSCULAR; INTRAVENOUS AS NEEDED
OUTPATIENT
Start: 2025-03-11

## 2025-02-10 RX ORDER — ACETAMINOPHEN 325 MG/1
650 TABLET ORAL ONCE
OUTPATIENT
Start: 2025-03-11

## 2025-02-10 RX ORDER — ALBUTEROL SULFATE 0.83 MG/ML
3 SOLUTION RESPIRATORY (INHALATION) AS NEEDED
Status: CANCELLED | OUTPATIENT
Start: 2025-02-11

## 2025-02-10 RX ORDER — EPINEPHRINE 0.3 MG/.3ML
0.3 INJECTION SUBCUTANEOUS EVERY 5 MIN PRN
Status: CANCELLED | OUTPATIENT
Start: 2025-02-11

## 2025-02-10 RX ORDER — DIPHENHYDRAMINE HCL 50 MG
50 CAPSULE ORAL ONCE
OUTPATIENT
Start: 2025-03-11

## 2025-02-10 RX ORDER — ALBUTEROL SULFATE 0.83 MG/ML
3 SOLUTION RESPIRATORY (INHALATION) AS NEEDED
OUTPATIENT
Start: 2025-03-11

## 2025-02-10 RX ORDER — DIPHENHYDRAMINE HYDROCHLORIDE 50 MG/ML
50 INJECTION INTRAMUSCULAR; INTRAVENOUS AS NEEDED
Status: CANCELLED | OUTPATIENT
Start: 2025-02-11

## 2025-02-10 RX ORDER — DIPHENHYDRAMINE HCL 50 MG
50 CAPSULE ORAL ONCE
Status: CANCELLED | OUTPATIENT
Start: 2025-02-11

## 2025-02-10 RX ORDER — ALLOPURINOL 300 MG/1
300 TABLET ORAL DAILY
Qty: 90 TABLET | Refills: 0 | Status: SHIPPED | OUTPATIENT
Start: 2025-02-10

## 2025-02-10 RX ORDER — EPINEPHRINE 0.3 MG/.3ML
0.3 INJECTION SUBCUTANEOUS EVERY 5 MIN PRN
OUTPATIENT
Start: 2025-03-11

## 2025-02-10 RX ORDER — FAMOTIDINE 10 MG/ML
20 INJECTION, SOLUTION INTRAVENOUS ONCE AS NEEDED
OUTPATIENT
Start: 2025-03-11

## 2025-02-10 RX ORDER — ACETAMINOPHEN 325 MG/1
650 TABLET ORAL ONCE
Status: CANCELLED | OUTPATIENT
Start: 2025-02-11

## 2025-02-10 RX ORDER — MONTELUKAST SODIUM 10 MG/1
10 TABLET ORAL ONCE
Status: CANCELLED | OUTPATIENT
Start: 2025-02-11

## 2025-02-10 RX ORDER — MONTELUKAST SODIUM 10 MG/1
10 TABLET ORAL ONCE
OUTPATIENT
Start: 2025-03-11

## 2025-02-10 RX ORDER — FAMOTIDINE 10 MG/ML
20 INJECTION, SOLUTION INTRAVENOUS ONCE AS NEEDED
Status: CANCELLED | OUTPATIENT
Start: 2025-02-11

## 2025-02-10 RX ORDER — DEXAMETHASONE 4 MG/1
20 TABLET ORAL ONCE
Status: CANCELLED | OUTPATIENT
Start: 2025-02-11

## 2025-02-10 ASSESSMENT — ENCOUNTER SYMPTOMS
DIARRHEA: 1
UNEXPECTED WEIGHT CHANGE: 0
WOUND: 0
VOMITING: 0
NUMBNESS: 1
DIAPHORESIS: 1
APPETITE CHANGE: 0
ARTHRALGIAS: 1
FEVER: 0
CHILLS: 0
BLOOD IN STOOL: 0
SHORTNESS OF BREATH: 1
HEMATOLOGIC/LYMPHATIC NEGATIVE: 1
CONSTIPATION: 1
CARDIOVASCULAR NEGATIVE: 1
NAUSEA: 0
FATIGUE: 1

## 2025-02-10 NOTE — PROGRESS NOTES
Patient ID: Toby Ugalde is a 74 y.o. male.    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 3/6/17- bone survey was negative for lytic lesions.  3/15/17-  bone marrow revealed 30% plasma cells with kappa predominance. 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 Cano for complaints of fatigue.  Labs notable for hyponatremia to 127, anemia with a HGB of 9.  Cr 1.99. Protein elevated to  10.7.  Uric acid up to 8.9.      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  Skeletal survey (3/2/22) - lesion in the left rib.   PET:    Expansile hypermetabolic lytic mass involving the left lateral 9th  rib, correlating to suspected plasmacytoma described on osseous  survey performed 03/02/2022.  2. Diffusely increased FDG uptake throughout the bone marrow which  may relate to anemia/marrow activation, with marrow involvement by  myeloma not excluded.  Treatment:   Initiated (3/29/22) treatment for his relapsed Myeloma with Revlimid 10 mg by mouth days 1-14/21 days, Daratuzumab (3/29, & 4/5/22), SC Velcade (3/29 & 4/5/22), & weekly Dexamethasone complicated by several hospitaliizations for syncope, PE ,afib due to  velcade and revlimid.  Velcade discontinued     Recv'd weekly single agent Daraumumab from 5/31 - 7/19/22. Initiated (8/25/22) Revlimid 10 mg on days 1-21/28 days.     Has had marked reduction in paraprotein and free lyte chains  with M protein : 1.4 g/dl on 4/19/22 ,    [place] free lyte/ratio of 4.49 mg/dl/4.45.    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         IgG multiple myeloma (Multi)   9/6/2023 Initial Diagnosis    IgG multiple myeloma (CMS/HCC)     10/17/2023 -  Chemotherapy    Daratumumab, 28 Day Cycles - Maintenance       Response:     Past Medical History:     Past Medical History:   Diagnosis Date    Chronic gout, unspecified, without tophus (tophi) 12/11/2024    Dental caries on pit and fissure surface penetrating into dentin 01/12/2024    Deposits (accretions) on teeth 01/12/2024    Essential (primary) hypertension 11/20/2013    Benign essential hypertension    Gout, unspecified 12/11/2024    History of hypercholesterolemia 01/12/2024    History of hypertension 01/12/2024    History of malignant neoplasm of prostate 01/12/2024    Impacted cerumen 01/12/2024    Impacted cerumen, bilateral 02/16/2024    Infective pharyngitis 01/12/2024    Other intervertebral disc degeneration, lumbar region     Lumbar degenerative disc disease    Overweight with body mass index (BMI) 25.0-29.9 02/02/2022    Partial loss of teeth due to caries, class I 01/12/2024    Personal history of malignant neoplasm of prostate     History of malignant neoplasm of prostate    Personal history of other diseases of the circulatory system     History of hypertension    Personal history of other diseases of the circulatory system 06/29/2022    History of atrial tachycardia    Personal history of other diseases of the circulatory system     History of essential hypertension    Personal history of other diseases of the musculoskeletal system and connective tissue     History of arthritis    Personal history of other diseases of the musculoskeletal system and connective tissue     History of acute  gouty arthritis    Personal history of other diseases of the musculoskeletal system and connective tissue 01/30/2019    History of left foot drop    Personal history of other diseases of the respiratory system     History of bronchitis    Personal history of other endocrine, nutritional and metabolic disease     History of hypercholesterolemia    Personal history of other endocrine, nutritional and metabolic disease     History of diabetes mellitus    Personal history of other endocrine, nutritional and metabolic disease 01/17/2014    History of diabetes mellitus    Post-traumatic stress disorder, chronic 11/10/2023    Primary localized osteoarthritis of pelvic region and thigh 12/17/2018    Pure hypercholesterolemia, unspecified 11/20/2013    Low-density-lipoid-type (LDL) hyperlipoproteinemia    Snoring     Snoring     Surgical History:     Past Surgical History:   Procedure Laterality Date    BACK SURGERY  11/20/2013    Back Surgery    TONSILLECTOMY  11/20/2013    Tonsillectomy    TOTAL KNEE ARTHROPLASTY  11/20/2013    Knee Replacement      Family History:     Family History   Problem Relation Name Age of Onset    Heart attack Mother      Diabetes Brother      Stroke Maternal Grandfather       Social History:  Reported history of agent orange exposure while in [employer] at [employer]. Retired  and post .  from his wife who is health care  power of .  Two sons and 2 grandchildren (1)      Social History     Tobacco Use    Smoking status: Never     Passive exposure: Never    Smokeless tobacco: Never   Substance Use Topics    Alcohol use: Yes     Comment: occasional    Drug use: Never      -------------------------------------------------------------------------------------------------------  Subjective       HPI    KANDI ROSE is a 74 year old Male h/o pAfib, T2DM, HTN, gout, IgG kappa MM.  Still not clear whether patient has ONJ.  Following with dentist at the [facility].   Patient states he had a bone density 11/23 which does not show osteopenia.    Toby presents to the clinic today (2/11/25) unaccompanied for follow up evaulation of his multiple myeloma, blood work, and is scheduled to receive C29 of monthly subcutaneous daratumumab.  Remains on lenalidomide 5 mg daily on days 1-21 of 28 day cycle.  Today, he is on around day 16 of his 28 day cycle of lenalidomide.  [facility]    No changes since last visit.  Energy level is medium range.  Uses cane for ambulation.  Continues to have night sweats every 1-2 weeks. Appetite is okay, depends on the day.  Can get full quickly some days.  Weight is stable.  Continues to have shortness of breath on exertion.  Bowels chronically rotate between diarrhea and constipation.  Takes prn imodium and stool softener.  Chronic left hip pain, using tramadol and tylenol.  Following with orthopedics, saw about 1 week ago.  Plans to scheduled another cortisol injection left hip with previously plan to eventually have left hip replacement.  Numbness and tingling to bilateral feet and hands, doesn't happen as often as it was.  Was recently scheduled for cardioversion but when he went for procedure was no longer in afib, so cardioversion was cancelled.  Feels like he may be going in and out of afib.  Following with cardiology.  Continues to take apixaban 5 mg BID.  Glucose levels running 130s, can run higher after getting steroids with treatment.    Review of Systems   Constitutional:  Positive for diaphoresis (occassional night sweats about once per week to 2 weeks, can be drenching) and fatigue. Negative for appetite change, chills, fever and unexpected weight change.   Respiratory:  Positive for shortness of breath (on exertion).    Cardiovascular: Negative.    Gastrointestinal:  Positive for constipation and diarrhea. Negative for blood in stool, nausea and vomiting.   Genitourinary: Negative.      Musculoskeletal:  Positive for arthralgias. Negative for gait problem (uses cane).   Skin: Negative.    Neurological:  Positive for numbness. Negative for gait problem (uses cane).   Hematological: Negative.    All other systems reviewed and are negative.  -------------------------------------------------------------------------------------------------------  Objective   BSA: 2.1 meters squared  /74 (BP Location: Right arm, Patient Position: Sitting, BP Cuff Size: Adult)   Pulse 76   Temp 35.7 °C (96.3 °F) (Core)   Resp 18   Wt 89.4 kg (197 lb)   SpO2 97%   BMI 28.27 kg/m²     Physical Exam  Vitals reviewed.   Constitutional:       Appearance: Normal appearance.   HENT:      Head: Normocephalic and atraumatic.      Mouth/Throat:      Mouth: Mucous membranes are moist.   Eyes:      Extraocular Movements: Extraocular movements intact.      Conjunctiva/sclera: Conjunctivae normal.      Pupils: Pupils are equal, round, and reactive to light.   Cardiovascular:      Rate and Rhythm: Normal rate and regular rhythm.      Pulses: Normal pulses.      Heart sounds: Normal heart sounds.   Pulmonary:      Effort: Pulmonary effort is normal.      Breath sounds: Normal breath sounds.   Abdominal:      General: Abdomen is flat. Bowel sounds are normal.      Palpations: Abdomen is soft. There is no mass.      Tenderness: There is no abdominal tenderness.   Musculoskeletal:         General: No swelling. Normal range of motion.   Lymphadenopathy:      Comments: No lymphadenopathy   Skin:     General: Skin is warm and dry.   Neurological:      General: No focal deficit present.      Mental Status: He is alert and oriented to person, place, and time.   Psychiatric:         Mood and Affect: Mood normal.         Behavior: Behavior normal.         Thought Content: Thought content normal.         Judgment: Judgment normal.     Performance Status:  Symptomatic; fully  ambulatory  -------------------------------------------------------------------------------------------------------  Assessment/Plan      73 yo M h/o IgG kappa MM s/p Rd [id number] best response ID stopped d/t dental procedure, presented on 3/2022 for worsening anemia/Cr d/t relapsed MM, and likely LT 9th rib plasmacytoma.      Restaging 3/2022 shows high burden disease with 90% tumorous involvement of the bone marrow ,   Initiated (3/29/22) treatment for his relapsed Myeloma with Revlimid 10 mg by mouth days 1-14/21 days, Daratuzumab (3/29, & 4/5/22), SC Velcade (3/29 & 4/5/22), & weekly Dexamethasone. complicated by several hospilizations for syncope, PE ,afib due to velcade and revlimid.    Recv'd weekly single agent Daraumumab from 5/31 - 7/19/22. Initiated (8/25/22) Revlimid 10 mg on days 1-21/28 days.     Will continue monthly casey, rev, & dex until evidence of disease progression or unacceptable toxicity.    2/11/25:  CBC results from today are stable.  Kappa and lambda free light chains stable from 1/14/25, level in process from today.  M-protein stable at 0.1 (1/14/25), level in process from today.  No concerning findings on physical examination.  Scheduled to receive C29 daratumumab today.  Continue revlimid 5 mg daily on days 1-21 out of 28 day cycle.  Follow up visit in 2 months.    Ig [place] Free Light Chain   Date Value Ref Range Status   01/14/2025 2.79 (H) 0.33 - 1.94 mg/dL Final   12/17/2024 2.37 (H) 0.33 - 1.94 mg/dL Final   11/19/2024 1.82 0.33 - 1.94 mg/dL Final   10/22/2024 2.36 (H) 0.33 - 1.94 mg/dL Final   09/17/2024 2.18 (H) 0.33 - 1.94 mg/dL Final     Ig Lambda Free Light Chain   Date Value Ref Range Status   01/14/2025 0.85 0.57 - 2.63 mg/dL Final   12/17/2024 0.91 0.57 - 2.63 mg/dL Final   11/19/2024 0.75 0.57 - 2.63 mg/dL Final   10/22/2024 1.09 0.57 - 2.63 mg/dL Final   09/17/2024 0.91 0.57 - 2.63 mg/dL Final     Kappa/Lambda Ratio   Date Value Ref Range Status   01/14/2025 3.28 (H) 0.26 -  1.65 Final   12/17/2024 2.60 (H) 0.26 - 1.65 Final   11/19/2024 2.43 (H) 0.26 - 1.65 Final   10/22/2024 2.17 (H) 0.26 - 1.65 Final   09/17/2024 2.40 (H) 0.26 - 1.65 Final     M-PROTEIN 1   Date Value Ref Range Status   01/14/2025 0.1 (H)   g/dL Final   12/17/2024 0.1 (H)   g/dL Final   11/19/2024 0.1 (H)   g/dL Final   10/22/2024 0.1 (H)   g/dL Final   09/17/2024 0.1 (H)   g/dL Final      #Atrial fib on chronic anticoagulation  on Eliquis 5 mg by mouth twice a day.  Patient confirmed on 50 mg by mouth twice a day of metoprolol.  Follows with Dr. Moon and Dr. Garcia.  Recently (10/22/24) has been feeling more SOB - advised to reach out to cardiology  12/17/24:  Cardioversion planned for 11/14/24 but was cancelled as Toby was back in NSR.  Following with Dr. Garcia and Dr. Moon.  Currently on eliquis 5 mg BID and metoprolol 100 mg BID.    PE (4/9/22) - Small occlusive embolic thrombus within the right lower lobe pulmonary artery branch, medially and posteriorly.  Cont Eliquis 5 mg twice a day.     Bone health:  Xgeva held due to concerns of ONJ and prolonged treatment in the past.  Bone density December 2023 was normal.  Hold for now.      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  Pt had his tooth pulled in 2019, however with prolonged bone exposure/infection, eventually underwent OMSF surgery in 10/2021. Unclear if he has denosumab assoc jaw necrosis. Patient finally had his bad tooth (L upper molar) removed on 5/25/23. [facility]  2/11/25:  Has dentures now.  Planning to have root canal fixed soon.      Healthcare Maintenance:  Received shingrix 6/16/21 and 8/16/21.  Received pneumococcal conjugate 1/12/24, updated covid and influenza vaccines 9/25/24, and RSV vaccine 9/30/24.    Social Work:  4/30/24: Requested to have DWIGHT Dominguez OSW-C contact patient regarding his medical bills.   8/20/24:  Filled out  applications for grants for [email or web address] and [email or web address].  Reports he does not need assistance for medical paperwork or bills today.    2/11/25:   Reports he does not need assistance with social work today.     RTC:   3/11/25:  Infusion appt for C30 casey  4/8/25:  Follow up visit with provider and C31 casey.  -------------------------------------------------------------------------------------------------------  ESPINOZA Novak

## 2025-02-11 ENCOUNTER — INFUSION (OUTPATIENT)
Dept: HEMATOLOGY/ONCOLOGY | Facility: CLINIC | Age: 75
End: 2025-02-11
Payer: MEDICARE

## 2025-02-11 ENCOUNTER — LAB (OUTPATIENT)
Dept: LAB | Facility: CLINIC | Age: 75
End: 2025-02-11
Payer: MEDICARE

## 2025-02-11 ENCOUNTER — APPOINTMENT (OUTPATIENT)
Dept: HEMATOLOGY/ONCOLOGY | Facility: CLINIC | Age: 75
End: 2025-02-11
Payer: MEDICARE

## 2025-02-11 ENCOUNTER — OFFICE VISIT (OUTPATIENT)
Dept: HEMATOLOGY/ONCOLOGY | Facility: CLINIC | Age: 75
End: 2025-02-11
Payer: MEDICARE

## 2025-02-11 VITALS
SYSTOLIC BLOOD PRESSURE: 128 MMHG | OXYGEN SATURATION: 97 % | DIASTOLIC BLOOD PRESSURE: 74 MMHG | TEMPERATURE: 96.3 F | HEART RATE: 76 BPM | BODY MASS INDEX: 28.27 KG/M2 | WEIGHT: 197 LBS | RESPIRATION RATE: 18 BRPM

## 2025-02-11 DIAGNOSIS — C90.00 IGG MULTIPLE MYELOMA (MULTI): ICD-10-CM

## 2025-02-11 DIAGNOSIS — I48.0 PAROXYSMAL ATRIAL FIBRILLATION (MULTI): ICD-10-CM

## 2025-02-11 DIAGNOSIS — C90.00 IGG MULTIPLE MYELOMA (MULTI): Primary | ICD-10-CM

## 2025-02-11 DIAGNOSIS — Z86.711 HISTORY OF PULMONARY EMBOLISM: ICD-10-CM

## 2025-02-11 LAB
ALBUMIN SERPL BCP-MCNC: 4.2 G/DL (ref 3.4–5)
ALP SERPL-CCNC: 64 U/L (ref 33–136)
ALT SERPL W P-5'-P-CCNC: 16 U/L (ref 10–52)
ANION GAP SERPL CALC-SCNC: 17 MMOL/L (ref 10–20)
AST SERPL W P-5'-P-CCNC: 11 U/L (ref 9–39)
BASOPHILS # BLD AUTO: 0.01 X10*3/UL (ref 0–0.1)
BASOPHILS NFR BLD AUTO: 0.2 %
BILIRUB SERPL-MCNC: 0.7 MG/DL (ref 0–1.2)
BUN SERPL-MCNC: 17 MG/DL (ref 6–23)
CALCIUM SERPL-MCNC: 8.5 MG/DL (ref 8.6–10.6)
CHLORIDE SERPL-SCNC: 103 MMOL/L (ref 98–107)
CO2 SERPL-SCNC: 23 MMOL/L (ref 21–32)
CREAT SERPL-MCNC: 1.49 MG/DL (ref 0.5–1.3)
EGFRCR SERPLBLD CKD-EPI 2021: 48 ML/MIN/1.73M*2
EOSINOPHIL # BLD AUTO: 0.09 X10*3/UL (ref 0–0.4)
EOSINOPHIL NFR BLD AUTO: 1.7 %
ERYTHROCYTE [DISTWIDTH] IN BLOOD BY AUTOMATED COUNT: 13.6 % (ref 11.5–14.5)
GLUCOSE SERPL-MCNC: 176 MG/DL (ref 74–99)
HCT VFR BLD AUTO: 36.7 % (ref 41–52)
HGB BLD-MCNC: 12.3 G/DL (ref 13.5–17.5)
IGA SERPL-MCNC: 51 MG/DL (ref 70–400)
IGG SERPL-MCNC: 636 MG/DL (ref 700–1600)
IGM SERPL-MCNC: 8 MG/DL (ref 40–230)
IMM GRANULOCYTES # BLD AUTO: 0.02 X10*3/UL (ref 0–0.5)
IMM GRANULOCYTES NFR BLD AUTO: 0.4 % (ref 0–0.9)
LYMPHOCYTES # BLD AUTO: 1.46 X10*3/UL (ref 0.8–3)
LYMPHOCYTES NFR BLD AUTO: 26.8 %
MCH RBC QN AUTO: 30.4 PG (ref 26–34)
MCHC RBC AUTO-ENTMCNC: 33.5 G/DL (ref 32–36)
MCV RBC AUTO: 91 FL (ref 80–100)
MONOCYTES # BLD AUTO: 0.58 X10*3/UL (ref 0.05–0.8)
MONOCYTES NFR BLD AUTO: 10.6 %
NEUTROPHILS # BLD AUTO: 3.29 X10*3/UL (ref 1.6–5.5)
NEUTROPHILS NFR BLD AUTO: 60.3 %
NRBC BLD-RTO: ABNORMAL /100{WBCS}
PLATELET # BLD AUTO: 153 X10*3/UL (ref 150–450)
POTASSIUM SERPL-SCNC: 3.6 MMOL/L (ref 3.5–5.3)
PROT SERPL-MCNC: 6.4 G/DL (ref 6.4–8.2)
PROT SERPL-MCNC: 6.4 G/DL (ref 6.4–8.2)
RBC # BLD AUTO: 4.05 X10*6/UL (ref 4.5–5.9)
SODIUM SERPL-SCNC: 139 MMOL/L (ref 136–145)
WBC # BLD AUTO: 5.5 X10*3/UL (ref 4.4–11.3)

## 2025-02-11 PROCEDURE — 83521 IG LIGHT CHAINS FREE EACH: CPT

## 2025-02-11 PROCEDURE — 36415 COLL VENOUS BLD VENIPUNCTURE: CPT

## 2025-02-11 PROCEDURE — 84155 ASSAY OF PROTEIN SERUM: CPT

## 2025-02-11 PROCEDURE — 3078F DIAST BP <80 MM HG: CPT

## 2025-02-11 PROCEDURE — 2500000004 HC RX 250 GENERAL PHARMACY W/ HCPCS (ALT 636 FOR OP/ED): Performed by: INTERNAL MEDICINE

## 2025-02-11 PROCEDURE — 1125F AMNT PAIN NOTED PAIN PRSNT: CPT

## 2025-02-11 PROCEDURE — 1036F TOBACCO NON-USER: CPT

## 2025-02-11 PROCEDURE — 99214 OFFICE O/P EST MOD 30 MIN: CPT

## 2025-02-11 PROCEDURE — 84165 PROTEIN E-PHORESIS SERUM: CPT

## 2025-02-11 PROCEDURE — 80053 COMPREHEN METABOLIC PANEL: CPT

## 2025-02-11 PROCEDURE — 85025 COMPLETE CBC W/AUTO DIFF WBC: CPT

## 2025-02-11 PROCEDURE — 2500000004 HC RX 250 GENERAL PHARMACY W/ HCPCS (ALT 636 FOR OP/ED): Mod: JZ,TB | Performed by: INTERNAL MEDICINE

## 2025-02-11 PROCEDURE — 2500000001 HC RX 250 WO HCPCS SELF ADMINISTERED DRUGS (ALT 637 FOR MEDICARE OP): Performed by: INTERNAL MEDICINE

## 2025-02-11 PROCEDURE — 82784 ASSAY IGA/IGD/IGG/IGM EACH: CPT

## 2025-02-11 PROCEDURE — 3074F SYST BP LT 130 MM HG: CPT

## 2025-02-11 PROCEDURE — 1160F RVW MEDS BY RX/DR IN RCRD: CPT

## 2025-02-11 PROCEDURE — 1123F ACP DISCUSS/DSCN MKR DOCD: CPT

## 2025-02-11 PROCEDURE — 1159F MED LIST DOCD IN RCRD: CPT

## 2025-02-11 PROCEDURE — 96401 CHEMO ANTI-NEOPL SQ/IM: CPT

## 2025-02-11 RX ORDER — MONTELUKAST SODIUM 10 MG/1
10 TABLET ORAL ONCE
Status: COMPLETED | OUTPATIENT
Start: 2025-02-11 | End: 2025-02-11

## 2025-02-11 RX ORDER — ACETAMINOPHEN 325 MG/1
650 TABLET ORAL ONCE
Status: COMPLETED | OUTPATIENT
Start: 2025-02-11 | End: 2025-02-11

## 2025-02-11 RX ORDER — FAMOTIDINE 10 MG/ML
20 INJECTION INTRAVENOUS ONCE AS NEEDED
Status: DISCONTINUED | OUTPATIENT
Start: 2025-02-11 | End: 2025-02-11 | Stop reason: HOSPADM

## 2025-02-11 RX ORDER — DIPHENHYDRAMINE HCL 50 MG
50 CAPSULE ORAL ONCE
Status: COMPLETED | OUTPATIENT
Start: 2025-02-11 | End: 2025-02-11

## 2025-02-11 RX ORDER — DIPHENHYDRAMINE HYDROCHLORIDE 50 MG/ML
50 INJECTION INTRAMUSCULAR; INTRAVENOUS AS NEEDED
Status: DISCONTINUED | OUTPATIENT
Start: 2025-02-11 | End: 2025-02-11 | Stop reason: HOSPADM

## 2025-02-11 RX ORDER — EPINEPHRINE 0.3 MG/.3ML
0.3 INJECTION SUBCUTANEOUS EVERY 5 MIN PRN
Status: DISCONTINUED | OUTPATIENT
Start: 2025-02-11 | End: 2025-02-11 | Stop reason: HOSPADM

## 2025-02-11 RX ORDER — ALBUTEROL SULFATE 0.83 MG/ML
3 SOLUTION RESPIRATORY (INHALATION) AS NEEDED
Status: DISCONTINUED | OUTPATIENT
Start: 2025-02-11 | End: 2025-02-11 | Stop reason: HOSPADM

## 2025-02-11 RX ADMIN — DIPHENHYDRAMINE HYDROCHLORIDE 50 MG: 50 CAPSULE ORAL at 10:43

## 2025-02-11 RX ADMIN — ACETAMINOPHEN 650 MG: 325 TABLET ORAL at 10:43

## 2025-02-11 RX ADMIN — DEXAMETHASONE 20 MG: 6 TABLET ORAL at 10:43

## 2025-02-11 RX ADMIN — MONTELUKAST 10 MG: 10 TABLET, FILM COATED ORAL at 10:43

## 2025-02-11 RX ADMIN — DARATUMUMAB AND HYALURONIDASE-FIHJ (HUMAN RECOMBINANT) 1800 MG: 1800; 30000 INJECTION SUBCUTANEOUS at 11:13

## 2025-02-11 ASSESSMENT — PAIN SCALES - GENERAL: PAINLEVEL_OUTOF10: 3

## 2025-02-11 NOTE — PROGRESS NOTES
Patient ID: Toby Ugalde is a 74 y.o. male.    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 3/6/17- bone survey was negative for lytic lesions.  3/15/17-  bone marrow revealed 30% plasma cells with kappa predominance. Started on Rd  - 2017 - 12/2019: Rd->R 10mg (d/t hyperGlc a/w dex so d/c'ed), best response [place]; Rd held d/t dental procedure and hip replacement, but never restarted  - 3/2022: worsening anemia since 3/22, worsening Cr since 3/21; SPE-IgGK 3.5g, K/L 147; bone survey: LT 9th rib possible plasmacytoma     Late February 2022 patient was seen by his PCP Dr. Cano for complaints of fatigue.  Labs notable for hyponatremia to 127, anemia with a HGB of 9.  Cr 1.99. Protein elevated to  10.7.  Uric acid up to 8.9.      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  Skeletal survey (3/2/22) - lesion in the left rib.   PET:    Expansile hypermetabolic lytic mass involving the left lateral 9th  rib, correlating to suspected plasmacytoma described on osseous  survey performed 03/02/2022.  2. Diffusely increased FDG uptake throughout the bone marrow which  may relate to anemia/marrow activation, with marrow involvement by  myeloma not excluded.  Treatment:   Initiated (3/29/22) treatment for his relapsed Myeloma with Revlimid 10 mg by mouth days 1-14/21 days, Daratuzumab (3/29, & 4/5/22), SC Velcade (3/29 & 4/5/22), & weekly Dexamethasone complicated by several hospitaliizations for syncope, PE ,afib due to  velcade and revlimid.  Velcade discontinued     Recv'd weekly single agent Daraumumab from 5/31 - 7/19/22. Initiated (8/25/22) Revlimid 10 mg on days 1-21/28 days.     Has had marked reduction in paraprotein and free lyte chains  with M protein : 1.4 g/dl on 4/19/22 ,    [place] free lyte/ratio of 4.49 mg/dl/4.45.    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         IgG multiple myeloma (Multi)   9/6/2023 Initial Diagnosis    IgG multiple myeloma (CMS/HCC)     10/17/2023 -  Chemotherapy    Daratumumab, 28 Day Cycles - Maintenance       Response:     Past Medical History:     Past Medical History:   Diagnosis Date    Chronic gout, unspecified, without tophus (tophi) 12/11/2024    Dental caries on pit and fissure surface penetrating into dentin 01/12/2024    Deposits (accretions) on teeth 01/12/2024    Essential (primary) hypertension 11/20/2013    Benign essential hypertension    Gout, unspecified 12/11/2024    History of hypercholesterolemia 01/12/2024    History of hypertension 01/12/2024    History of malignant neoplasm of prostate 01/12/2024    Impacted cerumen 01/12/2024    Impacted cerumen, bilateral 02/16/2024    Infective pharyngitis 01/12/2024    Other intervertebral disc degeneration, lumbar region     Lumbar degenerative disc disease    Overweight with body mass index (BMI) 25.0-29.9 02/02/2022    Partial loss of teeth due to caries, class I 01/12/2024    Personal history of malignant neoplasm of prostate     History of malignant neoplasm of prostate    Personal history of other diseases of the circulatory system     History of hypertension    Personal history of other diseases of the circulatory system 06/29/2022    History of atrial tachycardia    Personal history of other diseases of the circulatory system     History of essential hypertension    Personal history of other diseases of the musculoskeletal system and connective tissue     History of arthritis    Personal history of other diseases of the musculoskeletal system and connective tissue     History of acute  gouty arthritis    Personal history of other diseases of the musculoskeletal system and connective tissue 01/30/2019    History of left foot drop    Personal history of other diseases of the respiratory system     History of bronchitis    Personal history of other endocrine, nutritional and metabolic disease     History of hypercholesterolemia    Personal history of other endocrine, nutritional and metabolic disease     History of diabetes mellitus    Personal history of other endocrine, nutritional and metabolic disease 01/17/2014    History of diabetes mellitus    Post-traumatic stress disorder, chronic 11/10/2023    Primary localized osteoarthritis of pelvic region and thigh 12/17/2018    Pure hypercholesterolemia, unspecified 11/20/2013    Low-density-lipoid-type (LDL) hyperlipoproteinemia    Snoring     Snoring     Surgical History:     Past Surgical History:   Procedure Laterality Date    BACK SURGERY  11/20/2013    Back Surgery    TONSILLECTOMY  11/20/2013    Tonsillectomy    TOTAL KNEE ARTHROPLASTY  11/20/2013    Knee Replacement      Family History:     Family History   Problem Relation Name Age of Onset    Heart attack Mother      Diabetes Brother      Stroke Maternal Grandfather       Social History:  Reported history of agent orange exposure while in [employer] at [employer]. Retired  and post .  from his wife who is health care  power of .  Two sons and 2 grandchildren (1)      Social History     Tobacco Use    Smoking status: Never     Passive exposure: Never    Smokeless tobacco: Never   Substance Use Topics    Alcohol use: Yes     Comment: occasional    Drug use: Never      -------------------------------------------------------------------------------------------------------  Subjective       HPI    KANDI ROSE is a 74 year old Male h/o pAfib, T2DM, HTN, gout, IgG kappa MM with history as above.  Still not clear whether patient has ONJ.  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  Patient states he had a bone density 11/23 which does not show osteopenia.    Toby presents to the clinic today (2/11/25) unaccompanied for follow up evaulation of his multiple myeloma, blood work, and is scheduled to receive C29 of monthly subcutaneous daratumumab.  Remains on lenalidomide 5 mg daily on days 1-21 of 28 day cycle.  Today, he is on day 7 of his 28 day cycle of lenalidomide.  [facility]    No changes since last visit.  Energy level is medium range.  Uses cane for ambulation.  Continues to have night sweats every 1-2 weeks. Appetite is okay, depends on the day.  Can get full quickly some days.  Weight is stable.  Continues to have shortness of breath on exertion.  Bowels chronically rotate between diarrhea and constipation.  Takes prn imodium and stool softener.  Chronic left hip pain, using tramadol and tylenol.  Following with orthopedics.  Received a cortisol injection left hip with previously plan to eventually have left hip replacement.  Numbness and tingling to bilateral feet and hands.  Was recently scheduled for cardioversion but when he went for procedure was no longer in afib, so cardioversion was cancelled.      Review of Systems   Constitutional:  Positive for diaphoresis (occassional night sweats about once per week to 2 weeks, can be drenching) and fatigue. Negative for appetite change, chills, fever and unexpected weight change.   Respiratory:  Positive for shortness of breath (on exertion).    Cardiovascular: Negative.    Gastrointestinal:  Positive for constipation and diarrhea. Negative for blood in stool, nausea and vomiting.   Genitourinary: Negative.     Musculoskeletal:  Positive for arthralgias. Negative for gait problem (uses cane).   Skin:  Negative for rash and wound.   Neurological:  Positive for numbness. Negative for gait problem (uses cane).   Hematological: Negative.    All other systems  reviewed and are negative.  -------------------------------------------------------------------------------------------------------  Objective   BSA: There is no height or weight on file to calculate BSA.  There were no vitals taken for this visit.    Physical Exam  Vitals reviewed.   Constitutional:       Appearance: Normal appearance.   HENT:      Head: Normocephalic and atraumatic.      Mouth/Throat:      Mouth: Mucous membranes are moist.   Eyes:      Extraocular Movements: Extraocular movements intact.      Conjunctiva/sclera: Conjunctivae normal.      Pupils: Pupils are equal, round, and reactive to light.   Cardiovascular:      Rate and Rhythm: Normal rate and regular rhythm.      Pulses: Normal pulses.      Heart sounds: Normal heart sounds.   Pulmonary:      Effort: Pulmonary effort is normal.      Breath sounds: Normal breath sounds.   Abdominal:      General: Abdomen is flat. Bowel sounds are normal.      Palpations: Abdomen is soft. There is no mass.      Tenderness: There is no abdominal tenderness.   Musculoskeletal:         General: No swelling. Normal range of motion.   Lymphadenopathy:      Comments: No lymphadenopathy   Skin:     General: Skin is warm and dry.   Neurological:      General: No focal deficit present.      Mental Status: He is alert and oriented to person, place, and time.   Psychiatric:         Mood and Affect: Mood normal.         Behavior: Behavior normal.         Thought Content: Thought content normal.         Judgment: Judgment normal.     Performance Status:  Symptomatic; fully ambulatory  -------------------------------------------------------------------------------------------------------  Assessment/Plan      75 yo M h/o IgG kappa MM s/p Rd 8830-5282 best response PA stopped d/t dental procedure, presented on 3/2022 for worsening anemia/Cr d/t relapsed MM, and likely LT 9th rib plasmacytoma.      Restaging 3/2022 shows high burden disease with 90% tumorous involvement of  the bone marrow ,   Initiated (3/29/22) treatment for his relapsed Myeloma with Revlimid 10 mg by mouth days 1-14/21 days, Daratuzumab (3/29, & 4/5/22), SC Velcade (3/29 & 4/5/22), & weekly Dexamethasone. complicated by several hospilizations for syncope, PE ,afib due to velcade and revlimid.    Recv'd weekly single agent Daraumumab from 5/31 - 7/19/22. Initiated (8/25/22) Revlimid 10 mg on days 1-21/28 days.     Will continue monthly casey, rev, & dex until evidence of disease progression or unacceptable toxicity.    12/17/24:  CBC results from today are stable.  Kappa and lambda free light chains stable from 11/19/24, level in process from today.  M-protein stable at 0.1 (11/19/24), level in process from today.  No concerning findings on physical examination.  Scheduled to receive C27 daratumumab today.  Continue revlimid 5 mg daily on days 1-21 out of 28 day cycle.  Follow up visit in 2 months.    Ig [place] Free Light Chain   Date Value Ref Range Status   01/14/2025 2.79 (H) 0.33 - 1.94 mg/dL Final   12/17/2024 2.37 (H) 0.33 - 1.94 mg/dL Final   11/19/2024 1.82 0.33 - 1.94 mg/dL Final   10/22/2024 2.36 (H) 0.33 - 1.94 mg/dL Final   09/17/2024 2.18 (H) 0.33 - 1.94 mg/dL Final     Ig [place] Free Light Chain   Date Value Ref Range Status   01/14/2025 0.85 0.57 - 2.63 mg/dL Final   12/17/2024 0.91 0.57 - 2.63 mg/dL Final   11/19/2024 0.75 0.57 - 2.63 mg/dL Final   10/22/2024 1.09 0.57 - 2.63 mg/dL Final   09/17/2024 0.91 0.57 - 2.63 mg/dL Final     Kappa/Lambda Ratio   Date Value Ref Range Status   01/14/2025 3.28 (H) 0.26 - 1.65 Final   12/17/2024 2.60 (H) 0.26 - 1.65 Final   11/19/2024 2.43 (H) 0.26 - 1.65 Final   10/22/2024 2.17 (H) 0.26 - 1.65 Final   09/17/2024 2.40 (H) 0.26 - 1.65 Final     M-PROTEIN 1   Date Value Ref Range Status   01/14/2025 0.1 (H)   g/dL Final   12/17/2024 0.1 (H)   g/dL Final   11/19/2024 0.1 (H)   g/dL Final   10/22/2024 0.1 (H)   g/dL Final   09/17/2024 0.1 (H)   g/dL Final      #Atrial  fib on chronic anticoagulation  on Eliquis 5 mg by mouth twice a day.  Patient confirmed on 50 mg by mouth twice a day of metoprolol.  Follows with Dr. Moon and Dr. Garcia.  Recently (10/22/24) has been feeling more SOB - advised to reach out to cardiology  12/17/24:  Cardioversion planned for 11/14/24 was cancelled as Toby was back in NS[facility].  Following with Dr. Garcia and Dr. Moon.  Currently on eliquis 5 mg BID and metoprolol 100 mg BID.    PE (4/9/22) - Small occlusive embolic thrombus within the right lower lobe pulmonary artery branch, medially and posteriorly.  Cont Eliquis 5 mg twice a day.     Bone health:  Xgeva held due to concerns of ONJ and prolonged treatment in the past.  Bone density December 2023 was normal.  Hold for now.      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  Pt had his tooth pulled in 2019, however with prolonged bone exposure/infection, eventually underwent OMSF surgery in 10/2021. Unclear if he has denosumab assoc jaw necrosis. Patient finally had his bad tooth (L upper molar) removed on 5/25/23. [facility]  6/25/24:  Reports that he recently finished dental work and has dentures.      Social Work:  4/30/24: Requested to have DWIGHT Dominguez OSW-C contact patient regarding his medical bills.   8/20/24:  Filled out applications for grants for [employer] and [employer].  Reports he does not need assistance for medical paperwork or bills today.    12/17/24:   Reports he does not need assistance with social work today.     RTC:   1/14/25:  Infusion appt for C28 casey  2/11/25:  Follow up visit with provider and C29 casey  -------------------------------------------------------------------------------------------------------  Yumiko Waller MD

## 2025-02-12 LAB
ALBUMIN: 4 G/DL (ref 3.4–5)
ALPHA 1 GLOBULIN: 0.3 G/DL (ref 0.2–0.6)
ALPHA 2 GLOBULIN: 0.8 G/DL (ref 0.4–1.1)
BETA GLOBULIN: 0.7 G/DL (ref 0.5–1.2)
GAMMA GLOBULIN: 0.6 G/DL (ref 0.5–1.4)
KAPPA LC SERPL-MCNC: 2.51 MG/DL (ref 0.33–1.94)
KAPPA LC/LAMBDA SER: 3.3 {RATIO} (ref 0.26–1.65)
LAMBDA LC SERPL-MCNC: 0.76 MG/DL (ref 0.57–2.63)
M-PROTEIN 1: 0.1 G/DL
PATH REVIEW-SERUM PROTEIN ELECTROPHORESIS: ABNORMAL
PROTEIN ELECTROPHORESIS COMMENT: ABNORMAL

## 2025-02-26 ENCOUNTER — APPOINTMENT (OUTPATIENT)
Dept: PRIMARY CARE | Facility: CLINIC | Age: 75
End: 2025-02-26
Payer: MEDICARE

## 2025-02-26 VITALS
BODY MASS INDEX: 27.77 KG/M2 | WEIGHT: 194 LBS | SYSTOLIC BLOOD PRESSURE: 130 MMHG | HEIGHT: 70 IN | HEART RATE: 64 BPM | OXYGEN SATURATION: 98 % | DIASTOLIC BLOOD PRESSURE: 75 MMHG | RESPIRATION RATE: 21 BRPM

## 2025-02-26 DIAGNOSIS — D61.818 PANCYTOPENIA: ICD-10-CM

## 2025-02-26 DIAGNOSIS — E78.00 HYPERCHOLESTEROLEMIA: ICD-10-CM

## 2025-02-26 DIAGNOSIS — N13.8 BPH WITH OBSTRUCTION/LOWER URINARY TRACT SYMPTOMS: ICD-10-CM

## 2025-02-26 DIAGNOSIS — N40.1 BPH WITH OBSTRUCTION/LOWER URINARY TRACT SYMPTOMS: ICD-10-CM

## 2025-02-26 DIAGNOSIS — I10 BENIGN HYPERTENSION: ICD-10-CM

## 2025-02-26 DIAGNOSIS — E11.9 TYPE 2 DIABETES MELLITUS WITHOUT COMPLICATION, WITHOUT LONG-TERM CURRENT USE OF INSULIN (MULTI): ICD-10-CM

## 2025-02-26 DIAGNOSIS — C90.00 IGG MULTIPLE MYELOMA (MULTI): ICD-10-CM

## 2025-02-26 DIAGNOSIS — R53.81 MALAISE AND FATIGUE: ICD-10-CM

## 2025-02-26 DIAGNOSIS — N18.2 CHRONIC RENAL IMPAIRMENT, STAGE 2 (MILD): ICD-10-CM

## 2025-02-26 DIAGNOSIS — I48.91 ATRIAL FIBRILLATION WITH RVR (MULTI): Primary | ICD-10-CM

## 2025-02-26 DIAGNOSIS — R53.83 MALAISE AND FATIGUE: ICD-10-CM

## 2025-02-26 PROCEDURE — 3075F SYST BP GE 130 - 139MM HG: CPT | Performed by: INTERNAL MEDICINE

## 2025-02-26 PROCEDURE — 3078F DIAST BP <80 MM HG: CPT | Performed by: INTERNAL MEDICINE

## 2025-02-26 PROCEDURE — 99214 OFFICE O/P EST MOD 30 MIN: CPT | Performed by: INTERNAL MEDICINE

## 2025-02-26 PROCEDURE — 1123F ACP DISCUSS/DSCN MKR DOCD: CPT | Performed by: INTERNAL MEDICINE

## 2025-02-26 PROCEDURE — G2211 COMPLEX E/M VISIT ADD ON: HCPCS | Performed by: INTERNAL MEDICINE

## 2025-02-26 PROCEDURE — 1159F MED LIST DOCD IN RCRD: CPT | Performed by: INTERNAL MEDICINE

## 2025-02-26 PROCEDURE — 1036F TOBACCO NON-USER: CPT | Performed by: INTERNAL MEDICINE

## 2025-02-26 ASSESSMENT — ENCOUNTER SYMPTOMS
EYES NEGATIVE: 1
CONSTITUTIONAL NEGATIVE: 1
ENDOCRINE NEGATIVE: 1
ALLERGIC/IMMUNOLOGIC NEGATIVE: 1
RESPIRATORY NEGATIVE: 1
PSYCHIATRIC NEGATIVE: 1
MUSCULOSKELETAL NEGATIVE: 1
NEUROLOGICAL NEGATIVE: 1
HEMATOLOGIC/LYMPHATIC NEGATIVE: 1
CARDIOVASCULAR NEGATIVE: 1
GASTROINTESTINAL NEGATIVE: 1

## 2025-02-26 NOTE — ASSESSMENT & PLAN NOTE
LEFT HIP PAIN AND ADVANCED DJD AND FOLLOWS WITH ORTHO WITH Status Post CORTISONE INJECTION     Orders:    traMADol (Ultram) 50 mg tablet; Take 1 tablet (50 mg) by mouth every 8 hours if needed for severe pain (7 - 10) or moderate pain (4 - 6).    Opiate/Opioid/Benzo Prescription Compliance; Future

## 2025-02-26 NOTE — ASSESSMENT & PLAN NOTE
DM - NIDDM  . Reviewed with patient / Will check  HbA1c and fasting blood sugars. Educate home self monitoring and diary keeping(reviewed with patient home blood sugar levels /diary). Educate extensively low calorie diet and weight loss with exercise. Reviewed BS- diary and Rx. Regimen. [place] renal protection with ARB/ACEI.               Educate compliance with diet and Rx. And educate risks and autcomes.     Refill Metformin 500 mg BID with meals

## 2025-02-26 NOTE — PROGRESS NOTES
"Subjective   Patient ID: Toby Ugalde is a 74 y.o. male who presents for Follow-up (Follow up).    HPI     Review of Systems   Constitutional: Negative.    HENT: Negative.     Eyes: Negative.    Respiratory: Negative.     Cardiovascular: Negative.    Gastrointestinal: Negative.    Endocrine: Negative.    Musculoskeletal: Negative.    Skin: Negative.    Allergic/Immunologic: Negative.    Neurological: Negative.    Hematological: Negative.    Psychiatric/Behavioral: Negative.     All other systems reviewed and are negative.      Objective   /75   Pulse 64   Resp 21   Ht 1.778 m (5' 10\")   Wt 88 kg (194 lb)   SpO2 98%   BMI 27.84 kg/m²     Physical Exam  Vitals and nursing note reviewed.   Constitutional:       Appearance: Normal appearance.   HENT:      Head: Normocephalic and atraumatic.      Right Ear: Tympanic membrane, ear canal and external ear normal.      Left Ear: Tympanic membrane, ear canal and external ear normal. There is no impacted cerumen.      Nose: Nose normal.      Mouth/Throat:      Mouth: Mucous membranes are moist.      Pharynx: Oropharynx is clear.   Eyes:      Extraocular Movements: Extraocular movements intact.      Conjunctiva/sclera: Conjunctivae normal.      Pupils: Pupils are equal, round, and reactive to light.   Cardiovascular:      Rate and Rhythm: Normal rate and regular rhythm.      Pulses: Normal pulses.      Heart sounds: Normal heart sounds. No murmur heard.  Pulmonary:      Effort: Pulmonary effort is normal. No respiratory distress.      Breath sounds: Normal breath sounds. No stridor. No wheezing, rhonchi or rales.   Chest:      Chest wall: No tenderness.   Abdominal:      General: Abdomen is flat. Bowel sounds are normal. There is no distension.      Palpations: Abdomen is soft. There is no mass.      Tenderness: There is no abdominal tenderness. There is no right CVA tenderness, left CVA tenderness, guarding or rebound.      Hernia: No hernia is present. "   Musculoskeletal:         General: Normal range of motion.      Cervical back: Normal range of motion and neck supple.   Skin:     General: Skin is warm.      Capillary Refill: Capillary refill takes less than 2 seconds.   Neurological:      General: No focal deficit present.      Mental Status: He is alert.      Cranial Nerves: No cranial nerve deficit.      Sensory: No sensory deficit.      Motor: No weakness.      Coordination: Coordination normal.      Gait: Gait normal.      Deep Tendon Reflexes: Reflexes normal.   Psychiatric:         Mood and Affect: Mood normal.         Behavior: Behavior normal. Behavior is cooperative.         Thought Content: Thought content normal.         Judgment: Judgment normal.         Assessment/Plan   Problem List Items Addressed This Visit             ICD-10-CM    Pancytopenia D61.818     Monitor CBC and Asymptomatic  now only mildly fatigued          Atrial fibrillation with RVR (Multi) - Primary I48.91     Atrial fibrillation - persistent with  Ventricular rate is well controlled (in sinus rhythm). ON ELIQUIS  No Shortness of breath. Continues  Eliquis 5mg BID  Metoprolol to 50 mg BID           Benign hypertension I10     HTN - hypertension well/controlled .Target BP < 130/80  achieved. Educate low salt diet and exercise with weight loss. Educate home self monitoring and diary keeping. Educate risks of elevate blood pressure and benefits of prompt treatment.  Refill Amlodipine and Metoprolol             BPH with obstruction/lower urinary tract symptoms N40.1, N13.8     BPH - Benign PROSTATIC Hypertrophy, + Dysuria/Nocturia, check PSA, prescribe Flomax 0.4mg qD and Avodart 0.5 mg qD vs. Finasteride 5 mg qD.  Educate exercises and Change in life style, prescribe vitamin E 400 IU qD. Consider referral to urology.          Chronic renal insufficiency N18.9     CRI - Chronic Renal Insuficiency. Secondary to DM/HTN/Atherosclerosis. Follow BUN/Cr. and lytes.   RENOPROTECTION with  ACEI/ARB (). Monitor microalbuminuria. Avoid hypotension and renal hypoperfusion. The patient was instructed to avoid NSAIDS and educate compliance with medications to control HTN(hypertension) and cholesterol and diabetes.                                     Diabetes mellitus (Multi) E11.9     DM - NIDDM  . Reviewed with patient / Will check  HbA1c and fasting blood sugars. Educate home self monitoring and diary keeping(reviewed with patient home blood sugar levels /diary). Educate extensively low calorie diet and weight loss with exercise. Reviewed BS- diary and Rx. Regimen. [place] renal protection with ARB/ACEI.               Educate compliance with diet and Rx. And educate risks and autcomes.     Refill Metformin 500 mg BID with meals              Hypercholesterolemia E78.00     Hypercholesterolemia - Monitor lipid profile and educate patient upon risks of high cholesterol and targets. Educate diet and change in lifestyle and increase in exercises - Refill:  Atorvastatin    and educate compliance with medication and diet.                  IgG multiple myeloma (Multi) C90.00     Monitor CBC and blood count and follow with hematology and stable now on Chemotherapy                 Malaise and fatigue R53.81, R53.83     Fatigue - check CMP(metabolic panel and elctrolytes) , CBC(complete blood cell count), TSH(thyroid function). Insomnia may play a role and sleep studies(rule out sleep apnea) are recommended . Educate sleep hygiene. Consider anxiety disorder vs. depression. Consider Stress test, and 2DECHO.              Atrial fibrillation with RVR (Multi)  Atrial fibrillation - persistent with  Ventricular rate is well controlled (in sinus rhythm). ON ELIQUIS  No Shortness of breath. Continues  Eliquis 5mg BID  Metoprolol to 50 mg BID          Benign hypertension  HTN - hypertension well/controlled .Target BP < 130/80  achieved. Educate low salt diet and exercise with weight loss. Educate home self monitoring and  diary keeping. Educate risks of elevate blood pressure and benefits of prompt treatment.  Refill Amlodipine and Metoprolol         Hypercholesterolemia  Hypercholesterolemia - Monitor lipid profile and educate patient upon risks of high cholesterol and targets. Educate diet and change in lifestyle and increase in exercises - Refill:  Atorvastatin    and educate compliance with medication and diet.           Type 2 diabetes mellitus without complication, without long-term current use of insulin (Multi)  DM - NIDDM  . Reviewed with patient / Will check  HbA1c and fasting blood sugars. Educate home self monitoring and diary keeping(reviewed with patient home blood sugar levels /diary). Educate extensively low calorie diet and weight loss with exercise. Reviewed BS- diary and Rx. Regimen. [place] renal protection with ARB/ACEI.               Educate compliance with diet and Rx. And educate risks and autcomes.     Refill Metformin 500 mg BID with meals          GERD without esophagitis  GERD - Acid reflux disease. Rx. PPI (Prilosec/Prevacid/Protonix/Nexium) and educate diet and life style changes. Referred patient to an endoscopy (EGD) and check H. Pylori titers.         BPH with obstruction/lower urinary tract symptoms  High PSA and follows with urology             Chronic renal impairment, stage 2 (mild)  CRI - Chronic Renal Insuficiency. Secondary to DM/HTN/Atherosclerosis. Follow BUN/Cr. and lytes.   RENOPROTECTION with ACEI/ARB (). Monitor microalbuminuria. Avoid hypotension and renal hypoperfusion. The patient was instructed to avoid NSAIDS and educate compliance with medications to control HTN(hypertension) and cholesterol and diabetes.                                    IgG multiple myeloma (Multi)  Monitor CBC and blood count and follow with hematology and stable now on Chemotherapy

## 2025-03-11 ENCOUNTER — LAB (OUTPATIENT)
Dept: LAB | Facility: CLINIC | Age: 75
End: 2025-03-11
Payer: MEDICARE

## 2025-03-11 ENCOUNTER — INFUSION (OUTPATIENT)
Dept: HEMATOLOGY/ONCOLOGY | Facility: CLINIC | Age: 75
End: 2025-03-11
Payer: MEDICARE

## 2025-03-11 VITALS
HEART RATE: 64 BPM | SYSTOLIC BLOOD PRESSURE: 152 MMHG | OXYGEN SATURATION: 100 % | BODY MASS INDEX: 27.75 KG/M2 | TEMPERATURE: 97.5 F | DIASTOLIC BLOOD PRESSURE: 73 MMHG | WEIGHT: 193.4 LBS | RESPIRATION RATE: 18 BRPM

## 2025-03-11 DIAGNOSIS — C90.00 IGG MULTIPLE MYELOMA (MULTI): ICD-10-CM

## 2025-03-11 LAB
ALBUMIN SERPL BCP-MCNC: 4.1 G/DL (ref 3.4–5)
ALP SERPL-CCNC: 72 U/L (ref 33–136)
ALT SERPL W P-5'-P-CCNC: 12 U/L (ref 10–52)
ANION GAP SERPL CALC-SCNC: 15 MMOL/L (ref 10–20)
AST SERPL W P-5'-P-CCNC: 10 U/L (ref 9–39)
BASOPHILS # BLD AUTO: 0.01 X10*3/UL (ref 0–0.1)
BASOPHILS NFR BLD AUTO: 0.2 %
BILIRUB SERPL-MCNC: 0.7 MG/DL (ref 0–1.2)
BUN SERPL-MCNC: 15 MG/DL (ref 6–23)
CALCIUM SERPL-MCNC: 8.4 MG/DL (ref 8.6–10.6)
CHLORIDE SERPL-SCNC: 105 MMOL/L (ref 98–107)
CO2 SERPL-SCNC: 23 MMOL/L (ref 21–32)
CREAT SERPL-MCNC: 1.57 MG/DL (ref 0.5–1.3)
EGFRCR SERPLBLD CKD-EPI 2021: 45 ML/MIN/1.73M*2
EOSINOPHIL # BLD AUTO: 0.16 X10*3/UL (ref 0–0.4)
EOSINOPHIL NFR BLD AUTO: 3.4 %
ERYTHROCYTE [DISTWIDTH] IN BLOOD BY AUTOMATED COUNT: 14 % (ref 11.5–14.5)
GLUCOSE SERPL-MCNC: 123 MG/DL (ref 74–99)
HCT VFR BLD AUTO: 32.4 % (ref 41–52)
HGB BLD-MCNC: 10.8 G/DL (ref 13.5–17.5)
IGA SERPL-MCNC: 57 MG/DL (ref 70–400)
IGG SERPL-MCNC: 604 MG/DL (ref 700–1600)
IGM SERPL-MCNC: 9 MG/DL (ref 40–230)
IMM GRANULOCYTES # BLD AUTO: 0.01 X10*3/UL (ref 0–0.5)
IMM GRANULOCYTES NFR BLD AUTO: 0.2 % (ref 0–0.9)
LYMPHOCYTES # BLD AUTO: 2.27 X10*3/UL (ref 0.8–3)
LYMPHOCYTES NFR BLD AUTO: 48.8 %
MCH RBC QN AUTO: 30.5 PG (ref 26–34)
MCHC RBC AUTO-ENTMCNC: 33.3 G/DL (ref 32–36)
MCV RBC AUTO: 92 FL (ref 80–100)
MONOCYTES # BLD AUTO: 0.77 X10*3/UL (ref 0.05–0.8)
MONOCYTES NFR BLD AUTO: 16.6 %
NEUTROPHILS # BLD AUTO: 1.43 X10*3/UL (ref 1.6–5.5)
NEUTROPHILS NFR BLD AUTO: 30.8 %
NRBC BLD-RTO: ABNORMAL /100{WBCS}
PLATELET # BLD AUTO: 163 X10*3/UL (ref 150–450)
POTASSIUM SERPL-SCNC: 3.5 MMOL/L (ref 3.5–5.3)
PROT SERPL-MCNC: 6 G/DL (ref 6.4–8.2)
PROT SERPL-MCNC: 6 G/DL (ref 6.4–8.2)
RBC # BLD AUTO: 3.54 X10*6/UL (ref 4.5–5.9)
SODIUM SERPL-SCNC: 139 MMOL/L (ref 136–145)
WBC # BLD AUTO: 4.7 X10*3/UL (ref 4.4–11.3)

## 2025-03-11 PROCEDURE — 84155 ASSAY OF PROTEIN SERUM: CPT

## 2025-03-11 PROCEDURE — 2500000004 HC RX 250 GENERAL PHARMACY W/ HCPCS (ALT 636 FOR OP/ED): Performed by: INTERNAL MEDICINE

## 2025-03-11 PROCEDURE — 36415 COLL VENOUS BLD VENIPUNCTURE: CPT

## 2025-03-11 PROCEDURE — 82784 ASSAY IGA/IGD/IGG/IGM EACH: CPT

## 2025-03-11 PROCEDURE — 84075 ASSAY ALKALINE PHOSPHATASE: CPT

## 2025-03-11 PROCEDURE — 2500000004 HC RX 250 GENERAL PHARMACY W/ HCPCS (ALT 636 FOR OP/ED): Mod: JZ,TB | Performed by: INTERNAL MEDICINE

## 2025-03-11 PROCEDURE — 83521 IG LIGHT CHAINS FREE EACH: CPT

## 2025-03-11 PROCEDURE — 96401 CHEMO ANTI-NEOPL SQ/IM: CPT

## 2025-03-11 PROCEDURE — 2500000001 HC RX 250 WO HCPCS SELF ADMINISTERED DRUGS (ALT 637 FOR MEDICARE OP): Performed by: INTERNAL MEDICINE

## 2025-03-11 PROCEDURE — 84165 PROTEIN E-PHORESIS SERUM: CPT

## 2025-03-11 PROCEDURE — 85025 COMPLETE CBC W/AUTO DIFF WBC: CPT

## 2025-03-11 RX ORDER — DIPHENHYDRAMINE HYDROCHLORIDE 50 MG/ML
50 INJECTION INTRAMUSCULAR; INTRAVENOUS AS NEEDED
Status: DISCONTINUED | OUTPATIENT
Start: 2025-03-11 | End: 2025-03-11 | Stop reason: HOSPADM

## 2025-03-11 RX ORDER — EPINEPHRINE 0.3 MG/.3ML
0.3 INJECTION SUBCUTANEOUS EVERY 5 MIN PRN
Status: DISCONTINUED | OUTPATIENT
Start: 2025-03-11 | End: 2025-03-11 | Stop reason: HOSPADM

## 2025-03-11 RX ORDER — ALBUTEROL SULFATE 0.83 MG/ML
3 SOLUTION RESPIRATORY (INHALATION) AS NEEDED
Status: DISCONTINUED | OUTPATIENT
Start: 2025-03-11 | End: 2025-03-11 | Stop reason: HOSPADM

## 2025-03-11 RX ORDER — FAMOTIDINE 10 MG/ML
20 INJECTION, SOLUTION INTRAVENOUS ONCE AS NEEDED
Status: DISCONTINUED | OUTPATIENT
Start: 2025-03-11 | End: 2025-03-11 | Stop reason: HOSPADM

## 2025-03-11 RX ORDER — ACETAMINOPHEN 325 MG/1
650 TABLET ORAL ONCE
Status: COMPLETED | OUTPATIENT
Start: 2025-03-11 | End: 2025-03-11

## 2025-03-11 RX ORDER — DIPHENHYDRAMINE HCL 50 MG
50 CAPSULE ORAL ONCE
Status: COMPLETED | OUTPATIENT
Start: 2025-03-11 | End: 2025-03-11

## 2025-03-11 RX ORDER — MONTELUKAST SODIUM 10 MG/1
10 TABLET ORAL ONCE
Status: COMPLETED | OUTPATIENT
Start: 2025-03-11 | End: 2025-03-11

## 2025-03-11 RX ADMIN — DIPHENHYDRAMINE HYDROCHLORIDE 50 MG: 50 CAPSULE ORAL at 09:47

## 2025-03-11 RX ADMIN — ACETAMINOPHEN 650 MG: 325 TABLET ORAL at 09:46

## 2025-03-11 RX ADMIN — MONTELUKAST 10 MG: 10 TABLET, FILM COATED ORAL at 09:46

## 2025-03-11 RX ADMIN — DARATUMUMAB AND HYALURONIDASE-FIHJ (HUMAN RECOMBINANT) 1800 MG: 1800; 30000 INJECTION SUBCUTANEOUS at 10:20

## 2025-03-11 RX ADMIN — DEXAMETHASONE 20 MG: 6 TABLET ORAL at 09:46

## 2025-03-11 ASSESSMENT — PAIN SCALES - GENERAL: PAINLEVEL_OUTOF10: 2

## 2025-03-12 LAB
KAPPA LC SERPL-MCNC: 2.4 MG/DL (ref 0.33–1.94)
KAPPA LC/LAMBDA SER: 2.96 {RATIO} (ref 0.26–1.65)
LAMBDA LC SERPL-MCNC: 0.81 MG/DL (ref 0.57–2.63)

## 2025-03-13 LAB
ALBUMIN: 3.7 G/DL (ref 3.4–5)
ALPHA 1 GLOBULIN: 0.3 G/DL (ref 0.2–0.6)
ALPHA 2 GLOBULIN: 0.8 G/DL (ref 0.4–1.1)
BETA GLOBULIN: 0.7 G/DL (ref 0.5–1.2)
GAMMA GLOBULIN: 0.5 G/DL (ref 0.5–1.4)
M-PROTEIN 1: 0.2 G/DL
PATH REVIEW-SERUM PROTEIN ELECTROPHORESIS: ABNORMAL
PROTEIN ELECTROPHORESIS COMMENT: ABNORMAL

## 2025-03-26 ENCOUNTER — APPOINTMENT (OUTPATIENT)
Dept: PRIMARY CARE | Facility: CLINIC | Age: 75
End: 2025-03-26
Payer: MEDICARE

## 2025-03-26 VITALS
OXYGEN SATURATION: 96 % | SYSTOLIC BLOOD PRESSURE: 128 MMHG | WEIGHT: 196.21 LBS | RESPIRATION RATE: 21 BRPM | HEART RATE: 82 BPM | DIASTOLIC BLOOD PRESSURE: 75 MMHG | HEIGHT: 70 IN | BODY MASS INDEX: 28.09 KG/M2

## 2025-03-26 DIAGNOSIS — E29.1 HYPOGONADISM MALE: ICD-10-CM

## 2025-03-26 DIAGNOSIS — K21.9 GERD WITHOUT ESOPHAGITIS: ICD-10-CM

## 2025-03-26 DIAGNOSIS — R59.0 ANTERIOR CERVICAL ADENOPATHY: ICD-10-CM

## 2025-03-26 DIAGNOSIS — I48.91 ATRIAL FIBRILLATION WITH RVR (MULTI): Primary | ICD-10-CM

## 2025-03-26 DIAGNOSIS — I10 BENIGN HYPERTENSION: ICD-10-CM

## 2025-03-26 DIAGNOSIS — C90.00 IGG MULTIPLE MYELOMA (MULTI): ICD-10-CM

## 2025-03-26 DIAGNOSIS — E78.00 HYPERCHOLESTEROLEMIA: ICD-10-CM

## 2025-03-26 DIAGNOSIS — E11.9 TYPE 2 DIABETES MELLITUS WITHOUT COMPLICATION, WITHOUT LONG-TERM CURRENT USE OF INSULIN: ICD-10-CM

## 2025-03-26 DIAGNOSIS — N18.2 CHRONIC RENAL IMPAIRMENT, STAGE 2 (MILD): ICD-10-CM

## 2025-03-26 PROBLEM — K08.51: Status: RESOLVED | Noted: 2025-03-26 | Resolved: 2025-03-26

## 2025-03-26 PROCEDURE — G2211 COMPLEX E/M VISIT ADD ON: HCPCS | Performed by: INTERNAL MEDICINE

## 2025-03-26 PROCEDURE — 1159F MED LIST DOCD IN RCRD: CPT | Performed by: INTERNAL MEDICINE

## 2025-03-26 PROCEDURE — 1036F TOBACCO NON-USER: CPT | Performed by: INTERNAL MEDICINE

## 2025-03-26 PROCEDURE — 3074F SYST BP LT 130 MM HG: CPT | Performed by: INTERNAL MEDICINE

## 2025-03-26 PROCEDURE — 1123F ACP DISCUSS/DSCN MKR DOCD: CPT | Performed by: INTERNAL MEDICINE

## 2025-03-26 PROCEDURE — 99214 OFFICE O/P EST MOD 30 MIN: CPT | Performed by: INTERNAL MEDICINE

## 2025-03-26 PROCEDURE — 3078F DIAST BP <80 MM HG: CPT | Performed by: INTERNAL MEDICINE

## 2025-03-26 RX ORDER — AMOXICILLIN AND CLAVULANATE POTASSIUM 875; 125 MG/1; MG/1
875 TABLET, FILM COATED ORAL 2 TIMES DAILY
Qty: 20 TABLET | Refills: 0 | Status: SHIPPED | OUTPATIENT
Start: 2025-03-26 | End: 2025-04-05

## 2025-03-26 ASSESSMENT — ENCOUNTER SYMPTOMS
NEUROLOGICAL NEGATIVE: 1
ENDOCRINE NEGATIVE: 1
CONSTITUTIONAL NEGATIVE: 1
ALLERGIC/IMMUNOLOGIC NEGATIVE: 1
RESPIRATORY NEGATIVE: 1
CARDIOVASCULAR NEGATIVE: 1
HEMATOLOGIC/LYMPHATIC NEGATIVE: 1
GASTROINTESTINAL NEGATIVE: 1
EYES NEGATIVE: 1
PSYCHIATRIC NEGATIVE: 1
MUSCULOSKELETAL NEGATIVE: 1

## 2025-03-26 NOTE — PROGRESS NOTES
"Subjective   Patient ID: Toby Ugalde is a 75 y.o. male who presents for Follow-up (1 month follow up/Right side under jaw is swollen and sore ) for the past few days     HPI     Review of Systems   Constitutional: Negative.    HENT: Negative.     Eyes: Negative.    Respiratory: Negative.     Cardiovascular: Negative.    Gastrointestinal: Negative.    Endocrine: Negative.    Musculoskeletal: Negative.    Skin: Negative.    Allergic/Immunologic: Negative.    Neurological: Negative.    Hematological: Negative.    Psychiatric/Behavioral: Negative.     All other systems reviewed and are negative.      Objective   /75   Pulse 82   Resp 21   Ht 1.778 m (5' 10\")   Wt 89 kg (196 lb 3.4 oz)   SpO2 96%   BMI 28.15 kg/m²     Physical Exam  Vitals and nursing note reviewed.   Constitutional:       Appearance: Normal appearance.   HENT:      Head: Normocephalic and atraumatic.      Right Ear: Tympanic membrane, ear canal and external ear normal.      Left Ear: Tympanic membrane, ear canal and external ear normal. There is no impacted cerumen.      Nose: Nose normal.      Mouth/Throat:      Mouth: Mucous membranes are moist.      Pharynx: Oropharynx is clear.   Eyes:      Extraocular Movements: Extraocular movements intact.      Conjunctiva/sclera: Conjunctivae normal.      Pupils: Pupils are equal, round, and reactive to light.   Cardiovascular:      Rate and Rhythm: Normal rate and regular rhythm.      Pulses: Normal pulses.      Heart sounds: Normal heart sounds. No murmur heard.  Pulmonary:      Effort: Pulmonary effort is normal. No respiratory distress.      Breath sounds: Normal breath sounds. No stridor. No wheezing, rhonchi or rales.   Chest:      Chest wall: No tenderness.   Abdominal:      General: Abdomen is flat. Bowel sounds are normal. There is no distension.      Palpations: Abdomen is soft. There is no mass.      Tenderness: There is no abdominal tenderness. There is no right CVA tenderness, left CVA " tenderness, guarding or rebound.      Hernia: No hernia is present.   Musculoskeletal:         General: Normal range of motion.      Cervical back: Normal range of motion and neck supple.   Skin:     General: Skin is warm.      Capillary Refill: Capillary refill takes less than 2 seconds.   Neurological:      General: No focal deficit present.      Mental Status: He is alert.      Cranial Nerves: No cranial nerve deficit.      Sensory: No sensory deficit.      Motor: No weakness.      Coordination: Coordination normal.      Gait: Gait normal.      Deep Tendon Reflexes: Reflexes normal.   Psychiatric:         Mood and Affect: Mood normal.         Behavior: Behavior normal. Behavior is cooperative.         Thought Content: Thought content normal.         Judgment: Judgment normal.         Assessment/Plan   Problem List Items Addressed This Visit             ICD-10-CM    Atrial fibrillation with RVR (Multi) - Primary I48.91     Atrial fibrillation - persistent with  Ventricular rate is well controlled (in sinus rhythm). ON ELIQUIS  No Shortness of breath. Continues  Eliquis 5mg BID  Metoprolol to 50 mg BID           Benign hypertension I10     HTN - hypertension well/controlled .Target BP < 130/80  achieved. Educate low salt diet and exercise with weight loss. Educate home self monitoring and diary keeping. Educate risks of elevate blood pressure and benefits of prompt treatment.  Refill Amlodipine and Metoprolol          Chronic renal insufficiency N18.9     CRI - Chronic Renal Insuficiency. Secondary to DM/HTN/Atherosclerosis. Follow BUN/Cr. and lytes.   RENOPROTECTION with ACEI/ARB (). Monitor microalbuminuria. Avoid hypotension and renal hypoperfusion. The patient was instructed to avoid NSAIDS and educate compliance with medications to control HTN(hypertension) and cholesterol and diabetes.                                     Diabetes mellitus (Multi) E11.9     DM - NIDDM  . Reviewed with patient / Will check   HbA1c and fasting blood sugars. Educate home self monitoring and diary keeping(reviewed with patient home blood sugar levels /diary). Educate extensively low calorie diet and weight loss with exercise. Reviewed BS- diary and Rx. Regimen. [place] renal protection with ARB/ACEI.               Educate compliance with diet and Rx. And educate risks and autcomes.     Refill Metformin 500 mg BID with meals           GERD without esophagitis K21.9     GERD - Acid reflux disease. Rx. PPI (Prilosec/Prevacid/Protonix/Nexium) and educate diet and life style changes. Referred patient to an endoscopy (EGD) and check H. Pylori titers.           Hypercholesterolemia E78.00     Hypercholesterolemia - Monitor lipid profile and educate patient upon risks of high cholesterol and targets. Educate diet and change in lifestyle and increase in exercises - Refill:  Atorvastatin    and educate compliance with medication and diet.                   Hypogonadism male E29.1    IgG multiple myeloma (Multi) C90.00     Monitor CBC and blood count and follow with hematology and stable now on Chemotherapy                 Anterior cervical adenopathy R59.0     Reactive due to recent dental work vs. Multiple myeloma and refer to oncology hematology and start Augmentin 875 mg BID with food for 10 days  to treat possible dental infection          Relevant Medications    amoxicillin-pot clavulanate (Augmentin) 875-125 mg tablet       Pancytopenia D61.818        Monitor CBC and Asymptomatic  now only mildly fatigued            Atrial fibrillation with RVR (Multi) - Primary I48.91       Atrial fibrillation - persistent with  Ventricular rate is well controlled (in sinus rhythm). ON ELIQUIS  No Shortness of breath. Continues  Eliquis 5mg BID  Metoprolol to 50 mg BID             Benign hypertension I10       HTN - hypertension well/controlled .Target BP < 130/80  achieved. Educate low salt diet and exercise with weight loss. Educate home self monitoring  and diary keeping. Educate risks of elevate blood pressure and benefits of prompt treatment.  Refill Amlodipine and Metoprolol               BPH with obstruction/lower urinary tract symptoms N40.1, N13.8       BPH - Benign PROSTATIC Hypertrophy, + Dysuria/Nocturia, check PSA, prescribe Flomax 0.4mg qD and Avodart 0.5 mg qD vs. Finasteride 5 mg qD.  Educate exercises and Change in life style, prescribe vitamin E 400 IU qD. Consider referral to urology.            Chronic renal insufficiency N18.9       CRI - Chronic Renal Insuficiency. Secondary to DM/HTN/Atherosclerosis. Follow BUN/Cr. and lytes.   RENOPROTECTION with ACEI/ARB (). Monitor microalbuminuria. Avoid hypotension and renal hypoperfusion. The patient was instructed to avoid NSAIDS and educate compliance with medications to control HTN(hypertension) and cholesterol and diabetes.                                       Diabetes mellitus (Multi) E11.9       DM - NIDDM  . Reviewed with patient / Will check  HbA1c and fasting blood sugars. Educate home self monitoring and diary keeping(reviewed with patient home blood sugar levels /diary). Educate extensively low calorie diet and weight loss with exercise. Reviewed BS- diary and Rx. Regimen. [place] renal protection with ARB/ACEI.               Educate compliance with diet and Rx. And educate risks and autcomes.     Refill Metformin 500 mg BID with meals                 Hypercholesterolemia E78.00       Hypercholesterolemia - Monitor lipid profile and educate patient upon risks of high cholesterol and targets. Educate diet and change in lifestyle and increase in exercises - Refill:  Atorvastatin    and educate compliance with medication and diet.                     IgG multiple myeloma (Multi) C90.00       Monitor CBC and blood count and follow with hematology and stable now on Chemotherapy                   Malaise and fatigue R53.81, R53.83       Fatigue - check CMP(metabolic panel and elctrolytes) ,  CBC(complete blood cell count), TSH(thyroid function). Insomnia may play a role and sleep studies(rule out sleep apnea) are recommended . Educate sleep hygiene. Consider anxiety disorder vs. depression. Consider Stress test, and 2DECHO.                 Atrial fibrillation with RVR (Multi)  Atrial fibrillation - persistent with  Ventricular rate is well controlled (in sinus rhythm). ON ELIQUIS  No Shortness of breath. Continues  Eliquis 5mg BID  Metoprolol to 50 mg BID          Benign hypertension  HTN - hypertension well/controlled .Target BP < 130/80  achieved. Educate low salt diet and exercise with weight loss. Educate home self monitoring and diary keeping. Educate risks of elevate blood pressure and benefits of prompt treatment.  Refill Amlodipine and Metoprolol         Hypercholesterolemia  Hypercholesterolemia - Monitor lipid profile and educate patient upon risks of high cholesterol and targets. Educate diet and change in lifestyle and increase in exercises - Refill:  Atorvastatin    and educate compliance with medication and diet.           Type 2 diabetes mellitus without complication, without long-term current use of insulin (Multi)  DM - NIDDM  . Reviewed with patient / Will check  HbA1c and fasting blood sugars. Educate home self monitoring and diary keeping(reviewed with patient home blood sugar levels /diary). Educate extensively low calorie diet and weight loss with exercise. Reviewed BS- diary and Rx. Regimen. [place] renal protection with ARB/ACEI.               Educate compliance with diet and Rx. And educate risks and autcomes.     Refill Metformin 500 mg BID with meals          GERD without esophagitis  GERD - Acid reflux disease. Rx. PPI (Prilosec/Prevacid/Protonix/Nexium) and educate diet and life style changes. Referred patient to an endoscopy (EGD) and check H. Pylori titers.         BPH with obstruction/lower urinary tract symptoms  High PSA and follows with urology             Chronic  renal impairment, stage 2 (mild)  CRI - Chronic Renal Insuficiency. Secondary to DM/HTN/Atherosclerosis. Follow BUN/Cr. and lytes.   RENOPROTECTION with ACEI/ARB (). Monitor microalbuminuria. Avoid hypotension and renal hypoperfusion. The patient was instructed to avoid NSAIDS and educate compliance with medications to control HTN(hypertension) and cholesterol and diabetes.                                    IgG multiple myeloma (Multi)  Monitor CBC and blood count and follow with hematology and stable now on Chemotherapy                    Immunizations/Injections      very important  Immunizations from outside sources need reconciliation.      Flu vaccine, trivalent, preservative free, HIGH-DOSE, age 65y+ (Fluzone)12/11/2020, 10/1/2018  Influenza, Unspecified9/22/2010  Influenza, seasonal, [id number]/6/2021, 9/12/2016, 9/9/2015,  ... (3 more)  Pfizer COVID-19 vaccine, 12 years and older, (30mcg/0.3mL) (Comirnaty)9/25/2024, 10/16/2023  Pfizer COVID-19 vaccine, bivalent, age 12 years and older (30 mcg/0.3 mL)3/17/2023  Pneumococcal conjugate vaccine, 13-valent (PREVNAR 13)6/23/2015  Pneumococcal conjugate vaccine, 20-valent (PREVNAR 20)1/12/2024  Pneumococcal polysaccharide vaccine, 23-valent, age 2 years and older (PNEUMOVAX 23)4/11/2018  Tdap vaccine, age 7 year and older (BOOSTRIX, ADACEL)6/23/2015  Zoster vaccine, recombinant, adult (SHINGRIX)8/16/2021, 6/16/2021

## 2025-03-26 NOTE — ASSESSMENT & PLAN NOTE
Reactive due to recent dental work vs. Multiple myeloma and refer to oncology hematology and start Augmentin 875 mg BID with food for 10 days  to treat possible dental infection

## 2025-03-26 NOTE — ASSESSMENT & PLAN NOTE
DM - NIDDM  . Reviewed with patient / Will check  HbA1c and fasting blood sugars. Educate home self monitoring and diary keeping(reviewed with patient home blood sugar levels /diary). Educate extensively low calorie diet and weight loss with exercise. Reviewed BS- diary and Rx. Regimen. [place] renal protection with ARB/ACEI.               Educate compliance with diet and Rx. And educate risks and autcomes.     Refill Metformin 500 mg BID with meals

## 2025-03-28 ENCOUNTER — TELEPHONE (OUTPATIENT)
Dept: HEMATOLOGY/ONCOLOGY | Facility: HOSPITAL | Age: 75
End: 2025-03-28

## 2025-03-28 ENCOUNTER — TELEPHONE (OUTPATIENT)
Dept: ADMISSION | Facility: HOSPITAL | Age: 75
End: 2025-03-28
Payer: MEDICARE

## 2025-03-28 NOTE — TELEPHONE ENCOUNTER
Toby said he saw his PCP on Wednesday due to a lump on right side of neck that appeared overnight. The area is tender he told the PCP.  He said he was told to let his team know about this and he was prescribed Augmentin 875-125mg tablet. Take 1 tablet (875mg) by mouth 2 times a day for 10 days. Next FUV is with Rosi Parham and Infusion is on 4/8. Messaged team.

## 2025-03-28 NOTE — TELEPHONE ENCOUNTER
I called Toby and let him know what Rosi Parham has said in her note below. He said he had a crown done two weeks ago and will finish his antibiotics. He will call nurse line back if any new or worsening symptoms. He also said after antibiotics if still there he will call and let us know.

## 2025-04-06 ASSESSMENT — ENCOUNTER SYMPTOMS
APPETITE CHANGE: 0
CHILLS: 0
CONSTIPATION: 1
NUMBNESS: 1
FATIGUE: 1
NAUSEA: 0
CARDIOVASCULAR NEGATIVE: 1
FEVER: 0
SHORTNESS OF BREATH: 1
UNEXPECTED WEIGHT CHANGE: 0
DIARRHEA: 1
VOMITING: 0
DIAPHORESIS: 1
BLOOD IN STOOL: 0
ARTHRALGIAS: 1

## 2025-04-06 NOTE — PROGRESS NOTES
Patient ID: Toby Ugalde is a 75 y.o. male.    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 3/6/17- bone survey was negative for lytic lesions.  3/15/17-  bone marrow revealed 30% plasma cells with kappa predominance. Started on Rd  - 2017 - 12/2019: Rd->R 10mg (d/t hyperGlc a/w dex so d/c'ed), best response [place]; Rd held d/t dental procedure and hip replacement, but never restarted  - 3/2022: worsening anemia since 3/22, worsening Cr since 3/21; SPE-IgGK 3.5g, K/L 147; bone survey: LT 9th rib possible plasmacytoma     Late February 2022 patient was seen by his PCP Dr. Cano for complaints of fatigue.  Labs notable for hyponatremia to 127, anemia with a HGB of 9.  Cr 1.99. Protein elevated to  10.7.  Uric acid up to 8.9.      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  Skeletal survey (3/2/22) - lesion in the left rib.   PET:    Expansile hypermetabolic lytic mass involving the left lateral 9th  rib, correlating to suspected plasmacytoma described on osseous  survey performed 03/02/2022.  2. Diffusely increased FDG uptake throughout the bone marrow which  may relate to anemia/marrow activation, with marrow involvement by  myeloma not excluded.  Treatment:   Initiated (3/29/22) treatment for his relapsed Myeloma with Revlimid 10 mg by mouth days 1-14/21 days, Daratuzumab (3/29, & 4/5/22), SC Velcade (3/29 & 4/5/22), & weekly Dexamethasone complicated by several hospitaliizations for syncope, PE ,afib due to  velcade and revlimid.  Velcade discontinued     Recv'd weekly single agent Daraumumab from 5/31 - 7/19/22. Initiated (8/25/22) Revlimid 10 mg on days 1-21/28 days.     Has had marked reduction in paraprotein and free lyte chains  with M protein : 1.4 g/dl on 4/19/22 ,    [place] free lyte/ratio of 4.49 mg/dl/4.45.    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         IgG multiple myeloma (Multi)   9/6/2023 Initial Diagnosis    IgG multiple myeloma (CMS/HCC)     10/17/2023 -  Chemotherapy    Daratumumab, 28 Day Cycles - Maintenance       Response:     Past Medical History:     Past Medical History:   Diagnosis Date    Chronic gout, unspecified, without tophus (tophi) 12/11/2024    Dental caries on pit and fissure surface penetrating into dentin 01/12/2024    Deposits (accretions) on teeth 01/12/2024    Essential (primary) hypertension 11/20/2013    Benign essential hypertension    Gout, unspecified 12/11/2024    History of hypercholesterolemia 01/12/2024    History of hypertension 01/12/2024    History of malignant neoplasm of prostate 01/12/2024    Impacted cerumen 01/12/2024    Impacted cerumen, bilateral 02/16/2024    Infective pharyngitis 01/12/2024    Open restoration margins of tooth 03/26/2025    Other intervertebral disc degeneration, lumbar region     Lumbar degenerative disc disease    Overweight with body mass index (BMI) 25.0-29.9 02/02/2022    Partial loss of teeth due to caries, class I 01/12/2024    Personal history of malignant neoplasm of prostate     History of malignant neoplasm of prostate    Personal history of other diseases of the circulatory system     History of hypertension    Personal history of other diseases of the circulatory system 06/29/2022    History of atrial tachycardia    Personal history of other diseases of the circulatory system     History of essential hypertension    Personal history of other diseases of the musculoskeletal system and connective tissue     History of arthritis    Personal history of other diseases of the musculoskeletal  system and connective tissue     History of acute gouty arthritis    Personal history of other diseases of the musculoskeletal system and connective tissue 01/30/2019    History of left foot drop    Personal history of other diseases of the respiratory system     History of bronchitis    Personal history of other endocrine, nutritional and metabolic disease     History of hypercholesterolemia    Personal history of other endocrine, nutritional and metabolic disease     History of diabetes mellitus    Personal history of other endocrine, nutritional and metabolic disease 01/17/2014    History of diabetes mellitus    Post-traumatic stress disorder, chronic 11/10/2023    Primary localized osteoarthritis of pelvic region and thigh 12/17/2018    Pure hypercholesterolemia, unspecified 11/20/2013    Low-density-lipoid-type (LDL) hyperlipoproteinemia    Snoring     Snoring     Surgical History:     Past Surgical History:   Procedure Laterality Date    BACK SURGERY  11/20/2013    Back Surgery    TONSILLECTOMY  11/20/2013    Tonsillectomy    TOTAL KNEE ARTHROPLASTY  11/20/2013    Knee Replacement      Family History:     Family History   Problem Relation Name Age of Onset    Heart attack Mother      Diabetes Brother      Stroke Maternal Grandfather       Social History:  Reported history of agent orange exposure while in [employer] at [employer]. Retired  and post .  from his wife who is health care  power of .  Two sons and 2 grandchildren (1)      Social History     Tobacco Use    Smoking status: Never     Passive exposure: Never    Smokeless tobacco: Never   Substance Use Topics    Alcohol use: Yes     Comment: occasional    Drug use: Never      -------------------------------------------------------------------------------------------------------  Subjective       HPI    KANDI ROSE is a 75 year old Male h/o pAfib, T2DM, HTN, gout, IgG kappa MM.  Still not clear whether patient has  ONKENNA.  [facility].  Patient states he had a bone density 11/23 which does not show osteopenia.    Toby presents to the clinic today (4/8/25) with his wife for follow up evaulation of his multiple myeloma, blood work, and is scheduled to receive C31 of monthly subcutaneous daratumumab.  Remains on lenalidomide 5 mg daily on days 1-21 of 28 day cycle.  Today, he is on around day 16 of his 28 day cycle of lenalidomide.  [facility]  Started new revlimid cycle last Thursday or Friday.      Reports a new enlarged lymph node to right neck that he noticed around 4/22/25.  Completed course of 10 day of Augmentin prescribed by primary care provider.  Lymph node no longer feels tender and has slightly gotten smaller after course of Augmentin.  Had dental work completed in March to upper right tooth as he was having a bad metal taste in his mouth.  He still needs to have crown placed on April 16th.   Was not told he had an infection to mouth.  Currently has no tooth discomfort.  [facility]  No fevers or chills.  No history of smoking.    Energy level is up and down.  Intermittent night sweats.  Appetite is also up and down.  Weight is stable.  Uses cane for ambulation.  Continues to have shortness of breath on exertion.  Bowels chronically rotate between diarrhea and constipation.  Takes prn imodium and stool softener.  May have cortisol injection left hip with previously plan to eventually have left hip replacement.  Numbness and tingling to bilateral feet and hands, doesn't happen as often as it was. Following with cardiology.  Continues to take apixaban 5 mg BID.  Glucose levels running 130-140s, can run higher after getting steroids with treatment.    Review of Systems   Constitutional:  Positive for diaphoresis (occassional night sweats about once per week to 2 weeks, can be drenching) and fatigue. Negative for appetite  change, chills, fever and unexpected weight change.   Respiratory:  Positive for shortness of breath (on exertion).    Cardiovascular: Negative.    Gastrointestinal:  Positive for constipation and diarrhea. Negative for blood in stool, nausea and vomiting.   Genitourinary: Negative.     Musculoskeletal:  Positive for arthralgias. Negative for gait problem (uses cane).   Skin: Negative.    Neurological:  Positive for numbness. Negative for gait problem (uses cane).   Hematological:  Positive for adenopathy.   All other systems reviewed and are negative.  -------------------------------------------------------------------------------------------------------  Objective   BSA: 2.09 meters squared  /81   Pulse 89   Temp 36.4 °C (97.5 °F)   Resp 18   Wt 88.2 kg (194 lb 7.1 oz)   SpO2 96%   BMI 27.90 kg/m²     Physical Exam  Vitals reviewed.   Constitutional:       Appearance: Normal appearance.   HENT:      Head: Normocephalic and atraumatic.        Comments: Enlarged firm lymph node 4x3 cm to right neck, non-tender.     Mouth/Throat:      Mouth: Mucous membranes are moist.   Eyes:      Extraocular Movements: Extraocular movements intact.      Conjunctiva/sclera: Conjunctivae normal.      Pupils: Pupils are equal, round, and reactive to light.   Cardiovascular:      Rate and Rhythm: Normal rate and regular rhythm.      Pulses: Normal pulses.      Heart sounds: Normal heart sounds.   Pulmonary:      Effort: Pulmonary effort is normal.      Breath sounds: Normal breath sounds.   Abdominal:      General: Abdomen is flat. Bowel sounds are normal.      Palpations: Abdomen is soft. There is no mass.      Tenderness: There is no abdominal tenderness.   Musculoskeletal:         General: No swelling. Normal range of motion.   Lymphadenopathy:      Comments: No lymphadenopathy   Skin:     General: Skin is warm and dry.   Neurological:      General: No focal deficit present.      Mental Status: He is alert and oriented  to person, place, and time.   Psychiatric:         Mood and Affect: Mood normal.         Behavior: Behavior normal.         Thought Content: Thought content normal.         Judgment: Judgment normal.     Performance Status:  Symptomatic; fully ambulatory  -------------------------------------------------------------------------------------------------------  Assessment/Plan      74 yo M h/o IgG kappa MM s/p Rd [id number] best response [place] stopped d/t dental procedure, presented on 3/2022 for worsening anemia/Cr d/t relapsed MM, and likely LT 9th rib plasmacytoma.      Restaging 3/2022 shows high burden disease with 90% tumorous involvement of the bone marrow ,   Initiated (3/29/22) treatment for his relapsed Myeloma with Revlimid 10 mg by mouth days 1-14/21 days, Daratuzumab (3/29, & 4/5/22), SC Velcade (3/29 & 4/5/22), & weekly Dexamethasone. complicated by several hospilizations for syncope, PE ,afib due to velcade and revlimid.    Recv'd weekly single agent Daraumumab from 5/31 - 7/19/22. Initiated (8/25/22) Revlimid 10 mg on days 1-21/28 days.     Will continue monthly casey, rev, & dex until evidence of disease progression or unacceptable toxicity.    4/8/25:  CBC results from today are stable from today.  Kappa and lambda free light chains stable from 3/11/25, level in process from today.  M-protein stable at 0.2 (3/11/25), level in process from today.  See enlarged LN below. Scheduled to receive C31 daratumumab today.  Continue revlimid 5 mg daily on days 1-21 out of 28 day cycle.  Follow up visit in 2 months.    Ig [place] Free Light Chain   Date Value Ref Range Status   03/11/2025 2.40 (H) 0.33 - 1.94 mg/dL Final   02/11/2025 2.51 (H) 0.33 - 1.94 mg/dL Final   01/14/2025 2.79 (H) 0.33 - 1.94 mg/dL Final   12/17/2024 2.37 (H) 0.33 - 1.94 mg/dL Final   11/19/2024 1.82 0.33 - 1.94 mg/dL Final     Ig Lambda Free Light Chain   Date Value Ref Range Status   03/11/2025 0.81 0.57 - 2.63 mg/dL Final   02/11/2025 0.76  0.57 - 2.63 mg/dL Final   01/14/2025 0.85 0.57 - 2.63 mg/dL Final   12/17/2024 0.91 0.57 - 2.63 mg/dL Final   11/19/2024 0.75 0.57 - 2.63 mg/dL Final     Kappa/Lambda Ratio   Date Value Ref Range Status   03/11/2025 2.96 (H) 0.26 - 1.65 Final   02/11/2025 3.30 (H) 0.26 - 1.65 Final   01/14/2025 3.28 (H) 0.26 - 1.65 Final   12/17/2024 2.60 (H) 0.26 - 1.65 Final   11/19/2024 2.43 (H) 0.26 - 1.65 Final     M-PROTEIN 1   Date Value Ref Range Status   03/11/2025 0.2 (H)   g/dL Final   02/11/2025 0.1 (H)   g/dL Final   01/14/2025 0.1 (H)   g/dL Final   12/17/2024 0.1 (H)   g/dL Final   11/19/2024 0.1 (H)   g/dL Final      Enlarged Right Cervical Lymph Node:  4/8/25:  Toby reports new enlarged right cervical lymph node that he noted around 4/22/25.  Recently had dental work completed in March, needs to have crown replaced on 4/16/25.  PCP prescribed 10 day course of Augmentin.  Toby reports slight decrease in LN size and tenderness resolved after completing Augmentin.  On examination today right cervical LN 4x3 cm in size, firm, non-tender.  Dr. Waller evaluated patient today.  Plan for ENT referral for evaluation and LN biopsy.  Plan for CT scan of neck with IV contrast.  POCT creatinine to be obtained prior to scan and advised Toby to hold metformin for 3 days before and 3 days following CT scan.  Advised Toby to contact oncology team with worsening or new symptom concerns.      #Atrial fib on chronic anticoagulation  on Eliquis 5 mg by mouth twice a day.  Patient confirmed on 50 mg by mouth twice a day of metoprolol.  Follows with Dr. Moon and Dr. Garcia.  Recently (10/22/24) has been feeling more SOB - advised to reach out to cardiology  Cardioversion planned for 11/14/24 but was cancelled as Toby was back in [facility].  Following with Dr. Garcia and Dr. Moon.  Currently on eliquis 5 mg BID and metoprolol 100 mg BID.    PE (4/9/22) - Small occlusive embolic thrombus within the right lower lobe pulmonary artery branch,  medially and posteriorly.  Cont Eliquis 5 mg twice a day.     Bone health:  Xgeva held due to concerns of ONJ and prolonged treatment in the past.  Bone density December 2023 was normal.  Hold for now.      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  Pt had his tooth pulled in 2019, however with prolonged bone exposure/infection, eventually underwent OMSF surgery in 10/2021. Unclear if he has denosumab assoc jaw necrosis. Patient finally had his bad tooth (L upper molar) removed on 5/25/23. [facility]  Has dentures now.     Healthcare Maintenance:  Received shingrix 6/16/21 and 8/16/21.  Received pneumococcal conjugate 1/12/24, updated covid and influenza vaccines 9/25/24, and RSV vaccine 9/30/24.    Social Work:  4/30/24: Requested to have DWIGHT Dominguez OSWUlyssesC contact patient regarding his medical bills.   8/20/24:  Filled out applications for grants for [employer] and [employer].  Reports he does not need assistance for medical paperwork or bills today.    Reports he does not need assistance with social work today.     RTC:   ENT consult and CT scan neck for enlarged LN.  5/6/25:  Infusion appt for C32 casey  6/3/25: Follow up visit with provider and C33 casey.  -------------------------------------------------------------------------------------------------------  ESPINOZA Novak

## 2025-04-07 RX ORDER — ALBUTEROL SULFATE 0.83 MG/ML
3 SOLUTION RESPIRATORY (INHALATION) AS NEEDED
Status: CANCELLED | OUTPATIENT
Start: 2025-04-08

## 2025-04-07 RX ORDER — MONTELUKAST SODIUM 10 MG/1
10 TABLET ORAL ONCE
Status: CANCELLED | OUTPATIENT
Start: 2025-04-08

## 2025-04-07 RX ORDER — ACETAMINOPHEN 325 MG/1
650 TABLET ORAL ONCE
Status: CANCELLED | OUTPATIENT
Start: 2025-04-08

## 2025-04-07 RX ORDER — DIPHENHYDRAMINE HYDROCHLORIDE 50 MG/ML
50 INJECTION, SOLUTION INTRAMUSCULAR; INTRAVENOUS AS NEEDED
Status: CANCELLED | OUTPATIENT
Start: 2025-04-08

## 2025-04-07 RX ORDER — DIPHENHYDRAMINE HCL 50 MG
50 CAPSULE ORAL ONCE
Status: CANCELLED | OUTPATIENT
Start: 2025-04-08

## 2025-04-07 RX ORDER — FAMOTIDINE 10 MG/ML
20 INJECTION, SOLUTION INTRAVENOUS ONCE AS NEEDED
Status: CANCELLED | OUTPATIENT
Start: 2025-04-08

## 2025-04-07 RX ORDER — EPINEPHRINE 0.3 MG/.3ML
0.3 INJECTION SUBCUTANEOUS EVERY 5 MIN PRN
Status: CANCELLED | OUTPATIENT
Start: 2025-04-08

## 2025-04-08 ENCOUNTER — INFUSION (OUTPATIENT)
Dept: HEMATOLOGY/ONCOLOGY | Facility: CLINIC | Age: 75
End: 2025-04-08
Payer: MEDICARE

## 2025-04-08 ENCOUNTER — OFFICE VISIT (OUTPATIENT)
Dept: HEMATOLOGY/ONCOLOGY | Facility: CLINIC | Age: 75
End: 2025-04-08
Payer: MEDICARE

## 2025-04-08 ENCOUNTER — LAB (OUTPATIENT)
Dept: LAB | Facility: CLINIC | Age: 75
End: 2025-04-08
Payer: MEDICARE

## 2025-04-08 VITALS
WEIGHT: 194.45 LBS | BODY MASS INDEX: 27.9 KG/M2 | DIASTOLIC BLOOD PRESSURE: 81 MMHG | TEMPERATURE: 97.5 F | OXYGEN SATURATION: 96 % | RESPIRATION RATE: 18 BRPM | SYSTOLIC BLOOD PRESSURE: 141 MMHG | HEART RATE: 89 BPM

## 2025-04-08 DIAGNOSIS — C90.00 IGG MULTIPLE MYELOMA (MULTI): ICD-10-CM

## 2025-04-08 DIAGNOSIS — I48.0 PAROXYSMAL ATRIAL FIBRILLATION (MULTI): ICD-10-CM

## 2025-04-08 DIAGNOSIS — Z51.12 ENCOUNTER FOR MONOCLONAL ANTIBODY TREATMENT FOR MALIGNANCY: ICD-10-CM

## 2025-04-08 DIAGNOSIS — Z86.711 HISTORY OF PULMONARY EMBOLISM: ICD-10-CM

## 2025-04-08 DIAGNOSIS — R59.0 ENLARGED LYMPH NODE IN NECK: ICD-10-CM

## 2025-04-08 DIAGNOSIS — M27.2 OSTEOMYELITIS OF MANDIBLE: ICD-10-CM

## 2025-04-08 DIAGNOSIS — C90.00 IGG MULTIPLE MYELOMA (MULTI): Primary | ICD-10-CM

## 2025-04-08 LAB
ALBUMIN SERPL BCP-MCNC: 4.3 G/DL (ref 3.4–5)
ALP SERPL-CCNC: 72 U/L (ref 33–136)
ALT SERPL W P-5'-P-CCNC: 15 U/L (ref 10–52)
ANION GAP SERPL CALC-SCNC: 17 MMOL/L (ref 10–20)
AST SERPL W P-5'-P-CCNC: 13 U/L (ref 9–39)
BASOPHILS # BLD AUTO: 0.02 X10*3/UL (ref 0–0.1)
BASOPHILS NFR BLD AUTO: 0.3 %
BILIRUB SERPL-MCNC: 0.8 MG/DL (ref 0–1.2)
BUN SERPL-MCNC: 20 MG/DL (ref 6–23)
CALCIUM SERPL-MCNC: 8.9 MG/DL (ref 8.6–10.6)
CHLORIDE SERPL-SCNC: 108 MMOL/L (ref 98–107)
CO2 SERPL-SCNC: 21 MMOL/L (ref 21–32)
CREAT SERPL-MCNC: 1.47 MG/DL (ref 0.5–1.3)
EGFRCR SERPLBLD CKD-EPI 2021: 49 ML/MIN/1.73M*2
EOSINOPHIL # BLD AUTO: 0.14 X10*3/UL (ref 0–0.4)
EOSINOPHIL NFR BLD AUTO: 2.4 %
ERYTHROCYTE [DISTWIDTH] IN BLOOD BY AUTOMATED COUNT: 13.9 % (ref 11.5–14.5)
GLUCOSE SERPL-MCNC: 127 MG/DL (ref 74–99)
HCT VFR BLD AUTO: 34.9 % (ref 41–52)
HGB BLD-MCNC: 11.8 G/DL (ref 13.5–17.5)
IMM GRANULOCYTES # BLD AUTO: 0.01 X10*3/UL (ref 0–0.5)
IMM GRANULOCYTES NFR BLD AUTO: 0.2 % (ref 0–0.9)
LYMPHOCYTES # BLD AUTO: 3.57 X10*3/UL (ref 0.8–3)
LYMPHOCYTES NFR BLD AUTO: 60.3 %
MCH RBC QN AUTO: 31.1 PG (ref 26–34)
MCHC RBC AUTO-ENTMCNC: 33.8 G/DL (ref 32–36)
MCV RBC AUTO: 92 FL (ref 80–100)
MONOCYTES # BLD AUTO: 0.94 X10*3/UL (ref 0.05–0.8)
MONOCYTES NFR BLD AUTO: 15.9 %
NEUTROPHILS # BLD AUTO: 1.24 X10*3/UL (ref 1.6–5.5)
NEUTROPHILS NFR BLD AUTO: 20.9 %
NRBC BLD-RTO: ABNORMAL /100{WBCS}
PLATELET # BLD AUTO: 183 X10*3/UL (ref 150–450)
POTASSIUM SERPL-SCNC: 3.1 MMOL/L (ref 3.5–5.3)
PROT SERPL-MCNC: 6.4 G/DL (ref 6.4–8.2)
PROT SERPL-MCNC: 6.4 G/DL (ref 6.4–8.2)
RBC # BLD AUTO: 3.8 X10*6/UL (ref 4.5–5.9)
SODIUM SERPL-SCNC: 143 MMOL/L (ref 136–145)
WBC # BLD AUTO: 5.9 X10*3/UL (ref 4.4–11.3)

## 2025-04-08 PROCEDURE — 2500000001 HC RX 250 WO HCPCS SELF ADMINISTERED DRUGS (ALT 637 FOR MEDICARE OP): Performed by: INTERNAL MEDICINE

## 2025-04-08 PROCEDURE — 80053 COMPREHEN METABOLIC PANEL: CPT

## 2025-04-08 PROCEDURE — 3077F SYST BP >= 140 MM HG: CPT

## 2025-04-08 PROCEDURE — 1123F ACP DISCUSS/DSCN MKR DOCD: CPT

## 2025-04-08 PROCEDURE — 1125F AMNT PAIN NOTED PAIN PRSNT: CPT

## 2025-04-08 PROCEDURE — 3079F DIAST BP 80-89 MM HG: CPT

## 2025-04-08 PROCEDURE — 84165 PROTEIN E-PHORESIS SERUM: CPT

## 2025-04-08 PROCEDURE — 1036F TOBACCO NON-USER: CPT

## 2025-04-08 PROCEDURE — 1160F RVW MEDS BY RX/DR IN RCRD: CPT

## 2025-04-08 PROCEDURE — 82784 ASSAY IGA/IGD/IGG/IGM EACH: CPT

## 2025-04-08 PROCEDURE — 99215 OFFICE O/P EST HI 40 MIN: CPT

## 2025-04-08 PROCEDURE — 83521 IG LIGHT CHAINS FREE EACH: CPT

## 2025-04-08 PROCEDURE — 96401 CHEMO ANTI-NEOPL SQ/IM: CPT

## 2025-04-08 PROCEDURE — 2500000004 HC RX 250 GENERAL PHARMACY W/ HCPCS (ALT 636 FOR OP/ED): Mod: JZ,TB | Performed by: INTERNAL MEDICINE

## 2025-04-08 PROCEDURE — 85025 COMPLETE CBC W/AUTO DIFF WBC: CPT

## 2025-04-08 PROCEDURE — 2500000004 HC RX 250 GENERAL PHARMACY W/ HCPCS (ALT 636 FOR OP/ED): Performed by: INTERNAL MEDICINE

## 2025-04-08 PROCEDURE — 84155 ASSAY OF PROTEIN SERUM: CPT

## 2025-04-08 PROCEDURE — 36415 COLL VENOUS BLD VENIPUNCTURE: CPT

## 2025-04-08 PROCEDURE — 1159F MED LIST DOCD IN RCRD: CPT

## 2025-04-08 RX ORDER — DIPHENHYDRAMINE HYDROCHLORIDE 50 MG/ML
50 INJECTION, SOLUTION INTRAMUSCULAR; INTRAVENOUS AS NEEDED
Status: DISCONTINUED | OUTPATIENT
Start: 2025-04-08 | End: 2025-04-08 | Stop reason: HOSPADM

## 2025-04-08 RX ORDER — FAMOTIDINE 10 MG/ML
20 INJECTION, SOLUTION INTRAVENOUS ONCE AS NEEDED
Status: DISCONTINUED | OUTPATIENT
Start: 2025-04-08 | End: 2025-04-08 | Stop reason: HOSPADM

## 2025-04-08 RX ORDER — DIPHENHYDRAMINE HCL 50 MG
50 CAPSULE ORAL ONCE
Status: COMPLETED | OUTPATIENT
Start: 2025-04-08 | End: 2025-04-08

## 2025-04-08 RX ORDER — MONTELUKAST SODIUM 10 MG/1
10 TABLET ORAL ONCE
Status: COMPLETED | OUTPATIENT
Start: 2025-04-08 | End: 2025-04-08

## 2025-04-08 RX ORDER — ACETAMINOPHEN 325 MG/1
650 TABLET ORAL ONCE
Status: COMPLETED | OUTPATIENT
Start: 2025-04-08 | End: 2025-04-08

## 2025-04-08 RX ORDER — ALBUTEROL SULFATE 0.83 MG/ML
3 SOLUTION RESPIRATORY (INHALATION) AS NEEDED
Status: DISCONTINUED | OUTPATIENT
Start: 2025-04-08 | End: 2025-04-08 | Stop reason: HOSPADM

## 2025-04-08 RX ORDER — EPINEPHRINE 0.3 MG/.3ML
0.3 INJECTION SUBCUTANEOUS EVERY 5 MIN PRN
Status: DISCONTINUED | OUTPATIENT
Start: 2025-04-08 | End: 2025-04-08 | Stop reason: HOSPADM

## 2025-04-08 RX ADMIN — ACETAMINOPHEN 650 MG: 325 TABLET ORAL at 11:13

## 2025-04-08 RX ADMIN — DARATUMUMAB AND HYALURONIDASE-FIHJ (HUMAN RECOMBINANT) 1800 MG: 1800; 30000 INJECTION SUBCUTANEOUS at 11:51

## 2025-04-08 RX ADMIN — DEXAMETHASONE 20 MG: 6 TABLET ORAL at 11:13

## 2025-04-08 RX ADMIN — DIPHENHYDRAMINE HYDROCHLORIDE 50 MG: 50 CAPSULE ORAL at 11:13

## 2025-04-08 RX ADMIN — MONTELUKAST 10 MG: 10 TABLET, FILM COATED ORAL at 11:13

## 2025-04-08 ASSESSMENT — PAIN SCALES - GENERAL: PAINLEVEL_OUTOF10: 2

## 2025-04-08 ASSESSMENT — ENCOUNTER SYMPTOMS: ADENOPATHY: 1

## 2025-04-09 LAB
ALBUMIN: 3.8 G/DL (ref 3.4–5)
ALPHA 1 GLOBULIN: 0.3 G/DL (ref 0.2–0.6)
ALPHA 2 GLOBULIN: 0.9 G/DL (ref 0.4–1.1)
BETA GLOBULIN: 0.8 G/DL (ref 0.5–1.2)
GAMMA GLOBULIN: 0.6 G/DL (ref 0.5–1.4)
IGA SERPL-MCNC: 54 MG/DL (ref 70–400)
IGG SERPL-MCNC: 603 MG/DL (ref 700–1600)
IGM SERPL-MCNC: 10 MG/DL (ref 40–230)
KAPPA LC SERPL-MCNC: 2.75 MG/DL (ref 0.33–1.94)
KAPPA LC/LAMBDA SER: 2.78 {RATIO} (ref 0.26–1.65)
LAMBDA LC SERPL-MCNC: 0.99 MG/DL (ref 0.57–2.63)
M-PROTEIN 1: 0.1 G/DL
PATH REVIEW-SERUM PROTEIN ELECTROPHORESIS: ABNORMAL
PROTEIN ELECTROPHORESIS COMMENT: ABNORMAL

## 2025-04-11 ENCOUNTER — OFFICE VISIT (OUTPATIENT)
Dept: OTOLARYNGOLOGY | Facility: HOSPITAL | Age: 75
End: 2025-04-11
Payer: MEDICARE

## 2025-04-11 DIAGNOSIS — R59.0 ENLARGED LYMPH NODE IN NECK: ICD-10-CM

## 2025-04-11 PROCEDURE — 99214 OFFICE O/P EST MOD 30 MIN: CPT | Mod: 25 | Performed by: STUDENT IN AN ORGANIZED HEALTH CARE EDUCATION/TRAINING PROGRAM

## 2025-04-11 PROCEDURE — 99204 OFFICE O/P NEW MOD 45 MIN: CPT | Performed by: STUDENT IN AN ORGANIZED HEALTH CARE EDUCATION/TRAINING PROGRAM

## 2025-04-11 PROCEDURE — 31575 DIAGNOSTIC LARYNGOSCOPY: CPT | Performed by: STUDENT IN AN ORGANIZED HEALTH CARE EDUCATION/TRAINING PROGRAM

## 2025-04-11 PROCEDURE — 10021 FNA BX W/O IMG GDN 1ST LES: CPT | Mod: 25 | Performed by: STUDENT IN AN ORGANIZED HEALTH CARE EDUCATION/TRAINING PROGRAM

## 2025-04-11 PROCEDURE — 31575 DIAGNOSTIC LARYNGOSCOPY: CPT | Mod: 25 | Performed by: STUDENT IN AN ORGANIZED HEALTH CARE EDUCATION/TRAINING PROGRAM

## 2025-04-11 PROCEDURE — 1159F MED LIST DOCD IN RCRD: CPT | Performed by: STUDENT IN AN ORGANIZED HEALTH CARE EDUCATION/TRAINING PROGRAM

## 2025-04-11 PROCEDURE — 1123F ACP DISCUSS/DSCN MKR DOCD: CPT | Performed by: STUDENT IN AN ORGANIZED HEALTH CARE EDUCATION/TRAINING PROGRAM

## 2025-04-11 PROCEDURE — 88112 CYTOPATH CELL ENHANCE TECH: CPT | Mod: TC,MCY | Performed by: STUDENT IN AN ORGANIZED HEALTH CARE EDUCATION/TRAINING PROGRAM

## 2025-04-11 PROCEDURE — 10021 FNA BX W/O IMG GDN 1ST LES: CPT | Performed by: STUDENT IN AN ORGANIZED HEALTH CARE EDUCATION/TRAINING PROGRAM

## 2025-04-11 ASSESSMENT — PATIENT HEALTH QUESTIONNAIRE - PHQ9
SUM OF ALL RESPONSES TO PHQ9 QUESTIONS 1 AND 2: 0
1. LITTLE INTEREST OR PLEASURE IN DOING THINGS: NOT AT ALL
2. FEELING DOWN, DEPRESSED OR HOPELESS: NOT AT ALL

## 2025-04-11 NOTE — PROGRESS NOTES
HEAD AND NECK SURGERY CONSULT  [facility]    Referring Provider: Prasad PLAZA  I had the pleasure of seeing Toby Ugalde as a consultation today for evaluation of a new right-sided neck lymph node.     The patient reports a new right-sided neck mass that has been present for several weeks. The mass was originally a few centimeters in size and tender. His PCP prescribed a 10 day course of amoxicillin. The mass no longer feels tender and has gotten slightly smaller since completion of antibiotics. He denies changes in swallow function or his voice. Of note, he did have dental work on his upper teeth 2-3 weeks before he noticed the mass. He denies a history of smoking. He was exposed to agent orange in [place]. He has a history of right-sided osteonecrosis of the jaw, treated by [facility] in 2021.    The patient denies having prior trauma or surgery to their head and neck. There is a personal or family history of blood clots, easy bleeding or bruising, or anesthesia concerns. He had a PE in April 2022, currently on Eliquis 5mg BID.    Tobacco use: The patient  reports that he has never smoked. He has never been exposed to tobacco smoke. He has never used smokeless tobacco.   Alcohol Use: The patient  reports current alcohol use.     Physical Examination  General: Examination reveals a well-developed, well-nourished patient in no apparent distress.  The patient has no audible dysphonia, stridor or airway distress.  The patient is oriented, alert and responsive.    Ears: Bilateral EAC patent  Face: no lesions of the face, symmetric movement  Oral Cavity:  The patient is able to open the mouth widely without trismus.  The floor of mouth and oral tongue are soft, and no mucosal abnormalities are noted on the lips or within the oral cavity. The oral tongue is fully mobile and midline on protrusion.  The patient has right-sided inferior dentures.  Oropharynx: There are no mucosal abnormalities noted within the  oropharynx.  The soft palate elevates symmetrically, the tonsils and base of tongue are normal to inspection and palpation.  Salivary glands: There are no palpable masses of the parotid or submandibular glands.   Neuro: Cranial nerves 2-12 are without obvious abnormality.  Neck: The neck is soft. There is a palpable non-tender right-sided cervical lymph node, approximately 3 cm, firm, mobile     Procedure Note:  Diagnostic Flexible Laryngoscopy (71041)  Indication: patient symptoms requiring evaluation of pharyngeal/laryngeal/hypopharyngeal structures  Surgeon: Vanita Fernando MD  Informed Consent: The procedure, risks, and benefits were discussed with the patient and verbal consent obtained to proceed.   Procedure: Topical anesthesia (lidocaine) was used to spray the nasal mucosa.  A flexible laryngoscopy was inserted through the nasal cavity. The scope was then passed through the nasopharynx, oropharynx, and supraglottis. The vallecula, hypopharynx, supraglottis, glottis and subglottis were then visualized. The scope was then removed. The patient tolerated the procedure with no complications.     Findings: All of the visualized areas noted above were noted to be without evidence of lesions, ulcerations or masses.  The vocal folds adduct and abduct fully. Post-treatment changes were noted throughout with no masses or lesions.    Attestation: I performed the procedure in its entirety, or was present with the resident/fellow for the entirety of the procedure.    DATA REVIEWED:  Labs:  Lab Results   Component Value Date    WBC 5.9 04/08/2025    HGB 11.8 (L) 04/08/2025    HCT 34.9 (L) 04/08/2025     04/08/2025    NEUTROABS 1.24 (L) 04/08/2025     Lab Results   Component Value Date    TSH 1.28 05/09/2022    HGBA1C 7.7 (A) 02/28/2022        Review of prior medical records: I reviewed the patient's medical records which included a clinic note from Dr. Parham dated 4/8/25 in which he detailed the ongoing  treatment for multiple myeloma with daratumumab and revlimid.      Procedure Note:  Fine needle aspiration biopsy (99443)  Indication: biopsy of right neck node  Surgeon: Vanita Fernando MD  Informed consent: The procedure, risks, benefits were discussed with the patient and verbal consent obtained to proceed  Procedure: The skin was cleaned. Topical anesthesia injected superficially into the skin with 1:100,000 lidocaine with epinephrine. The lymph node was palpated. A 22 gauge needle on a syringe was used to make multiple passes through the lymph node. This was repeated with a second needle/syringe. The patient tolerated the procedure with no complications.    Attestation: I performed the procedure in its entirety, or was present with the resident/fellow for the entirety of the procedure.        ASSESSMENT and PLAN:    Enlarged right-sided cervical lymph node  - Discussed differential diagnosis, including benign and malignant etiologies. Could be related to known diagnosis of MM, infection or new head and neck primary, although head and neck exam including flexible scope does not reveal an obvious lesion  - Biopsy completed in the office today, awaiting pathology results. If insufficient for diagnosis will refer for image-guided core needle biopsy  - Agree with CT neck  with contrast, this is scheduled 4/22/25    I will call him with pathology results    Vanita Fernando MD

## 2025-04-11 NOTE — LETTER
April 14, 2025     EWELINA Novak-CNP  [id number]  [facility] [address]    Patient: Toby gUalde   YOB: 1950   Date of Visit: 4/11/2025       Dear Dr. Rosi Parham, APRN-CNP:    Thank you for referring Toby Ugalde to me for evaluation. Below are my notes for this consultation.  If you have questions, please do not hesitate to call me. I look forward to following your patient along with you.       Sincerely,     Vanita Fernando MD      CC: No Recipients  ______________________________________________________________________________________    HEAD AND NECK SURGERY CONSULT  [facility]    Referring Provider: Prasad PLAZA  I had the pleasure of seeing Toby Ugalde as a consultation today for evaluation of a new right-sided neck lymph node.     The patient reports a new right-sided neck mass that has been present for several weeks. The mass was originally a few centimeters in size and tender. His PCP prescribed a 10 day course of amoxicillin. The mass no longer feels tender and has gotten slightly smaller since completion of antibiotics. He denies changes in swallow function or his voice. Of note, he did have dental work on his upper teeth 2-3 weeks before he noticed the mass. He denies a history of smoking. He was exposed to agent orange in [place]. He has a history of right-sided osteonecrosis of the jaw, treated by [facility] in 2021.    The patient denies having prior trauma or surgery to their head and neck. There is a personal or family history of blood clots, easy bleeding or bruising, or anesthesia concerns. He had a PE in April 2022, currently on Eliquis 5mg BID.    Tobacco use: The patient  reports that he has never smoked. He has never been exposed to tobacco smoke. He has never used smokeless tobacco.   Alcohol Use: The patient  reports current alcohol use.     Physical Examination  General: Examination reveals a well-developed, well-nourished patient in no apparent  distress.  The patient has no audible dysphonia, stridor or airway distress.  The patient is oriented, alert and responsive.    Ears: Bilateral EAC patent  Face: no lesions of the face, symmetric movement  Oral Cavity:  The patient is able to open the mouth widely without trismus.  The floor of mouth and oral tongue are soft, and no mucosal abnormalities are noted on the lips or within the oral cavity. The oral tongue is fully mobile and midline on protrusion.  The patient has right-sided inferior dentures.  Oropharynx: There are no mucosal abnormalities noted within the oropharynx.  The soft palate elevates symmetrically, the tonsils and base of tongue are normal to inspection and palpation.  Salivary glands: There are no palpable masses of the parotid or submandibular glands.   Neuro: Cranial nerves 2-12 are without obvious abnormality.  Neck: The neck is soft. There is a palpable non-tender right-sided cervical lymph node, approximately 3 cm, firm, mobile     Procedure Note:  Diagnostic Flexible Laryngoscopy (87368)  Indication: patient symptoms requiring evaluation of pharyngeal/laryngeal/hypopharyngeal structures  Surgeon: Vanita Fernando MD  Informed Consent: The procedure, risks, and benefits were discussed with the patient and verbal consent obtained to proceed.   Procedure: Topical anesthesia (lidocaine) was used to spray the nasal mucosa.  A flexible laryngoscopy was inserted through the nasal cavity. The scope was then passed through the nasopharynx, oropharynx, and supraglottis. The vallecula, hypopharynx, supraglottis, glottis and subglottis were then visualized. The scope was then removed. The patient tolerated the procedure with no complications.     Findings: All of the visualized areas noted above were noted to be without evidence of lesions, ulcerations or masses.  The vocal folds adduct and abduct fully. Post-treatment changes were noted throughout with no masses or lesions.    Attestation: I  performed the procedure in its entirety, or was present with the resident/fellow for the entirety of the procedure.    DATA REVIEWED:  Labs:  Lab Results   Component Value Date    WBC 5.9 04/08/2025    HGB 11.8 (L) 04/08/2025    HCT 34.9 (L) 04/08/2025     04/08/2025    NEUTROABS 1.24 (L) 04/08/2025     Lab Results   Component Value Date    TSH 1.28 05/09/2022    HGBA1C 7.7 (A) 02/28/2022        Review of prior medical records: I reviewed the patient's medical records which included a clinic note from Dr. Parham dated 4/8/25 in which he detailed the ongoing treatment for multiple myeloma with daratumumab and revlimid.      Procedure Note:  Fine needle aspiration biopsy (77634)  Indication: biopsy of right neck node  Surgeon: Vanita Fernando MD  Informed consent: The procedure, risks, benefits were discussed with the patient and verbal consent obtained to proceed  Procedure: The skin was cleaned. Topical anesthesia injected superficially into the skin with 1:100,000 lidocaine with epinephrine. The lymph node was palpated. A 22 gauge needle on a syringe was used to make multiple passes through the lymph node. This was repeated with a second needle/syringe. The patient tolerated the procedure with no complications.    Attestation: I performed the procedure in its entirety, or was present with the resident/fellow for the entirety of the procedure.        ASSESSMENT and PLAN:    Enlarged right-sided cervical lymph node  - Discussed differential diagnosis, including benign and malignant etiologies. Could be related to known diagnosis of MM, infection or new head and neck primary, although head and neck exam including flexible scope does not reveal an obvious lesion  - Biopsy completed in the office today, awaiting pathology results. If insufficient for diagnosis will refer for image-guided core needle biopsy  - Agree with CT neck  with contrast, this is scheduled 4/22/25    I will call him with pathology  results    Vanita Fernando MD

## 2025-04-14 LAB
LABORATORY COMMENT REPORT: NORMAL
LABORATORY COMMENT REPORT: NORMAL
PATH REPORT.FINAL DX SPEC: NORMAL
PATH REPORT.GROSS SPEC: NORMAL
PATH REPORT.RELEVANT HX SPEC: NORMAL
PATH REPORT.TOTAL CANCER: NORMAL

## 2025-04-15 ENCOUNTER — TELEPHONE (OUTPATIENT)
Dept: HEMATOLOGY/ONCOLOGY | Facility: CLINIC | Age: 75
End: 2025-04-15
Payer: MEDICARE

## 2025-04-15 DIAGNOSIS — E87.6 HYPOKALEMIA: Primary | ICD-10-CM

## 2025-04-15 RX ORDER — POTASSIUM CHLORIDE 750 MG/1
20 TABLET, FILM COATED, EXTENDED RELEASE ORAL DAILY
Qty: 14 TABLET | Refills: 0 | Status: SHIPPED | OUTPATIENT
Start: 2025-04-15 | End: 2025-04-22

## 2025-04-22 ENCOUNTER — HOSPITAL ENCOUNTER (OUTPATIENT)
Dept: RADIOLOGY | Facility: CLINIC | Age: 75
Discharge: HOME | End: 2025-04-22
Payer: MEDICARE

## 2025-04-22 DIAGNOSIS — R59.0 ENLARGED LYMPH NODE IN NECK: ICD-10-CM

## 2025-04-22 PROCEDURE — 2550000001 HC RX 255 CONTRASTS

## 2025-04-22 PROCEDURE — 70491 CT SOFT TISSUE NECK W/DYE: CPT | Performed by: RADIOLOGY

## 2025-04-22 PROCEDURE — 70491 CT SOFT TISSUE NECK W/DYE: CPT

## 2025-04-22 RX ADMIN — IOHEXOL 75 ML: 350 INJECTION, SOLUTION INTRAVENOUS at 13:37

## 2025-04-23 ENCOUNTER — APPOINTMENT (OUTPATIENT)
Dept: PRIMARY CARE | Facility: CLINIC | Age: 75
End: 2025-04-23
Payer: MEDICARE

## 2025-04-23 VITALS
HEART RATE: 76 BPM | DIASTOLIC BLOOD PRESSURE: 70 MMHG | HEIGHT: 70 IN | SYSTOLIC BLOOD PRESSURE: 120 MMHG | OXYGEN SATURATION: 98 % | BODY MASS INDEX: 28.06 KG/M2 | WEIGHT: 196 LBS | RESPIRATION RATE: 22 BRPM

## 2025-04-23 DIAGNOSIS — R97.20 ELEVATED PSA: ICD-10-CM

## 2025-04-23 DIAGNOSIS — K21.9 GERD WITHOUT ESOPHAGITIS: ICD-10-CM

## 2025-04-23 DIAGNOSIS — N18.2 CHRONIC RENAL IMPAIRMENT, STAGE 2 (MILD): ICD-10-CM

## 2025-04-23 DIAGNOSIS — R53.83 OTHER FATIGUE: ICD-10-CM

## 2025-04-23 DIAGNOSIS — N13.8 BPH WITH OBSTRUCTION/LOWER URINARY TRACT SYMPTOMS: ICD-10-CM

## 2025-04-23 DIAGNOSIS — D61.818 PANCYTOPENIA: ICD-10-CM

## 2025-04-23 DIAGNOSIS — N40.1 BPH WITH OBSTRUCTION/LOWER URINARY TRACT SYMPTOMS: ICD-10-CM

## 2025-04-23 DIAGNOSIS — I10 BENIGN HYPERTENSION: ICD-10-CM

## 2025-04-23 DIAGNOSIS — E11.9 TYPE 2 DIABETES MELLITUS WITHOUT COMPLICATION, WITHOUT LONG-TERM CURRENT USE OF INSULIN: ICD-10-CM

## 2025-04-23 DIAGNOSIS — M51.362 DEGENERATION OF INTERVERTEBRAL DISC OF LUMBAR REGION WITH DISCOGENIC BACK PAIN AND LOWER EXTREMITY PAIN: ICD-10-CM

## 2025-04-23 DIAGNOSIS — E78.00 HYPERCHOLESTEROLEMIA: ICD-10-CM

## 2025-04-23 DIAGNOSIS — I48.91 ATRIAL FIBRILLATION WITH RVR (MULTI): Primary | ICD-10-CM

## 2025-04-23 DIAGNOSIS — C90.00 IGG MULTIPLE MYELOMA (MULTI): ICD-10-CM

## 2025-04-23 PROCEDURE — 1123F ACP DISCUSS/DSCN MKR DOCD: CPT | Performed by: INTERNAL MEDICINE

## 2025-04-23 PROCEDURE — 99214 OFFICE O/P EST MOD 30 MIN: CPT | Performed by: INTERNAL MEDICINE

## 2025-04-23 PROCEDURE — G2211 COMPLEX E/M VISIT ADD ON: HCPCS | Performed by: INTERNAL MEDICINE

## 2025-04-23 PROCEDURE — 1159F MED LIST DOCD IN RCRD: CPT | Performed by: INTERNAL MEDICINE

## 2025-04-23 PROCEDURE — 1036F TOBACCO NON-USER: CPT | Performed by: INTERNAL MEDICINE

## 2025-04-23 PROCEDURE — 3078F DIAST BP <80 MM HG: CPT | Performed by: INTERNAL MEDICINE

## 2025-04-23 PROCEDURE — 3074F SYST BP LT 130 MM HG: CPT | Performed by: INTERNAL MEDICINE

## 2025-04-23 ASSESSMENT — ENCOUNTER SYMPTOMS
RESPIRATORY NEGATIVE: 1
MUSCULOSKELETAL NEGATIVE: 1
NEUROLOGICAL NEGATIVE: 1
GASTROINTESTINAL NEGATIVE: 1
ALLERGIC/IMMUNOLOGIC NEGATIVE: 1
EYES NEGATIVE: 1
PSYCHIATRIC NEGATIVE: 1
CARDIOVASCULAR NEGATIVE: 1
ENDOCRINE NEGATIVE: 1
HEMATOLOGIC/LYMPHATIC NEGATIVE: 1
CONSTITUTIONAL NEGATIVE: 1

## 2025-04-23 NOTE — PROGRESS NOTES
"Subjective   Patient ID: Toby Ugalde is a 75 y.o. male who presents for Follow-up (Follow up).    HPI     Review of Systems   Constitutional: Negative.    HENT: Negative.     Eyes: Negative.    Respiratory: Negative.     Cardiovascular: Negative.    Gastrointestinal: Negative.    Endocrine: Negative.    Musculoskeletal: Negative.    Skin: Negative.    Allergic/Immunologic: Negative.    Neurological: Negative.    Hematological: Negative.    Psychiatric/Behavioral: Negative.     All other systems reviewed and are negative.      Objective   /70   Pulse 76   Resp 22   Ht 1.778 m (5' 10\")   Wt 88.9 kg (196 lb)   SpO2 98%   BMI 28.12 kg/m²     Physical Exam  Vitals and nursing note reviewed.   Constitutional:       Appearance: Normal appearance.   HENT:      Head: Normocephalic and atraumatic.      Right Ear: Tympanic membrane, ear canal and external ear normal.      Left Ear: Tympanic membrane, ear canal and external ear normal. There is no impacted cerumen.      Nose: Nose normal.      Mouth/Throat:      Mouth: Mucous membranes are moist.      Pharynx: Oropharynx is clear.   Eyes:      Extraocular Movements: Extraocular movements intact.      Conjunctiva/sclera: Conjunctivae normal.      Pupils: Pupils are equal, round, and reactive to light.   Cardiovascular:      Rate and Rhythm: Normal rate and regular rhythm.      Pulses: Normal pulses.      Heart sounds: Normal heart sounds. No murmur heard.  Pulmonary:      Effort: Pulmonary effort is normal. No respiratory distress.      Breath sounds: Normal breath sounds. No stridor. No wheezing, rhonchi or rales.   Chest:      Chest wall: No tenderness.   Abdominal:      General: Abdomen is flat. Bowel sounds are normal. There is no distension.      Palpations: Abdomen is soft. There is no mass.      Tenderness: There is no abdominal tenderness. There is no right CVA tenderness, left CVA tenderness, guarding or rebound.      Hernia: No hernia is present. "   Musculoskeletal:         General: Normal range of motion.      Cervical back: Normal range of motion and neck supple.   Skin:     General: Skin is warm.      Capillary Refill: Capillary refill takes less than 2 seconds.   Neurological:      General: No focal deficit present.      Mental Status: He is alert.      Cranial Nerves: No cranial nerve deficit.      Sensory: No sensory deficit.      Motor: No weakness.      Coordination: Coordination normal.      Gait: Gait normal.      Deep Tendon Reflexes: Reflexes normal.   Psychiatric:         Mood and Affect: Mood normal.         Behavior: Behavior normal. Behavior is cooperative.         Thought Content: Thought content normal.         Judgment: Judgment normal.         Assessment/Plan   Problem List Items Addressed This Visit           ICD-10-CM    Pancytopenia D61.818    Monitor CBC and Asymptomatic  now only mildly fatigued          Atrial fibrillation with RVR (Multi) - Primary I48.91    Atrial fibrillation - persistent with  Ventricular rate is well controlled (in sinus rhythm). ON ELIQUIS  No Shortness of breath. Continues  Eliquis 5mg BID  Metoprolol to 50 mg BID              Benign hypertension I10    HTN - hypertension well/controlled .Target BP < 130/80  achieved. Educate low salt diet and exercise with weight loss. Educate home self monitoring and diary keeping. Educate risks of elevate blood pressure and benefits of prompt treatment.  Refill Amlodipine and Metoprolol             BPH with obstruction/lower urinary tract symptoms N40.1, N13.8    BPH - Benign PROSTATIC Hypertrophy, + Dysuria/Nocturia, check PSA, prescribe Flomax 0.4mg qD and Avodart 0.5 mg qD vs. Finasteride 5 mg qD.  Educate exercises and Change in life style, prescribe vitamin E 400 IU qD. Consider referral to urology.             Chronic renal insufficiency N18.9    CRI - Chronic Renal Insuficiency. Secondary to DM/HTN/Atherosclerosis. Follow BUN/Cr. and lytes.   RENOPROTECTION with  ACEI/ARB (). Monitor microalbuminuria. Avoid hypotension and renal hypoperfusion. The patient was instructed to avoid NSAIDS and educate compliance with medications to control HTN(hypertension) and cholesterol and diabetes.                                     Diabetes mellitus (Multi) E11.9    DM - NIDDM  . Reviewed with patient / Will check  HbA1c and fasting blood sugars. Educate home self monitoring and diary keeping(reviewed with patient home blood sugar levels /diary). Educate extensively low calorie diet and weight loss with exercise. Reviewed BS- diary and Rx. Regimen. [place] renal protection with ARB/ACEI.               Educate compliance with diet and Rx. And educate risks and autcomes.     Refill Metformin 500 mg BID with meals              Elevated PSA R97.20    Monitor PSA and follows with urology          Fatigue R53.83    Fatigue - check CMP(metabolic panel and elctrolytes) , CBC(complete blood cell count), TSH(thyroid function). Insomnia may play a role and sleep studies(rule out sleep apnea) are recommended . Educate sleep hygiene. Consider anxiety disorder vs. depression. Consider Stress test, and 2DECHO.           GERD without esophagitis K21.9    GERD - Acid reflux disease. Rx. PPI (Prilosec/Prevacid/Protonix/Nexium) and educate diet and life style changes. Referred patient to an endoscopy (EGD) and check H. Pylori titers.          Hypercholesterolemia E78.00    Hypercholesterolemia - Monitor lipid profile and educate patient upon risks of high cholesterol and targets. Educate diet and change in lifestyle and increase in exercises - Refill:  Atorvastatin    and educate compliance with medication and diet.               IgG multiple myeloma (Multi) C90.00    Monitor CBC and blood count and follow with hematology and stable now on Chemotherapy          Lumbar degenerative disc disease M51.369    DDD - Degenerative disc disease of the Lumbo-Sacral (LS)/  spine. Educate exercises and referred  patient to Physical Therapy (PT). Ordered X-Ray's of the LS/spine. Consider MRI. Radiculopathy in the distribution of L4-L5-S1/nerve roots, needs NSAIDS (Naprosin 500mg BID vs. Arthrotec 75mg BID or Prednisone taper (Medrol dose pack), Flexeril 10 mg qHS, heat application, and pain control with Tylenol                   Atrial fibrillation with RVR (Multi) - Primary I48.91        Atrial fibrillation - persistent with  Ventricular rate is well controlled (in sinus rhythm). ON ELIQUIS  No Shortness of breath. Continues  Eliquis 5mg BID  Metoprolol to 50 mg BID             Benign hypertension I10       HTN - hypertension well/controlled .Target BP < 130/80  achieved. Educate low salt diet and exercise with weight loss. Educate home self monitoring and diary keeping. Educate risks of elevate blood pressure and benefits of prompt treatment.  Refill Amlodipine and Metoprolol            Chronic renal insufficiency N18.9       CRI - Chronic Renal Insuficiency. Secondary to DM/HTN/Atherosclerosis. Follow BUN/Cr. and lytes.   RENOPROTECTION with ACEI/ARB (). Monitor microalbuminuria. Avoid hypotension and renal hypoperfusion. The patient was instructed to avoid NSAIDS and educate compliance with medications to control HTN(hypertension) and cholesterol and diabetes.                                       Diabetes mellitus (Multi) E11.9       DM - NIDDM  . Reviewed with patient / Will check  HbA1c and fasting blood sugars. Educate home self monitoring and diary keeping(reviewed with patient home blood sugar levels /diary). Educate extensively low calorie diet and weight loss with exercise. Reviewed BS- diary and Rx. Regimen. [place] renal protection with ARB/ACEI.               Educate compliance with diet and Rx. And educate risks and autcomes.     Refill Metformin 500 mg BID with meals             GERD without esophagitis K21.9       GERD - Acid reflux disease. Rx. PPI (Prilosec/Prevacid/Protonix/Nexium) and educate diet and  life style changes. Referred patient to an endoscopy (EGD) and check H. Pylori titers.             Hypercholesterolemia E78.00       Hypercholesterolemia - Monitor lipid profile and educate patient upon risks of high cholesterol and targets. Educate diet and change in lifestyle and increase in exercises - Refill:  Atorvastatin    and educate compliance with medication and diet.                     Hypogonadism male E29.1     IgG multiple myeloma (Multi) C90.00       Monitor CBC and blood count and follow with hematology and stable now on Chemotherapy                   Anterior cervical adenopathy R59.0       Reactive due to recent dental work vs. Multiple myeloma and refer to oncology hematology and start Augmentin 875 mg BID with food for 10 days  to treat possible dental infection            Relevant Medications     amoxicillin-pot clavulanate (Augmentin) 875-125 mg tablet               Pancytopenia D61.818         Monitor CBC and Asymptomatic  now only mildly fatigued            Atrial fibrillation with RVR (Multi) - Primary I48.91       Atrial fibrillation - persistent with  Ventricular rate is well controlled (in sinus rhythm). ON ELIQUIS  No Shortness of breath. Continues  Eliquis 5mg BID  Metoprolol to 50 mg BID             Benign hypertension I10       HTN - hypertension well/controlled .Target BP < 130/80  achieved. Educate low salt diet and exercise with weight loss. Educate home self monitoring and diary keeping. Educate risks of elevate blood pressure and benefits of prompt treatment.  Refill Amlodipine and Metoprolol               BPH with obstruction/lower urinary tract symptoms N40.1, N13.8       BPH - Benign PROSTATIC Hypertrophy, + Dysuria/Nocturia, check PSA, prescribe Flomax 0.4mg qD and Avodart 0.5 mg qD vs. Finasteride 5 mg qD.  Educate exercises and Change in life style, prescribe vitamin E 400 IU qD. Consider referral to urology.            Chronic renal insufficiency N18.9       CRI - Chronic  Renal Insuficiency. Secondary to DM/HTN/Atherosclerosis. Follow BUN/Cr. and lytes.   RENOPROTECTION with ACEI/ARB (). Monitor microalbuminuria. Avoid hypotension and renal hypoperfusion. The patient was instructed to avoid NSAIDS and educate compliance with medications to control HTN(hypertension) and cholesterol and diabetes.                                       Diabetes mellitus (Multi) E11.9       DM - NIDDM  . Reviewed with patient / Will check  HbA1c and fasting blood sugars. Educate home self monitoring and diary keeping(reviewed with patient home blood sugar levels /diary). Educate extensively low calorie diet and weight loss with exercise. Reviewed BS- diary and Rx. Regimen. [place] renal protection with ARB/ACEI.               Educate compliance with diet and Rx. And educate risks and autcomes.     Refill Metformin 500 mg BID with meals                 Hypercholesterolemia E78.00       Hypercholesterolemia - Monitor lipid profile and educate patient upon risks of high cholesterol and targets. Educate diet and change in lifestyle and increase in exercises - Refill:  Atorvastatin    and educate compliance with medication and diet.                     IgG multiple myeloma (Multi) C90.00       Monitor CBC and blood count and follow with hematology and stable now on Chemotherapy                   Malaise and fatigue R53.81, R53.83       Fatigue - check CMP(metabolic panel and elctrolytes) , CBC(complete blood cell count), TSH(thyroid function). Insomnia may play a role and sleep studies(rule out sleep apnea) are recommended . Educate sleep hygiene. Consider anxiety disorder vs. depression. Consider Stress test, and 2DECHO.

## 2025-04-23 NOTE — ASSESSMENT & PLAN NOTE
DDD - Degenerative disc disease of the Lumbo-Sacral (LS)/  spine. Educate exercises and referred patient to Physical Therapy (PT). Ordered X-Ray's of the LS/spine. Consider MRI. Radiculopathy in the distribution of L4-L5-S1/nerve roots, needs NSAIDS (Naprosin 500mg BID vs. Arthrotec 75mg BID or Prednisone taper (Medrol dose pack), Flexeril 10 mg qHS, heat application, and pain control with Tylenol       24 HOUR EVENTS:  - worsening AMS  - fluc added     NEURO  RASS (if intubated): 		CAM ICU (if concern for delirium):  Exam: AOx1  Meds: melatonin 5 milliGRAM(s) Oral at bedtime      RESPIRATORY  RR: 22 (08-13-24 @ 05:00) (11 - 27)  SpO2: 100% (08-13-24 @ 05:00) (97% - 100%)  Wt(kg): --  Exam: Lungs CTA b/l  Mechanical Ventilation:     Meds:     CARDIOVASCULAR  HR: 67 (08-13-24 @ 05:00) (60 - 78)  BP: 111/58 (08-13-24 @ 05:00) (95/54 - 127/55)  BP(mean): 81 (08-13-24 @ 05:00) (72 - 81)  ABP: --  ABP(mean): --  Wt(kg): --  CVP(cm H2O): --  VBG - ( 12 Aug 2024 21:08 )  pH: 7.40  /  pCO2: 36    /  pO2: 51    / HCO3: 22    / Base Excess: -2.2  /  SaO2: 85.4   Lactate: 2.3                Exam: Normal S1/S2 w/o murmurs or rubs  Cardiac Rhythm: sinus  Perfusion     [x ]Adequate   [ ]Inadequate  Mentation   [ ]Normal       [x ]Reduced  Extremities  [x ]Warm         [ ]Cool  Volume Status [ ]Hypervolemic [x ]Euvolemic [ ]Hypovolemic  Meds: midodrine. 20 milliGRAM(s) Oral three times a day      GI/NUTRITION  Exam: abd dsitended, non TTP  Diet: FLD  Last Bowel Movement: 13-Aug-2024 (08-13-24 @ 03:00)  Last Bowel Movement: 12-Aug-2024 (08-12-24 @ 19:00)  Last Bowel Movement: 12-Aug-2024 (08-12-24 @ 07:55)    Meds: lactulose Syrup 20 Gram(s) Oral three times a day  pantoprazole  Injectable 40 milliGRAM(s) IV Push two times a day      GENITOURINARY  I&O's Detail    08-12 @ 07:01  -  08-13 @ 05:18  --------------------------------------------------------  IN:    IV PiggyBack: 200 mL    IV PiggyBack: 100 mL  Total IN: 300 mL    OUT:    Ureteral Catheter (mL): 10 mL  Total OUT: 10 mL    Total NET: 290 mL        Weight (kg): 114 (08-12 @ 16:46)  08-12    130<L>  |  94<L>  |  41<H>  ----------------------------<  153<H>  3.9   |  19<L>  |  6.21<H>    Ca    9.4      12 Aug 2024 21:12  Phos  3.4     08-12  Mg     1.9     08-12    TPro  x   /  Alb  x   /  TBili  x   /  DBili  8.6<H>  /  AST  x   /  ALT  x   /  AlkPhos  x   08-12    Meds: folic acid 1 milliGRAM(s) Oral daily  multivitamin 1 Tablet(s) Oral daily  thiamine 100 milliGRAM(s) Oral daily      HEMATOLOGIC  Meds:                         8.9    14.41 )-----------( 104      ( 12 Aug 2024 21:12 )             27.2     PT/INR - ( 12 Aug 2024 21:12 )   PT: 42.4 sec;   INR: 4.04 ratio         PTT - ( 12 Aug 2024 21:12 )  PTT:73.2 sec    INFECTIOUS DISEASES  T(C): 36.1 (08-13-24 @ 03:00), Max: 36.5 (08-12-24 @ 18:56)  Wt(kg): --  WBC Count: 14.41 K/uL (08-12 @ 21:12)  WBC Count: 13.19 K/uL (08-12 @ 10:41)    Recent Cultures:    Meds: fluconAZOLE IVPB      fluconAZOLE IVPB 200 milliGRAM(s) IV Intermittent every 24 hours  piperacillin/tazobactam IVPB.. 3.375 Gram(s) IV Intermittent every 12 hours  rifAXIMin 550 milliGRAM(s) Oral two times a day      ENDOCRINE  Capillary Blood Glucose    Meds:     ACCESS DEVICES:  [ ] Peripheral IV  [ ] Central Venous Line		[ ] R	[ ] L	[ ] IJ	[ ] Fem	[ ] SC	Placed:   [ ] Arterial Line			[ ] R	[ ] L	[ ] Fem	[ ] Rad	[ ] Ax	Placed:   [ ] PICC:					[ ] Mediport  [ ] Urinary Catheter, Date Placed:   [ ] Necessity of urinary, arterial, and venous catheters discussed    OTHER MEDICATIONS:  chlorhexidine 2% Cloths 1 Application(s) Topical <User Schedule>  lidocaine   4% Patch 1 Patch Transdermal daily      IMAGING:

## 2025-04-23 NOTE — ASSESSMENT & PLAN NOTE
DM - NIDDM  . Reviewed with patient / Will check  HbA1c and fasting blood sugars. Educate home self monitoring and diary keeping(reviewed with patient home blood sugar levels /diary). Educate extensively low calorie diet and weight loss with exercise. Reviewed BS- diary and Rx. Regimen. [place] renal protection with ARB/ACEI.               Educate compliance with diet and Rx. And educate risks and autcomes.     Refill Metformin 500 mg BID with meals

## 2025-05-05 RX ORDER — DIPHENHYDRAMINE HCL 50 MG
50 CAPSULE ORAL ONCE
Status: CANCELLED | OUTPATIENT
Start: 2025-05-06

## 2025-05-05 RX ORDER — EPINEPHRINE 0.3 MG/.3ML
0.3 INJECTION SUBCUTANEOUS EVERY 5 MIN PRN
OUTPATIENT
Start: 2025-06-03

## 2025-05-05 RX ORDER — ACETAMINOPHEN 325 MG/1
650 TABLET ORAL ONCE
OUTPATIENT
Start: 2025-06-03

## 2025-05-05 RX ORDER — ACETAMINOPHEN 325 MG/1
650 TABLET ORAL ONCE
Status: CANCELLED | OUTPATIENT
Start: 2025-05-06

## 2025-05-05 RX ORDER — MONTELUKAST SODIUM 10 MG/1
10 TABLET ORAL ONCE
Status: CANCELLED | OUTPATIENT
Start: 2025-05-06

## 2025-05-05 RX ORDER — ALBUTEROL SULFATE 0.83 MG/ML
3 SOLUTION RESPIRATORY (INHALATION) AS NEEDED
OUTPATIENT
Start: 2025-06-03

## 2025-05-05 RX ORDER — EPINEPHRINE 0.3 MG/.3ML
0.3 INJECTION SUBCUTANEOUS EVERY 5 MIN PRN
Status: CANCELLED | OUTPATIENT
Start: 2025-05-06

## 2025-05-05 RX ORDER — ALBUTEROL SULFATE 0.83 MG/ML
3 SOLUTION RESPIRATORY (INHALATION) AS NEEDED
Status: CANCELLED | OUTPATIENT
Start: 2025-05-06

## 2025-05-05 RX ORDER — DIPHENHYDRAMINE HYDROCHLORIDE 50 MG/ML
50 INJECTION, SOLUTION INTRAMUSCULAR; INTRAVENOUS AS NEEDED
Status: CANCELLED | OUTPATIENT
Start: 2025-05-06

## 2025-05-05 RX ORDER — FAMOTIDINE 10 MG/ML
20 INJECTION, SOLUTION INTRAVENOUS ONCE AS NEEDED
OUTPATIENT
Start: 2025-06-03

## 2025-05-05 RX ORDER — DIPHENHYDRAMINE HCL 50 MG
50 CAPSULE ORAL ONCE
OUTPATIENT
Start: 2025-06-03

## 2025-05-05 RX ORDER — DIPHENHYDRAMINE HYDROCHLORIDE 50 MG/ML
50 INJECTION, SOLUTION INTRAMUSCULAR; INTRAVENOUS AS NEEDED
OUTPATIENT
Start: 2025-06-03

## 2025-05-05 RX ORDER — FAMOTIDINE 10 MG/ML
20 INJECTION, SOLUTION INTRAVENOUS ONCE AS NEEDED
Status: CANCELLED | OUTPATIENT
Start: 2025-05-06

## 2025-05-05 RX ORDER — MONTELUKAST SODIUM 10 MG/1
10 TABLET ORAL ONCE
OUTPATIENT
Start: 2025-06-03

## 2025-05-06 ENCOUNTER — INFUSION (OUTPATIENT)
Dept: HEMATOLOGY/ONCOLOGY | Facility: CLINIC | Age: 75
End: 2025-05-06
Payer: MEDICARE

## 2025-05-06 VITALS
RESPIRATION RATE: 17 BRPM | OXYGEN SATURATION: 99 % | BODY MASS INDEX: 28.98 KG/M2 | HEART RATE: 68 BPM | WEIGHT: 195.66 LBS | HEIGHT: 69 IN | SYSTOLIC BLOOD PRESSURE: 118 MMHG | TEMPERATURE: 97.3 F | DIASTOLIC BLOOD PRESSURE: 68 MMHG

## 2025-05-06 DIAGNOSIS — C90.00 IGG MULTIPLE MYELOMA (MULTI): ICD-10-CM

## 2025-05-06 LAB
ALBUMIN SERPL BCP-MCNC: 4.2 G/DL (ref 3.4–5)
ALP SERPL-CCNC: 71 U/L (ref 33–136)
ALT SERPL W P-5'-P-CCNC: 13 U/L (ref 10–52)
ANION GAP SERPL CALC-SCNC: 15 MMOL/L (ref 10–20)
AST SERPL W P-5'-P-CCNC: 12 U/L (ref 9–39)
BASOPHILS # BLD AUTO: 0.02 X10*3/UL (ref 0–0.1)
BASOPHILS NFR BLD AUTO: 0.5 %
BILIRUB SERPL-MCNC: 0.8 MG/DL (ref 0–1.2)
BUN SERPL-MCNC: 17 MG/DL (ref 6–23)
CALCIUM SERPL-MCNC: 8.7 MG/DL (ref 8.6–10.6)
CHLORIDE SERPL-SCNC: 105 MMOL/L (ref 98–107)
CO2 SERPL-SCNC: 22 MMOL/L (ref 21–32)
CREAT SERPL-MCNC: 1.35 MG/DL (ref 0.5–1.3)
EGFRCR SERPLBLD CKD-EPI 2021: 54 ML/MIN/1.73M*2
EOSINOPHIL # BLD AUTO: 0.11 X10*3/UL (ref 0–0.4)
EOSINOPHIL NFR BLD AUTO: 2.6 %
ERYTHROCYTE [DISTWIDTH] IN BLOOD BY AUTOMATED COUNT: 13.9 % (ref 11.5–14.5)
GLUCOSE SERPL-MCNC: 154 MG/DL (ref 74–99)
HCT VFR BLD AUTO: 35.5 % (ref 41–52)
HGB BLD-MCNC: 12.1 G/DL (ref 13.5–17.5)
IGA SERPL-MCNC: 50 MG/DL (ref 70–400)
IGG SERPL-MCNC: 609 MG/DL (ref 700–1600)
IGM SERPL-MCNC: 8 MG/DL (ref 40–230)
IMM GRANULOCYTES # BLD AUTO: 0.01 X10*3/UL (ref 0–0.5)
IMM GRANULOCYTES NFR BLD AUTO: 0.2 % (ref 0–0.9)
LYMPHOCYTES # BLD AUTO: 2.18 X10*3/UL (ref 0.8–3)
LYMPHOCYTES NFR BLD AUTO: 52 %
MCH RBC QN AUTO: 30.8 PG (ref 26–34)
MCHC RBC AUTO-ENTMCNC: 34.1 G/DL (ref 32–36)
MCV RBC AUTO: 90 FL (ref 80–100)
MONOCYTES # BLD AUTO: 0.54 X10*3/UL (ref 0.05–0.8)
MONOCYTES NFR BLD AUTO: 12.9 %
NEUTROPHILS # BLD AUTO: 1.33 X10*3/UL (ref 1.6–5.5)
NEUTROPHILS NFR BLD AUTO: 31.8 %
NRBC BLD-RTO: ABNORMAL /100{WBCS}
PLATELET # BLD AUTO: 165 X10*3/UL (ref 150–450)
POTASSIUM SERPL-SCNC: 3.6 MMOL/L (ref 3.5–5.3)
PROT SERPL-MCNC: 5.9 G/DL (ref 6.4–8.2)
PROT SERPL-MCNC: 5.9 G/DL (ref 6.4–8.2)
RBC # BLD AUTO: 3.93 X10*6/UL (ref 4.5–5.9)
SODIUM SERPL-SCNC: 138 MMOL/L (ref 136–145)
WBC # BLD AUTO: 4.2 X10*3/UL (ref 4.4–11.3)

## 2025-05-06 PROCEDURE — 2500000001 HC RX 250 WO HCPCS SELF ADMINISTERED DRUGS (ALT 637 FOR MEDICARE OP): Performed by: INTERNAL MEDICINE

## 2025-05-06 PROCEDURE — 2500000004 HC RX 250 GENERAL PHARMACY W/ HCPCS (ALT 636 FOR OP/ED): Mod: JZ,TB | Performed by: INTERNAL MEDICINE

## 2025-05-06 PROCEDURE — 83521 IG LIGHT CHAINS FREE EACH: CPT

## 2025-05-06 PROCEDURE — 85025 COMPLETE CBC W/AUTO DIFF WBC: CPT

## 2025-05-06 PROCEDURE — 2500000004 HC RX 250 GENERAL PHARMACY W/ HCPCS (ALT 636 FOR OP/ED): Performed by: INTERNAL MEDICINE

## 2025-05-06 PROCEDURE — 96401 CHEMO ANTI-NEOPL SQ/IM: CPT

## 2025-05-06 PROCEDURE — 84075 ASSAY ALKALINE PHOSPHATASE: CPT

## 2025-05-06 PROCEDURE — 82784 ASSAY IGA/IGD/IGG/IGM EACH: CPT

## 2025-05-06 PROCEDURE — 84155 ASSAY OF PROTEIN SERUM: CPT

## 2025-05-06 PROCEDURE — 84165 PROTEIN E-PHORESIS SERUM: CPT

## 2025-05-06 RX ORDER — ALBUTEROL SULFATE 0.83 MG/ML
3 SOLUTION RESPIRATORY (INHALATION) AS NEEDED
Status: DISCONTINUED | OUTPATIENT
Start: 2025-05-06 | End: 2025-05-06 | Stop reason: HOSPADM

## 2025-05-06 RX ORDER — MONTELUKAST SODIUM 10 MG/1
10 TABLET ORAL ONCE
Status: COMPLETED | OUTPATIENT
Start: 2025-05-06 | End: 2025-05-06

## 2025-05-06 RX ORDER — DIPHENHYDRAMINE HCL 50 MG
50 CAPSULE ORAL ONCE
Status: COMPLETED | OUTPATIENT
Start: 2025-05-06 | End: 2025-05-06

## 2025-05-06 RX ORDER — FAMOTIDINE 10 MG/ML
20 INJECTION, SOLUTION INTRAVENOUS ONCE AS NEEDED
Status: DISCONTINUED | OUTPATIENT
Start: 2025-05-06 | End: 2025-05-06 | Stop reason: HOSPADM

## 2025-05-06 RX ORDER — DIPHENHYDRAMINE HYDROCHLORIDE 50 MG/ML
50 INJECTION, SOLUTION INTRAMUSCULAR; INTRAVENOUS AS NEEDED
Status: DISCONTINUED | OUTPATIENT
Start: 2025-05-06 | End: 2025-05-06 | Stop reason: HOSPADM

## 2025-05-06 RX ORDER — ACETAMINOPHEN 325 MG/1
650 TABLET ORAL ONCE
Status: COMPLETED | OUTPATIENT
Start: 2025-05-06 | End: 2025-05-06

## 2025-05-06 RX ORDER — EPINEPHRINE 0.3 MG/.3ML
0.3 INJECTION SUBCUTANEOUS EVERY 5 MIN PRN
Status: DISCONTINUED | OUTPATIENT
Start: 2025-05-06 | End: 2025-05-06 | Stop reason: HOSPADM

## 2025-05-06 RX ADMIN — MONTELUKAST 10 MG: 10 TABLET, FILM COATED ORAL at 07:56

## 2025-05-06 RX ADMIN — DARATUMUMAB AND HYALURONIDASE-FIHJ (HUMAN RECOMBINANT) 1800 MG: 1800; 30000 INJECTION SUBCUTANEOUS at 08:55

## 2025-05-06 RX ADMIN — ACETAMINOPHEN 650 MG: 325 TABLET ORAL at 07:56

## 2025-05-06 RX ADMIN — DEXAMETHASONE 20 MG: 6 TABLET ORAL at 07:56

## 2025-05-06 RX ADMIN — DIPHENHYDRAMINE HYDROCHLORIDE 50 MG: 50 CAPSULE ORAL at 07:56

## 2025-05-06 ASSESSMENT — PAIN SCALES - GENERAL: PAINLEVEL_OUTOF10: 2

## 2025-05-07 LAB
KAPPA LC SERPL-MCNC: 2.88 MG/DL (ref 0.33–1.94)
KAPPA LC/LAMBDA SER: 3.6 {RATIO} (ref 0.26–1.65)
LAMBDA LC SERPL-MCNC: 0.8 MG/DL (ref 0.57–2.63)

## 2025-05-08 ENCOUNTER — OFFICE VISIT (OUTPATIENT)
Dept: CARDIOLOGY | Facility: HOSPITAL | Age: 75
End: 2025-05-08
Payer: MEDICARE

## 2025-05-08 VITALS
HEIGHT: 72 IN | OXYGEN SATURATION: 100 % | HEART RATE: 101 BPM | SYSTOLIC BLOOD PRESSURE: 122 MMHG | RESPIRATION RATE: 18 BRPM | DIASTOLIC BLOOD PRESSURE: 77 MMHG | WEIGHT: 193.8 LBS | BODY MASS INDEX: 26.25 KG/M2

## 2025-05-08 DIAGNOSIS — I48.0 PAROXYSMAL ATRIAL FIBRILLATION (MULTI): Primary | ICD-10-CM

## 2025-05-08 DIAGNOSIS — I10 BENIGN HYPERTENSION: ICD-10-CM

## 2025-05-08 DIAGNOSIS — E78.00 HYPERCHOLESTEROLEMIA: ICD-10-CM

## 2025-05-08 LAB
ALBUMIN: 3.7 G/DL (ref 3.4–5)
ALPHA 1 GLOBULIN: 0.2 G/DL (ref 0.2–0.6)
ALPHA 2 GLOBULIN: 0.8 G/DL (ref 0.4–1.1)
ATRIAL RATE: 326 BPM
BETA GLOBULIN: 0.7 G/DL (ref 0.5–1.2)
GAMMA GLOBULIN: 0.6 G/DL (ref 0.5–1.4)
M-PROTEIN 1: 0.2 G/DL (ref ?–0)
PATH REVIEW-SERUM PROTEIN ELECTROPHORESIS: ABNORMAL
PROTEIN ELECTROPHORESIS COMMENT: ABNORMAL
Q ONSET: 223 MS
QRS COUNT: 17 BEATS
QRS DURATION: 88 MS
QT INTERVAL: 366 MS
QTC CALCULATION(BAZETT): 474 MS
QTC FREDERICIA: 435 MS
R AXIS: 71 DEGREES
T AXIS: 57 DEGREES
T OFFSET: 406 MS
VENTRICULAR RATE: 101 BPM

## 2025-05-08 PROCEDURE — 93005 ELECTROCARDIOGRAM TRACING: CPT | Performed by: NURSE PRACTITIONER

## 2025-05-08 PROCEDURE — 99213 OFFICE O/P EST LOW 20 MIN: CPT | Performed by: NURSE PRACTITIONER

## 2025-05-08 PROCEDURE — 1159F MED LIST DOCD IN RCRD: CPT | Performed by: NURSE PRACTITIONER

## 2025-05-08 PROCEDURE — 99214 OFFICE O/P EST MOD 30 MIN: CPT | Performed by: NURSE PRACTITIONER

## 2025-05-08 PROCEDURE — 1036F TOBACCO NON-USER: CPT | Performed by: NURSE PRACTITIONER

## 2025-05-08 PROCEDURE — 3078F DIAST BP <80 MM HG: CPT | Performed by: NURSE PRACTITIONER

## 2025-05-08 PROCEDURE — G2211 COMPLEX E/M VISIT ADD ON: HCPCS | Performed by: NURSE PRACTITIONER

## 2025-05-08 PROCEDURE — 3074F SYST BP LT 130 MM HG: CPT | Performed by: NURSE PRACTITIONER

## 2025-05-08 NOTE — PATIENT INSTRUCTIONS
Continue current meds  Follow up with Dr. Garcia for atrial fibrillation/flutter  Follow up as scheduled

## 2025-05-08 NOTE — PROGRESS NOTES
Primary Care Physician: Antoine Cano MD  Date of Visit: 05/08/2025  9:20 AM EDT  Location of visit: [facility]     Chief Complaint:   Chief Complaint   Patient presents with    Follow-up        HPI / Summary:   Toby Ugalde is a 75 y.o. male presents for followup. Seen in collaboration with Dr. Moon. He continues to have chemotherapy for treatment of multiple myeloma. He has an occasional more chronic dyspnea on exertion when ambulating up 1 flight of stairs and walking prolonged distances which has remained unchanged. No Chest pain.  He ambulates with assistance of a cane at times. He has an occasional lightheadedness upon standing. No near syncope or syncope. The patient denies chest pain,palpitations, lightheadedness, syncope, orthopnea, paroxysmal nocturnal dyspnea,lower extremity edema, or bleeding problems.              Past Medical History:  Past Medical History:   Diagnosis Date    Chronic gout, unspecified, without tophus (tophi) 12/11/2024    Dental caries on pit and fissure surface penetrating into dentin 01/12/2024    Deposits (accretions) on teeth 01/12/2024    Essential (primary) hypertension 11/20/2013    Benign essential hypertension    Gout, unspecified 12/11/2024    History of hypercholesterolemia 01/12/2024    History of hypertension 01/12/2024    History of malignant neoplasm of prostate 01/12/2024    Impacted cerumen 01/12/2024    Impacted cerumen, bilateral 02/16/2024    Infective pharyngitis 01/12/2024    Open restoration margins of tooth 03/26/2025    Other intervertebral disc degeneration, lumbar region     Lumbar degenerative disc disease    Overweight with body mass index (BMI) 25.0-29.9 02/02/2022    Partial loss of teeth due to caries, class I 01/12/2024    Personal history of malignant neoplasm of prostate     History of malignant neoplasm of prostate    Personal history of other diseases of the circulatory system     History of hypertension    Personal history of other  diseases of the circulatory system 06/29/2022    History of atrial tachycardia    Personal history of other diseases of the circulatory system     History of essential hypertension    Personal history of other diseases of the musculoskeletal system and connective tissue     History of arthritis    Personal history of other diseases of the musculoskeletal system and connective tissue     History of acute gouty arthritis    Personal history of other diseases of the musculoskeletal system and connective tissue 01/30/2019    History of left foot drop    Personal history of other diseases of the respiratory system     History of bronchitis    Personal history of other endocrine, nutritional and metabolic disease     History of hypercholesterolemia    Personal history of other endocrine, nutritional and metabolic disease     History of diabetes mellitus    Personal history of other endocrine, nutritional and metabolic disease 01/17/2014    History of diabetes mellitus    Post-traumatic stress disorder, chronic 11/10/2023    Primary localized osteoarthritis of pelvic region and thigh 12/17/2018    Pure hypercholesterolemia, unspecified 11/20/2013    Low-density-lipoid-type (LDL) hyperlipoproteinemia    Snoring     Snoring        Past Surgical History:  Past Surgical History:   Procedure Laterality Date    BACK SURGERY  11/20/2013    Back Surgery    TONSILLECTOMY  11/20/2013    Tonsillectomy    TOTAL KNEE ARTHROPLASTY  11/20/2013    Knee Replacement          Social History:   reports that he has never smoked. He has never been exposed to tobacco smoke. He has never used smokeless tobacco. He reports current alcohol use. He reports that he does not use drugs.     Family History:  family history includes Diabetes in his brother; Heart attack in his mother; Stroke in his maternal grandfather.      Allergies:  Allergies   Allergen Reactions    Mold Other       Outpatient Medications:  Current Outpatient Medications   Medication  "Instructions    acetaminophen (Tylenol) 500 mg tablet Take by mouth.    allopurinol (ZYLOPRIM) 300 mg, oral, Daily    amLODIPine (NORVASC) 5 mg, oral, Daily    apixaban (Eliquis) 5 mg tablet TAKE ONE (1) TABLET BY MOUTH 2 TIMES A DAY    atorvastatin (LIPITOR) 80 mg, oral, Daily    colchicine 0.6 mg, oral, Daily    finasteride (PROSCAR) 5 mg, oral, Daily    fludrocortisone (Florinef) 0.1 mg tablet 1 tablet, Daily    fluticasone (Flonase) 50 mcg/actuation nasal spray 1 spray, Daily PRN    lenalidomide (Revlimid) 5 mg capsule Take by mouth.    losartan (COZAAR) 100 mg, oral, Daily    metFORMIN (GLUCOPHAGE) 500 mg, As needed    metoprolol tartrate (LOPRESSOR) 50 mg, oral, 2 times daily    pantoprazole (ProtoNix) 40 mg EC tablet 1 tablet, Daily    sildenafil (VIAGRA) 100 mg, As needed    tamsulosin (FLOMAX) 0.4 mg, oral, Daily    traMADol (ULTRAM) 50 mg, oral, Every 8 hours PRN       Physical Exam:  Vitals:    05/08/25 0940   BP: 122/77   BP Location: Left arm   Patient Position: Sitting   BP Cuff Size: Adult   Pulse: 101   Resp: 18   SpO2: 100%   Weight: 87.9 kg (193 lb 12.8 oz)   Height: 1.816 m (5' 11.5\")     Wt Readings from Last 5 Encounters:   05/08/25 87.9 kg (193 lb 12.8 oz)   05/06/25 88.7 kg (195 lb 10.5 oz)   04/23/25 88.9 kg (196 lb)   04/08/25 88.2 kg (194 lb 7.1 oz)   03/26/25 89 kg (196 lb 3.4 oz)     Body mass index is 26.65 kg/m².     GENERAL: alert, cooperative, pleasant, in no acute distress  SKIN: warm and dry  NECK: Normal JVD, negative HJR  CARDIAC: IRRegular rate and rhythm with no rubs, murmurs, or gallops  CHEST: Normal respiratory efforts, lungs clear to auscultation bilaterally.  ABDOMEN: soft, nontender, nondistended  EXTREMITIES: no edema, +2 palpable RP and DP pulses bilaterally       Last Labs:  Recent Labs     05/06/25  0728 04/08/25  0959 03/11/25  0848   WBC 4.2* 5.9 4.7   HGB 12.1* 11.8* 10.8*   HCT 35.5* 34.9* 32.4*    183 163   MCV 90 92 92     Recent Labs     05/06/25  0728 " 04/08/25  0959 03/11/25  0848    143 139   K 3.6 3.1* 3.5    108* 105   BUN 17 20 15   CREATININE 1.35* 1.47* 1.57*     CMP -  Lab Results   Component Value Date    CALCIUM 8.7 05/06/2025    PHOS 3.6 05/29/2024    PROT 5.9 (L) 05/06/2025    PROT 5.9 (L) 05/06/2025    ALBUMIN 4.2 05/06/2025    AST 12 05/06/2025    ALT 13 05/06/2025    ALKPHOS 71 05/06/2025    BILITOT 0.8 05/06/2025       LIPID PANEL -   Lab Results   Component Value Date    CHOL 145 08/20/2024    HDL 72.8 08/20/2024    LDLF 61 09/27/2023    TRIG 54 08/20/2024   LDL 61 8/20/24    Lab Results   Component Value Date    BNP 20 04/09/2022    HGBA1C 7.7 (A) 02/28/2022    HGBA1C 7.0 (H) 08/03/2020       Last Cardiology Tests:  ECG:  Obtained and reviewed EKG- atrial flutter  with variable AV block    Echo:  4/9/22  CONCLUSIONS:   1. The left ventricular systolic function is normal with a 60% estimated ejection fraction.   2. Compared with study from 9/6/2018, Right ventricle appears mildly enlarged.       Stress Test:  9/13/2018   IMPRESSION:  1. Normal stress myocardial perfusion imaging. No evidence of  stress-induced ischemia or scar.  2. Well-maintained left ventricular function.  I personally reviewed the images/study and I agree with the findings  as stated. This study was interpreted at [place], [place].    CT of chest 4/30/22  VESSELS:  Normal caliber aorta with mild  aortic atherosclerosis.  Borderline enlargement of the main pulmonary artery measuring up to  3.1 cm which may be seen with pulmonary hypertension. Evaluation of  the lower pulmonary artery vessels is partially degraded by  respiratory motion. Previously seen filling defect appears improved  since prior imaging. No definite new pulmonary embolism seen to the  segmental level.          Assessment/Plan   Toby was seen today for follow-up.  Diagnoses and all orders for this visit:  Paroxysmal atrial fibrillation (Multi)  (Primary)  -     ECG 12 lead (Clinic Performed)  Hypercholesterolemia  Benign hypertension        In Summary Mr. Toby Ugalde is a pleasant 74 year old  male with significant past medical history of Paroxysmal atrial fibrillation, Hypertension, Pulmonary embolism 4/9/22, GERD, Anemia, Multiple myeloma. Patient is asymptomatic from a cardiac perspective. His blood pressure is controlled. His EKG today showed typical atrial flutter .  He is tolerating Eliquis. He will schedule follow up with Dr. Garcia for further management. His recent lipid panel is at goal. He will continue current cardiovascular medications. He will follow up as scheduled.     Orders:  No orders of the defined types were placed in this encounter.     Followup Appts:  Future Appointments   Date Time Provider Department Center   5/30/2025 11:30 AM Antoine Cano MD [id number] [facility]   6/3/2025 10:00 AM ESPINOZA Novak [id number] [facility]   6/3/2025 10:30 AM INF 05 MINOFF [id number] [facility]   6/16/2025  9:50 AM Kendra Segovia MD [id number] [facility]   7/9/2025  8:00 AM Geoffrey Garcia MD [id number] [facility]   8/6/2025  8:30 AM [facility]           ____________________________________________________________  ESPINOZA Almanza  [facility]  [facility]

## 2025-05-14 ENCOUNTER — OFFICE VISIT (OUTPATIENT)
Dept: CARDIOLOGY | Facility: HOSPITAL | Age: 75
End: 2025-05-14
Payer: MEDICARE

## 2025-05-14 VITALS
WEIGHT: 196 LBS | SYSTOLIC BLOOD PRESSURE: 122 MMHG | HEIGHT: 71 IN | BODY MASS INDEX: 27.44 KG/M2 | HEART RATE: 73 BPM | DIASTOLIC BLOOD PRESSURE: 69 MMHG | OXYGEN SATURATION: 99 %

## 2025-05-14 DIAGNOSIS — I48.0 PAROXYSMAL ATRIAL FIBRILLATION (MULTI): Primary | ICD-10-CM

## 2025-05-14 PROCEDURE — 3078F DIAST BP <80 MM HG: CPT | Performed by: INTERNAL MEDICINE

## 2025-05-14 PROCEDURE — 1159F MED LIST DOCD IN RCRD: CPT | Performed by: INTERNAL MEDICINE

## 2025-05-14 PROCEDURE — 99214 OFFICE O/P EST MOD 30 MIN: CPT | Performed by: INTERNAL MEDICINE

## 2025-05-14 PROCEDURE — 3074F SYST BP LT 130 MM HG: CPT | Performed by: INTERNAL MEDICINE

## 2025-05-14 PROCEDURE — 93005 ELECTROCARDIOGRAM TRACING: CPT | Performed by: INTERNAL MEDICINE

## 2025-05-14 PROCEDURE — 99212 OFFICE O/P EST SF 10 MIN: CPT

## 2025-05-14 NOTE — PROGRESS NOTES
Referred by Geoffrey Lei MD provider found for No chief complaint on file.       Toby Ugalde is a 75 y.o. year old male patient with h/o A Fib on medical treatment. Presents for follow up.     PMHx/PSHx: As above    FamHx: unremarkable     Allergies:  RX Allergies[1]     Review of Systems    Constitutional: not feeling tired.   Eyes: no eyesight problems.   ENT: no hearing loss and no nosebleeds.   Cardiovascular: no intermittent leg claudication and as noted in HPI.   Respiratory: no chronic cough and no shortness of breath.   Gastrointestinal: no change in bowel habits and no blood in stools.   Genitourinary: no urinary frequency and no hematuria.   Skin: no skin rashes.   Neurological: no seizures and no frequent falls.   Psychiatric: no depression and not suicidal.   All other systems have been reviewed and are negative for complaint.     Outpatient Medications:  Current Outpatient Medications   Medication Instructions    acetaminophen (Tylenol) 500 mg tablet Take by mouth.    allopurinol (ZYLOPRIM) 300 mg, oral, Daily    amLODIPine (NORVASC) 5 mg, oral, Daily    apixaban (Eliquis) 5 mg tablet TAKE ONE (1) TABLET BY MOUTH 2 TIMES A DAY    atorvastatin (LIPITOR) 80 mg, oral, Daily    colchicine 0.6 mg, oral, Daily    finasteride (PROSCAR) 5 mg, oral, Daily    fludrocortisone (Florinef) 0.1 mg tablet 1 tablet, Daily    fluticasone (Flonase) 50 mcg/actuation nasal spray 1 spray, Daily PRN    lenalidomide (Revlimid) 5 mg capsule Take by mouth.    losartan (COZAAR) 100 mg, oral, Daily    metFORMIN (GLUCOPHAGE) 500 mg, As needed    metoprolol tartrate (LOPRESSOR) 50 mg, oral, 2 times daily    pantoprazole (ProtoNix) 40 mg EC tablet 1 tablet, Daily    sildenafil (VIAGRA) 100 mg, As needed    tamsulosin (FLOMAX) 0.4 mg, oral, Daily    traMADol (ULTRAM) 50 mg, oral, Every 8 hours PRN         Last Recorded Vitals:      3/11/2025     9:32 AM 3/26/2025    10:00 AM 4/8/2025    10:14 AM 4/23/2025     9:19 AM 5/6/2025  "    7:49 AM 5/8/2025     9:40 AM 5/14/2025    11:01 AM   Vitals   Systolic 152 128 141 120 118 122 122   Diastolic 73 75 81 70 68 77 69   BP Location Right arm     Left arm Left arm   Heart Rate 64 82 89 76 68 101 73   Temp 36.4 °C (97.5 °F)  36.4 °C (97.5 °F)  36.3 °C (97.3 °F)     Resp 18 21 18 22 17 18    Height  1.778 m (5' 10\")  1.778 m (5' 10\") 1.743 m (5' 8.62\")  1.816 m (5' 11.5\") 1.803 m (5' 11\")   Weight (lb) 193.4 196.21 194.45 196 195.66 193.8 196   BMI 27.75 kg/m2 28.15 kg/m2 27.9 kg/m2 28.12 kg/m2 29.21 kg/m2 26.65 kg/m2 27.34 kg/m2   BSA (m2) 2.08 m2 2.1 m2 2.09 m2 2.1 m2 2.07 m2 2.11 m2 2.11 m2   Visit Report  Report Report Report  Report Report       Significant value    Visit Vitals  /69 (BP Location: Left arm, Patient Position: Sitting, BP Cuff Size: Adult)   Pulse 73   Ht 1.803 m (5' 11\")   Wt 88.9 kg (196 lb)   SpO2 99%   BMI 27.34 kg/m²   Smoking Status Never   BSA 2.11 m²        Physical Exam:  Constitutional: alert and in no acute distress.   Eyes: no erythema, swelling or discharge from the eye .   Neck: neck is supple, symmetric, trachea midline, no masses  and no thyromegaly .   Pulmonary: no increased work of breathing or signs of respiratory distress  and lungs clear to auscultation.    Cardiovascular: carotid pulses 2+ bilaterally with no bruit , JVP was normal, no thrills , regular rhythm, normal S1 and S2, no murmurs , pedal pulses 2+ bilaterally  and no edema .   Abdomen: abdomen non-tender, no masses  and no hepatomegaly .   Skin: skin warm and dry, normal skin turgor .   Psychiatric judgment and insight is normal  and oriented to person, place and time .        Assessment/Plan   Problem List Items Addressed This Visit           ICD-10-CM    Paroxysmal atrial fibrillation (Multi) - Primary I48.0    Relevant Orders    ECG 12 lead (Clinic Performed)       Toby Ugalde is a 75 y.o. year old male patient with h/o A Fib on medical treatment. Presents for follow up.   His current ECG " shows NSR with narrow QRS and HR of 73 bpm. The patient reports doing well, some isolated palpitations but otherwise asymptomatic. Will continue his current medications and follow up in 6 months.         Geoffrey Garcia MD  Cardiac Electrophysiology      Thank you very much for allowing me to participate in the care of this pleasant patient. Please do not hesitate to contact me with any further questions or concerns regarding his care.    **Disclaimer: This note was dictated by speech recognition, and every effort has been made to prevent any error in transcription, however minor errors may be present**         [1]   Allergies  Allergen Reactions    Mold Other

## 2025-05-30 ENCOUNTER — APPOINTMENT (OUTPATIENT)
Dept: PRIMARY CARE | Facility: CLINIC | Age: 75
End: 2025-05-30
Payer: MEDICARE

## 2025-06-02 RX ORDER — FAMOTIDINE 10 MG/ML
20 INJECTION, SOLUTION INTRAVENOUS ONCE AS NEEDED
OUTPATIENT
Start: 2025-07-01

## 2025-06-02 RX ORDER — DIPHENHYDRAMINE HYDROCHLORIDE 50 MG/ML
50 INJECTION, SOLUTION INTRAMUSCULAR; INTRAVENOUS AS NEEDED
OUTPATIENT
Start: 2025-07-01

## 2025-06-02 RX ORDER — ALBUTEROL SULFATE 0.83 MG/ML
3 SOLUTION RESPIRATORY (INHALATION) AS NEEDED
OUTPATIENT
Start: 2025-07-01

## 2025-06-02 RX ORDER — EPINEPHRINE 0.3 MG/.3ML
0.3 INJECTION SUBCUTANEOUS EVERY 5 MIN PRN
OUTPATIENT
Start: 2025-07-01

## 2025-06-02 RX ORDER — MONTELUKAST SODIUM 10 MG/1
10 TABLET ORAL ONCE
OUTPATIENT
Start: 2025-07-01

## 2025-06-02 RX ORDER — DIPHENHYDRAMINE HCL 50 MG
50 CAPSULE ORAL ONCE
OUTPATIENT
Start: 2025-07-01

## 2025-06-02 RX ORDER — ACETAMINOPHEN 325 MG/1
650 TABLET ORAL ONCE
OUTPATIENT
Start: 2025-07-01

## 2025-06-02 ASSESSMENT — ENCOUNTER SYMPTOMS
CONSTIPATION: 1
FATIGUE: 1
DIAPHORESIS: 1
DIARRHEA: 1
VOMITING: 0
ARTHRALGIAS: 1
ADENOPATHY: 1
APPETITE CHANGE: 0
CARDIOVASCULAR NEGATIVE: 1
FEVER: 0
BLOOD IN STOOL: 0
CHILLS: 0
UNEXPECTED WEIGHT CHANGE: 0
NUMBNESS: 1
NAUSEA: 0
SHORTNESS OF BREATH: 1

## 2025-06-03 ENCOUNTER — OFFICE VISIT (OUTPATIENT)
Dept: HEMATOLOGY/ONCOLOGY | Facility: CLINIC | Age: 75
End: 2025-06-03
Payer: MEDICARE

## 2025-06-03 ENCOUNTER — INFUSION (OUTPATIENT)
Dept: HEMATOLOGY/ONCOLOGY | Facility: CLINIC | Age: 75
End: 2025-06-03
Payer: MEDICARE

## 2025-06-03 ENCOUNTER — LAB (OUTPATIENT)
Dept: LAB | Facility: CLINIC | Age: 75
End: 2025-06-03
Payer: MEDICARE

## 2025-06-03 VITALS
OXYGEN SATURATION: 95 % | TEMPERATURE: 98.2 F | RESPIRATION RATE: 18 BRPM | WEIGHT: 201.94 LBS | DIASTOLIC BLOOD PRESSURE: 81 MMHG | HEART RATE: 70 BPM | BODY MASS INDEX: 28.17 KG/M2 | SYSTOLIC BLOOD PRESSURE: 155 MMHG

## 2025-06-03 DIAGNOSIS — C90.00 IGG MULTIPLE MYELOMA (MULTI): ICD-10-CM

## 2025-06-03 DIAGNOSIS — C90.00 IGG MULTIPLE MYELOMA (MULTI): Primary | ICD-10-CM

## 2025-06-03 LAB
ALBUMIN SERPL BCP-MCNC: 4.2 G/DL (ref 3.4–5)
ALP SERPL-CCNC: 77 U/L (ref 33–136)
ALT SERPL W P-5'-P-CCNC: 22 U/L (ref 10–52)
ANION GAP SERPL CALC-SCNC: 15 MMOL/L (ref 10–20)
AST SERPL W P-5'-P-CCNC: 16 U/L (ref 9–39)
BASOPHILS # BLD AUTO: 0.02 X10*3/UL (ref 0–0.1)
BASOPHILS NFR BLD AUTO: 0.5 %
BILIRUB SERPL-MCNC: 0.6 MG/DL (ref 0–1.2)
BUN SERPL-MCNC: 14 MG/DL (ref 6–23)
CALCIUM SERPL-MCNC: 8.7 MG/DL (ref 8.6–10.6)
CHLORIDE SERPL-SCNC: 106 MMOL/L (ref 98–107)
CO2 SERPL-SCNC: 22 MMOL/L (ref 21–32)
CREAT SERPL-MCNC: 1.35 MG/DL (ref 0.5–1.3)
EGFRCR SERPLBLD CKD-EPI 2021: 54 ML/MIN/1.73M*2
EOSINOPHIL # BLD AUTO: 0.11 X10*3/UL (ref 0–0.4)
EOSINOPHIL NFR BLD AUTO: 3 %
ERYTHROCYTE [DISTWIDTH] IN BLOOD BY AUTOMATED COUNT: 14.1 % (ref 11.5–14.5)
GLUCOSE SERPL-MCNC: 144 MG/DL (ref 74–99)
HCT VFR BLD AUTO: 35 % (ref 41–52)
HGB BLD-MCNC: 11.8 G/DL (ref 13.5–17.5)
IMM GRANULOCYTES # BLD AUTO: 0 X10*3/UL (ref 0–0.5)
IMM GRANULOCYTES NFR BLD AUTO: 0 % (ref 0–0.9)
LYMPHOCYTES # BLD AUTO: 1.81 X10*3/UL (ref 0.8–3)
LYMPHOCYTES NFR BLD AUTO: 49.5 %
MCH RBC QN AUTO: 30.9 PG (ref 26–34)
MCHC RBC AUTO-ENTMCNC: 33.7 G/DL (ref 32–36)
MCV RBC AUTO: 92 FL (ref 80–100)
MONOCYTES # BLD AUTO: 0.39 X10*3/UL (ref 0.05–0.8)
MONOCYTES NFR BLD AUTO: 10.7 %
NEUTROPHILS # BLD AUTO: 1.33 X10*3/UL (ref 1.6–5.5)
NEUTROPHILS NFR BLD AUTO: 36.3 %
NRBC BLD-RTO: ABNORMAL /100{WBCS}
PLATELET # BLD AUTO: 161 X10*3/UL (ref 150–450)
POTASSIUM SERPL-SCNC: 3.9 MMOL/L (ref 3.5–5.3)
PROT SERPL-MCNC: 6.2 G/DL (ref 6.4–8.2)
PROT SERPL-MCNC: 6.2 G/DL (ref 6.4–8.2)
RBC # BLD AUTO: 3.82 X10*6/UL (ref 4.5–5.9)
SODIUM SERPL-SCNC: 139 MMOL/L (ref 136–145)
WBC # BLD AUTO: 3.7 X10*3/UL (ref 4.4–11.3)

## 2025-06-03 PROCEDURE — 1036F TOBACCO NON-USER: CPT | Performed by: INTERNAL MEDICINE

## 2025-06-03 PROCEDURE — 3077F SYST BP >= 140 MM HG: CPT | Performed by: INTERNAL MEDICINE

## 2025-06-03 PROCEDURE — 82784 ASSAY IGA/IGD/IGG/IGM EACH: CPT

## 2025-06-03 PROCEDURE — 2500000004 HC RX 250 GENERAL PHARMACY W/ HCPCS (ALT 636 FOR OP/ED): Mod: JZ,TB | Performed by: INTERNAL MEDICINE

## 2025-06-03 PROCEDURE — 84075 ASSAY ALKALINE PHOSPHATASE: CPT

## 2025-06-03 PROCEDURE — 83521 IG LIGHT CHAINS FREE EACH: CPT

## 2025-06-03 PROCEDURE — 3079F DIAST BP 80-89 MM HG: CPT | Performed by: INTERNAL MEDICINE

## 2025-06-03 PROCEDURE — 2500000001 HC RX 250 WO HCPCS SELF ADMINISTERED DRUGS (ALT 637 FOR MEDICARE OP): Performed by: INTERNAL MEDICINE

## 2025-06-03 PROCEDURE — 96401 CHEMO ANTI-NEOPL SQ/IM: CPT

## 2025-06-03 PROCEDURE — 1159F MED LIST DOCD IN RCRD: CPT | Performed by: INTERNAL MEDICINE

## 2025-06-03 PROCEDURE — 2500000004 HC RX 250 GENERAL PHARMACY W/ HCPCS (ALT 636 FOR OP/ED): Performed by: INTERNAL MEDICINE

## 2025-06-03 PROCEDURE — 85025 COMPLETE CBC W/AUTO DIFF WBC: CPT

## 2025-06-03 PROCEDURE — 84155 ASSAY OF PROTEIN SERUM: CPT

## 2025-06-03 PROCEDURE — 84165 PROTEIN E-PHORESIS SERUM: CPT

## 2025-06-03 PROCEDURE — 36415 COLL VENOUS BLD VENIPUNCTURE: CPT

## 2025-06-03 PROCEDURE — 99214 OFFICE O/P EST MOD 30 MIN: CPT | Performed by: INTERNAL MEDICINE

## 2025-06-03 PROCEDURE — 1125F AMNT PAIN NOTED PAIN PRSNT: CPT | Performed by: INTERNAL MEDICINE

## 2025-06-03 PROCEDURE — 99214 OFFICE O/P EST MOD 30 MIN: CPT | Mod: 25 | Performed by: INTERNAL MEDICINE

## 2025-06-03 RX ORDER — MONTELUKAST SODIUM 10 MG/1
10 TABLET ORAL ONCE
Status: COMPLETED | OUTPATIENT
Start: 2025-06-03 | End: 2025-06-03

## 2025-06-03 RX ORDER — DEXAMETHASONE 4 MG/1
8 TABLET ORAL ONCE
Status: COMPLETED | OUTPATIENT
Start: 2025-06-03 | End: 2025-06-03

## 2025-06-03 RX ORDER — DIPHENHYDRAMINE HCL 50 MG
50 CAPSULE ORAL ONCE
Status: COMPLETED | OUTPATIENT
Start: 2025-06-03 | End: 2025-06-03

## 2025-06-03 RX ORDER — ACETAMINOPHEN 325 MG/1
650 TABLET ORAL ONCE
Status: COMPLETED | OUTPATIENT
Start: 2025-06-03 | End: 2025-06-03

## 2025-06-03 RX ORDER — ALBUTEROL SULFATE 0.83 MG/ML
3 SOLUTION RESPIRATORY (INHALATION) AS NEEDED
Status: DISCONTINUED | OUTPATIENT
Start: 2025-06-03 | End: 2025-06-03 | Stop reason: HOSPADM

## 2025-06-03 RX ORDER — DEXAMETHASONE 4 MG/1
8 TABLET ORAL ONCE
Status: CANCELLED | OUTPATIENT
Start: 2025-06-03

## 2025-06-03 RX ORDER — EPINEPHRINE 0.3 MG/.3ML
0.3 INJECTION SUBCUTANEOUS EVERY 5 MIN PRN
Status: DISCONTINUED | OUTPATIENT
Start: 2025-06-03 | End: 2025-06-03 | Stop reason: HOSPADM

## 2025-06-03 RX ORDER — DIPHENHYDRAMINE HYDROCHLORIDE 50 MG/ML
50 INJECTION, SOLUTION INTRAMUSCULAR; INTRAVENOUS AS NEEDED
Status: DISCONTINUED | OUTPATIENT
Start: 2025-06-03 | End: 2025-06-03 | Stop reason: HOSPADM

## 2025-06-03 RX ORDER — FAMOTIDINE 10 MG/ML
20 INJECTION, SOLUTION INTRAVENOUS ONCE AS NEEDED
Status: DISCONTINUED | OUTPATIENT
Start: 2025-06-03 | End: 2025-06-03 | Stop reason: HOSPADM

## 2025-06-03 RX ADMIN — DIPHENHYDRAMINE HYDROCHLORIDE 50 MG: 50 CAPSULE ORAL at 10:56

## 2025-06-03 RX ADMIN — MONTELUKAST 10 MG: 10 TABLET, FILM COATED ORAL at 10:56

## 2025-06-03 RX ADMIN — DARATUMUMAB AND HYALURONIDASE-FIHJ (HUMAN RECOMBINANT) 1800 MG: 1800; 30000 INJECTION SUBCUTANEOUS at 11:42

## 2025-06-03 RX ADMIN — DEXAMETHASONE 8 MG: 4 TABLET ORAL at 10:56

## 2025-06-03 RX ADMIN — ACETAMINOPHEN 650 MG: 325 TABLET ORAL at 10:56

## 2025-06-03 ASSESSMENT — PAIN SCALES - GENERAL: PAINLEVEL_OUTOF10: 2

## 2025-06-03 NOTE — PROGRESS NOTES
Patient ID: Toby Ugalde is a 75 y.o. male.    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 3/6/17- bone survey was negative for lytic lesions.  3/15/17-  bone marrow revealed 30% plasma cells with kappa predominance. Started on Rd  - 2017 - 12/2019: Rd->R 10mg (d/t hyperGlc a/w dex so d/c'ed), best response MA; Rd held d/t dental procedure and hip replacement, but never restarted  - 3/2022: worsening anemia since 3/22, worsening Cr since 3/21; SPE-IgGK 3.5g, K/L 147; bone survey: LT 9th rib possible plasmacytoma     Late February 2022 patient was seen by his PCP Dr. Cano for complaints of fatigue.  Labs notable for hyponatremia to 127, anemia with a HGB of 9.  Cr 1.99. Protein elevated to  10.7.  Uric acid up to 8.9.      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  Skeletal survey (3/2/22) - lesion in the left rib.   PET:    Expansile hypermetabolic lytic mass involving the left lateral 9th  rib, correlating to suspected plasmacytoma described on osseous  survey performed 03/02/2022.  2. Diffusely increased FDG uptake throughout the bone marrow which  may relate to anemia/marrow activation, with marrow involvement by  myeloma not excluded.  Treatment:   Initiated (3/29/22) treatment for his relapsed Myeloma with Revlimid 10 mg by mouth days 1-14/21 days, Daratuzumab (3/29, & 4/5/22), SC Velcade (3/29 & 4/5/22), & weekly Dexamethasone complicated by several hospitaliizations for syncope, PE ,afib due to  velcade and revlimid.  Velcade discontinued     Recv'd weekly single agent Daraumumab from 5/31 - 7/19/22. Initiated (8/25/22) Revlimid 10 mg on days 1-21/28 days.     Has had marked reduction in paraprotein and free lyte chains  with M protein : 1.4 g/dl on 4/19/22 ,    [place] free lyte/ratio of 4.49 mg/dl/4.45.    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         IgG multiple myeloma (Multi)   9/6/2023 Initial Diagnosis    IgG multiple myeloma (CMS/HCC)     10/17/2023 -  Chemotherapy    Daratumumab, 28 Day Cycles - Maintenance       Response:     Past Medical History:     Past Medical History:   Diagnosis Date    Chronic gout, unspecified, without tophus (tophi) 12/11/2024    Dental caries on pit and fissure surface penetrating into dentin 01/12/2024    Deposits (accretions) on teeth 01/12/2024    Essential (primary) hypertension 11/20/2013    Benign essential hypertension    Gout, unspecified 12/11/2024    History of hypercholesterolemia 01/12/2024    History of hypertension 01/12/2024    History of malignant neoplasm of prostate 01/12/2024    Impacted cerumen 01/12/2024    Impacted cerumen, bilateral 02/16/2024    Infective pharyngitis 01/12/2024    Open restoration margins of tooth 03/26/2025    Other intervertebral disc degeneration, lumbar region     Lumbar degenerative disc disease    Overweight with body mass index (BMI) 25.0-29.9 02/02/2022    Partial loss of teeth due to caries, class I 01/12/2024    Personal history of malignant neoplasm of prostate     History of malignant neoplasm of prostate    Personal history of other diseases of the circulatory system     History of hypertension    Personal history of other diseases of the circulatory system 06/29/2022    History of atrial tachycardia    Personal history of other diseases of the circulatory system     History of essential hypertension    Personal history of other diseases of the musculoskeletal system and connective tissue     History of arthritis    Personal history of other diseases of the musculoskeletal  system and connective tissue     History of acute gouty arthritis    Personal history of other diseases of the musculoskeletal system and connective tissue 01/30/2019    History of left foot drop    Personal history of other diseases of the respiratory system     History of bronchitis    Personal history of other endocrine, nutritional and metabolic disease     History of hypercholesterolemia    Personal history of other endocrine, nutritional and metabolic disease     History of diabetes mellitus    Personal history of other endocrine, nutritional and metabolic disease 01/17/2014    History of diabetes mellitus    Post-traumatic stress disorder, chronic 11/10/2023    Primary localized osteoarthritis of pelvic region and thigh 12/17/2018    Pure hypercholesterolemia, unspecified 11/20/2013    Low-density-lipoid-type (LDL) hyperlipoproteinemia    Snoring     Snoring     Surgical History:     Past Surgical History:   Procedure Laterality Date    BACK SURGERY  11/20/2013    Back Surgery    TONSILLECTOMY  11/20/2013    Tonsillectomy    TOTAL KNEE ARTHROPLASTY  11/20/2013    Knee Replacement      Family History:     Family History   Problem Relation Name Age of Onset    Heart attack Mother      Diabetes Brother      Stroke Maternal Grandfather       Social History:  Reported history of agent orange exposure while in [employer] at [employer]. Retired  and post .  from his wife who is health care  power of .  Two sons and 2 grandchildren (1)      Social History     Tobacco Use    Smoking status: Never     Passive exposure: Never    Smokeless tobacco: Never   Substance Use Topics    Alcohol use: Yes     Comment: occasional    Drug use: Never      -------------------------------------------------------------------------------------------------------  Subjective       HPI    KANDI ROSE is a 75 year old Male h/o pAfib, T2DM, HTN, gout, IgG kappa MM.  Still not clear whether patient has  ONJ.  Following with dentist at the [facility].  Patient states he had a bone density 11/23 which does not show osteopenia.    Toby presents to the clinic today (6/3/25) unaccompanied  follow up evaulation of his multiple myeloma, blood work, and is scheduled to receive C33 of monthly subcutaneous daratumumab.  Remains on lenalidomide 5 mg daily on days 1-21 of 28 day cycle.  Last visit in April patient reported an enlarged right cervical neck node persistent after course of antibiotics and after some dental  work. CT neck confirmed 1.5 cm mass, needle aspirate not diagnostic. Patient states that node has resolved.    Energy level is up and down.  Intermittent night sweats.  Appetite is also up and down.  Weight is stable.  Uses cane for ambulation.  May have cortisol injection left hip with previously plan to eventually have left hip replacement.  Numbness and tingling to bilateral feet and hands, doesn't happen as often as it was. Following with cardiology.  Continues to take apixaban 5 mg BID.  Glucose levels running 130-140s, can run higher after getting steroids with treatment.    Review of Systems   Constitutional:  Positive for diaphoresis (occassional night sweats about once per week to 2 weeks, can be drenching) and fatigue. Negative for appetite change, chills, fever and unexpected weight change.   Respiratory:  Positive for shortness of breath (on exertion).    Cardiovascular: Negative.    Gastrointestinal:  Positive for constipation and diarrhea. Negative for blood in stool, nausea and vomiting.   Genitourinary: Negative.     Musculoskeletal:  Positive for arthralgias. Negative for gait problem (uses cane).   Skin: Negative.    Neurological:  Positive for numbness. Negative for gait problem (uses cane).   Hematological:  Positive for adenopathy.   All other systems reviewed and are  negative.  -------------------------------------------------------------------------------------------------------  Objective   BSA: There is no height or weight on file to calculate BSA.  There were no vitals taken for this visit.    Physical Exam  Vitals reviewed.   Constitutional:       Appearance: Normal appearance.   HENT:      Head: Normocephalic and atraumatic.        Comments: Enlarged firm lymph node 4x3 cm to right neck, non-tender.     Mouth/Throat:      Mouth: Mucous membranes are moist.   Eyes:      Extraocular Movements: Extraocular movements intact.      Conjunctiva/sclera: Conjunctivae normal.      Pupils: Pupils are equal, round, and reactive to light.   Cardiovascular:      Rate and Rhythm: Normal rate and regular rhythm.      Pulses: Normal pulses.      Heart sounds: Normal heart sounds.   Pulmonary:      Effort: Pulmonary effort is normal.      Breath sounds: Normal breath sounds.   Abdominal:      General: Abdomen is flat. Bowel sounds are normal.      Palpations: Abdomen is soft. There is no mass.      Tenderness: There is no abdominal tenderness.   Musculoskeletal:         General: No swelling. Normal range of motion.   Lymphadenopathy:      Comments: No lymphadenopathy   Skin:     General: Skin is warm and dry.   Neurological:      General: No focal deficit present.      Mental Status: He is alert and oriented to person, place, and time.   Psychiatric:         Mood and Affect: Mood normal.         Behavior: Behavior normal.         Thought Content: Thought content normal.         Judgment: Judgment normal.   Performance Status:  Symptomatic; fully ambulatory  -------------------------------------------------------------------------------------------------------  Assessment/Plan      74 yo M h/o IgG kappa MM s/p Rd [id number] best response HI stopped d/t dental procedure, presented on 3/2022 for worsening anemia/Cr d/t relapsed MM, and likely LT 9th rib plasmacytoma.      Restaging 3/2022 shows  high burden disease with 90% tumorous involvement of the bone marrow ,   Initiated (3/29/22) treatment for his relapsed Myeloma with Revlimid 10 mg by mouth days 1-14/21 days, Daratuzumab (3/29, & 4/5/22), SC Velcade (3/29 & 4/5/22), & weekly Dexamethasone. complicated by several hospilizations for syncope, PE ,afib due to velcade and revlimid.    Recv'd weekly single agent Daraumumab from 5/31 - 7/19/22. Initiated (8/25/22) Revlimid 10 mg on days 1-21/28 days.     Will continue monthly casey, rev, & dex until evidence of disease progression or unacceptable toxicity.    6/3/25:  CBC results from today are stable from today.  Kappa and lambda free light chains stable f  M-protein stable at 0.2 (3/11/25), level in process from today.   Scheduled to receive C33 daratumumab today.  Continue revlimid 5 mg daily on days 1-21 out of 28 day cycle.  Follow up visit in 2 months. Plan PET imaging to assess lynn disease before next visit. Advised to hold metformin before PET scan.     Ig [place] Free Light Chain   Date Value Ref Range Status   05/06/2025 2.88 (H) 0.33 - 1.94 mg/dL Final   04/08/2025 2.75 (H) 0.33 - 1.94 mg/dL Final   03/11/2025 2.40 (H) 0.33 - 1.94 mg/dL Final   02/11/2025 2.51 (H) 0.33 - 1.94 mg/dL Final   01/14/2025 2.79 (H) 0.33 - 1.94 mg/dL Final     Ig Lambda Free Light Chain   Date Value Ref Range Status   05/06/2025 0.80 0.57 - 2.63 mg/dL Final   04/08/2025 0.99 0.57 - 2.63 mg/dL Final   03/11/2025 0.81 0.57 - 2.63 mg/dL Final   02/11/2025 0.76 0.57 - 2.63 mg/dL Final   01/14/2025 0.85 0.57 - 2.63 mg/dL Final     Kappa/Lambda Ratio   Date Value Ref Range Status   05/06/2025 3.60 (H) 0.26 - 1.65 Final   04/08/2025 2.78 (H) 0.26 - 1.65 Final   03/11/2025 2.96 (H) 0.26 - 1.65 Final   02/11/2025 3.30 (H) 0.26 - 1.65 Final   01/14/2025 3.28 (H) 0.26 - 1.65 Final     M-PROTEIN 1   Date Value Ref Range Status   05/06/2025 0.2 (H)   g/dL Final   04/08/2025 0.1 (H)   g/dL Final   03/11/2025 0.2 (H)   g/dL Final    02/11/2025 0.1 (H)   g/dL Final   01/14/2025 0.1 (H)   g/dL Final      Enlarged Right Cervical Lymph Node:  4/8/25:  Toby reports new enlarged right cervical lymph node that he noted around 4/22/25.  Recently had dental work completed in March, needs to have crown replaced on 4/16/25.  PCP prescribed 10 day course of Augmentin.  Toby reports slight decrease in LN size and tenderness resolved after completing Augmentin.  On examination right cervical LN 4x3 cm in size, firm, Patient seen by ENT and CT scan of neck showed 1.5 cm node. ENT FNA non diagnostic and in meantime node has completely resolved.  Will continue to monitor.      #Atrial fib on chronic anticoagulation  on Eliquis 5 mg by mouth twice a day.  Patient confirmed on 50 mg by mouth twice a day of metoprolol.  Follows with Dr. Moon and Dr. Garcia.  Recently (10/22/24) has been feeling more SOB - advised to reach out to cardiology  Cardioversion planned for 11/14/24 but was cancelled as Toby was back in [facility].  Following with Dr. Garcia and Dr. Moon.  Currently on eliquis 5 mg BID and metoprolol 100 mg BID.    PE (4/9/22) - Small occlusive embolic thrombus within the right lower lobe pulmonary artery branch, medially and posteriorly.  Cont Eliquis 5 mg twice a day.     Bone health:  Xgeva held due to concerns of ONJ and prolonged treatment in the past.  Bone density December 2023 was normal.  Has partial dentures, so will hold off xgeva due to concern about ONJ see below.       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  Pt had his tooth pulled in 2019, however with prolonged bone exposure/infection, eventually underwent OMSF surgery in 10/2021. Unclear if he has denosumab assoc jaw necrosis. Patient finally had his bad tooth (L upper molar) removed on 5/25/23. [facility]  Has partial dentures. .     Healthcare Maintenance:  Received shingrix 6/16/21 and 8/16/21.   Received pneumococcal conjugate 1/12/24, updated covid and influenza vaccines 9/25/24, and RSV vaccine 9/30/24.    Social Work:  4/30/24: Requested to have DWIGHT Dominguez OSJANET contact patient regarding his medical bills.   8/20/24:  Filled out applications for grants for [employer] and [employer].  Reports he does not need assistance for medical paperwork or bills today.    Reports he does not need assistance with social work today.     RTC:   PET scan ordered, yola in 1 month. Yola and provider visit in 2 months July 29th. Dexamethasone premed decreased to 8 mg monthly for better blood sugar control    Yumiko Waller MD

## 2025-06-04 LAB
IGA SERPL-MCNC: 44 MG/DL (ref 70–400)
IGG SERPL-MCNC: 603 MG/DL (ref 700–1600)
IGM SERPL-MCNC: 8 MG/DL (ref 40–230)
KAPPA LC SERPL-MCNC: 3.25 MG/DL (ref 0.33–1.94)
KAPPA LC/LAMBDA SER: 4.33 {RATIO} (ref 0.26–1.65)
LAMBDA LC SERPL-MCNC: 0.75 MG/DL (ref 0.57–2.63)

## 2025-06-06 LAB
ALBUMIN: 3.8 G/DL (ref 3.4–5)
ALPHA 1 GLOBULIN: 0.3 G/DL (ref 0.2–0.6)
ALPHA 2 GLOBULIN: 0.8 G/DL (ref 0.4–1.1)
BETA GLOBULIN: 0.7 G/DL (ref 0.5–1.2)
GAMMA GLOBULIN: 0.6 G/DL (ref 0.5–1.4)
M-PROTEIN 1: 0.2 G/DL (ref ?–0)
PATH REVIEW-SERUM PROTEIN ELECTROPHORESIS: ABNORMAL
PROTEIN ELECTROPHORESIS COMMENT: ABNORMAL

## 2025-06-16 ENCOUNTER — APPOINTMENT (OUTPATIENT)
Dept: UROLOGY | Facility: CLINIC | Age: 75
End: 2025-06-16
Payer: MEDICARE

## 2025-06-16 VITALS — TEMPERATURE: 97.3 F

## 2025-06-16 DIAGNOSIS — N13.8 BPH WITH OBSTRUCTION/LOWER URINARY TRACT SYMPTOMS: ICD-10-CM

## 2025-06-16 DIAGNOSIS — N40.1 BPH WITH OBSTRUCTION/LOWER URINARY TRACT SYMPTOMS: ICD-10-CM

## 2025-06-16 DIAGNOSIS — R97.20 ELEVATED PSA: ICD-10-CM

## 2025-06-16 DIAGNOSIS — N40.0 BENIGN PROSTATIC HYPERPLASIA, UNSPECIFIED WHETHER LOWER URINARY TRACT SYMPTOMS PRESENT: Primary | ICD-10-CM

## 2025-06-16 PROCEDURE — G2211 COMPLEX E/M VISIT ADD ON: HCPCS | Performed by: UROLOGY

## 2025-06-16 PROCEDURE — 1036F TOBACCO NON-USER: CPT | Performed by: UROLOGY

## 2025-06-16 PROCEDURE — 99214 OFFICE O/P EST MOD 30 MIN: CPT | Performed by: UROLOGY

## 2025-06-16 PROCEDURE — 1126F AMNT PAIN NOTED NONE PRSNT: CPT | Performed by: UROLOGY

## 2025-06-16 PROCEDURE — 51798 US URINE CAPACITY MEASURE: CPT | Performed by: UROLOGY

## 2025-06-16 PROCEDURE — 1159F MED LIST DOCD IN RCRD: CPT | Performed by: UROLOGY

## 2025-06-16 RX ORDER — FINASTERIDE 5 MG/1
5 TABLET, FILM COATED ORAL DAILY
Qty: 90 TABLET | Refills: 3 | Status: SHIPPED | OUTPATIENT
Start: 2025-06-16

## 2025-06-16 RX ORDER — TAMSULOSIN HYDROCHLORIDE 0.4 MG/1
0.4 CAPSULE ORAL DAILY
Qty: 90 CAPSULE | Refills: 3 | Status: SHIPPED | OUTPATIENT
Start: 2025-06-16

## 2025-06-16 ASSESSMENT — PAIN SCALES - GENERAL: PAINLEVEL_OUTOF10: 0-NO PAIN

## 2025-06-16 NOTE — PROGRESS NOTES
Subjective   Toby Ugalde is a 75 y.o. male with history of BPH with LUTS (107.3g) on Tamsulosin 0.4 mg and finasteride and elevated PSA s/p negative prostate MRI; presenting today for an annual follow up visit. Patient reports occasional incomplete bladder emptying and nocturia which are not bothersome. Denies any recent gross hematuria, fevers, chills, urinary retention, intractable flank or abdominal pain, nausea or vomiting.            Medical History[1]  Surgical History[2]  Family History[3]  Current Medications[4]  Allergies[5]  Social History     Socioeconomic History    Marital status:      Spouse name: Not on file    Number of children: Not on file    Years of education: Not on file    Highest education level: Not on file   Occupational History    Not on file   Tobacco Use    Smoking status: Never     Passive exposure: Never    Smokeless tobacco: Never   Substance and Sexual Activity    Alcohol use: Yes     Comment: occasional    Drug use: Never    Sexual activity: Not on file   Other Topics Concern    Not on file   Social History Narrative    Not on file     Social Drivers of Health     Financial Resource Strain: Low Risk  (8/20/2020)    Received from [facility]    Overall Financial Resource Strain (CARDIA)     Difficulty of Paying Living Expenses: Not hard at all   Food Insecurity: No Food Insecurity (8/20/2020)    Received from [facility]    Hunger Vital Sign     Worried About Running Out of Food in the Last Year: Never true     Ran Out of Food in the Last Year: Never true   Transportation Needs: No Transportation Needs (8/20/2020)    Received from [facility]    PRAPARE - Transportation     Lack of Transportation (Medical): No     Lack of Transportation (Non-Medical): No   Physical Activity: Not on file   Stress: Not on file   Social Connections: Not on file   Intimate Partner Violence: Not on file   Housing Stability: Not on file       Review of Systems  Pertinent items are  noted in HPI.    Objective       Lab Review  Lab Results   Component Value Date    WBC 3.7 (L) 06/03/2025    RBC 3.82 (L) 06/03/2025    HGB 11.8 (L) 06/03/2025    HCT 35.0 (L) 06/03/2025     06/03/2025      Lab Results   Component Value Date    BUN 14 06/03/2025    CREATININE 1.35 (H) 06/03/2025      Lab Results   Component Value Date    PSA 8.72 (H) 04/30/2024     IPSS: 7 and 1   PVR: 143 ml           Assessment/Plan   There are no diagnoses linked to this encounter.    BPH with LUTS (107.3g)     Patient's urinary symptoms are stable on Tamsulosin 0.4 mg and Finasteride 5 mg, will refill.    Follow up annually.     Elevated PSA s/p negative prostate MRI    PSA measurements on patients in their advanced age, notably after the 70 year juan are no longer recommended.     We will stop screening.     All questions were answered to the patient's satisfaction. Patient agrees with the plan and wishes to proceed. Follow-up will be scheduled appropriately.     E&M visit today is associated with current or anticipated ongoing medical care services related to a patient's single, serious condition or a complex condition.    Scribed for Dr. Segovia by Janki Denise. I , Dr Segovia, have personally reviewed and agreed with the information entered by the Virtual Scribe.          [1]   Past Medical History:  Diagnosis Date    Chronic gout, unspecified, without tophus (tophi) 12/11/2024    Dental caries on pit and fissure surface penetrating into dentin 01/12/2024    Deposits (accretions) on teeth 01/12/2024    Essential (primary) hypertension 11/20/2013    Benign essential hypertension    Gout, unspecified 12/11/2024    History of hypercholesterolemia 01/12/2024    History of hypertension 01/12/2024    History of malignant neoplasm of prostate 01/12/2024    Impacted cerumen 01/12/2024    Impacted cerumen, bilateral 02/16/2024    Infective pharyngitis 01/12/2024    Open restoration margins of tooth 03/26/2025    Other  intervertebral disc degeneration, lumbar region     Lumbar degenerative disc disease    Overweight with body mass index (BMI) 25.0-29.9 02/02/2022    Partial loss of teeth due to caries, class I 01/12/2024    Personal history of malignant neoplasm of prostate     History of malignant neoplasm of prostate    Personal history of other diseases of the circulatory system     History of hypertension    Personal history of other diseases of the circulatory system 06/29/2022    History of atrial tachycardia    Personal history of other diseases of the circulatory system     History of essential hypertension    Personal history of other diseases of the musculoskeletal system and connective tissue     History of arthritis    Personal history of other diseases of the musculoskeletal system and connective tissue     History of acute gouty arthritis    Personal history of other diseases of the musculoskeletal system and connective tissue 01/30/2019    History of left foot drop    Personal history of other diseases of the respiratory system     History of bronchitis    Personal history of other endocrine, nutritional and metabolic disease     History of hypercholesterolemia    Personal history of other endocrine, nutritional and metabolic disease     History of diabetes mellitus    Personal history of other endocrine, nutritional and metabolic disease 01/17/2014    History of diabetes mellitus    Post-traumatic stress disorder, chronic 11/10/2023    Primary localized osteoarthritis of pelvic region and thigh 12/17/2018    Pure hypercholesterolemia, unspecified 11/20/2013    Low-density-lipoid-type (LDL) hyperlipoproteinemia    Snoring     Snoring   [2]   Past Surgical History:  Procedure Laterality Date    BACK SURGERY  11/20/2013    Back Surgery    TONSILLECTOMY  11/20/2013    Tonsillectomy    TOTAL KNEE ARTHROPLASTY  11/20/2013    Knee Replacement   [3]   Family History  Problem Relation Name Age of Onset    Heart attack  Mother      Diabetes Brother      Stroke Maternal Grandfather     [4]   Current Outpatient Medications   Medication Sig Dispense Refill    acetaminophen (Tylenol) 500 mg tablet Take by mouth.      allopurinol (Zyloprim) 300 mg tablet TAKE 1 TABLET BY MOUTH ONCE DAILY. 90 tablet 0    amLODIPine (Norvasc) 5 mg tablet Take 1 tablet (5 mg) by mouth once daily. 90 tablet 3    apixaban (Eliquis) 5 mg tablet TAKE ONE (1) TABLET BY MOUTH 2 TIMES A  tablet 3    atorvastatin (Lipitor) 80 mg tablet Take 1 tablet (80 mg) by mouth once daily. 90 tablet 3    colchicine 0.6 mg tablet TAKE 1 TABLET (0.6 MG) BY MOUTH ONCE DAILY. (Patient taking differently: Take 1 tablet (0.6 mg) by mouth every other day.) 90 tablet 1    finasteride (Proscar) 5 mg tablet TAKE 1 TABLET BY MOUTH EVERY DAY 90 tablet 1    fludrocortisone (Florinef) 0.1 mg tablet Take 1 tablet (0.1 mg) by mouth once daily.      fluticasone (Flonase) 50 mcg/actuation nasal spray Administer 1 spray into affected nostril(s) once daily as needed.      lenalidomide (Revlimid) 5 mg capsule Take by mouth.      losartan (Cozaar) 100 mg tablet Take 1 tablet (100 mg) by mouth once daily. 90 tablet 3    metFORMIN (Glucophage) 500 mg tablet Take 1 tablet (500 mg) by mouth if needed.      metoprolol tartrate (Lopressor) 50 mg tablet Take 1 tablet by mouth 2 times a day.      pantoprazole (ProtoNix) 40 mg EC tablet Take 1 tablet (40 mg) by mouth once daily. (Patient taking differently: Take 1 tablet (40 mg) by mouth if needed.)      sildenafil (Viagra) 100 mg tablet Take 1 tablet (100 mg) by mouth if needed.  1 HOUR BEFORE NEEDED.      tamsulosin (Flomax) 0.4 mg 24 hr capsule TAKE 1 CAPSULE BY MOUTH ONCE DAILY. 90 capsule 1    traMADol (Ultram) 50 mg tablet Take 1 tablet (50 mg) by mouth every 8 hours if needed for severe pain (7 - 10) or moderate pain (4 - 6). 90 tablet 2     No current facility-administered medications for this visit.   [5]   Allergies  Allergen Reactions     Mold Other

## 2025-06-24 ENCOUNTER — APPOINTMENT (OUTPATIENT)
Dept: NEPHROLOGY | Facility: CLINIC | Age: 75
End: 2025-06-24
Payer: MEDICARE

## 2025-06-24 VITALS
OXYGEN SATURATION: 98 % | SYSTOLIC BLOOD PRESSURE: 92 MMHG | HEART RATE: 78 BPM | WEIGHT: 202 LBS | BODY MASS INDEX: 28.17 KG/M2 | DIASTOLIC BLOOD PRESSURE: 57 MMHG

## 2025-06-24 DIAGNOSIS — E11.21 TYPE 2 DIABETES MELLITUS WITH DIABETIC NEPHROPATHY, WITHOUT LONG-TERM CURRENT USE OF INSULIN: ICD-10-CM

## 2025-06-24 DIAGNOSIS — E78.00 HYPERCHOLESTEROLEMIA: ICD-10-CM

## 2025-06-24 DIAGNOSIS — I10 BENIGN HYPERTENSION: ICD-10-CM

## 2025-06-24 DIAGNOSIS — E55.9 VITAMIN D DEFICIENCY: ICD-10-CM

## 2025-06-24 DIAGNOSIS — N18.31 STAGE 3A CHRONIC KIDNEY DISEASE (MULTI): Primary | ICD-10-CM

## 2025-06-24 PROCEDURE — 1036F TOBACCO NON-USER: CPT | Performed by: INTERNAL MEDICINE

## 2025-06-24 PROCEDURE — 99214 OFFICE O/P EST MOD 30 MIN: CPT | Performed by: INTERNAL MEDICINE

## 2025-06-24 PROCEDURE — 1159F MED LIST DOCD IN RCRD: CPT | Performed by: INTERNAL MEDICINE

## 2025-06-24 PROCEDURE — 1160F RVW MEDS BY RX/DR IN RCRD: CPT | Performed by: INTERNAL MEDICINE

## 2025-06-24 PROCEDURE — 3078F DIAST BP <80 MM HG: CPT | Performed by: INTERNAL MEDICINE

## 2025-06-24 PROCEDURE — 3074F SYST BP LT 130 MM HG: CPT | Performed by: INTERNAL MEDICINE

## 2025-06-24 NOTE — PROGRESS NOTES
Subjective   Toby Ugalde is a 75 y.o. male who presented today to discuss his stage 3b chronic kidney disease in the setting of IgG kappa multiple myeloma, diabetes and hypertension.  He has the diabetes but no known retinopathy.  He has hypertension, hyperlipidemia, osteoarthritis, has had a venous thromboembolism, right total knee replacement in 2011, left total knee replacement in 2007, back surgery in 2009, right total hip replacement in August 2020, a jaw surgery in October 2021.  Bilateral cataract procedures.  He sees Dr. Yumiko Waller who currently has him on daratumumab, Revlimid on 3 weeks and off 1 week, and dexamethasone.  He had done well initially, then had a recurrence in 2022 with 90% tumorous involvement of the bone marrow.  He feels good now and denies nausea, vomiting, chest pain, or abdominal pain.  He has shortness of breath on exertion but that is not a new issue.    His weight is up 3 pounds.  Home blood pressures are in the 120s systolic, they are not typically as low as they are today.    ROS  As in Subjective, all other ROS are negative    Objective     Vital signs    Visit Vitals  BP 92/57 (BP Location: Left arm, Patient Position: Sitting, BP Cuff Size: Adult)   Pulse 78      Vitals:    06/24/25 1439   Weight: 91.6 kg (202 lb)        Physical Exam  Constitutional:       Appearance: Normal appearance.   HENT:      Mouth/Throat:      Mouth: Mucous membranes are moist.   Eyes:      Extraocular Movements: Extraocular movements intact.      Pupils: Pupils are equal, round, and reactive to light.   Cardiovascular:      Rate and Rhythm: Regular rhythm.      Heart sounds: S1 normal and S2 normal.   Pulmonary:      Breath sounds: Normal breath sounds.   Abdominal:      Comments: Soft, NT/ND, no masses, normal bowel sounds   Genitourinary:     Comments: No ramos  Musculoskeletal:      No synovitis  Edema:     Right lower leg: No edema.      Left lower leg: No edema.   Skin:     General: Skin is warm  "and dry.   Neurological:      General: No focal deficit present.      Mental Status: She is alert and oriented to person, place, and time.   Psychiatric:         Behavior: Behavior normal.      Meds  Current Medications[1]     Allergies  RX Allergies[2]     Results  Lab Results   Component Value Date    GLUCOSE 144 (H) 06/03/2025     06/03/2025    K 3.9 06/03/2025     06/03/2025    CO2 22 06/03/2025    ANIONGAP 15 06/03/2025    BUN 14 06/03/2025    CREATININE 1.35 (H) 06/03/2025    GFRF CANCELED 05/31/2023    CALCIUM 8.7 06/03/2025    MG 1.70 05/03/2022     Lab Results   Component Value Date    PTH 85.6 09/27/2023    CALCIUM 8.7 06/03/2025     No results found for: \"ALBUR\", \"[id number]\"         @LABALLVALUEIP(CREATININE:*)@  @LABALLVALUEIP(NA:*)@    Imaging results  === 09/27/14 ===    US RENAL COMPLETE     Assessment and Plan  Toby Aftab has stable stage 3a chronic kidney disease at this time.  As of Melissa 3 creatinine stable at 1.35 mg/dL, no acidosis, electrolytes look good.  He does have leukopenia, hemoglobin is near-normal and platelets are within normal limits.  He does have hypogammaglobulinemia on the anti--CD38 monoclonal antibody.  His kappa: Lambda ratio is on the way up but Dr. Waller, I am certain, is following that closely.  He has had very little protein in the urine but I will check that again before I next see him.  25 vitamin D and intact PTH have looked good in the past but I will get more data now.       Problem List Items Addressed This Visit       Benign hypertension    Relevant Orders    Follow Up In Nephrology    CBC and Auto Differential    Renal Function Panel    Parathyroid Hormone, Intact    Vitamin D 25-Hydroxy,Total (for eval of Vitamin D levels)    Albumin-Creatinine Ratio, Urine Random    Creatinine, Urine Random    Urinalysis with Reflex Microscopic    Protein, Urine Random    Hypercholesterolemia    Relevant Orders    Follow Up In Nephrology    CBC and Auto Differential "    Renal Function Panel    Parathyroid Hormone, Intact    Vitamin D 25-Hydroxy,Total (for eval of Vitamin D levels)    Albumin-Creatinine Ratio, Urine Random    Creatinine, Urine Random    Urinalysis with Reflex Microscopic    Protein, Urine Random    Stage 3a chronic kidney disease (Multi) - Primary    Relevant Orders    Follow Up In Nephrology    CBC and Auto Differential    Renal Function Panel    Parathyroid Hormone, Intact    Vitamin D 25-Hydroxy,Total (for eval of Vitamin D levels)    Albumin-Creatinine Ratio, Urine Random    Creatinine, Urine Random    Urinalysis with Reflex Microscopic    Protein, Urine Random    Type 2 diabetes mellitus with diabetic nephropathy, without long-term current use of insulin    Relevant Orders    Follow Up In Nephrology    CBC and Auto Differential    Renal Function Panel    Parathyroid Hormone, Intact    Vitamin D 25-Hydroxy,Total (for eval of Vitamin D levels)    Albumin-Creatinine Ratio, Urine Random    Creatinine, Urine Random    Urinalysis with Reflex Microscopic    Protein, Urine Random     Other Visit Diagnoses         Vitamin D deficiency        Relevant Orders    Follow Up In Nephrology    CBC and Auto Differential    Renal Function Panel    Parathyroid Hormone, Intact    Vitamin D 25-Hydroxy,Total (for eval of Vitamin D levels)    Albumin-Creatinine Ratio, Urine Random    Creatinine, Urine Random    Urinalysis with Reflex Microscopic    Protein, Urine Random           Nikita Guevara MD           [1]   Current Outpatient Medications:     acetaminophen (Tylenol) 500 mg tablet, Take by mouth., Disp: , Rfl:     allopurinol (Zyloprim) 300 mg tablet, TAKE 1 TABLET BY MOUTH ONCE DAILY., Disp: 90 tablet, Rfl: 0    amLODIPine (Norvasc) 5 mg tablet, Take 1 tablet (5 mg) by mouth once daily., Disp: 90 tablet, Rfl: 3    apixaban (Eliquis) 5 mg tablet, TAKE ONE (1) TABLET BY MOUTH 2 TIMES A DAY, Disp: 180 tablet, Rfl: 3    atorvastatin (Lipitor) 80 mg tablet, Take 1 tablet (80 mg)  by mouth once daily., Disp: 90 tablet, Rfl: 3    colchicine 0.6 mg tablet, TAKE 1 TABLET (0.6 MG) BY MOUTH ONCE DAILY., Disp: 90 tablet, Rfl: 1    finasteride (Proscar) 5 mg tablet, Take 1 tablet (5 mg) by mouth once daily., Disp: 90 tablet, Rfl: 3    fludrocortisone (Florinef) 0.1 mg tablet, Take 1 tablet (0.1 mg) by mouth once daily., Disp: , Rfl:     fluticasone (Flonase) 50 mcg/actuation nasal spray, Administer 1 spray into affected nostril(s) once daily as needed., Disp: , Rfl:     lenalidomide (Revlimid) 5 mg capsule, Take by mouth., Disp: , Rfl:     losartan (Cozaar) 100 mg tablet, Take 1 tablet (100 mg) by mouth once daily., Disp: 90 tablet, Rfl: 3    metFORMIN (Glucophage) 500 mg tablet, Take 1 tablet (500 mg) by mouth if needed., Disp: , Rfl:     metoprolol tartrate (Lopressor) 50 mg tablet, Take 1 tablet by mouth 2 times a day., Disp: , Rfl:     pantoprazole (ProtoNix) 40 mg EC tablet, Take 1 tablet (40 mg) by mouth once daily., Disp: , Rfl:     sildenafil (Viagra) 100 mg tablet, Take 1 tablet (100 mg) by mouth if needed.  1 HOUR BEFORE NEEDED., Disp: , Rfl:     tamsulosin (Flomax) 0.4 mg 24 hr capsule, Take 1 capsule (0.4 mg) by mouth once daily., Disp: 90 capsule, Rfl: 3    traMADol (Ultram) 50 mg tablet, Take 1 tablet (50 mg) by mouth every 8 hours if needed for severe pain (7 - 10) or moderate pain (4 - 6)., Disp: 90 tablet, Rfl: 2  [2]   Allergies  Allergen Reactions    Mold Other

## 2025-07-01 ENCOUNTER — INFUSION (OUTPATIENT)
Dept: HEMATOLOGY/ONCOLOGY | Facility: CLINIC | Age: 75
End: 2025-07-01
Payer: MEDICARE

## 2025-07-01 VITALS
WEIGHT: 199.74 LBS | HEART RATE: 59 BPM | SYSTOLIC BLOOD PRESSURE: 123 MMHG | DIASTOLIC BLOOD PRESSURE: 72 MMHG | BODY MASS INDEX: 27.86 KG/M2 | TEMPERATURE: 97.9 F | RESPIRATION RATE: 18 BRPM | OXYGEN SATURATION: 100 %

## 2025-07-01 DIAGNOSIS — C90.00 IGG MULTIPLE MYELOMA (MULTI): ICD-10-CM

## 2025-07-01 DIAGNOSIS — C90.00 IGG MULTIPLE MYELOMA (MULTI): Primary | ICD-10-CM

## 2025-07-01 LAB
ALBUMIN SERPL BCP-MCNC: 4.1 G/DL (ref 3.4–5)
ALP SERPL-CCNC: 67 U/L (ref 33–136)
ALT SERPL W P-5'-P-CCNC: 24 U/L (ref 10–52)
ANION GAP SERPL CALC-SCNC: 13 MMOL/L (ref 10–20)
AST SERPL W P-5'-P-CCNC: 13 U/L (ref 9–39)
BASOPHILS # BLD AUTO: 0.02 X10*3/UL (ref 0–0.1)
BASOPHILS NFR BLD AUTO: 0.6 %
BILIRUB SERPL-MCNC: 0.5 MG/DL (ref 0–1.2)
BUN SERPL-MCNC: 20 MG/DL (ref 6–23)
CALCIUM SERPL-MCNC: 8.8 MG/DL (ref 8.6–10.6)
CHLORIDE SERPL-SCNC: 108 MMOL/L (ref 98–107)
CO2 SERPL-SCNC: 22 MMOL/L (ref 21–32)
CREAT SERPL-MCNC: 1.44 MG/DL (ref 0.5–1.3)
EGFRCR SERPLBLD CKD-EPI 2021: 50 ML/MIN/1.73M*2
EOSINOPHIL # BLD AUTO: 0.05 X10*3/UL (ref 0–0.4)
EOSINOPHIL NFR BLD AUTO: 1.6 %
ERYTHROCYTE [DISTWIDTH] IN BLOOD BY AUTOMATED COUNT: 14.3 % (ref 11.5–14.5)
GLUCOSE SERPL-MCNC: 167 MG/DL (ref 74–99)
HCT VFR BLD AUTO: 33.3 % (ref 41–52)
HGB BLD-MCNC: 11.1 G/DL (ref 13.5–17.5)
IGA SERPL-MCNC: 40 MG/DL (ref 70–400)
IGG SERPL-MCNC: 655 MG/DL (ref 700–1600)
IGM SERPL-MCNC: 8 MG/DL (ref 40–230)
IMM GRANULOCYTES # BLD AUTO: 0.01 X10*3/UL (ref 0–0.5)
IMM GRANULOCYTES NFR BLD AUTO: 0.3 % (ref 0–0.9)
LYMPHOCYTES # BLD AUTO: 1.47 X10*3/UL (ref 0.8–3)
LYMPHOCYTES NFR BLD AUTO: 47.6 %
MCH RBC QN AUTO: 30.3 PG (ref 26–34)
MCHC RBC AUTO-ENTMCNC: 33.3 G/DL (ref 32–36)
MCV RBC AUTO: 91 FL (ref 80–100)
MONOCYTES # BLD AUTO: 0.33 X10*3/UL (ref 0.05–0.8)
MONOCYTES NFR BLD AUTO: 10.7 %
NEUTROPHILS # BLD AUTO: 1.21 X10*3/UL (ref 1.6–5.5)
NEUTROPHILS NFR BLD AUTO: 39.2 %
NRBC BLD-RTO: ABNORMAL /100{WBCS}
PLATELET # BLD AUTO: 161 X10*3/UL (ref 150–450)
POTASSIUM SERPL-SCNC: 3.8 MMOL/L (ref 3.5–5.3)
PROT SERPL-MCNC: 5.8 G/DL (ref 6.4–8.2)
PROT SERPL-MCNC: 5.8 G/DL (ref 6.4–8.2)
RBC # BLD AUTO: 3.66 X10*6/UL (ref 4.5–5.9)
SODIUM SERPL-SCNC: 139 MMOL/L (ref 136–145)
WBC # BLD AUTO: 3.1 X10*3/UL (ref 4.4–11.3)

## 2025-07-01 PROCEDURE — 84165 PROTEIN E-PHORESIS SERUM: CPT

## 2025-07-01 PROCEDURE — 2500000004 HC RX 250 GENERAL PHARMACY W/ HCPCS (ALT 636 FOR OP/ED): Mod: JZ,TB | Performed by: INTERNAL MEDICINE

## 2025-07-01 PROCEDURE — 80053 COMPREHEN METABOLIC PANEL: CPT

## 2025-07-01 PROCEDURE — 2500000004 HC RX 250 GENERAL PHARMACY W/ HCPCS (ALT 636 FOR OP/ED): Performed by: INTERNAL MEDICINE

## 2025-07-01 PROCEDURE — 85025 COMPLETE CBC W/AUTO DIFF WBC: CPT

## 2025-07-01 PROCEDURE — 83521 IG LIGHT CHAINS FREE EACH: CPT

## 2025-07-01 PROCEDURE — 84155 ASSAY OF PROTEIN SERUM: CPT

## 2025-07-01 PROCEDURE — 96401 CHEMO ANTI-NEOPL SQ/IM: CPT

## 2025-07-01 PROCEDURE — 2500000001 HC RX 250 WO HCPCS SELF ADMINISTERED DRUGS (ALT 637 FOR MEDICARE OP): Performed by: INTERNAL MEDICINE

## 2025-07-01 PROCEDURE — 82784 ASSAY IGA/IGD/IGG/IGM EACH: CPT

## 2025-07-01 RX ORDER — EPINEPHRINE 0.3 MG/.3ML
0.3 INJECTION SUBCUTANEOUS EVERY 5 MIN PRN
OUTPATIENT
Start: 2025-07-29

## 2025-07-01 RX ORDER — FAMOTIDINE 10 MG/ML
20 INJECTION, SOLUTION INTRAVENOUS ONCE AS NEEDED
Status: DISCONTINUED | OUTPATIENT
Start: 2025-07-01 | End: 2025-07-01 | Stop reason: HOSPADM

## 2025-07-01 RX ORDER — DIPHENHYDRAMINE HYDROCHLORIDE 50 MG/ML
50 INJECTION, SOLUTION INTRAMUSCULAR; INTRAVENOUS AS NEEDED
OUTPATIENT
Start: 2025-07-29

## 2025-07-01 RX ORDER — FAMOTIDINE 10 MG/ML
20 INJECTION, SOLUTION INTRAVENOUS ONCE AS NEEDED
OUTPATIENT
Start: 2025-08-26

## 2025-07-01 RX ORDER — DEXAMETHASONE 4 MG/1
8 TABLET ORAL ONCE
Status: CANCELLED | OUTPATIENT
Start: 2025-07-01

## 2025-07-01 RX ORDER — MONTELUKAST SODIUM 10 MG/1
10 TABLET ORAL ONCE
OUTPATIENT
Start: 2025-07-29

## 2025-07-01 RX ORDER — ALBUTEROL SULFATE 0.83 MG/ML
3 SOLUTION RESPIRATORY (INHALATION) AS NEEDED
Status: DISCONTINUED | OUTPATIENT
Start: 2025-07-01 | End: 2025-07-01 | Stop reason: HOSPADM

## 2025-07-01 RX ORDER — DEXAMETHASONE 4 MG/1
8 TABLET ORAL ONCE
OUTPATIENT
Start: 2025-07-29

## 2025-07-01 RX ORDER — DIPHENHYDRAMINE HCL 50 MG
50 CAPSULE ORAL ONCE
OUTPATIENT
Start: 2025-08-26

## 2025-07-01 RX ORDER — ALBUTEROL SULFATE 0.83 MG/ML
3 SOLUTION RESPIRATORY (INHALATION) AS NEEDED
OUTPATIENT
Start: 2025-07-29

## 2025-07-01 RX ORDER — DIPHENHYDRAMINE HCL 50 MG
50 CAPSULE ORAL ONCE
OUTPATIENT
Start: 2025-07-29

## 2025-07-01 RX ORDER — ACETAMINOPHEN 325 MG/1
650 TABLET ORAL ONCE
Status: COMPLETED | OUTPATIENT
Start: 2025-07-01 | End: 2025-07-01

## 2025-07-01 RX ORDER — MONTELUKAST SODIUM 10 MG/1
10 TABLET ORAL ONCE
OUTPATIENT
Start: 2025-08-26

## 2025-07-01 RX ORDER — ACETAMINOPHEN 325 MG/1
650 TABLET ORAL ONCE
OUTPATIENT
Start: 2025-07-29

## 2025-07-01 RX ORDER — MONTELUKAST SODIUM 10 MG/1
10 TABLET ORAL ONCE
Status: COMPLETED | OUTPATIENT
Start: 2025-07-01 | End: 2025-07-01

## 2025-07-01 RX ORDER — DIPHENHYDRAMINE HCL 50 MG
50 CAPSULE ORAL ONCE
Status: COMPLETED | OUTPATIENT
Start: 2025-07-01 | End: 2025-07-01

## 2025-07-01 RX ORDER — ACETAMINOPHEN 325 MG/1
650 TABLET ORAL ONCE
OUTPATIENT
Start: 2025-08-26

## 2025-07-01 RX ORDER — EPINEPHRINE 0.3 MG/.3ML
0.3 INJECTION SUBCUTANEOUS EVERY 5 MIN PRN
OUTPATIENT
Start: 2025-08-26

## 2025-07-01 RX ORDER — FAMOTIDINE 10 MG/ML
20 INJECTION, SOLUTION INTRAVENOUS ONCE AS NEEDED
OUTPATIENT
Start: 2025-07-29

## 2025-07-01 RX ORDER — DEXAMETHASONE 4 MG/1
8 TABLET ORAL ONCE
OUTPATIENT
Start: 2025-08-26

## 2025-07-01 RX ORDER — EPINEPHRINE 0.3 MG/.3ML
0.3 INJECTION SUBCUTANEOUS EVERY 5 MIN PRN
Status: DISCONTINUED | OUTPATIENT
Start: 2025-07-01 | End: 2025-07-01 | Stop reason: HOSPADM

## 2025-07-01 RX ORDER — DIPHENHYDRAMINE HYDROCHLORIDE 50 MG/ML
50 INJECTION, SOLUTION INTRAMUSCULAR; INTRAVENOUS AS NEEDED
OUTPATIENT
Start: 2025-08-26

## 2025-07-01 RX ORDER — DIPHENHYDRAMINE HYDROCHLORIDE 50 MG/ML
50 INJECTION, SOLUTION INTRAMUSCULAR; INTRAVENOUS AS NEEDED
Status: DISCONTINUED | OUTPATIENT
Start: 2025-07-01 | End: 2025-07-01 | Stop reason: HOSPADM

## 2025-07-01 RX ORDER — ALBUTEROL SULFATE 0.83 MG/ML
3 SOLUTION RESPIRATORY (INHALATION) AS NEEDED
OUTPATIENT
Start: 2025-08-26

## 2025-07-01 RX ORDER — DEXAMETHASONE 4 MG/1
8 TABLET ORAL ONCE
Status: COMPLETED | OUTPATIENT
Start: 2025-07-01 | End: 2025-07-01

## 2025-07-01 RX ADMIN — MONTELUKAST 10 MG: 10 TABLET, FILM COATED ORAL at 08:41

## 2025-07-01 RX ADMIN — DEXAMETHASONE 8 MG: 4 TABLET ORAL at 08:41

## 2025-07-01 RX ADMIN — ACETAMINOPHEN 650 MG: 325 TABLET ORAL at 08:40

## 2025-07-01 RX ADMIN — DARATUMUMAB AND HYALURONIDASE-FIHJ (HUMAN RECOMBINANT) 1800 MG: 1800; 30000 INJECTION SUBCUTANEOUS at 09:43

## 2025-07-01 RX ADMIN — DIPHENHYDRAMINE HYDROCHLORIDE 50 MG: 50 CAPSULE ORAL at 08:41

## 2025-07-01 ASSESSMENT — PAIN SCALES - GENERAL: PAINLEVEL_OUTOF10: 2

## 2025-07-02 LAB
ALBUMIN: 3.5 G/DL (ref 3.4–5)
ALPHA 1 GLOBULIN: 0.2 G/DL (ref 0.2–0.6)
ALPHA 2 GLOBULIN: 0.8 G/DL (ref 0.4–1.1)
BETA GLOBULIN: 0.7 G/DL (ref 0.5–1.2)
GAMMA GLOBULIN: 0.6 G/DL (ref 0.5–1.4)
KAPPA LC SERPL-MCNC: 3.23 MG/DL (ref 0.33–1.94)
KAPPA LC/LAMBDA SER: 4.49 {RATIO} (ref 0.26–1.65)
LAMBDA LC SERPL-MCNC: 0.72 MG/DL (ref 0.57–2.63)
M-PROTEIN 1: 0.2 G/DL (ref ?–0)
PATH REVIEW-SERUM PROTEIN ELECTROPHORESIS: ABNORMAL
PROTEIN ELECTROPHORESIS COMMENT: ABNORMAL

## 2025-07-08 ENCOUNTER — HOSPITAL ENCOUNTER (OUTPATIENT)
Dept: RADIOLOGY | Facility: CLINIC | Age: 75
Discharge: HOME | End: 2025-07-08
Payer: MEDICARE

## 2025-07-08 DIAGNOSIS — C90.00 IGG MULTIPLE MYELOMA (MULTI): ICD-10-CM

## 2025-07-08 PROCEDURE — 78816 PET IMAGE W/CT FULL BODY: CPT | Mod: PS

## 2025-07-08 PROCEDURE — 78816 PET IMAGE W/CT FULL BODY: CPT | Mod: PET TUMOR SUBSQ TX STRATEGY | Performed by: RADIOLOGY

## 2025-07-08 RX ORDER — FLUDEOXYGLUCOSE F 18 200 MCI/ML
14.42 INJECTION, SOLUTION INTRAVENOUS
Status: DISCONTINUED | OUTPATIENT
Start: 2025-07-08 | End: 2025-07-09 | Stop reason: HOSPADM

## 2025-07-09 ENCOUNTER — APPOINTMENT (OUTPATIENT)
Dept: CARDIOLOGY | Facility: HOSPITAL | Age: 75
End: 2025-07-09
Payer: MEDICARE

## 2025-07-11 ENCOUNTER — APPOINTMENT (OUTPATIENT)
Dept: PRIMARY CARE | Facility: CLINIC | Age: 75
End: 2025-07-11
Payer: MEDICARE

## 2025-07-25 ASSESSMENT — ENCOUNTER SYMPTOMS
APPETITE CHANGE: 0
CHILLS: 0
BLOOD IN STOOL: 0
VOMITING: 0
CARDIOVASCULAR NEGATIVE: 1
DIARRHEA: 1
FEVER: 0
DIAPHORESIS: 1
FATIGUE: 1
NAUSEA: 0
CONSTIPATION: 1
ARTHRALGIAS: 1
NUMBNESS: 1

## 2025-07-25 NOTE — PROGRESS NOTES
Patient ID: Toby Ugalde is a 75 y.o. male.    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 3/6/17- bone survey was negative for lytic lesions.  3/15/17-  bone marrow revealed 30% plasma cells with kappa predominance. 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 Cano for complaints of fatigue.  Labs notable for hyponatremia to 127, anemia with a HGB of 9.  Cr 1.99. Protein elevated to  10.7.  Uric acid up to 8.9.      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  Skeletal survey (3/2/22) - lesion in the left rib.   PET:    Expansile hypermetabolic lytic mass involving the left lateral 9th  rib, correlating to suspected plasmacytoma described on osseous  survey performed 03/02/2022.  2. Diffusely increased FDG uptake throughout the bone marrow which  may relate to anemia/marrow activation, with marrow involvement by  myeloma not excluded.  Treatment:   Initiated (3/29/22) treatment for his relapsed Myeloma with Revlimid 10 mg by mouth days 1-14/21 days, Daratuzumab (3/29, & 4/5/22), SC Velcade (3/29 & 4/5/22), & weekly Dexamethasone complicated by several hospitaliizations for syncope, PE ,afib due to  velcade and revlimid.  Velcade discontinued     Recv'd weekly single agent Daraumumab from 5/31 - 7/19/22. Initiated (8/25/22) Revlimid 10 mg on days 1-21/28 days.     Has had marked reduction in paraprotein and free lyte chains  with M protein : 1.4 g/dl on 4/19/22 ,    [place] free lyte/ratio of 4.49 mg/dl/4.45.    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  Note is made of postoperative changes from prior partial right mandibular resection.  No evidence of abnormal FDG uptake is seen in the surgical bed to suggest disease recurrence. This exam is not optimized for evaluation of osteonecrosis, particularly due to the adjacent postsurgical changes. Within these limitations, there is nodefinitive evidence of osteonecrosis and with there is further clinical concern, recommend clinical correlation and evaluation with further imaging such as MRI.  2. Interval worsening of metabolic activity associated within expansile lytic mass involving the lateral left 9th rib, concerning for progression of disease. No definitive new sites of osseous metastatic disease.  3. Mild hypermetabolic activity involving the left hip joint and surrounding the patient's bilateral knee arthroplasties, favored to be reactive.       Response:     Past Medical History:     Past Medical History:   Diagnosis Date    Chronic gout, unspecified, without tophus (tophi) 12/11/2024    Dental caries on pit and fissure surface penetrating into dentin 01/12/2024    Deposits (accretions) on teeth 01/12/2024    Essential (primary) hypertension 11/20/2013    Benign essential hypertension    Gout, unspecified 12/11/2024    History of hypercholesterolemia 01/12/2024    History of hypertension 01/12/2024    History of malignant neoplasm of prostate 01/12/2024    Impacted cerumen 01/12/2024    Impacted  cerumen, bilateral 02/16/2024    Infective pharyngitis 01/12/2024    Open restoration margins of tooth 03/26/2025    Other intervertebral disc degeneration, lumbar region     Lumbar degenerative disc disease    Overweight with body mass index (BMI) 25.0-29.9 02/02/2022    Partial loss of teeth due to caries, class I 01/12/2024    Personal history of malignant neoplasm of prostate     History of malignant neoplasm of prostate    Personal history of other diseases of the circulatory system     History of hypertension    Personal history of other diseases of the circulatory system 06/29/2022    History of atrial tachycardia    Personal history of other diseases of the circulatory system     History of essential hypertension    Personal history of other diseases of the musculoskeletal system and connective tissue     History of arthritis    Personal history of other diseases of the musculoskeletal system and connective tissue     History of acute gouty arthritis    Personal history of other diseases of the musculoskeletal system and connective tissue 01/30/2019    History of left foot drop    Personal history of other diseases of the respiratory system     History of bronchitis    Personal history of other endocrine, nutritional and metabolic disease     History of hypercholesterolemia    Personal history of other endocrine, nutritional and metabolic disease     History of diabetes mellitus    Personal history of other endocrine, nutritional and metabolic disease 01/17/2014    History of diabetes mellitus    Post-traumatic stress disorder, chronic 11/10/2023    Primary localized osteoarthritis of pelvic region and thigh 12/17/2018    Pure hypercholesterolemia, unspecified 11/20/2013    Low-density-lipoid-type (LDL) hyperlipoproteinemia    Snoring     Snoring     Surgical History:     Past Surgical History:   Procedure Laterality Date    BACK SURGERY  11/20/2013    Back Surgery    TONSILLECTOMY  11/20/2013     Tonsillectomy    TOTAL KNEE ARTHROPLASTY  11/20/2013    Knee Replacement      Family History:     Family History   Problem Relation Name Age of Onset    Heart attack Mother      Diabetes Brother      Stroke Maternal Grandfather       Social History:  Reported history of agent orange exposure while in [employer] at [employer]. Retired  and post .  from his wife who is health care  power of .  Two sons and 2 grandchildren (1)      Social History     Tobacco Use    Smoking status: Never     Passive exposure: Never    Smokeless tobacco: Never   Substance Use Topics    Alcohol use: Yes     Comment: occasional    Drug use: Never      -------------------------------------------------------------------------------------------------------  Subjective       HPI    ROSETOBY is a 75 year old Male h/o pAfib, T2DM, HTN, gout, IgG kappa MM.  Still not clear whether patient has ONJ.  Following with dentist at the [facility].  Patient states he had a bone density 11/23 which does not show osteopenia.    Toby presents to the clinic today (7/29/25) unaccompanied follow up evaulation of his multiple myeloma, blood work, and is scheduled to receive C35 of monthly subcutaneous daratumumab.  Remains on lenalidomide 5 mg daily on days 1-21 of 28 day cycle.  Last visit in April patient reported an enlarged right cervical neck node persistent after course of antibiotics and after some dental  work. CT neck confirmed 1.5 cm mass, needle aspirate not diagnostic. Patient states that node has resolved.    Energy level is up and down.  Intermittent night sweats.  Appetite is also up and down.  Weight is steady.  Bowels rotate between diarrhea and constipation at times.  Uses OTC medications for his bowels.  Uses cane for ambulation.  Has left rib pain, but is improved from before, rates about 1 out of 10.  May have cortisol injection left hip with previously plan to eventually have left hip  replacement.  Numbness and tingling to bilateral feet and hands, doesn't happen as often as it was. Takes tylenol or tramadol as needed.  Continues to have shortness of breath on exertion, staying the same.  Following with cardiology.  Bruises easily.  Continues to take apixaban 5 mg BID.  Glucose levels running 120-150s, can run higher after getting steroids with treatment.    Review of Systems   Constitutional:  Positive for diaphoresis (occassional night sweats about once per week to 2 weeks, can be drenching), fatigue and unexpected weight change. Negative for appetite change, chills and fever.   Respiratory:  Positive for shortness of breath (on exertion).    Cardiovascular: Negative.    Gastrointestinal:  Positive for constipation and diarrhea. Negative for blood in stool, nausea and vomiting.   Genitourinary: Negative.     Musculoskeletal:  Positive for arthralgias. Negative for gait problem (uses cane).   Skin: Negative.    Neurological:  Positive for numbness. Negative for gait problem (uses cane).   Hematological:  Negative for adenopathy. Bruises/bleeds easily.   All other systems reviewed and are negative.  -------------------------------------------------------------------------------------------------------  Objective   BSA: 2.13 meters squared  /70 (BP Location: Right arm, Patient Position: Sitting, BP Cuff Size: Adult)   Pulse 65   Temp 36.1 °C (97 °F) (Core)   Resp 18   Wt 90.3 kg (199 lb)   SpO2 98%   BMI 27.75 kg/m²     Physical Exam  Vitals reviewed.   Constitutional:       Appearance: Normal appearance.   HENT:      Head: Normocephalic and atraumatic.      Mouth/Throat:      Mouth: Mucous membranes are moist.     Eyes:      Extraocular Movements: Extraocular movements intact.      Conjunctiva/sclera: Conjunctivae normal.      Pupils: Pupils are equal, round, and reactive to light.       Cardiovascular:      Rate and Rhythm: Normal rate and regular rhythm.      Pulses: Normal pulses.       Heart sounds: Normal heart sounds.   Pulmonary:      Effort: Pulmonary effort is normal.      Breath sounds: Normal breath sounds.   Abdominal:      General: Abdomen is flat. Bowel sounds are normal.      Palpations: Abdomen is soft. There is no mass.      Tenderness: There is no abdominal tenderness.     Musculoskeletal:         General: No swelling. Normal range of motion.   Lymphadenopathy:      Comments: No lymphadenopathy     Skin:     General: Skin is warm and dry.     Neurological:      General: No focal deficit present.      Mental Status: He is alert and oriented to person, place, and time.     Psychiatric:         Mood and Affect: Mood normal.         Behavior: Behavior normal.         Thought Content: Thought content normal.         Judgment: Judgment normal.     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      Restaging 3/2022 shows high burden disease with 90% tumorous involvement of the bone marrow ,   Initiated (3/29/22) treatment for his relapsed Myeloma with Revlimid 10 mg by mouth days 1-14/21 days, Daratuzumab (3/29, & 4/5/22), SC Velcade (3/29 & 4/5/22), & weekly Dexamethasone. complicated by several hospilizations for syncope, PE ,afib due to velcade and revlimid.    Recv'd weekly single agent Daraumumab from 5/31 - 7/19/22. Initiated (8/25/22) Revlimid 10 mg on days 1-21/28 days.     Will continue monthly casey, rev, & dex until evidence of disease progression or unacceptable toxicity.    PET (7/8/25): IMPRESSION:  1.  Note is made of postoperative changes from prior partial right mandibular resection.  No evidence of abnormal FDG uptake is seen in the surgical bed to suggest  disease recurrence. This exam is not optimized for evaluation of osteonecrosis, particularly due to the adjacent postsurgical changes. Within these limitations, there is nodefinitive evidence of osteonecrosis and with there is further clinical concern, recommend clinical correlation and evaluation with further imaging such as MRI.  2. Interval worsening of metabolic activity associated within expansile lytic mass involving the lateral left 9th rib, concerning for progression of disease. No definitive new sites of osseous metastatic disease.  3. Mild hypermetabolic activity involving the left hip joint and surrounding the patient's bilateral knee arthroplasties, favored to be reactive.    7/29/25:  CBC results stable from today.  Kappa and lambda free light chains steady-see below.  M-protein 0.2 (7/1/25).  Myeloma labs in process from today-see below.  Reviewed PET scan results with patient with worsening metabolic activity with in lytic lesion to lateral left 9th rib.  Toby reports improved left rib pain, rates 1 out of 10 on the pain scale.  Will continue to monitor with no changes to treatment plan.  Scheduled to receive C35 daratumumab today.  Follow up visit with provider in 2 months.      Ig [place] Free Light Chain   Date Value Ref Range Status   07/01/2025 3.23 (H) 0.33 - 1.94 mg/dL Final   06/03/2025 3.25 (H) 0.33 - 1.94 mg/dL Final   05/06/2025 2.88 (H) 0.33 - 1.94 mg/dL Final   04/08/2025 2.75 (H) 0.33 - 1.94 mg/dL Final   03/11/2025 2.40 (H) 0.33 - 1.94 mg/dL Final     Ig Lambda Free Light Chain   Date Value Ref Range Status   07/01/2025 0.72 0.57 - 2.63 mg/dL Final   06/03/2025 0.75 0.57 - 2.63 mg/dL Final   05/06/2025 0.80 0.57 - 2.63 mg/dL Final   04/08/2025 0.99 0.57 - 2.63 mg/dL Final   03/11/2025 0.81 0.57 - 2.63 mg/dL Final     Kappa/Lambda Ratio   Date Value Ref Range Status   07/01/2025 4.49 (H) 0.26 - 1.65 Final   06/03/2025 4.33 (H) 0.26 - 1.65 Final   05/06/2025 3.60 (H) 0.26 - 1.65 Final    04/08/2025 2.78 (H) 0.26 - 1.65 Final   03/11/2025 2.96 (H) 0.26 - 1.65 Final     M-PROTEIN 1   Date Value Ref Range Status   07/01/2025 0.2 (H)   g/dL Final   06/03/2025 0.2 (H)   g/dL Final   05/06/2025 0.2 (H)   g/dL Final   04/08/2025 0.1 (H)   g/dL Final   03/11/2025 0.2 (H)   g/dL Final      Enlarged Right Cervical Lymph Node:  4/8/25:  Toby reports new enlarged right cervical lymph node that he noted around 4/22/25.  Recently had dental work completed in March, needs to have crown replaced on 4/16/25.  PCP prescribed 10 day course of Augmentin.  Toby reports slight decrease in LN size and tenderness resolved after completing Augmentin.  On examination right cervical LN 4x3 cm in size, firm, Patient seen by ENT and CT scan of neck showed 1.5 cm node. ENT FNA non diagnostic and in meantime node has completely resolved.  Will continue to monitor.    7/29/25:  Enlarged LN has resolved.    #Atrial fib on chronic anticoagulation  on Eliquis 5 mg by mouth twice a day.  Patient confirmed on 50 mg by mouth twice a day of metoprolol.  Follows with Dr. Moon and Dr. Garcia.  Recently (10/22/24) has been feeling more SOB - advised to reach out to cardiology  Cardioversion planned for 11/14/24 but was cancelled as Toby was back in [facility].  Following with Dr. Garcia and Dr. Moon.  Currently on eliquis 5 mg BID and metoprolol 100 mg BID.    PE (4/9/22) - Small occlusive embolic thrombus within the right lower lobe pulmonary artery branch, medially and posteriorly.  Cont Eliquis 5 mg twice a day.     Bone health:  Xgeva held due to concerns of ONJ and prolonged treatment in the past.  Bone density December 2023 was normal.  Has partial dentures, so will hold off xgeva due to concern about ONJ see below.       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  Pt had his tooth pulled in 2019, however with prolonged bone exposure/infection, eventually underwent OMSF surgery  in 10/2021. Unclear if he has denosumab assoc jaw necrosis. Patient finally had his bad tooth (L upper molar) removed on 5/25/23. [facility]  Has partial dentures.     Healthcare Maintenance:  Received shingrix 6/16/21 and 8/16/21.  Received pneumococcal conjugate 1/12/24, updated covid and influenza vaccines 9/25/24, and RSV vaccine 9/30/24.    Social Work:  4/30/24: Requested to have DWIGHT DominguezC contact patient regarding his medical bills.   8/20/24:  Filled out applications for grants for [employer].  Reports he does not need assistance for medical paperwork or bills today.    7/29/25:  Reports he does not need assistance with social work today.     RTC:   8/26/25:  Infusion appt for AARON peña  9/23/25:  Follow up visit with provider and C37 casey Parham, APRN-CNP

## 2025-07-28 ENCOUNTER — LAB (OUTPATIENT)
Dept: LAB | Facility: CLINIC | Age: 75
End: 2025-07-28
Payer: MEDICARE

## 2025-07-28 DIAGNOSIS — C90.00 IGG MULTIPLE MYELOMA (MULTI): ICD-10-CM

## 2025-07-28 LAB
ALBUMIN SERPL BCP-MCNC: 4.4 G/DL (ref 3.4–5)
ALP SERPL-CCNC: 73 U/L (ref 33–136)
ALT SERPL W P-5'-P-CCNC: 25 U/L (ref 10–52)
ANION GAP SERPL CALC-SCNC: 14 MMOL/L (ref 10–20)
AST SERPL W P-5'-P-CCNC: 13 U/L (ref 9–39)
BASOPHILS # BLD AUTO: 0.06 X10*3/UL (ref 0–0.1)
BASOPHILS NFR BLD AUTO: 1.3 %
BILIRUB SERPL-MCNC: 0.8 MG/DL (ref 0–1.2)
BUN SERPL-MCNC: 17 MG/DL (ref 6–23)
CALCIUM SERPL-MCNC: 9.3 MG/DL (ref 8.6–10.6)
CHLORIDE SERPL-SCNC: 106 MMOL/L (ref 98–107)
CO2 SERPL-SCNC: 21 MMOL/L (ref 21–32)
CREAT SERPL-MCNC: 1.47 MG/DL (ref 0.5–1.3)
EGFRCR SERPLBLD CKD-EPI 2021: 49 ML/MIN/1.73M*2
EOSINOPHIL # BLD AUTO: 0.05 X10*3/UL (ref 0–0.4)
EOSINOPHIL NFR BLD AUTO: 1 %
ERYTHROCYTE [DISTWIDTH] IN BLOOD BY AUTOMATED COUNT: 14.3 % (ref 11.5–14.5)
GLUCOSE SERPL-MCNC: 140 MG/DL (ref 74–99)
HCT VFR BLD AUTO: 36.1 % (ref 41–52)
HGB BLD-MCNC: 12.3 G/DL (ref 13.5–17.5)
IGA SERPL-MCNC: 45 MG/DL (ref 70–400)
IGG SERPL-MCNC: 804 MG/DL (ref 700–1600)
IGM SERPL-MCNC: 9 MG/DL (ref 40–230)
IMM GRANULOCYTES # BLD AUTO: 0.01 X10*3/UL (ref 0–0.5)
IMM GRANULOCYTES NFR BLD AUTO: 0.2 % (ref 0–0.9)
LYMPHOCYTES # BLD AUTO: 2.38 X10*3/UL (ref 0.8–3)
LYMPHOCYTES NFR BLD AUTO: 49.9 %
MCH RBC QN AUTO: 30.8 PG (ref 26–34)
MCHC RBC AUTO-ENTMCNC: 34.1 G/DL (ref 32–36)
MCV RBC AUTO: 90 FL (ref 80–100)
MONOCYTES # BLD AUTO: 0.58 X10*3/UL (ref 0.05–0.8)
MONOCYTES NFR BLD AUTO: 12.2 %
NEUTROPHILS # BLD AUTO: 1.69 X10*3/UL (ref 1.6–5.5)
NEUTROPHILS NFR BLD AUTO: 35.4 %
NRBC BLD-RTO: ABNORMAL /100{WBCS}
PLATELET # BLD AUTO: 153 X10*3/UL (ref 150–450)
POTASSIUM SERPL-SCNC: 3.8 MMOL/L (ref 3.5–5.3)
PROT SERPL-MCNC: 6.5 G/DL (ref 6.4–8.2)
PROT SERPL-MCNC: 6.5 G/DL (ref 6.4–8.2)
RBC # BLD AUTO: 4 X10*6/UL (ref 4.5–5.9)
SODIUM SERPL-SCNC: 137 MMOL/L (ref 136–145)
WBC # BLD AUTO: 4.8 X10*3/UL (ref 4.4–11.3)

## 2025-07-28 PROCEDURE — 84165 PROTEIN E-PHORESIS SERUM: CPT

## 2025-07-28 PROCEDURE — 83521 IG LIGHT CHAINS FREE EACH: CPT

## 2025-07-28 PROCEDURE — 85025 COMPLETE CBC W/AUTO DIFF WBC: CPT

## 2025-07-28 PROCEDURE — 84165 PROTEIN E-PHORESIS SERUM: CPT | Performed by: STUDENT IN AN ORGANIZED HEALTH CARE EDUCATION/TRAINING PROGRAM

## 2025-07-28 PROCEDURE — 36415 COLL VENOUS BLD VENIPUNCTURE: CPT

## 2025-07-28 PROCEDURE — 82784 ASSAY IGA/IGD/IGG/IGM EACH: CPT

## 2025-07-28 PROCEDURE — 80053 COMPREHEN METABOLIC PANEL: CPT

## 2025-07-28 PROCEDURE — 84155 ASSAY OF PROTEIN SERUM: CPT

## 2025-07-28 RX ORDER — MONTELUKAST SODIUM 10 MG/1
10 TABLET ORAL ONCE
OUTPATIENT
Start: 2025-09-23

## 2025-07-28 RX ORDER — EPINEPHRINE 0.3 MG/.3ML
0.3 INJECTION SUBCUTANEOUS EVERY 5 MIN PRN
OUTPATIENT
Start: 2025-09-23

## 2025-07-28 RX ORDER — DIPHENHYDRAMINE HYDROCHLORIDE 50 MG/ML
50 INJECTION, SOLUTION INTRAMUSCULAR; INTRAVENOUS AS NEEDED
OUTPATIENT
Start: 2025-09-23

## 2025-07-28 RX ORDER — FAMOTIDINE 10 MG/ML
20 INJECTION, SOLUTION INTRAVENOUS ONCE AS NEEDED
OUTPATIENT
Start: 2025-09-23

## 2025-07-28 RX ORDER — ACETAMINOPHEN 325 MG/1
650 TABLET ORAL ONCE
OUTPATIENT
Start: 2025-09-23

## 2025-07-28 RX ORDER — DEXAMETHASONE 4 MG/1
8 TABLET ORAL ONCE
OUTPATIENT
Start: 2025-09-23

## 2025-07-28 RX ORDER — ALBUTEROL SULFATE 0.83 MG/ML
3 SOLUTION RESPIRATORY (INHALATION) AS NEEDED
OUTPATIENT
Start: 2025-09-23

## 2025-07-28 RX ORDER — DIPHENHYDRAMINE HCL 50 MG
50 CAPSULE ORAL ONCE
OUTPATIENT
Start: 2025-09-23

## 2025-07-29 ENCOUNTER — OFFICE VISIT (OUTPATIENT)
Dept: HEMATOLOGY/ONCOLOGY | Facility: CLINIC | Age: 75
End: 2025-07-29
Payer: MEDICARE

## 2025-07-29 ENCOUNTER — INFUSION (OUTPATIENT)
Dept: HEMATOLOGY/ONCOLOGY | Facility: CLINIC | Age: 75
End: 2025-07-29
Payer: MEDICARE

## 2025-07-29 VITALS
RESPIRATION RATE: 18 BRPM | BODY MASS INDEX: 27.75 KG/M2 | WEIGHT: 199 LBS | OXYGEN SATURATION: 98 % | SYSTOLIC BLOOD PRESSURE: 137 MMHG | HEART RATE: 65 BPM | DIASTOLIC BLOOD PRESSURE: 70 MMHG | TEMPERATURE: 97 F

## 2025-07-29 DIAGNOSIS — I48.0 PAROXYSMAL ATRIAL FIBRILLATION (MULTI): ICD-10-CM

## 2025-07-29 DIAGNOSIS — C90.00 IGG MULTIPLE MYELOMA (MULTI): ICD-10-CM

## 2025-07-29 DIAGNOSIS — Z51.12 ENCOUNTER FOR MONOCLONAL ANTIBODY TREATMENT FOR MALIGNANCY: ICD-10-CM

## 2025-07-29 DIAGNOSIS — C90.00 IGG MULTIPLE MYELOMA (MULTI): Primary | ICD-10-CM

## 2025-07-29 DIAGNOSIS — Z86.711 HISTORY OF PULMONARY EMBOLISM: ICD-10-CM

## 2025-07-29 LAB
KAPPA LC SERPL-MCNC: 3.47 MG/DL (ref 0.33–1.94)
KAPPA LC/LAMBDA SER: 3.73 {RATIO} (ref 0.26–1.65)
LAMBDA LC SERPL-MCNC: 0.93 MG/DL (ref 0.57–2.63)

## 2025-07-29 PROCEDURE — 96401 CHEMO ANTI-NEOPL SQ/IM: CPT

## 2025-07-29 PROCEDURE — 1159F MED LIST DOCD IN RCRD: CPT

## 2025-07-29 PROCEDURE — 99215 OFFICE O/P EST HI 40 MIN: CPT

## 2025-07-29 PROCEDURE — 2500000004 HC RX 250 GENERAL PHARMACY W/ HCPCS (ALT 636 FOR OP/ED): Mod: JZ,TB | Performed by: INTERNAL MEDICINE

## 2025-07-29 PROCEDURE — 1160F RVW MEDS BY RX/DR IN RCRD: CPT

## 2025-07-29 PROCEDURE — 3078F DIAST BP <80 MM HG: CPT

## 2025-07-29 PROCEDURE — 2500000001 HC RX 250 WO HCPCS SELF ADMINISTERED DRUGS (ALT 637 FOR MEDICARE OP): Performed by: INTERNAL MEDICINE

## 2025-07-29 PROCEDURE — 3075F SYST BP GE 130 - 139MM HG: CPT

## 2025-07-29 PROCEDURE — 1125F AMNT PAIN NOTED PAIN PRSNT: CPT

## 2025-07-29 PROCEDURE — 1036F TOBACCO NON-USER: CPT

## 2025-07-29 PROCEDURE — 2500000004 HC RX 250 GENERAL PHARMACY W/ HCPCS (ALT 636 FOR OP/ED): Performed by: INTERNAL MEDICINE

## 2025-07-29 PROCEDURE — 99215 OFFICE O/P EST HI 40 MIN: CPT | Mod: 25

## 2025-07-29 RX ORDER — DEXAMETHASONE 4 MG/1
8 TABLET ORAL ONCE
Status: COMPLETED | OUTPATIENT
Start: 2025-07-29 | End: 2025-07-29

## 2025-07-29 RX ORDER — MONTELUKAST SODIUM 10 MG/1
10 TABLET ORAL ONCE
Status: COMPLETED | OUTPATIENT
Start: 2025-07-29 | End: 2025-07-29

## 2025-07-29 RX ORDER — DIPHENHYDRAMINE HYDROCHLORIDE 50 MG/ML
50 INJECTION, SOLUTION INTRAMUSCULAR; INTRAVENOUS AS NEEDED
Status: DISCONTINUED | OUTPATIENT
Start: 2025-07-29 | End: 2025-07-29 | Stop reason: HOSPADM

## 2025-07-29 RX ORDER — DIPHENHYDRAMINE HCL 50 MG
50 CAPSULE ORAL ONCE
Status: COMPLETED | OUTPATIENT
Start: 2025-07-29 | End: 2025-07-29

## 2025-07-29 RX ORDER — EPINEPHRINE 0.3 MG/.3ML
0.3 INJECTION SUBCUTANEOUS EVERY 5 MIN PRN
Status: DISCONTINUED | OUTPATIENT
Start: 2025-07-29 | End: 2025-07-29 | Stop reason: HOSPADM

## 2025-07-29 RX ORDER — FAMOTIDINE 10 MG/ML
20 INJECTION, SOLUTION INTRAVENOUS ONCE AS NEEDED
Status: DISCONTINUED | OUTPATIENT
Start: 2025-07-29 | End: 2025-07-29 | Stop reason: HOSPADM

## 2025-07-29 RX ORDER — ALBUTEROL SULFATE 0.83 MG/ML
3 SOLUTION RESPIRATORY (INHALATION) AS NEEDED
Status: DISCONTINUED | OUTPATIENT
Start: 2025-07-29 | End: 2025-07-29 | Stop reason: HOSPADM

## 2025-07-29 RX ORDER — ACETAMINOPHEN 325 MG/1
650 TABLET ORAL ONCE
Status: COMPLETED | OUTPATIENT
Start: 2025-07-29 | End: 2025-07-29

## 2025-07-29 RX ADMIN — DEXAMETHASONE 8 MG: 4 TABLET ORAL at 09:32

## 2025-07-29 RX ADMIN — DIPHENHYDRAMINE HYDROCHLORIDE 50 MG: 50 CAPSULE ORAL at 09:32

## 2025-07-29 RX ADMIN — MONTELUKAST 10 MG: 10 TABLET, FILM COATED ORAL at 09:32

## 2025-07-29 RX ADMIN — DARATUMUMAB AND HYALURONIDASE-FIHJ (HUMAN RECOMBINANT) 1800 MG: 1800; 30000 INJECTION SUBCUTANEOUS at 10:11

## 2025-07-29 RX ADMIN — ACETAMINOPHEN 650 MG: 325 TABLET ORAL at 09:32

## 2025-07-29 ASSESSMENT — ENCOUNTER SYMPTOMS
UNEXPECTED WEIGHT CHANGE: 1
ADENOPATHY: 0
BRUISES/BLEEDS EASILY: 1
SHORTNESS OF BREATH: 1

## 2025-07-29 ASSESSMENT — PAIN SCALES - GENERAL: PAINLEVEL_OUTOF10: 2

## 2025-07-30 LAB
ALBUMIN: 4 G/DL (ref 3.4–5)
ALPHA 1 GLOBULIN: 0.3 G/DL (ref 0.2–0.6)
ALPHA 2 GLOBULIN: 0.8 G/DL (ref 0.4–1.1)
BETA GLOBULIN: 0.7 G/DL (ref 0.5–1.2)
GAMMA GLOBULIN: 0.7 G/DL (ref 0.5–1.4)
M-PROTEIN 1: 0.2 G/DL (ref ?–0)
PATH REVIEW-SERUM PROTEIN ELECTROPHORESIS: ABNORMAL
PROTEIN ELECTROPHORESIS COMMENT: ABNORMAL

## 2025-07-31 ENCOUNTER — APPOINTMENT (OUTPATIENT)
Dept: PRIMARY CARE | Facility: CLINIC | Age: 75
End: 2025-07-31
Payer: MEDICARE

## 2025-08-06 ENCOUNTER — OFFICE VISIT (OUTPATIENT)
Dept: CARDIOLOGY | Facility: HOSPITAL | Age: 75
End: 2025-08-06
Payer: MEDICARE

## 2025-08-06 ENCOUNTER — TELEPHONE (OUTPATIENT)
Dept: CARDIOLOGY | Facility: HOSPITAL | Age: 75
End: 2025-08-06

## 2025-08-06 VITALS
HEART RATE: 70 BPM | DIASTOLIC BLOOD PRESSURE: 60 MMHG | SYSTOLIC BLOOD PRESSURE: 114 MMHG | HEIGHT: 71 IN | WEIGHT: 203 LBS | OXYGEN SATURATION: 100 % | BODY MASS INDEX: 28.42 KG/M2

## 2025-08-06 DIAGNOSIS — I10 BENIGN HYPERTENSION: ICD-10-CM

## 2025-08-06 DIAGNOSIS — I48.0 PAROXYSMAL ATRIAL FIBRILLATION (MULTI): Primary | ICD-10-CM

## 2025-08-06 DIAGNOSIS — E78.00 HYPERCHOLESTEROLEMIA: ICD-10-CM

## 2025-08-06 LAB
ATRIAL RATE: 70 BPM
P AXIS: 64 DEGREES
P OFFSET: 195 MS
P ONSET: 147 MS
PR INTERVAL: 156 MS
Q ONSET: 225 MS
QRS COUNT: 11 BEATS
QRS DURATION: 110 MS
QT INTERVAL: 422 MS
QTC CALCULATION(BAZETT): 455 MS
QTC FREDERICIA: 444 MS
R AXIS: 24 DEGREES
T AXIS: 54 DEGREES
T OFFSET: 436 MS
VENTRICULAR RATE: 70 BPM

## 2025-08-06 PROCEDURE — 93005 ELECTROCARDIOGRAM TRACING: CPT | Performed by: NURSE PRACTITIONER

## 2025-08-06 PROCEDURE — 99214 OFFICE O/P EST MOD 30 MIN: CPT | Performed by: NURSE PRACTITIONER

## 2025-08-06 PROCEDURE — 99213 OFFICE O/P EST LOW 20 MIN: CPT

## 2025-08-06 PROCEDURE — 3074F SYST BP LT 130 MM HG: CPT | Performed by: NURSE PRACTITIONER

## 2025-08-06 PROCEDURE — 3078F DIAST BP <80 MM HG: CPT | Performed by: NURSE PRACTITIONER

## 2025-08-06 PROCEDURE — 1159F MED LIST DOCD IN RCRD: CPT | Performed by: NURSE PRACTITIONER

## 2025-08-06 PROCEDURE — 1160F RVW MEDS BY RX/DR IN RCRD: CPT | Performed by: NURSE PRACTITIONER

## 2025-08-06 PROCEDURE — G2211 COMPLEX E/M VISIT ADD ON: HCPCS | Performed by: NURSE PRACTITIONER

## 2025-08-06 PROCEDURE — 1036F TOBACCO NON-USER: CPT | Performed by: NURSE PRACTITIONER

## 2025-08-06 PROCEDURE — 1125F AMNT PAIN NOTED PAIN PRSNT: CPT | Performed by: NURSE PRACTITIONER

## 2025-08-06 ASSESSMENT — PATIENT HEALTH QUESTIONNAIRE - PHQ9
1. LITTLE INTEREST OR PLEASURE IN DOING THINGS: NOT AT ALL
2. FEELING DOWN, DEPRESSED OR HOPELESS: NOT AT ALL
SUM OF ALL RESPONSES TO PHQ9 QUESTIONS 1 AND 2: 0

## 2025-08-06 ASSESSMENT — PAIN SCALES - GENERAL: PAINLEVEL_OUTOF10: 2

## 2025-08-06 ASSESSMENT — COLUMBIA-SUICIDE SEVERITY RATING SCALE - C-SSRS
1. IN THE PAST MONTH, HAVE YOU WISHED YOU WERE DEAD OR WISHED YOU COULD GO TO SLEEP AND NOT WAKE UP?: NO
2. HAVE YOU ACTUALLY HAD ANY THOUGHTS OF KILLING YOURSELF?: NO
6. HAVE YOU EVER DONE ANYTHING, STARTED TO DO ANYTHING, OR PREPARED TO DO ANYTHING TO END YOUR LIFE?: NO

## 2025-08-06 NOTE — PROGRESS NOTES
Primary Care Physician: Antoine Cano MD  Date of Visit: 08/06/2025  8:30 AM EDT  Location of visit: [facility]     Chief Complaint:   Chief Complaint   Patient presents with    Follow-up        HPI / Summary:   Toby Ugalde is a 75 y.o. male presents for followup. Seen in collaboration with Dr. Moon. He continues to have chemotherapy for treatment of multiple myeloma. His chronic dyspnea on exertion when ambulating up 1 flight of stairs and walking prolonged distances remains unchanged. No Chest pain.  He ambulates with assistance of a cane at times.  Activity is most limited by left hip pain. He has an occasional lightheadedness upon standing most noticeable after chemotherapy. No near syncope or syncope. The patient denies chest pain,palpitations, lightheadedness, syncope, orthopnea, paroxysmal nocturnal dyspnea,lower extremity edema, or bleeding problems.              Past Medical History:  Past Medical History:   Diagnosis Date    Chronic gout, unspecified, without tophus (tophi) 12/11/2024    Dental caries on pit and fissure surface penetrating into dentin 01/12/2024    Deposits (accretions) on teeth 01/12/2024    Essential (primary) hypertension 11/20/2013    Benign essential hypertension    Gout, unspecified 12/11/2024    History of hypercholesterolemia 01/12/2024    History of hypertension 01/12/2024    History of malignant neoplasm of prostate 01/12/2024    Impacted cerumen 01/12/2024    Impacted cerumen, bilateral 02/16/2024    Infective pharyngitis 01/12/2024    Open restoration margins of tooth 03/26/2025    Other intervertebral disc degeneration, lumbar region     Lumbar degenerative disc disease    Overweight with body mass index (BMI) 25.0-29.9 02/02/2022    Partial loss of teeth due to caries, class I 01/12/2024    Personal history of malignant neoplasm of prostate     History of malignant neoplasm of prostate    Personal history of other diseases of the circulatory system     History  of hypertension    Personal history of other diseases of the circulatory system 06/29/2022    History of atrial tachycardia    Personal history of other diseases of the circulatory system     History of essential hypertension    Personal history of other diseases of the musculoskeletal system and connective tissue     History of arthritis    Personal history of other diseases of the musculoskeletal system and connective tissue     History of acute gouty arthritis    Personal history of other diseases of the musculoskeletal system and connective tissue 01/30/2019    History of left foot drop    Personal history of other diseases of the respiratory system     History of bronchitis    Personal history of other endocrine, nutritional and metabolic disease     History of hypercholesterolemia    Personal history of other endocrine, nutritional and metabolic disease     History of diabetes mellitus    Personal history of other endocrine, nutritional and metabolic disease 01/17/2014    History of diabetes mellitus    Post-traumatic stress disorder, chronic 11/10/2023    Primary localized osteoarthritis of pelvic region and thigh 12/17/2018    Pure hypercholesterolemia, unspecified 11/20/2013    Low-density-lipoid-type (LDL) hyperlipoproteinemia    Snoring     Snoring        Past Surgical History:  Past Surgical History:   Procedure Laterality Date    BACK SURGERY  11/20/2013    Back Surgery    TONSILLECTOMY  11/20/2013    Tonsillectomy    TOTAL KNEE ARTHROPLASTY  11/20/2013    Knee Replacement          Social History:   reports that he has never smoked. He has never been exposed to tobacco smoke. He has never used smokeless tobacco. He reports current alcohol use. He reports that he does not use drugs.     Family History:  family history includes Diabetes in his brother; Heart attack in his mother; Stroke in his maternal grandfather.      Allergies:  Allergies   Allergen Reactions    Mold Other       Outpatient  "Medications:  Current Outpatient Medications   Medication Instructions    acetaminophen (Tylenol) 500 mg tablet Take by mouth.    allopurinol (ZYLOPRIM) 300 mg, oral, Daily    amLODIPine (NORVASC) 5 mg, oral, Daily    apixaban (Eliquis) 5 mg tablet TAKE ONE (1) TABLET BY MOUTH 2 TIMES A DAY    atorvastatin (LIPITOR) 80 mg, oral, Daily    colchicine 0.6 mg, oral, Daily    finasteride (PROSCAR) 5 mg, oral, Daily    fludrocortisone (Florinef) 0.1 mg tablet 1 tablet, Daily    fluticasone (Flonase) 50 mcg/actuation nasal spray 1 spray, Daily PRN    lenalidomide (Revlimid) 5 mg capsule Take by mouth.    losartan (COZAAR) 100 mg, oral, Daily    metFORMIN (GLUCOPHAGE) 500 mg, As needed    metoprolol tartrate (LOPRESSOR) 50 mg, oral, 2 times daily    pantoprazole (ProtoNix) 40 mg EC tablet 1 tablet, Daily    sildenafil (VIAGRA) 100 mg, As needed    tamsulosin (FLOMAX) 0.4 mg, oral, Daily    traMADol (ULTRAM) 50 mg, oral, Every 8 hours PRN       Physical Exam:  Vitals:    08/06/25 0829   BP: 150/82   BP Location: Right arm   Patient Position: Sitting   BP Cuff Size: Adult   Pulse: 70   SpO2: 100%   Weight: 92.1 kg (203 lb)   Height: 1.803 m (5' 11\")     Wt Readings from Last 5 Encounters:   08/06/25 92.1 kg (203 lb)   07/29/25 90.3 kg (199 lb)   07/01/25 90.6 kg (199 lb 11.8 oz)   06/24/25 91.6 kg (202 lb)   06/03/25 91.6 kg (201 lb 15.1 oz)     Body mass index is 28.31 kg/m².     GENERAL: alert, cooperative, pleasant, in no acute distress  SKIN: warm and dry  NECK: Normal JVD, negative HJR  CARDIAC: Regular rate and rhythm with no rubs, murmurs, or gallops  CHEST: Normal respiratory efforts, lungs clear to auscultation bilaterally.  ABDOMEN: soft, nontender, nondistended  EXTREMITIES: no edema, +2 palpable RP and DP pulses bilaterally       Last Labs:  Recent Labs     07/28/25  1519 07/01/25  0753 06/03/25  0958   WBC 4.8 3.1* 3.7*   HGB 12.3* 11.1* 11.8*   HCT 36.1* 33.3* 35.0*    161 161   MCV 90 91 92     Recent " Labs     07/28/25  1519 07/01/25  0753 06/03/25  0958    139 139   K 3.8 3.8 3.9    108* 106   BUN 17 20 14   CREATININE 1.47* 1.44* 1.35*     CMP -  Lab Results   Component Value Date    CALCIUM 9.3 07/28/2025    PHOS 3.6 05/29/2024    PROT 6.5 07/28/2025    PROT 6.5 07/28/2025    ALBUMIN 4.4 07/28/2025    AST 13 07/28/2025    ALT 25 07/28/2025    ALKPHOS 73 07/28/2025    BILITOT 0.8 07/28/2025       LIPID PANEL -   Lab Results   Component Value Date    CHOL 145 08/20/2024    HDL 72.8 08/20/2024    LDLF 61 09/27/2023    TRIG 54 08/20/2024   LDL 61 8/20/24    Lab Results   Component Value Date    BNP 20 04/09/2022    HGBA1C 7.7 (A) 02/28/2022    HGBA1C 7.0 (H) 08/03/2020       Last Cardiology Tests:  ECG:  Obtained and reviewed EKG- Normal sinus rhythm HR 70 with PACs    Echo:  4/9/22  CONCLUSIONS:   1. The left ventricular systolic function is normal with a 60% estimated ejection fraction.   2. Compared with study from 9/6/2018, Right ventricle appears mildly enlarged.       Stress Test:  9/13/2018   IMPRESSION:  1. Normal stress myocardial perfusion imaging. No evidence of  stress-induced ischemia or scar.  2. Well-maintained left ventricular function.  I personally reviewed the images/study and I agree with the findings  as stated. This study was interpreted at [place], [place].    CT of chest 4/30/22  VESSELS:  Normal caliber aorta with mild  aortic atherosclerosis.  Borderline enlargement of the main pulmonary artery measuring up to  3.1 cm which may be seen with pulmonary hypertension. Evaluation of  the lower pulmonary artery vessels is partially degraded by  respiratory motion. Previously seen filling defect appears improved  since prior imaging. No definite new pulmonary embolism seen to the  segmental level.          Assessment/Plan     Toby was seen today for follow-up.  Diagnoses and all orders for this visit:  Paroxysmal atrial fibrillation  (Multi) (Primary)  -     ECG 12 lead (Clinic Performed)  Benign hypertension  Hypercholesterolemia  -     Lipid panel; Future  -     Lipid panel      In Summary Mr. Toby Ugalde is a pleasant 75 year old  male with significant past medical history of Paroxysmal atrial fibrillation, Hypertension, Pulmonary embolism 4/9/22, GERD, Anemia, Multiple myeloma. Patient is asymptomatic from a cardiac perspective. His blood pressure is controlled. His EKG today showed normal sinus rhythm with PACs. He will schedule follow up with Dr. Garcia as scheduled. I have ordered fasting lipid panel. I have encouraged activity as able. He will continue current cardiovascular medications. He will follow up as scheduled.     Orders:  No orders of the defined types were placed in this encounter.     Followup Appts:  Future Appointments   Date Time Provider Department Center   8/26/2025  8:30 AM INF 05 MINOFF [id number] [facility]   9/18/2025  2:00 PM Jd Mclain MD [id number] [facility]   9/23/2025  8:00 AM EWELINA Novak-CNP [id number] [facility]   9/23/2025  8:30 AM INF 05 MINOFF [id number] [facility]   10/20/2025 10:20 AM Nikita Guevara MD [id number] [facility]   11/12/2025 11:20 AM Geoffrey Garcia MD [id number] [facility]   1/21/2026  3:00 PM Rishi Moon MD [id number] [facility]   6/16/2026  9:10 AM Kendra Segovia MD [id number] [facility]           ____________________________________________________________  Mora Lin, APRN-CNP  [facility]  [facility]

## 2025-08-06 NOTE — TELEPHONE ENCOUNTER
Patient called the office when he got home from his appointment with Rupa today to verify that he is taking Losartan 100 mg daily and not [facility]chlorothiaz[facility] 12[facility].  Reiterated to patient that Rupa wants him to continue taking the regular Losartan 100 mg daily as he has been and to get rid of the Hydrochlorothiazide-Losartan so that it doesn't cause any confusion.  Patient was agreeable and verbalized understanding.

## 2025-08-26 ENCOUNTER — INFUSION (OUTPATIENT)
Dept: HEMATOLOGY/ONCOLOGY | Facility: CLINIC | Age: 75
End: 2025-08-26
Payer: MEDICARE

## 2025-08-26 ENCOUNTER — SOCIAL WORK (OUTPATIENT)
Dept: CASE MANAGEMENT | Facility: HOSPITAL | Age: 75
End: 2025-08-26

## 2025-08-26 ENCOUNTER — LAB (OUTPATIENT)
Dept: LAB | Facility: CLINIC | Age: 75
End: 2025-08-26
Payer: MEDICARE

## 2025-08-26 ENCOUNTER — APPOINTMENT (OUTPATIENT)
Dept: HEMATOLOGY/ONCOLOGY | Facility: CLINIC | Age: 75
End: 2025-08-26
Payer: MEDICARE

## 2025-08-26 VITALS
SYSTOLIC BLOOD PRESSURE: 113 MMHG | WEIGHT: 199.96 LBS | BODY MASS INDEX: 27.89 KG/M2 | DIASTOLIC BLOOD PRESSURE: 66 MMHG | HEART RATE: 80 BPM | RESPIRATION RATE: 18 BRPM | TEMPERATURE: 96.8 F

## 2025-08-26 DIAGNOSIS — C90.00 IGG MULTIPLE MYELOMA (MULTI): ICD-10-CM

## 2025-08-26 LAB
ALBUMIN SERPL BCP-MCNC: 4.3 G/DL (ref 3.4–5)
ALP SERPL-CCNC: 70 U/L (ref 33–136)
ALT SERPL W P-5'-P-CCNC: 17 U/L (ref 10–52)
ANION GAP SERPL CALC-SCNC: 13 MMOL/L (ref 10–20)
AST SERPL W P-5'-P-CCNC: 14 U/L (ref 9–39)
BASOPHILS # BLD AUTO: 0.04 X10*3/UL (ref 0–0.1)
BASOPHILS NFR BLD AUTO: 1 %
BILIRUB SERPL-MCNC: 0.7 MG/DL (ref 0–1.2)
BUN SERPL-MCNC: 17 MG/DL (ref 6–23)
CALCIUM SERPL-MCNC: 8.8 MG/DL (ref 8.6–10.6)
CHLORIDE SERPL-SCNC: 108 MMOL/L (ref 98–107)
CHOLEST SERPL-MCNC: 124 MG/DL (ref 0–199)
CHOLESTEROL/HDL RATIO: 1.9
CO2 SERPL-SCNC: 23 MMOL/L (ref 21–32)
CREAT SERPL-MCNC: 1.39 MG/DL (ref 0.5–1.3)
EGFRCR SERPLBLD CKD-EPI 2021: 52 ML/MIN/1.73M*2
EOSINOPHIL # BLD AUTO: 0.1 X10*3/UL (ref 0–0.4)
EOSINOPHIL NFR BLD AUTO: 2.5 %
ERYTHROCYTE [DISTWIDTH] IN BLOOD BY AUTOMATED COUNT: 14.5 % (ref 11.5–14.5)
GLUCOSE SERPL-MCNC: 114 MG/DL (ref 74–99)
HCT VFR BLD AUTO: 34.5 % (ref 41–52)
HDLC SERPL-MCNC: 63.8 MG/DL
HGB BLD-MCNC: 11.5 G/DL (ref 13.5–17.5)
IGA SERPL-MCNC: 37 MG/DL (ref 70–400)
IGG SERPL-MCNC: 697 MG/DL (ref 700–1600)
IGM SERPL-MCNC: 7 MG/DL (ref 40–230)
IMM GRANULOCYTES # BLD AUTO: 0 X10*3/UL (ref 0–0.5)
IMM GRANULOCYTES NFR BLD AUTO: 0 % (ref 0–0.9)
LDLC SERPL CALC-MCNC: 52 MG/DL
LYMPHOCYTES # BLD AUTO: 2.23 X10*3/UL (ref 0.8–3)
LYMPHOCYTES NFR BLD AUTO: 55.9 %
MCH RBC QN AUTO: 30.3 PG (ref 26–34)
MCHC RBC AUTO-ENTMCNC: 33.3 G/DL (ref 32–36)
MCV RBC AUTO: 91 FL (ref 80–100)
MONOCYTES # BLD AUTO: 0.41 X10*3/UL (ref 0.05–0.8)
MONOCYTES NFR BLD AUTO: 10.3 %
NEUTROPHILS # BLD AUTO: 1.21 X10*3/UL (ref 1.6–5.5)
NEUTROPHILS NFR BLD AUTO: 30.3 %
NON HDL CHOLESTEROL: 60 MG/DL (ref 0–149)
NRBC BLD-RTO: ABNORMAL /100{WBCS}
PLATELET # BLD AUTO: 169 X10*3/UL (ref 150–450)
POTASSIUM SERPL-SCNC: 3.4 MMOL/L (ref 3.5–5.3)
PROT SERPL-MCNC: 6.4 G/DL (ref 6.4–8.2)
PROT SERPL-MCNC: 6.4 G/DL (ref 6.4–8.2)
RBC # BLD AUTO: 3.8 X10*6/UL (ref 4.5–5.9)
SODIUM SERPL-SCNC: 141 MMOL/L (ref 136–145)
TRIGL SERPL-MCNC: 39 MG/DL (ref 0–149)
VLDL: 8 MG/DL (ref 0–40)
WBC # BLD AUTO: 4 X10*3/UL (ref 4.4–11.3)

## 2025-08-26 PROCEDURE — 2500000004 HC RX 250 GENERAL PHARMACY W/ HCPCS (ALT 636 FOR OP/ED): Performed by: INTERNAL MEDICINE

## 2025-08-26 PROCEDURE — 80053 COMPREHEN METABOLIC PANEL: CPT

## 2025-08-26 PROCEDURE — 36415 COLL VENOUS BLD VENIPUNCTURE: CPT

## 2025-08-26 PROCEDURE — 83521 IG LIGHT CHAINS FREE EACH: CPT

## 2025-08-26 PROCEDURE — 2500000004 HC RX 250 GENERAL PHARMACY W/ HCPCS (ALT 636 FOR OP/ED): Mod: JZ,TB | Performed by: INTERNAL MEDICINE

## 2025-08-26 PROCEDURE — 85025 COMPLETE CBC W/AUTO DIFF WBC: CPT

## 2025-08-26 PROCEDURE — 96401 CHEMO ANTI-NEOPL SQ/IM: CPT

## 2025-08-26 PROCEDURE — 84155 ASSAY OF PROTEIN SERUM: CPT

## 2025-08-26 PROCEDURE — 80061 LIPID PANEL: CPT | Performed by: NURSE PRACTITIONER

## 2025-08-26 PROCEDURE — 82784 ASSAY IGA/IGD/IGG/IGM EACH: CPT

## 2025-08-26 PROCEDURE — 2500000001 HC RX 250 WO HCPCS SELF ADMINISTERED DRUGS (ALT 637 FOR MEDICARE OP): Performed by: INTERNAL MEDICINE

## 2025-08-26 RX ORDER — ACETAMINOPHEN 325 MG/1
650 TABLET ORAL ONCE
Status: COMPLETED | OUTPATIENT
Start: 2025-08-26 | End: 2025-08-26

## 2025-08-26 RX ORDER — ALBUTEROL SULFATE 0.83 MG/ML
3 SOLUTION RESPIRATORY (INHALATION) AS NEEDED
Status: DISCONTINUED | OUTPATIENT
Start: 2025-08-26 | End: 2025-08-26 | Stop reason: HOSPADM

## 2025-08-26 RX ORDER — DIPHENHYDRAMINE HCL 50 MG
50 CAPSULE ORAL ONCE
Status: COMPLETED | OUTPATIENT
Start: 2025-08-26 | End: 2025-08-26

## 2025-08-26 RX ORDER — MONTELUKAST SODIUM 10 MG/1
10 TABLET ORAL ONCE
Status: COMPLETED | OUTPATIENT
Start: 2025-08-26 | End: 2025-08-26

## 2025-08-26 RX ORDER — DIPHENHYDRAMINE HYDROCHLORIDE 50 MG/ML
50 INJECTION, SOLUTION INTRAMUSCULAR; INTRAVENOUS AS NEEDED
Status: DISCONTINUED | OUTPATIENT
Start: 2025-08-26 | End: 2025-08-26 | Stop reason: HOSPADM

## 2025-08-26 RX ORDER — FAMOTIDINE 10 MG/ML
20 INJECTION, SOLUTION INTRAVENOUS ONCE AS NEEDED
Status: DISCONTINUED | OUTPATIENT
Start: 2025-08-26 | End: 2025-08-26 | Stop reason: HOSPADM

## 2025-08-26 RX ORDER — DEXAMETHASONE 4 MG/1
8 TABLET ORAL ONCE
Status: COMPLETED | OUTPATIENT
Start: 2025-08-26 | End: 2025-08-26

## 2025-08-26 RX ORDER — EPINEPHRINE 0.3 MG/.3ML
0.3 INJECTION SUBCUTANEOUS EVERY 5 MIN PRN
Status: DISCONTINUED | OUTPATIENT
Start: 2025-08-26 | End: 2025-08-26 | Stop reason: HOSPADM

## 2025-08-26 RX ADMIN — ACETAMINOPHEN 650 MG: 325 TABLET ORAL at 09:04

## 2025-08-26 RX ADMIN — DEXAMETHASONE 8 MG: 4 TABLET ORAL at 09:05

## 2025-08-26 RX ADMIN — MONTELUKAST 10 MG: 10 TABLET, FILM COATED ORAL at 09:05

## 2025-08-26 RX ADMIN — DIPHENHYDRAMINE HYDROCHLORIDE 50 MG: 50 CAPSULE ORAL at 09:05

## 2025-08-26 RX ADMIN — DARATUMUMAB AND HYALURONIDASE-FIHJ (HUMAN RECOMBINANT) 1800 MG: 1800; 30000 INJECTION SUBCUTANEOUS at 09:28

## 2025-08-26 ASSESSMENT — PAIN SCALES - GENERAL: PAINLEVEL_OUTOF10: 2

## 2025-08-27 LAB
KAPPA LC SERPL-MCNC: 3.58 MG/DL (ref 0.33–1.94)
KAPPA LC/LAMBDA SER: 4.9 {RATIO} (ref 0.26–1.65)
LAMBDA LC SERPL-MCNC: 0.73 MG/DL (ref 0.57–2.63)

## 2025-09-01 LAB
ALBUMIN: 3.8 G/DL (ref 3.4–5)
ALPHA 1 GLOBULIN: 0.3 G/DL (ref 0.2–0.6)
ALPHA 2 GLOBULIN: 0.9 G/DL (ref 0.4–1.1)
BETA GLOBULIN: 0.7 G/DL (ref 0.5–1.2)
GAMMA GLOBULIN: 0.7 G/DL (ref 0.5–1.4)
M-PROTEIN 1: 0.2 G/DL (ref ?–0)
PATH REVIEW-SERUM PROTEIN ELECTROPHORESIS: ABNORMAL
PROTEIN ELECTROPHORESIS COMMENT: ABNORMAL

## 2025-09-18 ENCOUNTER — APPOINTMENT (OUTPATIENT)
Dept: PRIMARY CARE | Facility: CLINIC | Age: 75
End: 2025-09-18
Payer: MEDICARE

## 2025-10-20 ENCOUNTER — APPOINTMENT (OUTPATIENT)
Dept: NEPHROLOGY | Facility: CLINIC | Age: 75
End: 2025-10-20
Payer: MEDICARE

## 2026-06-16 ENCOUNTER — APPOINTMENT (OUTPATIENT)
Dept: UROLOGY | Facility: CLINIC | Age: 76
End: 2026-06-16
Payer: MEDICARE